# Patient Record
Sex: MALE | Race: WHITE | Employment: UNEMPLOYED | URBAN - METROPOLITAN AREA
[De-identification: names, ages, dates, MRNs, and addresses within clinical notes are randomized per-mention and may not be internally consistent; named-entity substitution may affect disease eponyms.]

---

## 2017-03-24 ENCOUNTER — APPOINTMENT (OUTPATIENT)
Dept: GENERAL RADIOLOGY | Age: 57
End: 2017-03-24
Payer: COMMERCIAL

## 2017-03-24 ENCOUNTER — HOSPITAL ENCOUNTER (EMERGENCY)
Age: 57
Discharge: OTHER INSTITUTION | End: 2017-03-25
Attending: EMERGENCY MEDICINE
Payer: COMMERCIAL

## 2017-03-24 VITALS
WEIGHT: 195 LBS | HEART RATE: 112 BPM | SYSTOLIC BLOOD PRESSURE: 159 MMHG | DIASTOLIC BLOOD PRESSURE: 104 MMHG | RESPIRATION RATE: 16 BRPM | TEMPERATURE: 98.2 F | OXYGEN SATURATION: 98 % | BODY MASS INDEX: 25.04 KG/M2

## 2017-03-24 DIAGNOSIS — M54.9 CHRONIC BACK PAIN, UNSPECIFIED BACK LOCATION, UNSPECIFIED BACK PAIN LATERALITY: ICD-10-CM

## 2017-03-24 DIAGNOSIS — F25.9 SCHIZOAFFECTIVE DISORDER, UNSPECIFIED TYPE (HCC): ICD-10-CM

## 2017-03-24 DIAGNOSIS — G89.29 CHRONIC SHOULDER PAIN, UNSPECIFIED LATERALITY: Primary | ICD-10-CM

## 2017-03-24 DIAGNOSIS — G89.29 CHRONIC BACK PAIN, UNSPECIFIED BACK LOCATION, UNSPECIFIED BACK PAIN LATERALITY: ICD-10-CM

## 2017-03-24 DIAGNOSIS — M25.519 CHRONIC SHOULDER PAIN, UNSPECIFIED LATERALITY: Primary | ICD-10-CM

## 2017-03-24 PROCEDURE — 99285 EMERGENCY DEPT VISIT HI MDM: CPT

## 2017-03-24 PROCEDURE — 73030 X-RAY EXAM OF SHOULDER: CPT

## 2017-03-24 PROCEDURE — 93005 ELECTROCARDIOGRAM TRACING: CPT

## 2017-03-24 PROCEDURE — 72100 X-RAY EXAM L-S SPINE 2/3 VWS: CPT

## 2017-03-24 RX ORDER — HYDROCODONE BITARTRATE AND ACETAMINOPHEN 5; 325 MG/1; MG/1
1 TABLET ORAL EVERY 6 HOURS PRN
Qty: 10 TABLET | Refills: 0 | Status: ON HOLD | OUTPATIENT
Start: 2017-03-24 | End: 2017-04-04 | Stop reason: HOSPADM

## 2017-03-24 ASSESSMENT — ENCOUNTER SYMPTOMS
COUGH: 0
ORTHOPNEA: 0
ABDOMINAL PAIN: 0
BACK PAIN: 1
SHORTNESS OF BREATH: 0
WHEEZING: 0
NAUSEA: 0
DIARRHEA: 0
VOMITING: 0

## 2017-03-24 ASSESSMENT — PAIN DESCRIPTION - ONSET: ONSET: ON-GOING

## 2017-03-24 ASSESSMENT — PAIN DESCRIPTION - PAIN TYPE: TYPE: ACUTE PAIN;CHRONIC PAIN

## 2017-03-24 ASSESSMENT — PAIN DESCRIPTION - FREQUENCY: FREQUENCY: CONTINUOUS

## 2017-03-24 ASSESSMENT — PAIN DESCRIPTION - PROGRESSION: CLINICAL_PROGRESSION: GRADUALLY WORSENING

## 2017-03-24 ASSESSMENT — PAIN DESCRIPTION - DESCRIPTORS: DESCRIPTORS: ACHING

## 2017-03-24 ASSESSMENT — PAIN DESCRIPTION - LOCATION: LOCATION: BACK;GENERALIZED;SHOULDER

## 2017-03-24 ASSESSMENT — PAIN SCALES - GENERAL: PAINLEVEL_OUTOF10: 10

## 2017-03-25 ENCOUNTER — HOSPITAL ENCOUNTER (INPATIENT)
Age: 57
LOS: 23 days | Discharge: HOME | DRG: 885 | End: 2017-04-17
Attending: PSYCHIATRY & NEUROLOGY | Admitting: PSYCHIATRY & NEUROLOGY
Payer: COMMERCIAL

## 2017-03-25 PROBLEM — F90.2 ADHD (ATTENTION DEFICIT HYPERACTIVITY DISORDER), COMBINED TYPE: Status: ACTIVE | Noted: 2017-03-25

## 2017-03-25 LAB
AMPHETAMINE SCREEN URINE: NEGATIVE
BARBITURATE SCREEN URINE: NEGATIVE
BENZODIAZEPINE SCREEN, URINE: NEGATIVE
BUPRENORPHINE URINE: NORMAL
CANNABINOID SCREEN URINE: NEGATIVE
COCAINE METABOLITE, URINE: NEGATIVE
MDMA URINE: NORMAL
METHADONE SCREEN, URINE: NEGATIVE
METHAMPHETAMINE, URINE: NORMAL
OPIATES, URINE: NEGATIVE
OXYCODONE SCREEN URINE: NEGATIVE
PHENCYCLIDINE, URINE: NEGATIVE
PROPOXYPHENE, URINE: NORMAL
TEST INFORMATION: NORMAL
TRICYCLIC ANTIDEPRESSANTS, UR: NORMAL

## 2017-03-25 PROCEDURE — 6370000000 HC RX 637 (ALT 250 FOR IP): Performed by: PSYCHIATRY & NEUROLOGY

## 2017-03-25 PROCEDURE — 80307 DRUG TEST PRSMV CHEM ANLYZR: CPT

## 2017-03-25 PROCEDURE — 1240000000 HC EMOTIONAL WELLNESS R&B

## 2017-03-25 PROCEDURE — 6370000000 HC RX 637 (ALT 250 FOR IP): Performed by: EMERGENCY MEDICINE

## 2017-03-25 RX ORDER — BUPROPION HYDROCHLORIDE 100 MG/1
100 TABLET, EXTENDED RELEASE ORAL DAILY
Status: DISCONTINUED | OUTPATIENT
Start: 2017-03-25 | End: 2017-03-31

## 2017-03-25 RX ORDER — LORAZEPAM 0.5 MG/1
1 TABLET ORAL ONCE
Status: COMPLETED | OUTPATIENT
Start: 2017-03-25 | End: 2017-03-25

## 2017-03-25 RX ORDER — LORAZEPAM 2 MG/ML
2 INJECTION INTRAMUSCULAR
Status: ACTIVE | OUTPATIENT
Start: 2017-03-25 | End: 2017-03-25

## 2017-03-25 RX ORDER — NICOTINE 21 MG/24HR
1 PATCH, TRANSDERMAL 24 HOURS TRANSDERMAL DAILY
Status: DISCONTINUED | OUTPATIENT
Start: 2017-03-25 | End: 2017-03-25 | Stop reason: CLARIF

## 2017-03-25 RX ORDER — BENZTROPINE MESYLATE 1 MG/ML
2 INJECTION INTRAMUSCULAR; INTRAVENOUS DAILY PRN
Status: DISCONTINUED | OUTPATIENT
Start: 2017-03-25 | End: 2017-04-17 | Stop reason: HOSPADM

## 2017-03-25 RX ORDER — ATOMOXETINE 10 MG/1
20 CAPSULE ORAL DAILY
Status: DISCONTINUED | OUTPATIENT
Start: 2017-03-25 | End: 2017-03-28

## 2017-03-25 RX ORDER — QUETIAPINE FUMARATE 300 MG/1
300 TABLET, FILM COATED ORAL NIGHTLY
Status: DISCONTINUED | OUTPATIENT
Start: 2017-03-25 | End: 2017-04-17 | Stop reason: HOSPADM

## 2017-03-25 RX ORDER — ATOMOXETINE 18 MG/1
18 CAPSULE ORAL DAILY
Status: DISCONTINUED | OUTPATIENT
Start: 2017-03-25 | End: 2017-03-25

## 2017-03-25 RX ORDER — CITALOPRAM 20 MG/1
20 TABLET ORAL DAILY
Status: DISCONTINUED | OUTPATIENT
Start: 2017-03-25 | End: 2017-03-25

## 2017-03-25 RX ORDER — MAGNESIUM HYDROXIDE/ALUMINUM HYDROXICE/SIMETHICONE 120; 1200; 1200 MG/30ML; MG/30ML; MG/30ML
30 SUSPENSION ORAL 2 TIMES DAILY PRN
Status: DISCONTINUED | OUTPATIENT
Start: 2017-03-25 | End: 2017-04-17 | Stop reason: HOSPADM

## 2017-03-25 RX ORDER — HYDROXYZINE HYDROCHLORIDE 25 MG/1
50 TABLET, FILM COATED ORAL 3 TIMES DAILY PRN
Status: DISPENSED | OUTPATIENT
Start: 2017-03-25 | End: 2017-03-28

## 2017-03-25 RX ORDER — TRAZODONE HYDROCHLORIDE 100 MG/1
100 TABLET ORAL NIGHTLY PRN
Status: DISCONTINUED | OUTPATIENT
Start: 2017-03-25 | End: 2017-04-17 | Stop reason: HOSPADM

## 2017-03-25 RX ORDER — ARIPIPRAZOLE 5 MG/1
5 TABLET ORAL DAILY
Status: DISCONTINUED | OUTPATIENT
Start: 2017-03-25 | End: 2017-03-25

## 2017-03-25 RX ORDER — HALOPERIDOL 5 MG/ML
10 INJECTION INTRAMUSCULAR
Status: ACTIVE | OUTPATIENT
Start: 2017-03-25 | End: 2017-03-25

## 2017-03-25 RX ORDER — ACETAMINOPHEN 325 MG/1
650 TABLET ORAL EVERY 6 HOURS PRN
Status: DISCONTINUED | OUTPATIENT
Start: 2017-03-25 | End: 2017-04-17 | Stop reason: HOSPADM

## 2017-03-25 RX ADMIN — TRAZODONE HYDROCHLORIDE 100 MG: 100 TABLET ORAL at 21:01

## 2017-03-25 RX ADMIN — QUETIAPINE FUMARATE 300 MG: 300 TABLET, FILM COATED ORAL at 21:01

## 2017-03-25 RX ADMIN — LORAZEPAM 1 MG: 0.5 TABLET ORAL at 00:33

## 2017-03-25 RX ADMIN — ATOMOXETINE HYDROCHLORIDE 20 MG: 10 CAPSULE ORAL at 13:27

## 2017-03-25 RX ADMIN — CITALOPRAM HYDROBROMIDE 20 MG: 20 TABLET ORAL at 08:03

## 2017-03-25 RX ADMIN — HYDROXYZINE HYDROCHLORIDE 50 MG: 25 TABLET, FILM COATED ORAL at 21:01

## 2017-03-25 RX ADMIN — ARIPIPRAZOLE 5 MG: 5 TABLET ORAL at 08:03

## 2017-03-25 RX ADMIN — ACETAMINOPHEN 650 MG: 325 TABLET ORAL at 01:52

## 2017-03-25 RX ADMIN — HYDROXYZINE HYDROCHLORIDE 50 MG: 25 TABLET, FILM COATED ORAL at 01:52

## 2017-03-25 RX ADMIN — ACETAMINOPHEN 650 MG: 325 TABLET ORAL at 10:36

## 2017-03-25 RX ADMIN — BUPROPION HYDROCHLORIDE 100 MG: 100 TABLET, FILM COATED, EXTENDED RELEASE ORAL at 12:00

## 2017-03-25 ASSESSMENT — PAIN SCALES - GENERAL
PAINLEVEL_OUTOF10: 4
PAINLEVEL_OUTOF10: 5
PAINLEVEL_OUTOF10: 0
PAINLEVEL_OUTOF10: 4
PAINLEVEL_OUTOF10: 7

## 2017-03-25 ASSESSMENT — LIFESTYLE VARIABLES: HISTORY_ALCOHOL_USE: YES

## 2017-03-25 ASSESSMENT — SLEEP AND FATIGUE QUESTIONNAIRES
DO YOU USE A SLEEP AID: NO
AVERAGE NUMBER OF SLEEP HOURS: 7
DO YOU HAVE DIFFICULTY SLEEPING: NO

## 2017-03-25 ASSESSMENT — PATIENT HEALTH QUESTIONNAIRE - PHQ9: SUM OF ALL RESPONSES TO PHQ QUESTIONS 1-9: 5

## 2017-03-25 ASSESSMENT — PAIN DESCRIPTION - PAIN TYPE: TYPE: ACUTE PAIN

## 2017-03-26 LAB
ABSOLUTE EOS #: 0.48 K/UL (ref 0–0.4)
ABSOLUTE LYMPH #: 1.84 K/UL (ref 1–4.8)
ABSOLUTE MONO #: 0.82 K/UL (ref 0.1–1.3)
ALBUMIN SERPL-MCNC: 3.9 G/DL (ref 3.5–5.2)
ALBUMIN/GLOBULIN RATIO: ABNORMAL (ref 1–2.5)
ALP BLD-CCNC: 64 U/L (ref 40–129)
ALT SERPL-CCNC: 18 U/L (ref 5–41)
ANION GAP SERPL CALCULATED.3IONS-SCNC: 14 MMOL/L (ref 9–17)
AST SERPL-CCNC: 15 U/L
BASOPHILS # BLD: 1 % (ref 0–2)
BASOPHILS ABSOLUTE: 0.07 K/UL (ref 0–0.2)
BILIRUB SERPL-MCNC: 0.24 MG/DL (ref 0.3–1.2)
BUN BLDV-MCNC: 13 MG/DL (ref 6–20)
BUN/CREAT BLD: ABNORMAL (ref 9–20)
CALCIUM SERPL-MCNC: 9 MG/DL (ref 8.6–10.4)
CHLORIDE BLD-SCNC: 102 MMOL/L (ref 98–107)
CO2: 22 MMOL/L (ref 20–31)
CREAT SERPL-MCNC: 0.67 MG/DL (ref 0.7–1.2)
DIFFERENTIAL TYPE: ABNORMAL
EOSINOPHILS RELATIVE PERCENT: 7 % (ref 0–4)
GFR AFRICAN AMERICAN: >60 ML/MIN
GFR NON-AFRICAN AMERICAN: >60 ML/MIN
GFR SERPL CREATININE-BSD FRML MDRD: ABNORMAL ML/MIN/{1.73_M2}
GFR SERPL CREATININE-BSD FRML MDRD: ABNORMAL ML/MIN/{1.73_M2}
GLUCOSE BLD-MCNC: 144 MG/DL (ref 70–99)
HCT VFR BLD CALC: 38 % (ref 41–53)
HEMOGLOBIN: 11.7 G/DL (ref 13.5–17.5)
LYMPHOCYTES # BLD: 27 % (ref 24–44)
MCH RBC QN AUTO: 24.3 PG (ref 26–34)
MCHC RBC AUTO-ENTMCNC: 30.7 G/DL (ref 31–37)
MCV RBC AUTO: 79.2 FL (ref 80–100)
MONOCYTES # BLD: 12 % (ref 1–7)
MORPHOLOGY: ABNORMAL
PDW BLD-RTO: 16.3 % (ref 11.5–14.9)
PLATELET # BLD: 248 K/UL (ref 150–450)
PLATELET ESTIMATE: ABNORMAL
PMV BLD AUTO: 7.7 FL (ref 6–12)
POTASSIUM SERPL-SCNC: 4.5 MMOL/L (ref 3.7–5.3)
RBC # BLD: 4.8 M/UL (ref 4.5–5.9)
RBC # BLD: ABNORMAL 10*6/UL
SEG NEUTROPHILS: 53 % (ref 36–66)
SEGMENTED NEUTROPHILS ABSOLUTE COUNT: 3.59 K/UL (ref 1.3–9.1)
SODIUM BLD-SCNC: 138 MMOL/L (ref 135–144)
TOTAL PROTEIN: 7.1 G/DL (ref 6.4–8.3)
WBC # BLD: 6.8 K/UL (ref 3.5–11)
WBC # BLD: ABNORMAL 10*3/UL

## 2017-03-26 PROCEDURE — 6370000000 HC RX 637 (ALT 250 FOR IP): Performed by: PSYCHIATRY & NEUROLOGY

## 2017-03-26 PROCEDURE — 85025 COMPLETE CBC W/AUTO DIFF WBC: CPT

## 2017-03-26 PROCEDURE — 36415 COLL VENOUS BLD VENIPUNCTURE: CPT

## 2017-03-26 PROCEDURE — 1240000000 HC EMOTIONAL WELLNESS R&B

## 2017-03-26 PROCEDURE — 6360000002 HC RX W HCPCS: Performed by: PSYCHIATRY & NEUROLOGY

## 2017-03-26 PROCEDURE — 80053 COMPREHEN METABOLIC PANEL: CPT

## 2017-03-26 RX ORDER — BENZTROPINE MESYLATE 1 MG/1
1 TABLET ORAL 2 TIMES DAILY
Status: DISCONTINUED | OUTPATIENT
Start: 2017-03-26 | End: 2017-04-17 | Stop reason: HOSPADM

## 2017-03-26 RX ADMIN — BUPROPION HYDROCHLORIDE 100 MG: 100 TABLET, FILM COATED, EXTENDED RELEASE ORAL at 09:06

## 2017-03-26 RX ADMIN — HYDROXYZINE HYDROCHLORIDE 50 MG: 25 TABLET, FILM COATED ORAL at 20:17

## 2017-03-26 RX ADMIN — ALUMINUM HYDROXIDE, MAGNESIUM HYDROXIDE, AND SIMETHICONE 30 ML: 200; 200; 20 SUSPENSION ORAL at 00:01

## 2017-03-26 RX ADMIN — BENZTROPINE MESYLATE 1 MG: 1 TABLET ORAL at 20:17

## 2017-03-26 RX ADMIN — ATOMOXETINE HYDROCHLORIDE 20 MG: 10 CAPSULE ORAL at 09:06

## 2017-03-26 RX ADMIN — ALUMINUM HYDROXIDE, MAGNESIUM HYDROXIDE, AND SIMETHICONE 30 ML: 200; 200; 20 SUSPENSION ORAL at 18:50

## 2017-03-26 RX ADMIN — BENZTROPINE MESYLATE 2 MG: 1 INJECTION INTRAMUSCULAR; INTRAVENOUS at 15:10

## 2017-03-26 RX ADMIN — QUETIAPINE FUMARATE 300 MG: 300 TABLET, FILM COATED ORAL at 20:17

## 2017-03-26 RX ADMIN — HYDROXYZINE HYDROCHLORIDE 50 MG: 25 TABLET, FILM COATED ORAL at 09:29

## 2017-03-26 RX ADMIN — TRAZODONE HYDROCHLORIDE 100 MG: 100 TABLET ORAL at 20:17

## 2017-03-27 PROCEDURE — 6370000000 HC RX 637 (ALT 250 FOR IP): Performed by: PSYCHIATRY & NEUROLOGY

## 2017-03-27 PROCEDURE — 1240000000 HC EMOTIONAL WELLNESS R&B

## 2017-03-27 RX ORDER — AMLODIPINE BESYLATE 10 MG/1
10 TABLET ORAL DAILY
Status: DISCONTINUED | OUTPATIENT
Start: 2017-03-27 | End: 2017-04-17 | Stop reason: HOSPADM

## 2017-03-27 RX ORDER — LORAZEPAM 2 MG/ML
2 INJECTION INTRAMUSCULAR
Status: ACTIVE | OUTPATIENT
Start: 2017-03-27 | End: 2017-03-27

## 2017-03-27 RX ORDER — LORAZEPAM 0.5 MG/1
0.5 TABLET ORAL 2 TIMES DAILY
Status: DISCONTINUED | OUTPATIENT
Start: 2017-03-27 | End: 2017-04-17 | Stop reason: HOSPADM

## 2017-03-27 RX ORDER — HALOPERIDOL 5 MG/ML
10 INJECTION INTRAMUSCULAR
Status: ACTIVE | OUTPATIENT
Start: 2017-03-27 | End: 2017-03-27

## 2017-03-27 RX ORDER — PANTOPRAZOLE SODIUM 20 MG/1
20 TABLET, DELAYED RELEASE ORAL
Status: DISCONTINUED | OUTPATIENT
Start: 2017-03-28 | End: 2017-04-17 | Stop reason: HOSPADM

## 2017-03-27 RX ADMIN — QUETIAPINE FUMARATE 300 MG: 300 TABLET, FILM COATED ORAL at 20:34

## 2017-03-27 RX ADMIN — TRAZODONE HYDROCHLORIDE 100 MG: 100 TABLET ORAL at 22:21

## 2017-03-27 RX ADMIN — ATOMOXETINE HYDROCHLORIDE 20 MG: 10 CAPSULE ORAL at 08:48

## 2017-03-27 RX ADMIN — BENZTROPINE MESYLATE 1 MG: 1 TABLET ORAL at 08:48

## 2017-03-27 RX ADMIN — AMLODIPINE BESYLATE 10 MG: 10 TABLET ORAL at 11:03

## 2017-03-27 RX ADMIN — BUPROPION HYDROCHLORIDE 100 MG: 100 TABLET, FILM COATED, EXTENDED RELEASE ORAL at 08:48

## 2017-03-27 RX ADMIN — BENZTROPINE MESYLATE 1 MG: 1 TABLET ORAL at 20:34

## 2017-03-27 RX ADMIN — LORAZEPAM 0.5 MG: 0.5 TABLET ORAL at 20:34

## 2017-03-27 RX ADMIN — ALUMINUM HYDROXIDE, MAGNESIUM HYDROXIDE, AND SIMETHICONE 30 ML: 200; 200; 20 SUSPENSION ORAL at 11:03

## 2017-03-28 PROCEDURE — 6370000000 HC RX 637 (ALT 250 FOR IP): Performed by: PSYCHIATRY & NEUROLOGY

## 2017-03-28 PROCEDURE — 1240000000 HC EMOTIONAL WELLNESS R&B

## 2017-03-28 RX ORDER — ATOMOXETINE 10 MG/1
30 CAPSULE ORAL DAILY
Status: DISCONTINUED | OUTPATIENT
Start: 2017-03-29 | End: 2017-03-30

## 2017-03-28 RX ADMIN — AMLODIPINE BESYLATE 10 MG: 10 TABLET ORAL at 08:32

## 2017-03-28 RX ADMIN — PANTOPRAZOLE SODIUM 20 MG: 20 TABLET, DELAYED RELEASE ORAL at 06:40

## 2017-03-28 RX ADMIN — LORAZEPAM 0.5 MG: 0.5 TABLET ORAL at 20:39

## 2017-03-28 RX ADMIN — BUPROPION HYDROCHLORIDE 100 MG: 100 TABLET, FILM COATED, EXTENDED RELEASE ORAL at 08:31

## 2017-03-28 RX ADMIN — ATOMOXETINE HYDROCHLORIDE 20 MG: 10 CAPSULE ORAL at 08:32

## 2017-03-28 RX ADMIN — LORAZEPAM 0.5 MG: 0.5 TABLET ORAL at 08:32

## 2017-03-28 RX ADMIN — QUETIAPINE FUMARATE 300 MG: 300 TABLET, FILM COATED ORAL at 20:39

## 2017-03-28 RX ADMIN — BENZTROPINE MESYLATE 1 MG: 1 TABLET ORAL at 08:32

## 2017-03-29 LAB
EKG ATRIAL RATE: 114 BPM
EKG P AXIS: 47 DEGREES
EKG P-R INTERVAL: 138 MS
EKG Q-T INTERVAL: 336 MS
EKG QRS DURATION: 94 MS
EKG QTC CALCULATION (BAZETT): 463 MS
EKG R AXIS: -14 DEGREES
EKG T AXIS: 52 DEGREES
EKG VENTRICULAR RATE: 114 BPM

## 2017-03-29 PROCEDURE — 1240000000 HC EMOTIONAL WELLNESS R&B

## 2017-03-29 PROCEDURE — 6370000000 HC RX 637 (ALT 250 FOR IP): Performed by: PSYCHIATRY & NEUROLOGY

## 2017-03-29 RX ADMIN — LORAZEPAM 0.5 MG: 0.5 TABLET ORAL at 08:59

## 2017-03-29 RX ADMIN — PANTOPRAZOLE SODIUM 20 MG: 20 TABLET, DELAYED RELEASE ORAL at 06:36

## 2017-03-29 RX ADMIN — LORAZEPAM 0.5 MG: 0.5 TABLET ORAL at 20:54

## 2017-03-29 RX ADMIN — QUETIAPINE FUMARATE 300 MG: 300 TABLET, FILM COATED ORAL at 20:54

## 2017-03-29 RX ADMIN — TRAZODONE HYDROCHLORIDE 100 MG: 100 TABLET ORAL at 20:54

## 2017-03-29 RX ADMIN — ACETAMINOPHEN 650 MG: 325 TABLET ORAL at 10:15

## 2017-03-29 RX ADMIN — AMLODIPINE BESYLATE 10 MG: 10 TABLET ORAL at 08:58

## 2017-03-29 RX ADMIN — ATOMOXETINE HYDROCHLORIDE 30 MG: 10 CAPSULE ORAL at 08:58

## 2017-03-29 RX ADMIN — BUPROPION HYDROCHLORIDE 100 MG: 100 TABLET, FILM COATED, EXTENDED RELEASE ORAL at 08:59

## 2017-03-29 RX ADMIN — BENZTROPINE MESYLATE 1 MG: 1 TABLET ORAL at 20:54

## 2017-03-29 ASSESSMENT — PAIN DESCRIPTION - LOCATION
LOCATION: SHOULDER
LOCATION: SHOULDER

## 2017-03-29 ASSESSMENT — PAIN SCALES - GENERAL
PAINLEVEL_OUTOF10: 0
PAINLEVEL_OUTOF10: 3
PAINLEVEL_OUTOF10: 10

## 2017-03-29 ASSESSMENT — PAIN DESCRIPTION - ORIENTATION
ORIENTATION: LEFT
ORIENTATION: LEFT

## 2017-03-29 ASSESSMENT — PAIN DESCRIPTION - PAIN TYPE
TYPE: CHRONIC PAIN
TYPE: CHRONIC PAIN

## 2017-03-30 PROCEDURE — 1240000000 HC EMOTIONAL WELLNESS R&B

## 2017-03-30 PROCEDURE — 6370000000 HC RX 637 (ALT 250 FOR IP): Performed by: PSYCHIATRY & NEUROLOGY

## 2017-03-30 RX ORDER — ATOMOXETINE 40 MG/1
40 CAPSULE ORAL DAILY
Status: DISCONTINUED | OUTPATIENT
Start: 2017-03-31 | End: 2017-03-31

## 2017-03-30 RX ADMIN — ATOMOXETINE HYDROCHLORIDE 30 MG: 10 CAPSULE ORAL at 08:57

## 2017-03-30 RX ADMIN — LORAZEPAM 0.5 MG: 0.5 TABLET ORAL at 08:58

## 2017-03-30 RX ADMIN — ACETAMINOPHEN 650 MG: 325 TABLET ORAL at 18:04

## 2017-03-30 RX ADMIN — PANTOPRAZOLE SODIUM 20 MG: 20 TABLET, DELAYED RELEASE ORAL at 06:29

## 2017-03-30 RX ADMIN — ALUMINUM HYDROXIDE, MAGNESIUM HYDROXIDE, AND SIMETHICONE 30 ML: 200; 200; 20 SUSPENSION ORAL at 21:40

## 2017-03-30 RX ADMIN — QUETIAPINE FUMARATE 300 MG: 300 TABLET, FILM COATED ORAL at 20:27

## 2017-03-30 RX ADMIN — AMLODIPINE BESYLATE 10 MG: 10 TABLET ORAL at 08:58

## 2017-03-30 RX ADMIN — BUPROPION HYDROCHLORIDE 100 MG: 100 TABLET, FILM COATED, EXTENDED RELEASE ORAL at 08:58

## 2017-03-30 RX ADMIN — LORAZEPAM 0.5 MG: 0.5 TABLET ORAL at 20:27

## 2017-03-30 ASSESSMENT — PAIN DESCRIPTION - LOCATION
LOCATION: HEAD
LOCATION: HEAD
LOCATION: BACK

## 2017-03-30 ASSESSMENT — PAIN SCALES - GENERAL
PAINLEVEL_OUTOF10: 0
PAINLEVEL_OUTOF10: 10
PAINLEVEL_OUTOF10: 3

## 2017-03-30 ASSESSMENT — PAIN DESCRIPTION - PAIN TYPE
TYPE: ACUTE PAIN
TYPE: CHRONIC PAIN
TYPE: ACUTE PAIN

## 2017-03-31 PROCEDURE — 6370000000 HC RX 637 (ALT 250 FOR IP): Performed by: PSYCHIATRY & NEUROLOGY

## 2017-03-31 PROCEDURE — 1240000000 HC EMOTIONAL WELLNESS R&B

## 2017-03-31 RX ORDER — BUPROPION HYDROCHLORIDE 150 MG/1
300 TABLET, EXTENDED RELEASE ORAL DAILY
Status: DISCONTINUED | OUTPATIENT
Start: 2017-04-01 | End: 2017-04-17 | Stop reason: HOSPADM

## 2017-03-31 RX ORDER — ATOMOXETINE 60 MG/1
60 CAPSULE ORAL DAILY
Status: DISCONTINUED | OUTPATIENT
Start: 2017-04-01 | End: 2017-04-17 | Stop reason: HOSPADM

## 2017-03-31 RX ORDER — BUPROPION HYDROCHLORIDE 100 MG/1
100 TABLET, EXTENDED RELEASE ORAL ONCE
Status: DISCONTINUED | OUTPATIENT
Start: 2017-03-31 | End: 2017-03-31

## 2017-03-31 RX ORDER — BUPROPION HYDROCHLORIDE 150 MG/1
300 TABLET, EXTENDED RELEASE ORAL ONCE
Status: DISCONTINUED | OUTPATIENT
Start: 2017-04-01 | End: 2017-03-31

## 2017-03-31 RX ORDER — BUPROPION HYDROCHLORIDE 100 MG/1
200 TABLET, EXTENDED RELEASE ORAL DAILY
Status: DISCONTINUED | OUTPATIENT
Start: 2017-04-01 | End: 2017-03-31

## 2017-03-31 RX ORDER — BUPROPION HYDROCHLORIDE 100 MG/1
100 TABLET, EXTENDED RELEASE ORAL ONCE
Status: COMPLETED | OUTPATIENT
Start: 2017-03-31 | End: 2017-03-31

## 2017-03-31 RX ADMIN — PANTOPRAZOLE SODIUM 20 MG: 20 TABLET, DELAYED RELEASE ORAL at 06:12

## 2017-03-31 RX ADMIN — BUPROPION HYDROCHLORIDE 100 MG: 100 TABLET, FILM COATED, EXTENDED RELEASE ORAL at 09:24

## 2017-03-31 RX ADMIN — AMLODIPINE BESYLATE 10 MG: 10 TABLET ORAL at 08:32

## 2017-03-31 RX ADMIN — QUETIAPINE FUMARATE 300 MG: 300 TABLET, FILM COATED ORAL at 20:50

## 2017-03-31 RX ADMIN — BUPROPION HYDROCHLORIDE 100 MG: 100 TABLET, FILM COATED, EXTENDED RELEASE ORAL at 08:33

## 2017-03-31 RX ADMIN — ATOMOXETINE HYDROCHLORIDE 40 MG: 40 CAPSULE ORAL at 08:33

## 2017-03-31 RX ADMIN — LORAZEPAM 0.5 MG: 0.5 TABLET ORAL at 08:33

## 2017-03-31 RX ADMIN — LORAZEPAM 0.5 MG: 0.5 TABLET ORAL at 20:50

## 2017-03-31 ASSESSMENT — PAIN SCALES - GENERAL: PAINLEVEL_OUTOF10: 10

## 2017-03-31 ASSESSMENT — PAIN DESCRIPTION - LOCATION: LOCATION: HEAD;BACK

## 2017-03-31 ASSESSMENT — PAIN DESCRIPTION - PAIN TYPE: TYPE: ACUTE PAIN

## 2017-04-01 PROCEDURE — 6370000000 HC RX 637 (ALT 250 FOR IP): Performed by: PSYCHIATRY & NEUROLOGY

## 2017-04-01 PROCEDURE — 1240000000 HC EMOTIONAL WELLNESS R&B

## 2017-04-01 RX ADMIN — AMLODIPINE BESYLATE 10 MG: 10 TABLET ORAL at 08:43

## 2017-04-01 RX ADMIN — LORAZEPAM 0.5 MG: 0.5 TABLET ORAL at 21:44

## 2017-04-01 RX ADMIN — BENZTROPINE MESYLATE 1 MG: 1 TABLET ORAL at 08:42

## 2017-04-01 RX ADMIN — PANTOPRAZOLE SODIUM 20 MG: 20 TABLET, DELAYED RELEASE ORAL at 06:16

## 2017-04-01 RX ADMIN — QUETIAPINE FUMARATE 300 MG: 300 TABLET, FILM COATED ORAL at 21:43

## 2017-04-01 RX ADMIN — ACETAMINOPHEN 650 MG: 325 TABLET ORAL at 21:42

## 2017-04-01 RX ADMIN — ALUMINUM HYDROXIDE, MAGNESIUM HYDROXIDE, AND SIMETHICONE 30 ML: 200; 200; 20 SUSPENSION ORAL at 10:45

## 2017-04-01 RX ADMIN — BUPROPION HYDROCHLORIDE 300 MG: 150 TABLET, FILM COATED, EXTENDED RELEASE ORAL at 08:43

## 2017-04-01 RX ADMIN — ATOMOXETINE HYDROCHLORIDE 60 MG: 60 CAPSULE ORAL at 08:43

## 2017-04-01 RX ADMIN — LORAZEPAM 0.5 MG: 0.5 TABLET ORAL at 08:43

## 2017-04-01 ASSESSMENT — PAIN SCALES - GENERAL
PAINLEVEL_OUTOF10: 5
PAINLEVEL_OUTOF10: 0

## 2017-04-02 PROCEDURE — 6370000000 HC RX 637 (ALT 250 FOR IP): Performed by: PSYCHIATRY & NEUROLOGY

## 2017-04-02 PROCEDURE — 1240000000 HC EMOTIONAL WELLNESS R&B

## 2017-04-02 RX ADMIN — ACETAMINOPHEN 650 MG: 325 TABLET ORAL at 12:08

## 2017-04-02 RX ADMIN — ATOMOXETINE HYDROCHLORIDE 60 MG: 60 CAPSULE ORAL at 08:29

## 2017-04-02 RX ADMIN — QUETIAPINE FUMARATE 300 MG: 300 TABLET, FILM COATED ORAL at 21:30

## 2017-04-02 RX ADMIN — AMLODIPINE BESYLATE 10 MG: 10 TABLET ORAL at 08:25

## 2017-04-02 RX ADMIN — LORAZEPAM 0.5 MG: 0.5 TABLET ORAL at 21:30

## 2017-04-02 RX ADMIN — LORAZEPAM 0.5 MG: 0.5 TABLET ORAL at 08:25

## 2017-04-02 RX ADMIN — BUPROPION HYDROCHLORIDE 300 MG: 150 TABLET, FILM COATED, EXTENDED RELEASE ORAL at 08:25

## 2017-04-02 ASSESSMENT — PAIN SCALES - GENERAL: PAINLEVEL_OUTOF10: 10

## 2017-04-03 PROCEDURE — 6370000000 HC RX 637 (ALT 250 FOR IP): Performed by: PSYCHIATRY & NEUROLOGY

## 2017-04-03 PROCEDURE — 1240000000 HC EMOTIONAL WELLNESS R&B

## 2017-04-03 RX ADMIN — BUPROPION HYDROCHLORIDE 300 MG: 150 TABLET, FILM COATED, EXTENDED RELEASE ORAL at 08:50

## 2017-04-03 RX ADMIN — LORAZEPAM 0.5 MG: 0.5 TABLET ORAL at 21:49

## 2017-04-03 RX ADMIN — ATOMOXETINE HYDROCHLORIDE 60 MG: 60 CAPSULE ORAL at 08:50

## 2017-04-03 RX ADMIN — AMLODIPINE BESYLATE 10 MG: 10 TABLET ORAL at 08:50

## 2017-04-03 RX ADMIN — QUETIAPINE FUMARATE 300 MG: 300 TABLET, FILM COATED ORAL at 21:49

## 2017-04-03 RX ADMIN — PANTOPRAZOLE SODIUM 20 MG: 20 TABLET, DELAYED RELEASE ORAL at 06:46

## 2017-04-03 RX ADMIN — LORAZEPAM 0.5 MG: 0.5 TABLET ORAL at 08:50

## 2017-04-03 RX ADMIN — ALUMINUM HYDROXIDE, MAGNESIUM HYDROXIDE, AND SIMETHICONE 30 ML: 200; 200; 20 SUSPENSION ORAL at 00:45

## 2017-04-04 PROCEDURE — 6370000000 HC RX 637 (ALT 250 FOR IP): Performed by: PSYCHIATRY & NEUROLOGY

## 2017-04-04 PROCEDURE — 1240000000 HC EMOTIONAL WELLNESS R&B

## 2017-04-04 RX ORDER — BENZTROPINE MESYLATE 1 MG/1
1 TABLET ORAL 2 TIMES DAILY
Qty: 60 TABLET | Refills: 0 | Status: SHIPPED | OUTPATIENT
Start: 2017-04-04

## 2017-04-04 RX ORDER — RANOLAZINE 500 MG/1
500 TABLET, EXTENDED RELEASE ORAL 2 TIMES DAILY
Qty: 60 TABLET | Refills: 0 | Status: SHIPPED | OUTPATIENT
Start: 2017-04-04

## 2017-04-04 RX ORDER — QUETIAPINE FUMARATE 300 MG/1
300 TABLET, FILM COATED ORAL NIGHTLY
Qty: 30 TABLET | Refills: 0 | Status: SHIPPED | OUTPATIENT
Start: 2017-04-04

## 2017-04-04 RX ORDER — TRAZODONE HYDROCHLORIDE 100 MG/1
100 TABLET ORAL NIGHTLY PRN
Qty: 30 TABLET | Refills: 0 | Status: SHIPPED | OUTPATIENT
Start: 2017-04-04

## 2017-04-04 RX ORDER — LORAZEPAM 0.5 MG/1
0.5 TABLET ORAL 2 TIMES DAILY
Qty: 60 TABLET | Refills: 0 | Status: SHIPPED | OUTPATIENT
Start: 2017-04-04 | End: 2017-05-04

## 2017-04-04 RX ORDER — PANTOPRAZOLE SODIUM 20 MG/1
20 TABLET, DELAYED RELEASE ORAL
Qty: 30 TABLET | Refills: 0 | Status: SHIPPED | OUTPATIENT
Start: 2017-04-04

## 2017-04-04 RX ORDER — BUPROPION HYDROCHLORIDE 150 MG/1
300 TABLET, EXTENDED RELEASE ORAL DAILY
Qty: 60 TABLET | Refills: 0 | Status: SHIPPED | OUTPATIENT
Start: 2017-04-04

## 2017-04-04 RX ORDER — AMLODIPINE BESYLATE 10 MG/1
10 TABLET ORAL DAILY
Qty: 30 TABLET | Refills: 0 | Status: SHIPPED | OUTPATIENT
Start: 2017-04-04

## 2017-04-04 RX ORDER — ATOMOXETINE 60 MG/1
60 CAPSULE ORAL DAILY
Qty: 30 CAPSULE | Refills: 0 | Status: SHIPPED | OUTPATIENT
Start: 2017-04-04

## 2017-04-04 RX ADMIN — LORAZEPAM 0.5 MG: 0.5 TABLET ORAL at 08:51

## 2017-04-04 RX ADMIN — ATOMOXETINE HYDROCHLORIDE 60 MG: 60 CAPSULE ORAL at 08:51

## 2017-04-04 RX ADMIN — ALUMINUM HYDROXIDE, MAGNESIUM HYDROXIDE, AND SIMETHICONE 30 ML: 200; 200; 20 SUSPENSION ORAL at 16:32

## 2017-04-04 RX ADMIN — BUPROPION HYDROCHLORIDE 300 MG: 150 TABLET, FILM COATED, EXTENDED RELEASE ORAL at 08:52

## 2017-04-04 RX ADMIN — BENZTROPINE MESYLATE 1 MG: 1 TABLET ORAL at 08:52

## 2017-04-04 RX ADMIN — TRAZODONE HYDROCHLORIDE 100 MG: 100 TABLET ORAL at 20:26

## 2017-04-04 RX ADMIN — ACETAMINOPHEN 650 MG: 325 TABLET ORAL at 20:26

## 2017-04-04 RX ADMIN — AMLODIPINE BESYLATE 10 MG: 10 TABLET ORAL at 08:52

## 2017-04-04 RX ADMIN — LORAZEPAM 0.5 MG: 0.5 TABLET ORAL at 20:25

## 2017-04-04 RX ADMIN — ALUMINUM HYDROXIDE, MAGNESIUM HYDROXIDE, AND SIMETHICONE 30 ML: 200; 200; 20 SUSPENSION ORAL at 21:54

## 2017-04-04 ASSESSMENT — PAIN SCALES - GENERAL
PAINLEVEL_OUTOF10: 7
PAINLEVEL_OUTOF10: 3

## 2017-04-05 PROCEDURE — 6370000000 HC RX 637 (ALT 250 FOR IP): Performed by: PSYCHIATRY & NEUROLOGY

## 2017-04-05 PROCEDURE — 1240000000 HC EMOTIONAL WELLNESS R&B

## 2017-04-05 RX ADMIN — LORAZEPAM 0.5 MG: 0.5 TABLET ORAL at 08:25

## 2017-04-05 RX ADMIN — ATOMOXETINE HYDROCHLORIDE 60 MG: 60 CAPSULE ORAL at 08:25

## 2017-04-05 RX ADMIN — ALUMINUM HYDROXIDE, MAGNESIUM HYDROXIDE, AND SIMETHICONE 30 ML: 200; 200; 20 SUSPENSION ORAL at 09:33

## 2017-04-05 RX ADMIN — BENZTROPINE MESYLATE 1 MG: 1 TABLET ORAL at 08:25

## 2017-04-05 RX ADMIN — AMLODIPINE BESYLATE 10 MG: 10 TABLET ORAL at 08:25

## 2017-04-05 RX ADMIN — BUPROPION HYDROCHLORIDE 300 MG: 150 TABLET, FILM COATED, EXTENDED RELEASE ORAL at 08:25

## 2017-04-05 RX ADMIN — PANTOPRAZOLE SODIUM 20 MG: 20 TABLET, DELAYED RELEASE ORAL at 06:32

## 2017-04-05 RX ADMIN — LORAZEPAM 0.5 MG: 0.5 TABLET ORAL at 20:43

## 2017-04-05 RX ADMIN — QUETIAPINE FUMARATE 300 MG: 300 TABLET, FILM COATED ORAL at 20:44

## 2017-04-05 ASSESSMENT — PAIN SCALES - GENERAL: PAINLEVEL_OUTOF10: 10

## 2017-04-06 PROCEDURE — 1240000000 HC EMOTIONAL WELLNESS R&B

## 2017-04-06 PROCEDURE — 6370000000 HC RX 637 (ALT 250 FOR IP): Performed by: PSYCHIATRY & NEUROLOGY

## 2017-04-06 RX ADMIN — BENZTROPINE MESYLATE 1 MG: 1 TABLET ORAL at 10:42

## 2017-04-06 RX ADMIN — LORAZEPAM 0.5 MG: 0.5 TABLET ORAL at 08:45

## 2017-04-06 RX ADMIN — BUPROPION HYDROCHLORIDE 300 MG: 150 TABLET, FILM COATED, EXTENDED RELEASE ORAL at 08:45

## 2017-04-06 RX ADMIN — LORAZEPAM 0.5 MG: 0.5 TABLET ORAL at 20:33

## 2017-04-06 RX ADMIN — QUETIAPINE FUMARATE 300 MG: 300 TABLET, FILM COATED ORAL at 20:31

## 2017-04-06 RX ADMIN — PANTOPRAZOLE SODIUM 20 MG: 20 TABLET, DELAYED RELEASE ORAL at 08:48

## 2017-04-06 RX ADMIN — ATOMOXETINE HYDROCHLORIDE 60 MG: 60 CAPSULE ORAL at 08:45

## 2017-04-06 RX ADMIN — AMLODIPINE BESYLATE 10 MG: 10 TABLET ORAL at 08:45

## 2017-04-06 ASSESSMENT — PAIN SCALES - GENERAL: PAINLEVEL_OUTOF10: 10

## 2017-04-07 PROCEDURE — 6370000000 HC RX 637 (ALT 250 FOR IP): Performed by: PSYCHIATRY & NEUROLOGY

## 2017-04-07 PROCEDURE — 1240000000 HC EMOTIONAL WELLNESS R&B

## 2017-04-07 RX ADMIN — AMLODIPINE BESYLATE 10 MG: 10 TABLET ORAL at 08:49

## 2017-04-07 RX ADMIN — QUETIAPINE FUMARATE 300 MG: 300 TABLET, FILM COATED ORAL at 20:39

## 2017-04-07 RX ADMIN — BUPROPION HYDROCHLORIDE 300 MG: 150 TABLET, FILM COATED, EXTENDED RELEASE ORAL at 08:49

## 2017-04-07 RX ADMIN — PANTOPRAZOLE SODIUM 20 MG: 20 TABLET, DELAYED RELEASE ORAL at 06:28

## 2017-04-07 RX ADMIN — ALUMINUM HYDROXIDE, MAGNESIUM HYDROXIDE, AND SIMETHICONE 30 ML: 200; 200; 20 SUSPENSION ORAL at 12:07

## 2017-04-07 RX ADMIN — ACETAMINOPHEN 650 MG: 325 TABLET ORAL at 20:39

## 2017-04-07 RX ADMIN — LORAZEPAM 0.5 MG: 0.5 TABLET ORAL at 20:39

## 2017-04-07 RX ADMIN — ATOMOXETINE HYDROCHLORIDE 60 MG: 60 CAPSULE ORAL at 08:49

## 2017-04-07 RX ADMIN — BENZTROPINE MESYLATE 1 MG: 1 TABLET ORAL at 20:39

## 2017-04-07 RX ADMIN — LORAZEPAM 0.5 MG: 0.5 TABLET ORAL at 08:49

## 2017-04-07 ASSESSMENT — PAIN SCALES - GENERAL
PAINLEVEL_OUTOF10: 6
PAINLEVEL_OUTOF10: 3

## 2017-04-08 PROCEDURE — 1240000000 HC EMOTIONAL WELLNESS R&B

## 2017-04-08 PROCEDURE — 6370000000 HC RX 637 (ALT 250 FOR IP): Performed by: PSYCHIATRY & NEUROLOGY

## 2017-04-08 RX ADMIN — AMLODIPINE BESYLATE 10 MG: 10 TABLET ORAL at 08:41

## 2017-04-08 RX ADMIN — TRAZODONE HYDROCHLORIDE 100 MG: 100 TABLET ORAL at 20:34

## 2017-04-08 RX ADMIN — BUPROPION HYDROCHLORIDE 300 MG: 150 TABLET, FILM COATED, EXTENDED RELEASE ORAL at 08:41

## 2017-04-08 RX ADMIN — QUETIAPINE FUMARATE 300 MG: 300 TABLET, FILM COATED ORAL at 20:33

## 2017-04-08 RX ADMIN — BENZTROPINE MESYLATE 1 MG: 1 TABLET ORAL at 08:41

## 2017-04-08 RX ADMIN — BENZTROPINE MESYLATE 1 MG: 1 TABLET ORAL at 20:33

## 2017-04-08 RX ADMIN — LORAZEPAM 0.5 MG: 0.5 TABLET ORAL at 08:41

## 2017-04-08 RX ADMIN — LORAZEPAM 0.5 MG: 0.5 TABLET ORAL at 20:33

## 2017-04-08 RX ADMIN — ATOMOXETINE HYDROCHLORIDE 60 MG: 60 CAPSULE ORAL at 08:41

## 2017-04-08 RX ADMIN — PANTOPRAZOLE SODIUM 20 MG: 20 TABLET, DELAYED RELEASE ORAL at 06:27

## 2017-04-09 PROCEDURE — 1240000000 HC EMOTIONAL WELLNESS R&B

## 2017-04-09 PROCEDURE — 6370000000 HC RX 637 (ALT 250 FOR IP): Performed by: PSYCHIATRY & NEUROLOGY

## 2017-04-09 RX ADMIN — LORAZEPAM 0.5 MG: 0.5 TABLET ORAL at 08:40

## 2017-04-09 RX ADMIN — BUPROPION HYDROCHLORIDE 300 MG: 150 TABLET, FILM COATED, EXTENDED RELEASE ORAL at 08:39

## 2017-04-09 RX ADMIN — QUETIAPINE FUMARATE 300 MG: 300 TABLET, FILM COATED ORAL at 20:30

## 2017-04-09 RX ADMIN — ATOMOXETINE HYDROCHLORIDE 60 MG: 60 CAPSULE ORAL at 08:39

## 2017-04-09 RX ADMIN — PANTOPRAZOLE SODIUM 20 MG: 20 TABLET, DELAYED RELEASE ORAL at 08:39

## 2017-04-09 RX ADMIN — AMLODIPINE BESYLATE 10 MG: 10 TABLET ORAL at 08:40

## 2017-04-09 RX ADMIN — ALUMINUM HYDROXIDE, MAGNESIUM HYDROXIDE, AND SIMETHICONE 30 ML: 200; 200; 20 SUSPENSION ORAL at 16:57

## 2017-04-09 RX ADMIN — BENZTROPINE MESYLATE 1 MG: 1 TABLET ORAL at 08:40

## 2017-04-09 RX ADMIN — LORAZEPAM 0.5 MG: 0.5 TABLET ORAL at 20:30

## 2017-04-09 ASSESSMENT — PAIN SCALES - GENERAL
PAINLEVEL_OUTOF10: 10
PAINLEVEL_OUTOF10: 10

## 2017-04-09 ASSESSMENT — PAIN DESCRIPTION - LOCATION
LOCATION: GENERALIZED
LOCATION: GENERALIZED

## 2017-04-09 ASSESSMENT — PAIN DESCRIPTION - PAIN TYPE: TYPE: CHRONIC PAIN

## 2017-04-10 PROCEDURE — 1240000000 HC EMOTIONAL WELLNESS R&B

## 2017-04-10 PROCEDURE — 6370000000 HC RX 637 (ALT 250 FOR IP): Performed by: PSYCHIATRY & NEUROLOGY

## 2017-04-10 RX ADMIN — LORAZEPAM 0.5 MG: 0.5 TABLET ORAL at 20:32

## 2017-04-10 RX ADMIN — BENZTROPINE MESYLATE 1 MG: 1 TABLET ORAL at 11:38

## 2017-04-10 RX ADMIN — PANTOPRAZOLE SODIUM 20 MG: 20 TABLET, DELAYED RELEASE ORAL at 06:32

## 2017-04-10 RX ADMIN — QUETIAPINE FUMARATE 300 MG: 300 TABLET, FILM COATED ORAL at 20:32

## 2017-04-10 RX ADMIN — ATOMOXETINE HYDROCHLORIDE 60 MG: 60 CAPSULE ORAL at 08:31

## 2017-04-10 RX ADMIN — LORAZEPAM 0.5 MG: 0.5 TABLET ORAL at 08:32

## 2017-04-10 RX ADMIN — ACETAMINOPHEN 650 MG: 325 TABLET ORAL at 13:34

## 2017-04-10 RX ADMIN — BUPROPION HYDROCHLORIDE 300 MG: 150 TABLET, FILM COATED, EXTENDED RELEASE ORAL at 08:31

## 2017-04-10 ASSESSMENT — PAIN DESCRIPTION - LOCATION: LOCATION: HEAD

## 2017-04-10 ASSESSMENT — PAIN DESCRIPTION - PAIN TYPE: TYPE: ACUTE PAIN

## 2017-04-10 ASSESSMENT — PAIN SCALES - GENERAL
PAINLEVEL_OUTOF10: 1
PAINLEVEL_OUTOF10: 3

## 2017-04-10 ASSESSMENT — PAIN DESCRIPTION - DESCRIPTORS: DESCRIPTORS: HEADACHE

## 2017-04-10 ASSESSMENT — PAIN DESCRIPTION - FREQUENCY: FREQUENCY: INTERMITTENT

## 2017-04-11 PROCEDURE — 6370000000 HC RX 637 (ALT 250 FOR IP): Performed by: PSYCHIATRY & NEUROLOGY

## 2017-04-11 PROCEDURE — 1240000000 HC EMOTIONAL WELLNESS R&B

## 2017-04-11 RX ADMIN — BUPROPION HYDROCHLORIDE 300 MG: 150 TABLET, FILM COATED, EXTENDED RELEASE ORAL at 08:35

## 2017-04-11 RX ADMIN — TRAZODONE HYDROCHLORIDE 100 MG: 100 TABLET ORAL at 20:53

## 2017-04-11 RX ADMIN — LORAZEPAM 0.5 MG: 0.5 TABLET ORAL at 20:53

## 2017-04-11 RX ADMIN — BENZTROPINE MESYLATE 1 MG: 1 TABLET ORAL at 20:53

## 2017-04-11 RX ADMIN — LORAZEPAM 0.5 MG: 0.5 TABLET ORAL at 08:35

## 2017-04-11 RX ADMIN — BENZTROPINE MESYLATE 1 MG: 1 TABLET ORAL at 08:35

## 2017-04-11 RX ADMIN — ATOMOXETINE HYDROCHLORIDE 60 MG: 60 CAPSULE ORAL at 08:35

## 2017-04-11 RX ADMIN — AMLODIPINE BESYLATE 10 MG: 10 TABLET ORAL at 08:35

## 2017-04-11 RX ADMIN — QUETIAPINE FUMARATE 300 MG: 300 TABLET, FILM COATED ORAL at 20:53

## 2017-04-11 RX ADMIN — PANTOPRAZOLE SODIUM 20 MG: 20 TABLET, DELAYED RELEASE ORAL at 06:48

## 2017-04-11 ASSESSMENT — PAIN DESCRIPTION - LOCATION: LOCATION: GENERALIZED

## 2017-04-12 PROCEDURE — 1240000000 HC EMOTIONAL WELLNESS R&B

## 2017-04-12 PROCEDURE — 6370000000 HC RX 637 (ALT 250 FOR IP): Performed by: PSYCHIATRY & NEUROLOGY

## 2017-04-12 RX ADMIN — LORAZEPAM 0.5 MG: 0.5 TABLET ORAL at 20:33

## 2017-04-12 RX ADMIN — QUETIAPINE FUMARATE 300 MG: 300 TABLET, FILM COATED ORAL at 20:33

## 2017-04-12 RX ADMIN — PANTOPRAZOLE SODIUM 20 MG: 20 TABLET, DELAYED RELEASE ORAL at 06:22

## 2017-04-12 RX ADMIN — LORAZEPAM 0.5 MG: 0.5 TABLET ORAL at 08:38

## 2017-04-12 RX ADMIN — AMLODIPINE BESYLATE 10 MG: 10 TABLET ORAL at 08:38

## 2017-04-12 RX ADMIN — BENZTROPINE MESYLATE 1 MG: 1 TABLET ORAL at 08:39

## 2017-04-12 RX ADMIN — BUPROPION HYDROCHLORIDE 300 MG: 150 TABLET, FILM COATED, EXTENDED RELEASE ORAL at 08:38

## 2017-04-12 RX ADMIN — ATOMOXETINE HYDROCHLORIDE 60 MG: 60 CAPSULE ORAL at 08:40

## 2017-04-12 ASSESSMENT — PAIN DESCRIPTION - PAIN TYPE: TYPE: ACUTE PAIN

## 2017-04-12 ASSESSMENT — PAIN DESCRIPTION - LOCATION: LOCATION: GENERALIZED

## 2017-04-12 ASSESSMENT — PAIN SCALES - GENERAL: PAINLEVEL_OUTOF10: 10

## 2017-04-13 PROCEDURE — 6370000000 HC RX 637 (ALT 250 FOR IP): Performed by: PSYCHIATRY & NEUROLOGY

## 2017-04-13 PROCEDURE — 1240000000 HC EMOTIONAL WELLNESS R&B

## 2017-04-13 PROCEDURE — 6370000000 HC RX 637 (ALT 250 FOR IP): Performed by: PODIATRIST

## 2017-04-13 RX ORDER — CLOTRIMAZOLE 1 %
CREAM (GRAM) TOPICAL 2 TIMES DAILY
Status: DISCONTINUED | OUTPATIENT
Start: 2017-04-13 | End: 2017-04-17 | Stop reason: HOSPADM

## 2017-04-13 RX ADMIN — LORAZEPAM 0.5 MG: 0.5 TABLET ORAL at 20:28

## 2017-04-13 RX ADMIN — PANTOPRAZOLE SODIUM 20 MG: 20 TABLET, DELAYED RELEASE ORAL at 06:27

## 2017-04-13 RX ADMIN — LORAZEPAM 0.5 MG: 0.5 TABLET ORAL at 09:02

## 2017-04-13 RX ADMIN — BENZTROPINE MESYLATE 1 MG: 1 TABLET ORAL at 09:03

## 2017-04-13 RX ADMIN — AMLODIPINE BESYLATE 10 MG: 10 TABLET ORAL at 09:01

## 2017-04-13 RX ADMIN — BUPROPION HYDROCHLORIDE 300 MG: 150 TABLET, FILM COATED, EXTENDED RELEASE ORAL at 09:02

## 2017-04-13 RX ADMIN — QUETIAPINE FUMARATE 300 MG: 300 TABLET, FILM COATED ORAL at 20:28

## 2017-04-13 RX ADMIN — ATOMOXETINE HYDROCHLORIDE 60 MG: 60 CAPSULE ORAL at 09:02

## 2017-04-13 RX ADMIN — CLOTRIMAZOLE: 10 CREAM TOPICAL at 15:02

## 2017-04-14 PROCEDURE — 1240000000 HC EMOTIONAL WELLNESS R&B

## 2017-04-14 PROCEDURE — 6370000000 HC RX 637 (ALT 250 FOR IP): Performed by: PSYCHIATRY & NEUROLOGY

## 2017-04-14 PROCEDURE — 6370000000 HC RX 637 (ALT 250 FOR IP): Performed by: PODIATRIST

## 2017-04-14 RX ADMIN — LORAZEPAM 0.5 MG: 0.5 TABLET ORAL at 08:53

## 2017-04-14 RX ADMIN — LORAZEPAM 0.5 MG: 0.5 TABLET ORAL at 20:26

## 2017-04-14 RX ADMIN — BUPROPION HYDROCHLORIDE 150 MG: 150 TABLET, FILM COATED, EXTENDED RELEASE ORAL at 08:53

## 2017-04-14 RX ADMIN — CLOTRIMAZOLE: 10 CREAM TOPICAL at 10:20

## 2017-04-14 RX ADMIN — ATOMOXETINE HYDROCHLORIDE 60 MG: 60 CAPSULE ORAL at 08:52

## 2017-04-14 RX ADMIN — QUETIAPINE FUMARATE 300 MG: 300 TABLET, FILM COATED ORAL at 20:25

## 2017-04-14 RX ADMIN — BENZTROPINE MESYLATE 1 MG: 1 TABLET ORAL at 11:42

## 2017-04-14 RX ADMIN — AMLODIPINE BESYLATE 10 MG: 10 TABLET ORAL at 08:53

## 2017-04-14 RX ADMIN — PANTOPRAZOLE SODIUM 20 MG: 20 TABLET, DELAYED RELEASE ORAL at 06:21

## 2017-04-15 PROCEDURE — 6370000000 HC RX 637 (ALT 250 FOR IP): Performed by: PSYCHIATRY & NEUROLOGY

## 2017-04-15 PROCEDURE — 1240000000 HC EMOTIONAL WELLNESS R&B

## 2017-04-15 RX ADMIN — AMLODIPINE BESYLATE 10 MG: 10 TABLET ORAL at 08:36

## 2017-04-15 RX ADMIN — ATOMOXETINE HYDROCHLORIDE 60 MG: 60 CAPSULE ORAL at 08:36

## 2017-04-15 RX ADMIN — BENZTROPINE MESYLATE 1 MG: 1 TABLET ORAL at 08:36

## 2017-04-15 RX ADMIN — LORAZEPAM 0.5 MG: 0.5 TABLET ORAL at 20:44

## 2017-04-15 RX ADMIN — ACETAMINOPHEN 650 MG: 325 TABLET ORAL at 20:43

## 2017-04-15 RX ADMIN — PANTOPRAZOLE SODIUM 20 MG: 20 TABLET, DELAYED RELEASE ORAL at 06:27

## 2017-04-15 RX ADMIN — LORAZEPAM 0.5 MG: 0.5 TABLET ORAL at 08:36

## 2017-04-15 RX ADMIN — QUETIAPINE FUMARATE 300 MG: 300 TABLET, FILM COATED ORAL at 20:44

## 2017-04-15 RX ADMIN — ALUMINUM HYDROXIDE, MAGNESIUM HYDROXIDE, AND SIMETHICONE 30 ML: 200; 200; 20 SUSPENSION ORAL at 10:43

## 2017-04-15 RX ADMIN — BUPROPION HYDROCHLORIDE 300 MG: 150 TABLET, FILM COATED, EXTENDED RELEASE ORAL at 08:36

## 2017-04-15 ASSESSMENT — PAIN SCALES - GENERAL
PAINLEVEL_OUTOF10: 3
PAINLEVEL_OUTOF10: 3

## 2017-04-16 PROCEDURE — 1240000000 HC EMOTIONAL WELLNESS R&B

## 2017-04-16 PROCEDURE — 6370000000 HC RX 637 (ALT 250 FOR IP): Performed by: PSYCHIATRY & NEUROLOGY

## 2017-04-16 RX ADMIN — BUPROPION HYDROCHLORIDE 300 MG: 150 TABLET, FILM COATED, EXTENDED RELEASE ORAL at 08:32

## 2017-04-16 RX ADMIN — PANTOPRAZOLE SODIUM 20 MG: 20 TABLET, DELAYED RELEASE ORAL at 06:23

## 2017-04-16 RX ADMIN — ALUMINUM HYDROXIDE, MAGNESIUM HYDROXIDE, AND SIMETHICONE 30 ML: 200; 200; 20 SUSPENSION ORAL at 19:50

## 2017-04-16 RX ADMIN — CLOTRIMAZOLE: 10 CREAM TOPICAL at 20:42

## 2017-04-16 RX ADMIN — ATOMOXETINE HYDROCHLORIDE 60 MG: 60 CAPSULE ORAL at 08:40

## 2017-04-16 RX ADMIN — QUETIAPINE FUMARATE 300 MG: 300 TABLET, FILM COATED ORAL at 20:32

## 2017-04-16 RX ADMIN — AMLODIPINE BESYLATE 10 MG: 10 TABLET ORAL at 08:32

## 2017-04-16 RX ADMIN — LORAZEPAM 0.5 MG: 0.5 TABLET ORAL at 20:31

## 2017-04-16 RX ADMIN — LORAZEPAM 0.5 MG: 0.5 TABLET ORAL at 08:32

## 2017-04-16 RX ADMIN — ALUMINUM HYDROXIDE, MAGNESIUM HYDROXIDE, AND SIMETHICONE 30 ML: 200; 200; 20 SUSPENSION ORAL at 10:38

## 2017-04-16 RX ADMIN — BENZTROPINE MESYLATE 1 MG: 1 TABLET ORAL at 08:32

## 2017-04-17 VITALS
WEIGHT: 195 LBS | DIASTOLIC BLOOD PRESSURE: 71 MMHG | OXYGEN SATURATION: 97 % | BODY MASS INDEX: 27.3 KG/M2 | HEART RATE: 84 BPM | SYSTOLIC BLOOD PRESSURE: 124 MMHG | HEIGHT: 71 IN | TEMPERATURE: 98.1 F | RESPIRATION RATE: 14 BRPM

## 2017-04-17 PROCEDURE — 6370000000 HC RX 637 (ALT 250 FOR IP): Performed by: PSYCHIATRY & NEUROLOGY

## 2017-04-17 PROCEDURE — 5130000000 HC BRIDGE APPOINTMENT

## 2017-04-17 RX ADMIN — PANTOPRAZOLE SODIUM 20 MG: 20 TABLET, DELAYED RELEASE ORAL at 06:10

## 2017-04-17 RX ADMIN — CLOTRIMAZOLE: 10 CREAM TOPICAL at 08:37

## 2017-04-17 RX ADMIN — BENZTROPINE MESYLATE 1 MG: 1 TABLET ORAL at 08:36

## 2017-04-17 RX ADMIN — AMLODIPINE BESYLATE 10 MG: 10 TABLET ORAL at 08:36

## 2017-04-17 RX ADMIN — ATOMOXETINE HYDROCHLORIDE 60 MG: 60 CAPSULE ORAL at 08:35

## 2017-04-17 RX ADMIN — BUPROPION HYDROCHLORIDE 300 MG: 150 TABLET, FILM COATED, EXTENDED RELEASE ORAL at 08:36

## 2017-04-17 RX ADMIN — LORAZEPAM 0.5 MG: 0.5 TABLET ORAL at 08:35

## 2023-03-04 PROCEDURE — 99308 SBSQ NF CARE LOW MDM 20: CPT | Performed by: INTERNAL MEDICINE

## 2023-04-01 PROCEDURE — 99308 SBSQ NF CARE LOW MDM 20: CPT | Performed by: INTERNAL MEDICINE

## 2023-04-30 ENCOUNTER — NURSING HOME VISIT (OUTPATIENT)
Dept: POST ACUTE CARE | Facility: EXTERNAL LOCATION | Age: 63
End: 2023-04-30
Payer: COMMERCIAL

## 2023-04-30 DIAGNOSIS — K21.9 GASTROESOPHAGEAL REFLUX DISEASE WITHOUT ESOPHAGITIS: ICD-10-CM

## 2023-04-30 DIAGNOSIS — R53.1 ASTHENIA: ICD-10-CM

## 2023-04-30 DIAGNOSIS — F32.89 OTHER DEPRESSION: ICD-10-CM

## 2023-04-30 DIAGNOSIS — J44.9 CHRONIC OBSTRUCTIVE PULMONARY DISEASE, UNSPECIFIED COPD TYPE (MULTI): ICD-10-CM

## 2023-04-30 DIAGNOSIS — G20.A1 PARKINSON'S DISEASE (MULTI): ICD-10-CM

## 2023-04-30 DIAGNOSIS — I25.10 ASHD (ARTERIOSCLEROTIC HEART DISEASE): ICD-10-CM

## 2023-04-30 DIAGNOSIS — F20.89 OTHER SCHIZOPHRENIA (MULTI): ICD-10-CM

## 2023-04-30 DIAGNOSIS — I10 BENIGN HYPERTENSION: Primary | ICD-10-CM

## 2023-04-30 DIAGNOSIS — F03.90 DEMENTIA, UNSPECIFIED DEMENTIA SEVERITY, UNSPECIFIED DEMENTIA TYPE, UNSPECIFIED WHETHER BEHAVIORAL, PSYCHOTIC, OR MOOD DISTURBANCE OR ANXIETY (MULTI): ICD-10-CM

## 2023-04-30 DIAGNOSIS — G40.89 OTHER SEIZURES (MULTI): ICD-10-CM

## 2023-04-30 PROCEDURE — 99308 SBSQ NF CARE LOW MDM 20: CPT | Performed by: INTERNAL MEDICINE

## 2023-04-30 NOTE — LETTER
Patient: Gerardo Albright  : 1960    Encounter Date: 2023    NAME OF THE PATIENT: Gerardo Albright     YOB: 1960    PLACE OF SERVICE: [facility]    This is a subsequent visit.    HPI: Mr. Gerardo Albright is a 62-year-old male with history of schizophrenia and depression.  He does have a history of Parkinson disease as well as dementia.  He is unable to care for himself and manage his affairs.  He requires supportive care.    PAST MEDICAL HISTORY: As per nursing home list. Schizoaffective disorder, schizophrenia, GERD, myocardial infarction, dementia, suicidal ideation, weakness, angina, seizure, depression, hypertension, ADHD.    CURRENT MEDICATIONS: As per nursing home list. Reviewed.    ALLERGIES: As per nursing home list. No allergies.    SOCIAL HISTORY: As per nursing home list. The patient is a nonsmoker and nondrinker.    FAMILY HISTORY: As per nursing home list. The patient does not recall his family.    REVIEW OF SYSTEMS: All 12 systems reviewed and pertaining covered in history and physical, rest are negative. The patient denies any fevers, chills, or chest pain at this time.    PHYSICAL EXAMINATION  GENERAL: This is a well-developed, well-nourished male, sitting in chair, in no acute distress.  VITAL SIGNS:  As recorded and reviewed from EMR.  Blood pressure 120/80.  Pulse 78.  Respirations 18.  Temperature 98.1.  EYES:  The patient's sclerae white.  The pupils were equal and round.  ENT:  The patient's external ears were normal and the otoscopic examination was negative.  NECK:  Supple.  There were no palpable masses.  Thyroid was not enlarged and there were no carotid bruits.  RESPIRATORY:  Lungs show rare scattered wheeze.  CARDIOVASCULAR:  The patient had S1 normal, split S2 without obvious rubs, clicks, or murmurs.  GASTROINTESTINAL:  There was no hepatosplenomegaly.  There were no palpable masses and no inguinal nodes.  LYMPHATIC:  The patient had no axillary,  groin, or lymphadenopathy.  MUSCULOSKELETAL:  The patient had normal gait.  The joints appeared to be normal without evidence of deformity.  Grossly the muscles had good range of motion and were without effusion.  EXTREMITIES:  There was no evidence of peripheral edema.  NEUROLOGIC:  The patient had normal cranial nerves.  The reflexes, sensory, and motor examination were grossly within normal limits.  PSYCHIATRIC: The patient had normal judgment and insight.  The patient was oriented to person, place, and time, and had no obvious mood defect including depression, anxiety, and/or agitation.    LAB WORK: Laboratory studies reviewed from prior visit.    ASSESSMENT AND PLAN:  Schizophrenia, on medication.  Parkinson's disease, on carbidopa/levodopa.  Dementia, unchanged.  Depression, on medication.  ASHD, on aspirin.  Weakness, on PT/OT.  Fall risk, on fall precautions.  COPD, on bronchodilator therapy.  Reflux disease, on PPI.  Hypertension, med controlled.  Seizure disorder, on anti-epileptic.  Medication list reviewed and discussed with the family.  Hospital chart reviewed.      Electronically Signed By: Amparo Galarza MD   8/2/23 10:27 AM

## 2023-05-06 ENCOUNTER — NURSING HOME VISIT (OUTPATIENT)
Dept: POST ACUTE CARE | Facility: EXTERNAL LOCATION | Age: 63
End: 2023-05-06
Payer: COMMERCIAL

## 2023-05-06 DIAGNOSIS — G40.89 OTHER SEIZURES (MULTI): ICD-10-CM

## 2023-05-06 DIAGNOSIS — F03.90 DEMENTIA, UNSPECIFIED DEMENTIA SEVERITY, UNSPECIFIED DEMENTIA TYPE, UNSPECIFIED WHETHER BEHAVIORAL, PSYCHOTIC, OR MOOD DISTURBANCE OR ANXIETY (MULTI): ICD-10-CM

## 2023-05-06 DIAGNOSIS — K21.9 GASTROESOPHAGEAL REFLUX DISEASE WITHOUT ESOPHAGITIS: ICD-10-CM

## 2023-05-06 DIAGNOSIS — J44.9 CHRONIC OBSTRUCTIVE PULMONARY DISEASE, UNSPECIFIED COPD TYPE (MULTI): Primary | ICD-10-CM

## 2023-05-06 DIAGNOSIS — G20.A1 PARKINSON'S DISEASE (MULTI): ICD-10-CM

## 2023-05-06 DIAGNOSIS — R53.1 ASTHENIA: ICD-10-CM

## 2023-05-06 DIAGNOSIS — I25.10 ASHD (ARTERIOSCLEROTIC HEART DISEASE): ICD-10-CM

## 2023-05-06 DIAGNOSIS — F20.89 OTHER SCHIZOPHRENIA (MULTI): ICD-10-CM

## 2023-05-06 DIAGNOSIS — F32.89 OTHER DEPRESSION: ICD-10-CM

## 2023-05-06 DIAGNOSIS — G47.09 OTHER INSOMNIA: ICD-10-CM

## 2023-05-06 DIAGNOSIS — I10 BENIGN HYPERTENSION: ICD-10-CM

## 2023-05-06 PROCEDURE — 99308 SBSQ NF CARE LOW MDM 20: CPT | Performed by: INTERNAL MEDICINE

## 2023-05-06 NOTE — LETTER
Patient: Gerardo Albright  : 1960    Encounter Date: 2023    NAME OF THE PATIENT: Gerardo Albright    YOB: 1960    PLACE OF SERVICE:  [facility]    This is a subsequent visit.    CHIEF COMPLAINT:  Mr. Gerardo Albright is a 62-year-old male with history of dementia with Parkinson disease.  He suffers from seizures and has a history of COPD.  He is unable to care for himself and requires supportive care.    PAST MEDICAL HISTORY: As per nursing home list. Schizoaffective disorder, schizophrenia, GERD, myocardial infarction, dementia, suicidal ideation, weakness, angina, seizure, depression, hypertension, ADHD.    CURRENT MEDICATIONS: As per nursing home list. Reviewed.    ALLERGIES: As per nursing home list. No allergies.    SOCIAL HISTORY: As per nursing home list. The patient is a nonsmoker and nondrinker.    FAMILY HISTORY: As per nursing home list. The patient does not recall his family.    REVIEW OF SYSTEMS: All 12 systems reviewed and pertaining covered in history and physical, rest are negative. The patient denies any fevers, chills, or chest pain at this time.    PHYSICAL EXAMINATION  GENERAL: This is a well-developed, well-nourished male, sitting in a chair, in no acute distress.  VITAL SIGNS:  As recorded and reviewed from EMR.  Blood pressure 126/78.  Pulse 84.  Respirations 18.  Temperature 97.9.  EYES:  The patient's sclerae white.  The pupils were equal and round.  ENT:  The patient's external ears were normal and the otoscopic examination was negative.  NECK:  Supple.  There were no palpable masses.  Thyroid was not enlarged and there were no carotid bruits.  RESPIRATORY:  Lungs show decreased breath sounds throughout with rare scattered wheeze.  CARDIOVASCULAR:  The patient had S1 normal, split S2 without obvious rubs, clicks, or murmurs.  GASTROINTESTINAL:  There was no hepatosplenomegaly.  There were no palpable masses and no inguinal nodes.  LYMPHATIC:  The patient  had no axillary, groin, or lymphadenopathy.  MUSCULOSKELETAL:  The patient had normal gait.  The joints appeared to be normal without evidence of deformity.  Grossly the muscles had good range of motion and were without effusion.  EXTREMITIES:  There was no evidence of peripheral edema.  NEUROLOGIC:  The patient had normal cranial nerves.  The reflexes, sensory, and motor examination were grossly within normal limits.  PSYCHIATRIC: The patient had normal judgment and insight.  The patient had no obvious mood defect including anxiety and/or agitation.  MENTAL STATUS EXAMINATION: The patient is alert and oriented x2.    LAB WORK: Laboratory studies were reviewed from prior visit.    ASSESSMENT AND PLAN:  COPD, on bronchodilator therapy.  Parkinson disease, on carbidopa and levodopa.  Seizure disorder, on antiepileptic.  Dementia, unchanged.  Schizophrenia, on medication.  Depression, on medication.  ASHD, on aspirin.  Weakness, on PT and OT.  Fall risk, on fall precaution.  Insomnia, on melatonin.  Reflux disease, on PPI.  Hypertension, med controlled.  Medication list reviewed and discussed with the family.  Hospital chart reviewed.      Electronically Signed By: Amparo Galarza MD   8/2/23 10:26 AM

## 2023-05-08 PROCEDURE — 99222 1ST HOSP IP/OBS MODERATE 55: CPT | Performed by: INTERNAL MEDICINE

## 2023-05-16 ENCOUNTER — NURSING HOME VISIT (OUTPATIENT)
Dept: POST ACUTE CARE | Facility: EXTERNAL LOCATION | Age: 63
End: 2023-05-16
Payer: COMMERCIAL

## 2023-05-16 DIAGNOSIS — E87.8 ELECTROLYTE DISORDER: ICD-10-CM

## 2023-05-16 DIAGNOSIS — S41.112A LACERATION OF MULTIPLE SITES OF LEFT UPPER EXTREMITY, INITIAL ENCOUNTER: Primary | ICD-10-CM

## 2023-05-16 DIAGNOSIS — I10 ESSENTIAL HYPERTENSION: ICD-10-CM

## 2023-05-16 DIAGNOSIS — K21.9 GASTROESOPHAGEAL REFLUX DISEASE, UNSPECIFIED WHETHER ESOPHAGITIS PRESENT: ICD-10-CM

## 2023-05-16 DIAGNOSIS — G62.9 NEUROPATHY: ICD-10-CM

## 2023-05-16 DIAGNOSIS — M13.0 POLYARTHRITIS: ICD-10-CM

## 2023-05-16 DIAGNOSIS — K59.00 CONSTIPATION, UNSPECIFIED CONSTIPATION TYPE: ICD-10-CM

## 2023-05-16 DIAGNOSIS — I25.10 CORONARY ARTERY DISEASE, UNSPECIFIED VESSEL OR LESION TYPE, UNSPECIFIED WHETHER ANGINA PRESENT, UNSPECIFIED WHETHER NATIVE OR TRANSPLANTED HEART: ICD-10-CM

## 2023-05-16 DIAGNOSIS — G20.A1 PARKINSON DISEASE (MULTI): ICD-10-CM

## 2023-05-16 DIAGNOSIS — G89.29 OTHER CHRONIC PAIN: ICD-10-CM

## 2023-05-16 PROCEDURE — 99310 SBSQ NF CARE HIGH MDM 45: CPT | Performed by: NURSE PRACTITIONER

## 2023-05-16 NOTE — LETTER
Patient: Gerardo Albright  : 1960    Encounter Date: 2023    *Provider Impression*  Patient is a 62 year y/o male who is seen today for management of multiple medical problems  #LFA Lacerations - PT/OT, wound care, keflex 500mg TID until   #Parkinson's / Neuropathy / Chronic pain / OA - APAP 650mg q4h PRN, schedule f/u w/ neurology - movement disorder clinic, neurontin 600mg TID  #HTN / CAD - amlodipine 10mg daily, ASA 81mg daily, Ranexa 500mg BID  #Electrolyte disorder - NaCl 1gram daily  #GERD / Constipation - pantoprazole 40mg daily  #Schizoaffective / Depression - per psych - mirtazapine, bupropion, cogentin, quetiapine, depakote, zoloft  Follow up as needed      *Chief Complaint*  laceration      *History of Present Illness*  Pt is a 61 y/o male LT resident of [facility] w/ [facility] as below who is seen today for follow up and management of multiple medical problems.     He was sent to the ED after an apparent suicide attempt during which he stole and disassembled shaving razors and slices his left wrist multiple times. He was treated and medically stabilized in the ED. His lacerations were sutured, he was evaluated by EPAT and admitted to [facility], where he was psychiatrically stabilized then d/c to back to [facility] for rehab.    He is seen today shortly after his arrival back to the facility and reports some tenderness, but denies any f/c, sweats, CP, SOB, cough, n/v, or any other new c/o presently.    Allergies - NKDA  PMH - ADHD, Schizoaffective, HTN, CAD, MDD  PSH - cardiac cath, PCI, CABG  FH - unreliable  SocHx - Current everyday smoker      *Review of Systems*  All other systems reviewed are negative except as noted in the HPI      *Vitals*  Date: 23 - T: 98.4 P: 69 R: 18 BP: 130/84 SpO2: 97% on RA ; 11/15/22 - Wt 185      *Results/Data*  CBC - Date: 23 WBC: 6.2 HGB: 14.7 HCT: 43.4 PLT: 272 ;   BMP - Date: 23 Na: 134 K: 4.4 Cl: 102 CO2: 24 BUN: 11 Cr: 0.74 Glu:  74 Ca: 9.5 Alb: 4.2 ;   LFT - Date: 23 AST: 12 ALT: 16 ALP: 46 Tbili: 0.3 ;   Coags - Date: INR: PT:  Other - Date: 9/3/20 - TC: 139 T HDL: 25 LDL: 93; 11/10/20 - CRP: 15.1 D-dimer: 0.46; 21 - TC: 199 T HDL: 36 LDL: 138 VPA: 86 TSH: 2.715 25-OH-D: 19.07 ; 21 - VPA: 49; 21 - 25-OH-D: 26.67; 22 - TSH: 2.218     *Physical Exam*  Gen: (+) NAD, (+) well-appearing  HEENT: (+) normocephalic, (+) MMM  Neck: (+) supple  Lungs: (-) cough  Abd: (+) ND  Ext: (-) edema, (-) deformity  MSK: (-) joint swelling  Skin: (-) rash, (+) LFA w/ dsg c/d/i  Neuro: (+) follows commands, (+) tremor, (+) alert      Electronically Signed By: VIVIANE Peña-CNP   23  9:33 PM

## 2023-05-20 ENCOUNTER — NURSING HOME VISIT (OUTPATIENT)
Dept: POST ACUTE CARE | Facility: EXTERNAL LOCATION | Age: 63
End: 2023-05-20
Payer: COMMERCIAL

## 2023-05-20 DIAGNOSIS — G40.89 OTHER SEIZURES (MULTI): ICD-10-CM

## 2023-05-20 DIAGNOSIS — F03.90 DEMENTIA, UNSPECIFIED DEMENTIA SEVERITY, UNSPECIFIED DEMENTIA TYPE, UNSPECIFIED WHETHER BEHAVIORAL, PSYCHOTIC, OR MOOD DISTURBANCE OR ANXIETY (MULTI): ICD-10-CM

## 2023-05-20 DIAGNOSIS — G47.09 OTHER INSOMNIA: ICD-10-CM

## 2023-05-20 DIAGNOSIS — I10 BENIGN HYPERTENSION: ICD-10-CM

## 2023-05-20 DIAGNOSIS — F20.89 OTHER SCHIZOPHRENIA (MULTI): Primary | ICD-10-CM

## 2023-05-20 DIAGNOSIS — F41.9 ANXIETY: ICD-10-CM

## 2023-05-20 DIAGNOSIS — R53.1 ASTHENIA: ICD-10-CM

## 2023-05-20 DIAGNOSIS — I21.A9 OTHER TYPE OF ACUTE MYOCARDIAL INFARCTION (MULTI): ICD-10-CM

## 2023-05-20 DIAGNOSIS — G20.A1 PARKINSON'S DISEASE (MULTI): ICD-10-CM

## 2023-05-20 DIAGNOSIS — K21.9 GASTROESOPHAGEAL REFLUX DISEASE WITHOUT ESOPHAGITIS: ICD-10-CM

## 2023-05-20 DIAGNOSIS — J44.9 CHRONIC OBSTRUCTIVE PULMONARY DISEASE, UNSPECIFIED COPD TYPE (MULTI): ICD-10-CM

## 2023-05-20 PROCEDURE — 99306 1ST NF CARE HIGH MDM 50: CPT | Performed by: INTERNAL MEDICINE

## 2023-05-20 NOTE — PROGRESS NOTES
*Provider Impression*  Patient is a 62 year y/o male who is seen today for management of multiple medical problems  #LFA Lacerations - PT/OT, wound care, keflex 500mg TID until 5/24  #Parkinson's / Neuropathy / Chronic pain / OA - APAP 650mg q4h PRN, schedule f/u w/ neurology - movement disorder clinic, neurontin 600mg TID  #HTN / CAD - amlodipine 10mg daily, ASA 81mg daily, Ranexa 500mg BID  #Electrolyte disorder - NaCl 1gram daily  #GERD / Constipation - pantoprazole 40mg daily  #Schizoaffective / Depression - per psych - mirtazapine, bupropion, cogentin, quetiapine, depakote, zoloft  Follow up as needed      *Chief Complaint*  laceration      *History of Present Illness*  Pt is a 61 y/o male LTC resident of [facility] w/ [facility] as below who is seen today for follow up and management of multiple medical problems.     He was sent to the ED after an apparent suicide attempt during which he stole and disassembled shaving razors and slices his left wrist multiple times. He was treated and medically stabilized in the ED. His lacerations were sutured, he was evaluated by EPAT and admitted to [facility], where he was psychiatrically stabilized then d/c to back to [facility] for rehab.    He is seen today shortly after his arrival back to the facility and reports some tenderness, but denies any f/c, sweats, CP, SOB, cough, n/v, or any other new c/o presently.    Allergies - NKDA  PMH - ADHD, Schizoaffective, HTN, CAD, MDD  PSH - cardiac cath, PCI, CABG  FH - unreliable  SocHx - Current everyday smoker      *Review of Systems*  All other systems reviewed are negative except as noted in the HPI      *Vitals*  Date: 5/16/23 - T: 98.4 P: 69 R: 18 BP: 130/84 SpO2: 97% on RA ; 11/15/22 - Wt 185      *Results/Data*  CBC - Date: 5/7/23 WBC: 6.2 HGB: 14.7 HCT: 43.4 PLT: 272 ;   BMP - Date: 5/7/23 Na: 134 K: 4.4 Cl: 102 CO2: 24 BUN: 11 Cr: 0.74 Glu: 74 Ca: 9.5 Alb: 4.2 ;   LFT - Date: 5/7/23 AST: 12 ALT: 16 ALP: 46 Tbili:  0.3 ;   Coags - Date: INR: PT:  Other - Date: 9/3/20 - TC: 139 T HDL: 25 LDL: 93; 11/10/20 - CRP: 15.1 D-dimer: 0.46; 21 - TC: 199 T HDL: 36 LDL: 138 VPA: 86 TSH: 2.715 25-OH-D: 19.07 ; 21 - VPA: 49; 21 - 25-OH-D: 26.67; 22 - TSH: 2.218     *Physical Exam*  Gen: (+) NAD, (+) well-appearing  HEENT: (+) normocephalic, (+) MMM  Neck: (+) supple  Lungs: (-) cough  Abd: (+) ND  Ext: (-) edema, (-) deformity  MSK: (-) joint swelling  Skin: (-) rash, (+) LFA w/ dsg c/d/i  Neuro: (+) follows commands, (+) tremor, (+) alert

## 2023-05-20 NOTE — LETTER
Patient: Gerardo Albright  : 1960    Encounter Date: 2023    NAME OF THE PATIENT: Gerardo Albright     YOB: 1960    PLACE OF SERVICE:  [facility].    This is a re-admission.    HPI:  Mr. Gerardo Albright is a 62-year-old male with history of schizoaffective disorder with seizures.  He has a history of dementia as well as Parkinson's disease.  He is unable to care for himself and manage his affairs.  He requires supportive care.    PAST MEDICAL HISTORY: As per nursing home list. Schizoaffective disorder, schizophrenia, GERD, myocardial infarction, dementia, suicidal ideation, weakness, angina, seizure, depression, hypertension, ADHD.    CURRENT MEDICATIONS: As per nursing home list. Reviewed.    ALLERGIES: As per nursing home list. No allergies.    SOCIAL HISTORY: As per nursing home list. The patient is a nonsmoker and nondrinker.    FAMILY HISTORY: As per nursing home list. The patient does not recall his family.    REVIEW OF SYSTEMS: All 12 systems reviewed and pertaining covered in history and physical, rest are negative. The patient denies any fevers, chills, or chest pain at this time.    PHYSICAL EXAMINATION  GENERAL: This is a well-developed, well-nourished male, sitting in a chair, in no acute distress.  VITAL SIGNS:  As recorded and reviewed from EMR.  Blood pressure 116/70.  Pulse 76.  Respirations 16.  Temperature 97.7.  EYES:  The patient's sclerae white.  The pupils were equal and round.  ENT:  The patient's external ears were normal and the otoscopic examination was negative.  NECK:  Supple.  There were no palpable masses.  Thyroid was not enlarged and there were no carotid bruits.  RESPIRATORY:  The patient had normal inspirations and expirations.  The breath sounds were equal bilaterally and clear to auscultation.  CARDIOVASCULAR:  The patient had S1 normal, split S2 without obvious rubs, clicks, or murmurs.  GASTROINTESTINAL:  There was no hepatosplenomegaly.   There were no palpable masses and no inguinal nodes.  LYMPHATIC:  The patient had no axillary, groin, or lymphadenopathy.  MUSCULOSKELETAL:  The patient had normal gait.  The joints appeared to be normal without evidence of deformity.  Grossly the muscles had good range of motion and were without effusion.  EXTREMITIES:  There was no evidence of peripheral edema.  NEUROLOGIC:  The patient had normal cranial nerves.  The reflexes, sensory, and motor examination were grossly within normal limits.  PSYCHIATRIC: The patient had normal judgment and insight. The patient had no obvious mood defect including depression,  and agitation.  MENTAL STATUS EXAMINATION:  The patient is alert and oriented x1.    LAB WORK: Laboratory studies reviewed from prior visit.    ASSESSMENT AND PLAN:  Schizoaffective disorder, on medication.  Seizure disorder, on antiepileptic.  Anxiety, on medication.  Parkinson's disease, on carbidopa and levodopa.  Dementia, unchanged.  Weakness, on PT and OT.  Fall risk, on fall precautions.  History of AMI, on aspirin.  Insomnia, on melatonin.  Reflux disease, on PPI.  COPD, on bronchodilator therapy.  Hypertension, med controlled.  Medication list reviewed and discussed with the family.  Hospital chart reviewed.      Electronically Signed By: Amparo Galarza MD   8/2/23 10:27 AM

## 2023-06-10 ENCOUNTER — NURSING HOME VISIT (OUTPATIENT)
Dept: POST ACUTE CARE | Facility: EXTERNAL LOCATION | Age: 63
End: 2023-06-10
Payer: COMMERCIAL

## 2023-06-10 DIAGNOSIS — J44.9 CHRONIC OBSTRUCTIVE PULMONARY DISEASE, UNSPECIFIED COPD TYPE (MULTI): Primary | ICD-10-CM

## 2023-06-10 DIAGNOSIS — K21.9 GASTROESOPHAGEAL REFLUX DISEASE WITHOUT ESOPHAGITIS: ICD-10-CM

## 2023-06-10 DIAGNOSIS — F20.89 OTHER SCHIZOPHRENIA (MULTI): ICD-10-CM

## 2023-06-10 DIAGNOSIS — F32.89 OTHER DEPRESSION: ICD-10-CM

## 2023-06-10 DIAGNOSIS — G47.09 OTHER INSOMNIA: ICD-10-CM

## 2023-06-10 DIAGNOSIS — I10 BENIGN HYPERTENSION: ICD-10-CM

## 2023-06-10 DIAGNOSIS — F03.90 DEMENTIA, UNSPECIFIED DEMENTIA SEVERITY, UNSPECIFIED DEMENTIA TYPE, UNSPECIFIED WHETHER BEHAVIORAL, PSYCHOTIC, OR MOOD DISTURBANCE OR ANXIETY (MULTI): ICD-10-CM

## 2023-06-10 DIAGNOSIS — G40.89 OTHER SEIZURES (MULTI): ICD-10-CM

## 2023-06-10 DIAGNOSIS — R53.1 ASTHENIA: ICD-10-CM

## 2023-06-10 PROCEDURE — 99307 SBSQ NF CARE SF MDM 10: CPT | Performed by: INTERNAL MEDICINE

## 2023-06-10 NOTE — LETTER
Patient: Gerardo Albright  : 1960    Encounter Date: 06/10/2023    NAME OF THE PATIENT: Gerardo Albright    YOB: 1960    PLACE OF SERVICE:  [facility].    This is a subsequent visit.    HPI:  Mr. Gerardo Albright is a 63-year-old male with history of dementia.  He suffers from COPD as well as schizophrenia.  He is unable to care for himself and requires supportive care.    PAST MEDICAL HISTORY: As per nursing home list. Schizoaffective disorder, schizophrenia, GERD, myocardial infarction, dementia, suicidal ideation, weakness, angina, seizure, depression, hypertension, ADHD.    CURRENT MEDICATIONS: As per nursing home list. Reviewed.    ALLERGIES: As per nursing home list. No allergies.    SOCIAL HISTORY: As per nursing home list. The patient is a nonsmoker and nondrinker.    FAMILY HISTORY: As per nursing home list. The patient does not recall his family.    REVIEW OF SYSTEMS: All 12 systems reviewed and pertaining covered in history and physical, rest are negative. The patient denies any fevers, chills, or chest pain at this time.    PHYSICAL EXAMINATION  GENERAL: This is a well-developed, well-nourished male, sitting in a chair, in no acute distress.  VITAL SIGNS:  As recorded and reviewed from EMR.  Blood pressure 130/82.  Pulse 80.  Respirations 18.  Temperature 97.6.  EYES:  The patient's sclerae white.  The pupils were equal and round.  ENT:  The patient's external ears were normal and the otoscopic examination was negative.  NECK:  Supple.  There were no palpable masses.  Thyroid was not enlarged and there were no carotid bruits.  RESPIRATORY:  Lungs show decreased breath sounds throughout.  CARDIOVASCULAR:  The patient had S1 normal, split S2 without obvious rubs, clicks, or murmurs.  GASTROINTESTINAL:  There was no hepatosplenomegaly.  There were no palpable masses and no inguinal nodes.  LYMPHATIC:  The patient had no axillary, groin, or lymphadenopathy.  MUSCULOSKELETAL:  The  patient had normal gait.  The joints appeared to be normal without evidence of deformity.  Grossly the muscles had good range of motion and were without effusion.  EXTREMITIES:  There was no evidence of peripheral edema.  NEUROLOGIC:  The patient had normal cranial nerves.  The reflexes, sensory, and motor examination were grossly within normal limits.  PSYCHIATRIC: The patient had normal judgment and insight.  The patient had no obvious mood defect including anxiety, and/or agitation.  MENTAL STATUS EXAMINATION: The patient is alert and oriented x2.    LAB WORK: Laboratory studies reviewed from prior visit.    ASSESSMENT AND PLAN:  COPD, on bronchodilator therapy.  Dementia, unchanged.  Schizophrenia, on medication.  Weakness, on PT/OT.  Fall risk, on fall precaution.  Reflux disease, on PPI.  Insomnia, on melatonin.  Seizure disorder, on anti-epileptic.  Hypertension, med controlled.  Depression, on medication.  Medication list reviewed and discussed with the family.  Hospital chart reviewed.      Electronically Signed By: Amparo Galarza MD   8/2/23 10:26 AM

## 2023-06-24 ENCOUNTER — NURSING HOME VISIT (OUTPATIENT)
Dept: POST ACUTE CARE | Facility: EXTERNAL LOCATION | Age: 63
End: 2023-06-24
Payer: COMMERCIAL

## 2023-06-24 DIAGNOSIS — J44.9 CHRONIC OBSTRUCTIVE PULMONARY DISEASE, UNSPECIFIED COPD TYPE (MULTI): ICD-10-CM

## 2023-06-24 DIAGNOSIS — I10 BENIGN HYPERTENSION: ICD-10-CM

## 2023-06-24 DIAGNOSIS — Z91.81 AT RISK FOR FALLS: ICD-10-CM

## 2023-06-24 DIAGNOSIS — F20.89 OTHER SCHIZOPHRENIA (MULTI): ICD-10-CM

## 2023-06-24 DIAGNOSIS — G20.A1 PARKINSON'S DISEASE (MULTI): ICD-10-CM

## 2023-06-24 DIAGNOSIS — F32.89 OTHER DEPRESSION: ICD-10-CM

## 2023-06-24 DIAGNOSIS — G47.09 OTHER INSOMNIA: ICD-10-CM

## 2023-06-24 DIAGNOSIS — G40.89 OTHER SEIZURES (MULTI): ICD-10-CM

## 2023-06-24 DIAGNOSIS — K21.9 GASTROESOPHAGEAL REFLUX DISEASE WITHOUT ESOPHAGITIS: ICD-10-CM

## 2023-06-24 DIAGNOSIS — F03.90 DEMENTIA, UNSPECIFIED DEMENTIA SEVERITY, UNSPECIFIED DEMENTIA TYPE, UNSPECIFIED WHETHER BEHAVIORAL, PSYCHOTIC, OR MOOD DISTURBANCE OR ANXIETY (MULTI): Primary | ICD-10-CM

## 2023-06-24 DIAGNOSIS — R53.1 ASTHENIA: ICD-10-CM

## 2023-06-24 PROCEDURE — 99308 SBSQ NF CARE LOW MDM 20: CPT | Performed by: INTERNAL MEDICINE

## 2023-06-24 NOTE — LETTER
Patient: Gerardo Albright  : 1960    Encounter Date: 2023    NAME OF THE PATIENT: Gerardo Albright    YOB: 1960    PLACE OF SERVICE:  [facility]    This is a subsequent visit.    HPI: Mr. Gerardo Albright is a 63-year-old male with history of dementia with Parkinson's disease and schizophrenia.  He is unable to care for himself and requires supportive care.    PAST MEDICAL HISTORY: As per nursing home list. Schizoaffective disorder, schizophrenia, GERD, myocardial infarction, dementia, suicidal ideation, weakness, angina, seizure, depression, hypertension, ADHD.    CURRENT MEDICATIONS: As per nursing home list. Reviewed.    ALLERGIES: As per nursing home list. No allergies.    SOCIAL HISTORY: As per nursing home list. The patient is a nonsmoker and nondrinker.    FAMILY HISTORY: As per nursing home list. The patient does not recall his family.    REVIEW OF SYSTEMS: All 12 systems reviewed and pertaining covered in history and physical, rest are negative. The patient denies any fevers, chills, or chest pain at this time.    PHYSICAL EXAMINATION  GENERAL: This is a well-developed, well-nourished male, sitting in a chair, in no acute distress.  VITAL SIGNS:  As recorded and reviewed from EMR.  Blood pressure 126/80.  Pulse 76.  Respirations 18.  Temperature 97.7.  EYES:  The patient's sclerae white.  The pupils were equal and round.  ENT:  The patient's external ears were normal and the otoscopic examination was negative.  NECK:  Supple.  There were no palpable masses.  Thyroid was not enlarged and there were no carotid bruits.  RESPIRATORY:  Lungs show rare scattered wheeze.  CARDIOVASCULAR:  The patient had S1 normal, split S2 without obvious rubs, clicks, or murmurs.  GASTROINTESTINAL:  There was no hepatosplenomegaly.  There were no palpable masses and no inguinal nodes.  LYMPHATIC:  The patient had no axillary, groin, or lymphadenopathy.  MUSCULOSKELETAL:  The patient had normal  gait.  The joints appeared to be normal without evidence of deformity.  Grossly the muscles had good range of motion and were without effusion.  EXTREMITIES:  There was no evidence of peripheral edema.  NEUROLOGIC:  The patient had normal cranial nerves.  The reflexes, sensory, and motor examination were grossly within normal limits.  PSYCHIATRIC: The patient had normal judgment and insight.  The patient had no obvious mood defect including anxiety and/or agitation.  MENTAL STATUS EXAMINATION: The patient is alert and oriented x2.    LAB WORK: Laboratory studies were reviewed from prior visit.    ASSESSMENT AND PLAN:  Dementia, unchanged.  Parkinson's disease, on carbidopa and levodopa.  COPD, on bronchodilator therapy.  Schizophrenia, on medication.  Hypertension, med controlled  Seizure disorder, on antiepileptic.  Weakness, on PT/OT.  Fall risk, on fall precaution.  Reflux disease, on PPI.  Depression, on medication.  Insomnia, on melatonin.  Medication list reviewed and discussed with the family.  Hospital chart reviewed.      Electronically Signed By: Amparo Galarza MD   8/2/23 10:26 AM

## 2023-06-27 ENCOUNTER — NURSING HOME VISIT (OUTPATIENT)
Dept: POST ACUTE CARE | Facility: EXTERNAL LOCATION | Age: 63
End: 2023-06-27
Payer: COMMERCIAL

## 2023-06-27 DIAGNOSIS — I25.10 CORONARY ARTERY DISEASE, UNSPECIFIED VESSEL OR LESION TYPE, UNSPECIFIED WHETHER ANGINA PRESENT, UNSPECIFIED WHETHER NATIVE OR TRANSPLANTED HEART: ICD-10-CM

## 2023-06-27 DIAGNOSIS — G20.A1 PARKINSON DISEASE (MULTI): Primary | ICD-10-CM

## 2023-06-27 DIAGNOSIS — E87.8 ELECTROLYTE DISORDER: ICD-10-CM

## 2023-06-27 DIAGNOSIS — G89.29 OTHER CHRONIC PAIN: ICD-10-CM

## 2023-06-27 DIAGNOSIS — K59.00 CONSTIPATION, UNSPECIFIED CONSTIPATION TYPE: ICD-10-CM

## 2023-06-27 DIAGNOSIS — K21.9 GASTROESOPHAGEAL REFLUX DISEASE, UNSPECIFIED WHETHER ESOPHAGITIS PRESENT: ICD-10-CM

## 2023-06-27 DIAGNOSIS — I10 ESSENTIAL HYPERTENSION: ICD-10-CM

## 2023-06-27 DIAGNOSIS — G62.9 NEUROPATHY: ICD-10-CM

## 2023-06-27 DIAGNOSIS — M13.0 POLYARTHRITIS: ICD-10-CM

## 2023-06-27 PROCEDURE — 99309 SBSQ NF CARE MODERATE MDM 30: CPT | Performed by: NURSE PRACTITIONER

## 2023-07-01 NOTE — PROGRESS NOTES
*Provider Impression*  Patient is a 63 year y/o male who is seen today for management of multiple medical problems  #Parkinson's / Neuropathy / Chronic pain / OA - APAP 650mg q4h PRN, schedule f/u w/ neurology - movement disorder clinic, neurontin 600mg TID  #HTN / CAD - amlodipine 10mg daily, ASA 81mg daily, Ranexa 500mg BID  #Electrolyte disorder - NaCl 1gram daily  #GERD / Constipation - pantoprazole 40mg daily  #Schizoaffective / Depression - per psych - mirtazapine, bupropion, cogentin, quetiapine, depakote, zoloft, risperidone  Follow up as needed      *Chief Complaint*  multiple medical problems      *History of Present Illness*  Pt is a 64 y/o male LTC resident of [facility] w/ [facility] as below who is seen today for follow up and management of multiple medical problems.     He was seen by psych. Started on risperidone.    He is seen sitting up at a table near the nurse's station and reports the new medicine is working, he is feeling better, no f/c, sweats, HA, dizziness, CP, SOB, cough, n/v, constipation, diarrhea, LUTS, or any other new c/o presently.    Allergies - NKDA  PMH - ADHD, Schizoaffective, HTN, CAD, MDD  PSH - cardiac cath, PCI, CABG  FH - unreliable  SocHx - Current everyday smoker      *Review of Systems*  All other systems reviewed are negative except as noted in the HPI      *Vitals*  Date: 23 - T: 98.4 P: 69 R: 18 BP: 130/84 SpO2: 97% on RA ; 11/15/22 - Wt 185      *Results/Data*  CBC - Date: 23 WBC: 6.2 HGB: 14.7 HCT: 43.4 PLT: 272 ;   BMP - Date: 23 Na: 134 K: 4.4 Cl: 102 CO2: 24 BUN: 11 Cr: 0.74 Glu: 74 Ca: 9.5 Alb: 4.2 ;   LFT - Date: 23 AST: 12 ALT: 16 ALP: 46 Tbili: 0.3 ;   Coags - Date: INR: PT:  Other - Date: 9/3/20 - TC: 139 T HDL: 25 LDL: 93; 11/10/20 - CRP: 15.1 D-dimer: 0.46; 21 - TC: 199 T HDL: 36 LDL: 138 VPA: 86 TSH: 2.715 25-OH-D: 19.07 ; 21 - VPA: 49; 21 - 25-OH-D: 26.67; 22 - TSH: 2.218     *Physical Exam*  Gen: (+) NAD,  (+) well-appearing  HEENT: (+) normocephalic, (+) MMM  Neck: (+) supple  Lungs: (-) cough  Abd: (+) ND  Ext: (-) edema, (-) deformity  MSK: (-) joint swelling  Skin: (-) rash  Neuro: (+) follows commands, (+) tremor, (+) alert

## 2023-07-08 ENCOUNTER — NURSING HOME VISIT (OUTPATIENT)
Dept: POST ACUTE CARE | Facility: EXTERNAL LOCATION | Age: 63
End: 2023-07-08
Payer: COMMERCIAL

## 2023-07-08 DIAGNOSIS — I10 BENIGN HYPERTENSION: ICD-10-CM

## 2023-07-08 DIAGNOSIS — R53.1 ASTHENIA: ICD-10-CM

## 2023-07-08 DIAGNOSIS — F03.90 DEMENTIA, UNSPECIFIED DEMENTIA SEVERITY, UNSPECIFIED DEMENTIA TYPE, UNSPECIFIED WHETHER BEHAVIORAL, PSYCHOTIC, OR MOOD DISTURBANCE OR ANXIETY (MULTI): ICD-10-CM

## 2023-07-08 DIAGNOSIS — Z91.81 AT RISK FOR FALLS: ICD-10-CM

## 2023-07-08 DIAGNOSIS — G20.A1 PARKINSON'S DISEASE, UNSPECIFIED WHETHER DYSKINESIA PRESENT, UNSPECIFIED WHETHER MANIFESTATIONS FLUCTUATE (MULTI): ICD-10-CM

## 2023-07-08 DIAGNOSIS — F32.89 OTHER DEPRESSION: ICD-10-CM

## 2023-07-08 DIAGNOSIS — J44.9 CHRONIC OBSTRUCTIVE PULMONARY DISEASE, UNSPECIFIED COPD TYPE (MULTI): Primary | ICD-10-CM

## 2023-07-08 DIAGNOSIS — K21.9 GASTROESOPHAGEAL REFLUX DISEASE WITHOUT ESOPHAGITIS: ICD-10-CM

## 2023-07-08 DIAGNOSIS — G47.09 OTHER INSOMNIA: ICD-10-CM

## 2023-07-08 DIAGNOSIS — F20.89 OTHER SCHIZOPHRENIA (MULTI): ICD-10-CM

## 2023-07-08 DIAGNOSIS — G40.89 OTHER SEIZURES (MULTI): ICD-10-CM

## 2023-07-08 PROCEDURE — 99308 SBSQ NF CARE LOW MDM 20: CPT | Performed by: INTERNAL MEDICINE

## 2023-07-08 NOTE — LETTER
Patient: Gerardo Albright  : 1960    Encounter Date: 2023    NAME OF THE PATIENT: Gerardo Albright     YOB: 1960    PLACE OF SERVICE:  [facility]    This is a subsequent visit.    HPI: Mr. Gerardo Albright is a 63-year-old male with history of dementia with Parkinson's disease.  He does suffer from schizophrenia and COPD.  He is unable to care for himself.  He requires supportive care.    PAST MEDICAL HISTORY:  As per nursing home list.  Schizoaffective disorder, schizophrenia, GERD, myocardial infarction, dementia, suicidal ideation, weakness, angina, seizure, depression, hypertension, ADHD.    CURRENT MEDICATIONS:  As per nursing home list.  Reviewed.    ALLERGIES:  As per nursing home list.  No allergies.    SOCIAL HISTORY:  As per nursing home list.  The patient is a nonsmoker and nondrinker.    FAMILY HISTORY:  As per nursing home list.  The patient does not recall his family.    REVIEW OF SYSTEMS:  All 12 systems reviewed and pertaining covered in history and physical, rest are negative.  The patient denies any fevers, chills, or chest pain at this time.    PHYSICAL EXAMINATION  GENERAL:  This is a well-developed, well-nourished male, sitting in a chair, in no acute distress.  VITAL SIGNS:  As recorded and reviewed from EMR.  Blood pressure 126/82.  Pulse 78.  Respirations 18.  Temperature 98.0.  EYES:  The patient's sclerae white.  The pupils were equal and round.  ENT:  The patient's external ears were normal and the otoscopic examination was negative.  NECK:  Supple.  There were no palpable masses.  Thyroid was not enlarged and there were no carotid bruits.  RESPIRATORY:  Lungs show decreased breath sound throughout.  CARDIOVASCULAR:  The patient had S1 normal, split S2 without obvious rubs, clicks, or murmurs.  GASTROINTESTINAL:  There was no hepatosplenomegaly.  There were no palpable masses and no inguinal nodes.  LYMPHATIC:  The patient had no axillary, groin, or  lymphadenopathy.  MUSCULOSKELETAL:  The patient had normal gait.  The joints appeared to be normal without evidence of deformity.  Grossly the muscles had good range of motion and were without effusion.  EXTREMITIES:  There was no evidence of peripheral edema.  NEUROLOGIC:  The patient had normal cranial nerves.  The reflexes, sensory, and motor examination were grossly within normal limits.  MENTAL STATUS EXAMINATION: The patient is alert and oriented x2.    LAB WORK: Laboratory studies were reviewed from prior visit.    ASSESSMENT AND PLAN:  COPD, on bronchodilator therapy.  Schizophrenia, on medication.  Dementia, unchanged.  Parkinson's disease, on carbidopa and levodopa.  Seizure disorder, on antiepileptic.  Hypertension, med controlled.  Weakness, on PT/OT.  Fall risk, on fall precaution.  Depression, on medication.  Reflux disease, on PPI.  Insomnia, on melatonin.  Medication list reviewed and discussed with the family.  Hospital chart reviewed.      Electronically Signed By: Amparo Galarza MD   10/9/23  4:09 PM

## 2023-07-16 ENCOUNTER — NURSING HOME VISIT (OUTPATIENT)
Dept: POST ACUTE CARE | Facility: EXTERNAL LOCATION | Age: 63
End: 2023-07-16
Payer: COMMERCIAL

## 2023-07-16 DIAGNOSIS — K21.9 GASTROESOPHAGEAL REFLUX DISEASE WITHOUT ESOPHAGITIS: ICD-10-CM

## 2023-07-16 DIAGNOSIS — I25.10 ASHD (ARTERIOSCLEROTIC HEART DISEASE): ICD-10-CM

## 2023-07-16 DIAGNOSIS — F20.89 OTHER SCHIZOPHRENIA (MULTI): ICD-10-CM

## 2023-07-16 DIAGNOSIS — F03.90 DEMENTIA, UNSPECIFIED DEMENTIA SEVERITY, UNSPECIFIED DEMENTIA TYPE, UNSPECIFIED WHETHER BEHAVIORAL, PSYCHOTIC, OR MOOD DISTURBANCE OR ANXIETY (MULTI): ICD-10-CM

## 2023-07-16 DIAGNOSIS — R53.1 ASTHENIA: ICD-10-CM

## 2023-07-16 DIAGNOSIS — I10 BENIGN HYPERTENSION: ICD-10-CM

## 2023-07-16 DIAGNOSIS — G47.09 OTHER INSOMNIA: ICD-10-CM

## 2023-07-16 DIAGNOSIS — G20.A1 PARKINSON'S DISEASE, UNSPECIFIED WHETHER DYSKINESIA PRESENT, UNSPECIFIED WHETHER MANIFESTATIONS FLUCTUATE (MULTI): ICD-10-CM

## 2023-07-16 DIAGNOSIS — Z91.81 AT RISK FOR FALLS: ICD-10-CM

## 2023-07-16 DIAGNOSIS — J44.9 CHRONIC OBSTRUCTIVE PULMONARY DISEASE, UNSPECIFIED COPD TYPE (MULTI): Primary | ICD-10-CM

## 2023-07-16 DIAGNOSIS — G40.89 OTHER SEIZURES (MULTI): ICD-10-CM

## 2023-07-16 PROCEDURE — 99308 SBSQ NF CARE LOW MDM 20: CPT | Performed by: INTERNAL MEDICINE

## 2023-07-16 NOTE — LETTER
Patient: Gerardo Albright  : 1960    Encounter Date: 2023    NAME OF THE PATIENT: Gerardo Albright    YOB: 1960    PLACE OF SERVICE:  [facility]    This is a subsequent visit.    HPI:  Mr. Gerardo Albright is a 63-year-old male with history of Parkinson disease.  He does have a history of COPD.  He suffers from dementia as well as seizure disorder.  He is unable to care for himself and requires supportive care.    PAST MEDICAL HISTORY:  As per nursing home list.  Schizoaffective disorder, schizophrenia, GERD, myocardial infarction, dementia, suicidal ideation, weakness, angina, seizure, depression, hypertension, ADHD.    CURRENT MEDICATIONS:  As per nursing home list.  Reviewed.    ALLERGIES:  As per nursing home list.  No allergies.    SOCIAL HISTORY:  As per nursing home list.  The patient is a nonsmoker and nondrinker.    FAMILY HISTORY:  As per nursing home list.  The patient does not recall his family.    REVIEW OF SYSTEMS:  All 12 systems reviewed and pertaining covered in history and physical, rest are negative.  The patient denies any fevers, chills, or chest pain at this time.    PHYSICAL EXAMINATION  GENERAL:  This is a well-developed, well-nourished male, sitting in a chair, in no acute distress.  VITAL SIGNS:  As recorded and reviewed from EMR.  Blood pressure 130/84.  Pulse 78.  Respirations 18.  Temperature 98.0.  EYES:  The patient's sclerae white.  The pupils were equal and round.  ENT:  The patient's external ears were normal and the otoscopic examination was negative.  NECK:  Supple.  There were no palpable masses.  Thyroid was not enlarged and there were no carotid bruits.  RESPIRATORY:  Lungs showed rare scattered wheeze.  CARDIOVASCULAR:  The patient had S1 normal, split S2 without obvious rubs, clicks, or murmurs.  GASTROINTESTINAL:  There was no hepatosplenomegaly.  There were no palpable masses and no inguinal nodes.  LYMPHATIC:  The patient had no axillary,  groin, or lymphadenopathy.  MUSCULOSKELETAL:  The patient had normal gait.  The joints appeared to be normal without evidence of deformity.  Grossly the muscles had good range of motion and were without effusion.  EXTREMITIES:  There was no evidence of peripheral edema.  NEUROLOGIC:  The patient had normal cranial nerves.  The reflexes, sensory, and motor examination were grossly within normal limits.  PSYCHIATRIC: The patient had normal judgment and insight.  The patient had no obvious mood defect including depression, anxiety, and/or agitation.  MENTAL STATUS:  The patient is alert and oriented x2.    LAB WORK:  Laboratory studies were reviewed from prior visit.    ASSESSMENT AND PLAN:  COPD, on bronchodilator therapy.  Parkinson disease, on carbidopa and levodopa.  Dementia, unchanged.  Seizure disorder, on antiepileptic.  Hypertension, medically controlled.  Weakness, on PT/OT.  Fall risk, on fall precautions.  Reflux disease, on PPI.  Schizophrenia, on medication.  ASHD, on aspirin.  Insomnia, on melatonin.  Medication list reviewed and discussed with the family.  Hospital chart reviewed.      Electronically Signed By: Amparo Galarza MD   10/9/23  4:09 PM

## 2023-07-27 PROBLEM — J44.9 CHRONIC OBSTRUCTIVE PULMONARY DISEASE (MULTI): Status: ACTIVE | Noted: 2023-07-27

## 2023-07-27 PROBLEM — I21.9 ACUTE MYOCARDIAL INFARCTION (MULTI): Status: ACTIVE | Noted: 2023-07-27

## 2023-07-27 PROBLEM — F03.90 DEMENTIA (MULTI): Status: ACTIVE | Noted: 2023-07-27

## 2023-07-27 PROBLEM — F41.9 ANXIETY: Status: ACTIVE | Noted: 2023-07-27

## 2023-07-27 PROBLEM — F20.89 OTHER SCHIZOPHRENIA (MULTI): Status: ACTIVE | Noted: 2023-07-27

## 2023-07-27 PROBLEM — G47.09 OTHER INSOMNIA: Status: ACTIVE | Noted: 2023-07-27

## 2023-07-27 PROBLEM — G40.89 OTHER SEIZURES (MULTI): Status: ACTIVE | Noted: 2023-07-27

## 2023-07-27 PROBLEM — K21.9 GASTROESOPHAGEAL REFLUX DISEASE WITHOUT ESOPHAGITIS: Status: ACTIVE | Noted: 2023-07-27

## 2023-07-27 PROBLEM — I25.10 ASHD (ARTERIOSCLEROTIC HEART DISEASE): Status: ACTIVE | Noted: 2023-07-27

## 2023-07-27 PROBLEM — F32.A DEPRESSION: Status: ACTIVE | Noted: 2023-07-27

## 2023-07-27 PROBLEM — I10 BENIGN HYPERTENSION: Status: ACTIVE | Noted: 2023-07-27

## 2023-07-27 PROBLEM — G20.A1 PARKINSON'S DISEASE (MULTI): Status: ACTIVE | Noted: 2023-07-27

## 2023-07-27 PROBLEM — R53.1 ASTHENIA: Status: ACTIVE | Noted: 2023-07-27

## 2023-07-27 NOTE — PROGRESS NOTES
NAME OF THE PATIENT: Gerardo Albright     YOB: 1960    PLACE OF SERVICE: [facility]    This is a subsequent visit.    HPI: Mr. Gerardo Albright is a 62-year-old male with history of schizophrenia and depression.  He does have a history of Parkinson disease as well as dementia.  He is unable to care for himself and manage his affairs.  He requires supportive care.    PAST MEDICAL HISTORY: As per nursing home list. Schizoaffective disorder, schizophrenia, GERD, myocardial infarction, dementia, suicidal ideation, weakness, angina, seizure, depression, hypertension, ADHD.    CURRENT MEDICATIONS: As per nursing home list. Reviewed.    ALLERGIES: As per nursing home list. No allergies.    SOCIAL HISTORY: As per nursing home list. The patient is a nonsmoker and nondrinker.    FAMILY HISTORY: As per nursing home list. The patient does not recall his family.    REVIEW OF SYSTEMS: All 12 systems reviewed and pertaining covered in history and physical, rest are negative. The patient denies any fevers, chills, or chest pain at this time.    PHYSICAL EXAMINATION  GENERAL: This is a well-developed, well-nourished male, sitting in chair, in no acute distress.  VITAL SIGNS:  As recorded and reviewed from EMR.  Blood pressure 120/80.  Pulse 78.  Respirations 18.  Temperature 98.1.  EYES:  The patient's sclerae white.  The pupils were equal and round.  ENT:  The patient's external ears were normal and the otoscopic examination was negative.  NECK:  Supple.  There were no palpable masses.  Thyroid was not enlarged and there were no carotid bruits.  RESPIRATORY:  Lungs show rare scattered wheeze.  CARDIOVASCULAR:  The patient had S1 normal, split S2 without obvious rubs, clicks, or murmurs.  GASTROINTESTINAL:  There was no hepatosplenomegaly.  There were no palpable masses and no inguinal nodes.  LYMPHATIC:  The patient had no axillary, groin, or lymphadenopathy.  MUSCULOSKELETAL:  The patient had normal gait.   The joints appeared to be normal without evidence of deformity.  Grossly the muscles had good range of motion and were without effusion.  EXTREMITIES:  There was no evidence of peripheral edema.  NEUROLOGIC:  The patient had normal cranial nerves.  The reflexes, sensory, and motor examination were grossly within normal limits.  PSYCHIATRIC: The patient had normal judgment and insight.  The patient was oriented to person, place, and time, and had no obvious mood defect including depression, anxiety, and/or agitation.    LAB WORK: Laboratory studies reviewed from prior visit.    ASSESSMENT AND PLAN:  Schizophrenia, on medication.  Parkinson's disease, on carbidopa/levodopa.  Dementia, unchanged.  Depression, on medication.  ASHD, on aspirin.  Weakness, on PT/OT.  Fall risk, on fall precautions.  COPD, on bronchodilator therapy.  Reflux disease, on PPI.  Hypertension, med controlled.  Seizure disorder, on anti-epileptic.  Medication list reviewed and discussed with the family.  Hospital chart reviewed.

## 2023-07-27 NOTE — PROGRESS NOTES
NAME OF THE PATIENT: Gerardo Albright     YOB: 1960    PLACE OF SERVICE:  [facility].    This is a re-admission.    HPI:  Mr. Gerardo Albright is a 62-year-old male with history of schizoaffective disorder with seizures.  He has a history of dementia as well as Parkinson's disease.  He is unable to care for himself and manage his affairs.  He requires supportive care.    PAST MEDICAL HISTORY: As per nursing home list. Schizoaffective disorder, schizophrenia, GERD, myocardial infarction, dementia, suicidal ideation, weakness, angina, seizure, depression, hypertension, ADHD.    CURRENT MEDICATIONS: As per nursing home list. Reviewed.    ALLERGIES: As per nursing home list. No allergies.    SOCIAL HISTORY: As per nursing home list. The patient is a nonsmoker and nondrinker.    FAMILY HISTORY: As per nursing home list. The patient does not recall his family.    REVIEW OF SYSTEMS: All 12 systems reviewed and pertaining covered in history and physical, rest are negative. The patient denies any fevers, chills, or chest pain at this time.    PHYSICAL EXAMINATION  GENERAL: This is a well-developed, well-nourished male, sitting in a chair, in no acute distress.  VITAL SIGNS:  As recorded and reviewed from EMR.  Blood pressure 116/70.  Pulse 76.  Respirations 16.  Temperature 97.7.  EYES:  The patient's sclerae white.  The pupils were equal and round.  ENT:  The patient's external ears were normal and the otoscopic examination was negative.  NECK:  Supple.  There were no palpable masses.  Thyroid was not enlarged and there were no carotid bruits.  RESPIRATORY:  The patient had normal inspirations and expirations.  The breath sounds were equal bilaterally and clear to auscultation.  CARDIOVASCULAR:  The patient had S1 normal, split S2 without obvious rubs, clicks, or murmurs.  GASTROINTESTINAL:  There was no hepatosplenomegaly.  There were no palpable masses and no inguinal nodes.  LYMPHATIC:  The patient  had no axillary, groin, or lymphadenopathy.  MUSCULOSKELETAL:  The patient had normal gait.  The joints appeared to be normal without evidence of deformity.  Grossly the muscles had good range of motion and were without effusion.  EXTREMITIES:  There was no evidence of peripheral edema.  NEUROLOGIC:  The patient had normal cranial nerves.  The reflexes, sensory, and motor examination were grossly within normal limits.  PSYCHIATRIC: The patient had normal judgment and insight. The patient had no obvious mood defect including depression,  and agitation.  MENTAL STATUS EXAMINATION:  The patient is alert and oriented x1.    LAB WORK: Laboratory studies reviewed from prior visit.    ASSESSMENT AND PLAN:  Schizoaffective disorder, on medication.  Seizure disorder, on antiepileptic.  Anxiety, on medication.  Parkinson's disease, on carbidopa and levodopa.  Dementia, unchanged.  Weakness, on PT and OT.  Fall risk, on fall precautions.  History of AMI, on aspirin.  Insomnia, on melatonin.  Reflux disease, on PPI.  COPD, on bronchodilator therapy.  Hypertension, med controlled.  Medication list reviewed and discussed with the family.  Hospital chart reviewed.

## 2023-07-27 NOTE — PROGRESS NOTES
NAME OF THE PATIENT: Gerardo Albright    YOB: 1960    PLACE OF SERVICE:  [facility]    This is a subsequent visit.    CHIEF COMPLAINT:  Mr. Gerardo Albright is a 62-year-old male with history of dementia with Parkinson disease.  He suffers from seizures and has a history of COPD.  He is unable to care for himself and requires supportive care.    PAST MEDICAL HISTORY: As per nursing home list. Schizoaffective disorder, schizophrenia, GERD, myocardial infarction, dementia, suicidal ideation, weakness, angina, seizure, depression, hypertension, ADHD.    CURRENT MEDICATIONS: As per nursing home list. Reviewed.    ALLERGIES: As per nursing home list. No allergies.    SOCIAL HISTORY: As per nursing home list. The patient is a nonsmoker and nondrinker.    FAMILY HISTORY: As per nursing home list. The patient does not recall his family.    REVIEW OF SYSTEMS: All 12 systems reviewed and pertaining covered in history and physical, rest are negative. The patient denies any fevers, chills, or chest pain at this time.    PHYSICAL EXAMINATION  GENERAL: This is a well-developed, well-nourished male, sitting in a chair, in no acute distress.  VITAL SIGNS:  As recorded and reviewed from EMR.  Blood pressure 126/78.  Pulse 84.  Respirations 18.  Temperature 97.9.  EYES:  The patient's sclerae white.  The pupils were equal and round.  ENT:  The patient's external ears were normal and the otoscopic examination was negative.  NECK:  Supple.  There were no palpable masses.  Thyroid was not enlarged and there were no carotid bruits.  RESPIRATORY:  Lungs show decreased breath sounds throughout with rare scattered wheeze.  CARDIOVASCULAR:  The patient had S1 normal, split S2 without obvious rubs, clicks, or murmurs.  GASTROINTESTINAL:  There was no hepatosplenomegaly.  There were no palpable masses and no inguinal nodes.  LYMPHATIC:  The patient had no axillary, groin, or lymphadenopathy.  MUSCULOSKELETAL:  The  patient had normal gait.  The joints appeared to be normal without evidence of deformity.  Grossly the muscles had good range of motion and were without effusion.  EXTREMITIES:  There was no evidence of peripheral edema.  NEUROLOGIC:  The patient had normal cranial nerves.  The reflexes, sensory, and motor examination were grossly within normal limits.  PSYCHIATRIC: The patient had normal judgment and insight.  The patient had no obvious mood defect including anxiety and/or agitation.  MENTAL STATUS EXAMINATION: The patient is alert and oriented x2.    LAB WORK: Laboratory studies were reviewed from prior visit.    ASSESSMENT AND PLAN:  COPD, on bronchodilator therapy.  Parkinson disease, on carbidopa and levodopa.  Seizure disorder, on antiepileptic.  Dementia, unchanged.  Schizophrenia, on medication.  Depression, on medication.  ASHD, on aspirin.  Weakness, on PT and OT.  Fall risk, on fall precaution.  Insomnia, on melatonin.  Reflux disease, on PPI.  Hypertension, med controlled.  Medication list reviewed and discussed with the family.  Hospital chart reviewed.

## 2023-07-28 NOTE — PROGRESS NOTES
NAME OF THE PATIENT: Gerardo Albright    YOB: 1960    PLACE OF SERVICE:  [facility].    This is a subsequent visit.    HPI:  Mr. Gerardo Albright is a 63-year-old male with history of dementia.  He suffers from COPD as well as schizophrenia.  He is unable to care for himself and requires supportive care.    PAST MEDICAL HISTORY: As per nursing home list. Schizoaffective disorder, schizophrenia, GERD, myocardial infarction, dementia, suicidal ideation, weakness, angina, seizure, depression, hypertension, ADHD.    CURRENT MEDICATIONS: As per nursing home list. Reviewed.    ALLERGIES: As per nursing home list. No allergies.    SOCIAL HISTORY: As per nursing home list. The patient is a nonsmoker and nondrinker.    FAMILY HISTORY: As per nursing home list. The patient does not recall his family.    REVIEW OF SYSTEMS: All 12 systems reviewed and pertaining covered in history and physical, rest are negative. The patient denies any fevers, chills, or chest pain at this time.    PHYSICAL EXAMINATION  GENERAL: This is a well-developed, well-nourished male, sitting in a chair, in no acute distress.  VITAL SIGNS:  As recorded and reviewed from EMR.  Blood pressure 130/82.  Pulse 80.  Respirations 18.  Temperature 97.6.  EYES:  The patient's sclerae white.  The pupils were equal and round.  ENT:  The patient's external ears were normal and the otoscopic examination was negative.  NECK:  Supple.  There were no palpable masses.  Thyroid was not enlarged and there were no carotid bruits.  RESPIRATORY:  Lungs show decreased breath sounds throughout.  CARDIOVASCULAR:  The patient had S1 normal, split S2 without obvious rubs, clicks, or murmurs.  GASTROINTESTINAL:  There was no hepatosplenomegaly.  There were no palpable masses and no inguinal nodes.  LYMPHATIC:  The patient had no axillary, groin, or lymphadenopathy.  MUSCULOSKELETAL:  The patient had normal gait.  The joints appeared to be normal without  evidence of deformity.  Grossly the muscles had good range of motion and were without effusion.  EXTREMITIES:  There was no evidence of peripheral edema.  NEUROLOGIC:  The patient had normal cranial nerves.  The reflexes, sensory, and motor examination were grossly within normal limits.  PSYCHIATRIC: The patient had normal judgment and insight.  The patient had no obvious mood defect including anxiety, and/or agitation.  MENTAL STATUS EXAMINATION: The patient is alert and oriented x2.    LAB WORK: Laboratory studies reviewed from prior visit.    ASSESSMENT AND PLAN:  COPD, on bronchodilator therapy.  Dementia, unchanged.  Schizophrenia, on medication.  Weakness, on PT/OT.  Fall risk, on fall precaution.  Reflux disease, on PPI.  Insomnia, on melatonin.  Seizure disorder, on anti-epileptic.  Hypertension, med controlled.  Depression, on medication.  Medication list reviewed and discussed with the family.  Hospital chart reviewed.

## 2023-07-30 PROBLEM — Z91.81 AT RISK FOR FALLS: Status: ACTIVE | Noted: 2023-07-30

## 2023-07-30 NOTE — PROGRESS NOTES
NAME OF THE PATIENT: Gerardo Albright    YOB: 1960    PLACE OF SERVICE:  [facility]    This is a subsequent visit.    HPI: Mr. Gerardo Albright is a 63-year-old male with history of dementia with Parkinson's disease and schizophrenia.  He is unable to care for himself and requires supportive care.    PAST MEDICAL HISTORY: As per nursing home list. Schizoaffective disorder, schizophrenia, GERD, myocardial infarction, dementia, suicidal ideation, weakness, angina, seizure, depression, hypertension, ADHD.    CURRENT MEDICATIONS: As per nursing home list. Reviewed.    ALLERGIES: As per nursing home list. No allergies.    SOCIAL HISTORY: As per nursing home list. The patient is a nonsmoker and nondrinker.    FAMILY HISTORY: As per nursing home list. The patient does not recall his family.    REVIEW OF SYSTEMS: All 12 systems reviewed and pertaining covered in history and physical, rest are negative. The patient denies any fevers, chills, or chest pain at this time.    PHYSICAL EXAMINATION  GENERAL: This is a well-developed, well-nourished male, sitting in a chair, in no acute distress.  VITAL SIGNS:  As recorded and reviewed from EMR.  Blood pressure 126/80.  Pulse 76.  Respirations 18.  Temperature 97.7.  EYES:  The patient's sclerae white.  The pupils were equal and round.  ENT:  The patient's external ears were normal and the otoscopic examination was negative.  NECK:  Supple.  There were no palpable masses.  Thyroid was not enlarged and there were no carotid bruits.  RESPIRATORY:  Lungs show rare scattered wheeze.  CARDIOVASCULAR:  The patient had S1 normal, split S2 without obvious rubs, clicks, or murmurs.  GASTROINTESTINAL:  There was no hepatosplenomegaly.  There were no palpable masses and no inguinal nodes.  LYMPHATIC:  The patient had no axillary, groin, or lymphadenopathy.  MUSCULOSKELETAL:  The patient had normal gait.  The joints appeared to be normal without evidence of deformity.   Grossly the muscles had good range of motion and were without effusion.  EXTREMITIES:  There was no evidence of peripheral edema.  NEUROLOGIC:  The patient had normal cranial nerves.  The reflexes, sensory, and motor examination were grossly within normal limits.  PSYCHIATRIC: The patient had normal judgment and insight.  The patient had no obvious mood defect including anxiety and/or agitation.  MENTAL STATUS EXAMINATION: The patient is alert and oriented x2.    LAB WORK: Laboratory studies were reviewed from prior visit.    ASSESSMENT AND PLAN:  Dementia, unchanged.  Parkinson's disease, on carbidopa and levodopa.  COPD, on bronchodilator therapy.  Schizophrenia, on medication.  Hypertension, med controlled  Seizure disorder, on antiepileptic.  Weakness, on PT/OT.  Fall risk, on fall precaution.  Reflux disease, on PPI.  Depression, on medication.  Insomnia, on melatonin.  Medication list reviewed and discussed with the family.  Hospital chart reviewed.

## 2023-08-15 ENCOUNTER — NURSING HOME VISIT (OUTPATIENT)
Dept: POST ACUTE CARE | Facility: EXTERNAL LOCATION | Age: 63
End: 2023-08-15
Payer: COMMERCIAL

## 2023-08-15 DIAGNOSIS — G62.9 NEUROPATHY: ICD-10-CM

## 2023-08-15 DIAGNOSIS — M13.0 POLYARTHRITIS: ICD-10-CM

## 2023-08-15 DIAGNOSIS — I10 ESSENTIAL HYPERTENSION: ICD-10-CM

## 2023-08-15 DIAGNOSIS — K59.00 CONSTIPATION, UNSPECIFIED CONSTIPATION TYPE: ICD-10-CM

## 2023-08-15 DIAGNOSIS — I25.10 CORONARY ARTERY DISEASE, UNSPECIFIED VESSEL OR LESION TYPE, UNSPECIFIED WHETHER ANGINA PRESENT, UNSPECIFIED WHETHER NATIVE OR TRANSPLANTED HEART: ICD-10-CM

## 2023-08-15 DIAGNOSIS — R22.9 SUBCUTANEOUS NODULE: ICD-10-CM

## 2023-08-15 DIAGNOSIS — E87.8 ELECTROLYTE DISORDER: ICD-10-CM

## 2023-08-15 DIAGNOSIS — G89.29 OTHER CHRONIC PAIN: ICD-10-CM

## 2023-08-15 DIAGNOSIS — K21.9 GASTROESOPHAGEAL REFLUX DISEASE, UNSPECIFIED WHETHER ESOPHAGITIS PRESENT: ICD-10-CM

## 2023-08-15 DIAGNOSIS — G20.A1 PARKINSON DISEASE (MULTI): Primary | ICD-10-CM

## 2023-08-15 PROCEDURE — 99309 SBSQ NF CARE MODERATE MDM 30: CPT | Performed by: NURSE PRACTITIONER

## 2023-08-19 NOTE — PROGRESS NOTES
*Provider Impression*  Patient is a 62 year y/o male who is seen today for management of multiple medical problems  #Parkinson's / Neuropathy / Chronic pain / OA - APAP 650mg q4h PRN, schedule f/u w/ neurology - movement disorder clinic, neurontin 600mg TID  #Subcutaneous nodule - check US subcutaneous nodule, add lidocaine 4% q6h PRN  #HTN / CAD - amlodipine 10mg daily, ASA 81mg daily, Ranexa 500mg BID  #Electrolyte disorder - NaCl 1gram daily  #GERD / Constipation - pantoprazole 40mg daily  #Schizoaffective / Depression - per psych - mirtazapine, bupropion, cogentin, quetiapine, depakote, zoloft, risperidone  Follow up as needed      *Chief Complaint*  multiple medical problems      *History of Present Illness*  Pt is a 61 y/o male LTC resident of [facility] w/ PMH as below who is seen today for follow up and management of multiple medical problems.     His labs were ok, including TSH and D level.    He is seen in the [facility], reports he has a nodule on abdomen, somewhat tender for about a week, having f/c, sweats, n/v, CP, SOB, cough, diarrhea, constipation, or any other new c/o presently.    Allergies - NKDA  PMH - ADHD, Schizoaffective, HTN, CAD, MDD  PSH - cardiac cath, PCI, CABG  FH - unreliable  SocHx - Current everyday smoker      *Review of Systems*  All other systems reviewed are negative except as noted in the HPI      *Vitals*  Date: 23 - T: 97.8 P: 78 R: 18 BP: 146/78 SpO2: 97% on RA ; 23 - Wt 197      *Results/Data*  CBC - Date: 23 WBC: 5.3 HGB: 13.3 HCT: 39.8 PLT: 274 ;   BMP - Date: 23 Na: 130 K: 3.9 Cl: 95 CO2: 24 BUN: 9 Cr: 0.8 Glu: 135 Ca: 8.9 Alb: 4.1 ;   LFT - Date: 23 AST: 13 ALT: 10 ALP: 44 Tbili: 0.5 ;   Coags - Date: INR: PT:  Other - Date: 9/3/20 - TC: 139 T HDL: 25 LDL: 93; 11/10/20 - CRP: 15.1 D-dimer: 0.46; 21 - TC: 199 T HDL: 36 LDL: 138 VPA: 86 TSH: 2.715 25-OH-D: 19.07 ; 21 - VPA: 49; 21 - 25-OH-D: 26.67; 22 - TSH: 2.218 ;  8/8/23  TSH: 2.888  25-OH-D: 31.67    *Physical Exam*  Gen: (+) NAD, (+) well-appearing  HEENT: (+) normocephalic, (+) MMM  Neck: (+) supple  Lungs: (-) cough  Abd: (+) ND  Ext: (-) edema, (-) deformity  MSK: (-) joint swelling  Skin: (-) rash,  Neuro: (+) follows commands, (+) tremor, (+) alert

## 2023-09-03 ENCOUNTER — NURSING HOME VISIT (OUTPATIENT)
Dept: POST ACUTE CARE | Facility: EXTERNAL LOCATION | Age: 63
End: 2023-09-03
Payer: COMMERCIAL

## 2023-09-03 DIAGNOSIS — K21.9 GASTROESOPHAGEAL REFLUX DISEASE WITHOUT ESOPHAGITIS: ICD-10-CM

## 2023-09-03 DIAGNOSIS — F32.89 OTHER DEPRESSION: ICD-10-CM

## 2023-09-03 DIAGNOSIS — Z91.81 AT RISK FOR FALLS: ICD-10-CM

## 2023-09-03 DIAGNOSIS — I10 BENIGN HYPERTENSION: ICD-10-CM

## 2023-09-03 DIAGNOSIS — G40.89 OTHER SEIZURES (MULTI): ICD-10-CM

## 2023-09-03 DIAGNOSIS — G47.09 OTHER INSOMNIA: ICD-10-CM

## 2023-09-03 DIAGNOSIS — J44.9 CHRONIC OBSTRUCTIVE PULMONARY DISEASE, UNSPECIFIED COPD TYPE (MULTI): Primary | ICD-10-CM

## 2023-09-03 DIAGNOSIS — R53.1 ASTHENIA: ICD-10-CM

## 2023-09-03 DIAGNOSIS — F03.90 DEMENTIA, UNSPECIFIED DEMENTIA SEVERITY, UNSPECIFIED DEMENTIA TYPE, UNSPECIFIED WHETHER BEHAVIORAL, PSYCHOTIC, OR MOOD DISTURBANCE OR ANXIETY (MULTI): ICD-10-CM

## 2023-09-03 DIAGNOSIS — F20.89 OTHER SCHIZOPHRENIA (MULTI): ICD-10-CM

## 2023-09-03 DIAGNOSIS — G20.A1 PARKINSON'S DISEASE, UNSPECIFIED WHETHER DYSKINESIA PRESENT, UNSPECIFIED WHETHER MANIFESTATIONS FLUCTUATE (MULTI): ICD-10-CM

## 2023-09-03 PROCEDURE — 99308 SBSQ NF CARE LOW MDM 20: CPT | Performed by: INTERNAL MEDICINE

## 2023-09-03 NOTE — LETTER
Patient: Gerardo Albright  : 1960    Encounter Date: 2023    NAME OF THE PATIENT: Gerardo Albright    YOB: 1960    PLACE OF SERVICE:  [facility]    This is a subsequent visit.    HPI: Mr. Gerardo Albright is a 63-year-old male with history of dementia with COPD and Parkinson disease.  He is unable to care for himself and requires supportive care.    PAST MEDICAL HISTORY: As per nursing home list. Schizoaffective disorder, schizophrenia, GERD, myocardial infarction, dementia, suicidal ideation, weakness, angina, seizure, depression, hypertension, ADHD.    CURRENT MEDICATIONS: As per nursing home list. Reviewed.    ALLERGIES: As per nursing home list. No allergies.    SOCIAL HISTORY: As per nursing home list. The patient is a nonsmoker and nondrinker.    FAMILY HISTORY: As per nursing home list. The patient does not recall his family.    REVIEW OF SYSTEMS: All 12 systems reviewed and pertaining covered in history and physical, rest are negative. The patient denies any fevers, chills, or chest pain at this time.    PHYSICAL EXAMINATION  GENERAL: This is a well-developed, well-nourished male, sitting in a chair, in no acute distress.  VITAL SIGNS:  As recorded and reviewed from EMR.  Blood pressure 128/82.  Pulse 84.  Respirations 18.  Temperature 98.1.  EYES:  The patient's sclerae white.  The pupils were equal and round.  ENT:  The patient's external ears were normal and the otoscopic examination was negative.  NECK:  Supple.  There were no palpable masses.  Thyroid was not enlarged and there were no carotid bruits.  RESPIRATORY:  Lungs show decreased breath sounds throughout.  CARDIOVASCULAR:  The patient had S1 normal, split S2 without obvious rubs, clicks, or murmurs.  GASTROINTESTINAL:  There was no hepatosplenomegaly.  There were no palpable masses and no inguinal nodes.  LYMPHATIC:  The patient had no axillary, groin, or lymphadenopathy.  MUSCULOSKELETAL:  The patient had  normal gait.  The joints appeared to be normal without evidence of deformity.  Grossly the muscles had good range of motion and were without effusion.  EXTREMITIES:  There was no evidence of peripheral edema.  NEUROLOGIC:  The patient had normal cranial nerves.  The reflexes, sensory, and motor examination were grossly within normal limits.  PSYCHIATRIC: The patient had normal judgment and insight. The patient was oriented to person, place, and time, and had no obvious mood defect including anxiety and/or agitation.    LAB WORK: Laboratory studies were reviewed from prior visit.    ASSESSMENT AND PLAN:  COPD, on bronchodilator therapy.  Seizure disorder, on antiepileptic.  Parkinson disease, on carbidopa and levodopa.  Dementia, unchanged.  Schizoaffective disorder, on medication.  Insomnia, on melatonin.  Weakness, on PT and OT.  Fall risk, on fall precaution.  Hypertension, med controlled.  Depression, on medication.  Reflux disease, on PPI.  Medication list reviewed and discussed with the family.  Hospital chart reviewed.      Electronically Signed By: Amparo Galarza MD   10/9/23  4:10 PM

## 2023-09-21 ENCOUNTER — HOSPITAL ENCOUNTER (INPATIENT)
Dept: DATA CONVERSION | Facility: HOSPITAL | Age: 63
Discharge: ANOTHER HOSPITAL | End: 2023-09-26
Attending: EMERGENCY MEDICINE | Admitting: INTERNAL MEDICINE
Payer: COMMERCIAL

## 2023-09-21 DIAGNOSIS — F29 UNSPECIFIED PSYCHOSIS NOT DUE TO A SUBSTANCE OR KNOWN PHYSIOLOGICAL CONDITION (MULTI): ICD-10-CM

## 2023-09-21 DIAGNOSIS — F25.1 SCHIZOAFFECTIVE DISORDER, DEPRESSIVE TYPE (MULTI): ICD-10-CM

## 2023-09-21 DIAGNOSIS — R45.851 SUICIDAL IDEATIONS: ICD-10-CM

## 2023-09-21 DIAGNOSIS — I10 ESSENTIAL (PRIMARY) HYPERTENSION: ICD-10-CM

## 2023-09-21 DIAGNOSIS — J69.0 PNEUMONITIS DUE TO INHALATION OF FOOD AND VOMIT (MULTI): ICD-10-CM

## 2023-09-21 DIAGNOSIS — F41.9 ANXIETY DISORDER, UNSPECIFIED: ICD-10-CM

## 2023-09-21 DIAGNOSIS — A41.9 SEPSIS, UNSPECIFIED ORGANISM (MULTI): ICD-10-CM

## 2023-09-21 DIAGNOSIS — F17.200 NICOTINE DEPENDENCE, UNSPECIFIED, UNCOMPLICATED: ICD-10-CM

## 2023-09-21 DIAGNOSIS — J18.9 PNEUMONIA, UNSPECIFIED ORGANISM: ICD-10-CM

## 2023-09-21 DIAGNOSIS — J96.01 ACUTE RESPIRATORY FAILURE WITH HYPOXIA (MULTI): ICD-10-CM

## 2023-09-21 DIAGNOSIS — F10.10 ALCOHOL ABUSE, UNCOMPLICATED: ICD-10-CM

## 2023-09-21 DIAGNOSIS — R41.9 UNSPECIFIED SYMPTOMS AND SIGNS INVOLVING COGNITIVE FUNCTIONS AND AWARENESS: ICD-10-CM

## 2023-09-21 DIAGNOSIS — I25.10 ATHEROSCLEROTIC HEART DISEASE OF NATIVE CORONARY ARTERY WITHOUT ANGINA PECTORIS: ICD-10-CM

## 2023-09-21 DIAGNOSIS — E87.1 HYPO-OSMOLALITY AND HYPONATREMIA: ICD-10-CM

## 2023-09-21 LAB
ALANINE AMINOTRANSFERASE (SGPT) (U/L) IN SER/PLAS: 11 U/L (ref 10–52)
ALBUMIN (G/DL) IN SER/PLAS: 4 G/DL (ref 3.4–5)
ALKALINE PHOSPHATASE (U/L) IN SER/PLAS: 55 U/L (ref 33–136)
ANION GAP IN SER/PLAS: 14 MMOL/L (ref 10–20)
APPARATUS: ABNORMAL
APPEARANCE, URINE: CLEAR
ASPARTATE AMINOTRANSFERASE (SGOT) (U/L) IN SER/PLAS: 13 U/L (ref 9–39)
BASOPHILS (10*3/UL) IN BLOOD BY AUTOMATED COUNT: 0.04 X10E9/L (ref 0–0.1)
BASOPHILS/100 LEUKOCYTES IN BLOOD BY AUTOMATED COUNT: 0.2 % (ref 0–2)
BILIRUBIN TOTAL (MG/DL) IN SER/PLAS: 0.7 MG/DL (ref 0–1.2)
BILIRUBIN, URINE: NEGATIVE
BLOOD, URINE: NEGATIVE
CALCIUM (MG/DL) IN SER/PLAS: 9.2 MG/DL (ref 8.6–10.3)
CARBON DIOXIDE, TOTAL (MMOL/L) IN SER/PLAS: 23 MMOL/L (ref 21–32)
CHLORIDE (MMOL/L) IN SER/PLAS: 98 MMOL/L (ref 98–107)
COLOR, URINE: YELLOW
CREATININE (MG/DL) IN SER/PLAS: 1.26 MG/DL (ref 0.5–1.3)
EOSINOPHILS (10*3/UL) IN BLOOD BY AUTOMATED COUNT: 0.01 X10E9/L (ref 0–0.7)
EOSINOPHILS/100 LEUKOCYTES IN BLOOD BY AUTOMATED COUNT: 0.1 % (ref 0–6)
ERYTHROCYTE DISTRIBUTION WIDTH (RATIO) BY AUTOMATED COUNT: 13.1 % (ref 11.5–14.5)
ERYTHROCYTE MEAN CORPUSCULAR HEMOGLOBIN CONCENTRATION (G/DL) BY AUTOMATED: 32.7 G/DL (ref 32–36)
ERYTHROCYTE MEAN CORPUSCULAR VOLUME (FL) BY AUTOMATED COUNT: 95 FL (ref 80–100)
ERYTHROCYTES (10*6/UL) IN BLOOD BY AUTOMATED COUNT: 4.36 X10E12/L (ref 4.5–5.9)
FIO2: 36 %
FLU A RESULT: NOT DETECTED
FLU B RESULT: NOT DETECTED
GFR MALE: 64 ML/MIN/1.73M2
GLUCOSE (MG/DL) IN SER/PLAS: 138 MG/DL (ref 74–99)
GLUCOSE, URINE: ABNORMAL MG/DL
HEMATOCRIT (%) IN BLOOD BY AUTOMATED COUNT: 41.6 % (ref 41–52)
HEMOGLOBIN (G/DL) IN BLOOD: 13.6 G/DL (ref 13.5–17.5)
IMMATURE GRANULOCYTES/100 LEUKOCYTES IN BLOOD BY AUTOMATED COUNT: 1 % (ref 0–0.9)
KETONES, URINE: NEGATIVE MG/DL
LACTATE (MMOL/L) IN SER/PLAS: 1.6 MMOL/L (ref 0.4–2)
LEUKOCYTE ESTERASE, URINE: NEGATIVE
LEUKOCYTES (10*3/UL) IN BLOOD BY AUTOMATED COUNT: 19.1 X10E9/L (ref 4.4–11.3)
LYMPHOCYTES (10*3/UL) IN BLOOD BY AUTOMATED COUNT: 1.11 X10E9/L (ref 1.2–4.8)
LYMPHOCYTES/100 LEUKOCYTES IN BLOOD BY AUTOMATED COUNT: 5.8 % (ref 13–44)
MONOCYTES (10*3/UL) IN BLOOD BY AUTOMATED COUNT: 1.54 X10E9/L (ref 0.1–1)
MONOCYTES/100 LEUKOCYTES IN BLOOD BY AUTOMATED COUNT: 8.1 % (ref 2–10)
NEUTROPHILS (10*3/UL) IN BLOOD BY AUTOMATED COUNT: 16.21 X10E9/L (ref 1.2–7.7)
NEUTROPHILS/100 LEUKOCYTES IN BLOOD BY AUTOMATED COUNT: 84.8 % (ref 40–80)
NITRITE, URINE: NEGATIVE
PATIENT TEMPERATURE: 37 DEGREES C
PH, URINE: 6 (ref 5–8)
PLATELETS (10*3/UL) IN BLOOD AUTOMATED COUNT: 269 X10E9/L (ref 150–450)
POCT ANION GAP, ARTERIAL: 9 MMOL/L (ref 10–25)
POCT BASE EXCESS, ARTERIAL: 1.3 MMOL/L (ref -2–3)
POCT CHLORIDE, ARTERIAL: 99 MMOL/L (ref 98–107)
POCT GLUCOSE, ARTERIAL: 127 MG/DL (ref 74–99)
POCT HCO3, ARTERIAL: 25 MMOL/L (ref 22–26)
POCT HEMATOCRIT, ARTERIAL: 41 % (ref 41–52)
POCT HEMOGLOBIN, ARTERIAL: 13.5 G/DL (ref 13.5–17.5)
POCT IONIZED CALCIUM, ARTERIAL: 1.2 MMOL/L (ref 1.1–1.33)
POCT LACTATE, ARTERIAL: 0.8 MMOL/L (ref 0.4–2)
POCT OXY HEMOGLOBIN, ARTERIAL: 93.1 % (ref 94–98)
POCT PCO2, ARTERIAL: 36 MMHG (ref 38–42)
POCT PH, ARTERIAL: 7.45 (ref 7.38–7.42)
POCT PO2, ARTERIAL: 77 MMHG (ref 85–95)
POCT POTASSIUM, ARTERIAL: 4.3 MMOL/L (ref 3.5–5.3)
POCT SO2, ARTERIAL: 97 % (ref 94–100)
POCT SODIUM, ARTERIAL: 129 MMOL/L (ref 136–145)
POTASSIUM (MMOL/L) IN SER/PLAS: 4.2 MMOL/L (ref 3.5–5.3)
PROTEIN TOTAL: 7.5 G/DL (ref 6.4–8.2)
PROTEIN, URINE: NEGATIVE MG/DL
SARS-COV-2 RESULT: NOT DETECTED
SODIUM (MMOL/L) IN SER/PLAS: 131 MMOL/L (ref 136–145)
SPECIFIC GRAVITY, URINE: 1.01 (ref 1–1.03)
TROPONIN I, HIGH SENSITIVITY: 6 NG/L (ref 0–20)
UREA NITROGEN (MG/DL) IN SER/PLAS: 21 MG/DL (ref 6–23)
UROBILINOGEN, URINE: <2 MG/DL (ref 0–1.9)

## 2023-09-22 LAB
ANION GAP IN SER/PLAS: 14 MMOL/L (ref 10–20)
CALCIUM (MG/DL) IN SER/PLAS: 8.8 MG/DL (ref 8.6–10.3)
CARBON DIOXIDE, TOTAL (MMOL/L) IN SER/PLAS: 22 MMOL/L (ref 21–32)
CHLORIDE (MMOL/L) IN SER/PLAS: 104 MMOL/L (ref 98–107)
CREATININE (MG/DL) IN SER/PLAS: 0.7 MG/DL (ref 0.5–1.3)
ERYTHROCYTE DISTRIBUTION WIDTH (RATIO) BY AUTOMATED COUNT: 13.2 % (ref 11.5–14.5)
ERYTHROCYTE MEAN CORPUSCULAR HEMOGLOBIN CONCENTRATION (G/DL) BY AUTOMATED: 33.1 G/DL (ref 32–36)
ERYTHROCYTE MEAN CORPUSCULAR VOLUME (FL) BY AUTOMATED COUNT: 95 FL (ref 80–100)
ERYTHROCYTES (10*6/UL) IN BLOOD BY AUTOMATED COUNT: 3.9 X10E12/L (ref 4.5–5.9)
GFR MALE: >90 ML/MIN/1.73M2
GLUCOSE (MG/DL) IN SER/PLAS: 189 MG/DL (ref 74–99)
HEMATOCRIT (%) IN BLOOD BY AUTOMATED COUNT: 36.9 % (ref 41–52)
HEMOGLOBIN (G/DL) IN BLOOD: 12.2 G/DL (ref 13.5–17.5)
LEUKOCYTES (10*3/UL) IN BLOOD BY AUTOMATED COUNT: 14.4 X10E9/L (ref 4.4–11.3)
PLATELETS (10*3/UL) IN BLOOD AUTOMATED COUNT: 265 X10E9/L (ref 150–450)
POTASSIUM (MMOL/L) IN SER/PLAS: 4 MMOL/L (ref 3.5–5.3)
PROCALCITONIN: 0.13 NG/ML
SODIUM (MMOL/L) IN SER/PLAS: 136 MMOL/L (ref 136–145)
UREA NITROGEN (MG/DL) IN SER/PLAS: 18 MG/DL (ref 6–23)

## 2023-09-23 LAB
ANION GAP IN SER/PLAS: 14 MMOL/L (ref 10–20)
CALCIUM (MG/DL) IN SER/PLAS: 9.1 MG/DL (ref 8.6–10.3)
CARBON DIOXIDE, TOTAL (MMOL/L) IN SER/PLAS: 22 MMOL/L (ref 21–32)
CHLORIDE (MMOL/L) IN SER/PLAS: 103 MMOL/L (ref 98–107)
CREATININE (MG/DL) IN SER/PLAS: 0.65 MG/DL (ref 0.5–1.3)
ERYTHROCYTE DISTRIBUTION WIDTH (RATIO) BY AUTOMATED COUNT: 13.4 % (ref 11.5–14.5)
ERYTHROCYTE MEAN CORPUSCULAR HEMOGLOBIN CONCENTRATION (G/DL) BY AUTOMATED: 32.3 G/DL (ref 32–36)
ERYTHROCYTE MEAN CORPUSCULAR VOLUME (FL) BY AUTOMATED COUNT: 99 FL (ref 80–100)
ERYTHROCYTES (10*6/UL) IN BLOOD BY AUTOMATED COUNT: 3.91 X10E12/L (ref 4.5–5.9)
GFR MALE: >90 ML/MIN/1.73M2
GLUCOSE (MG/DL) IN SER/PLAS: 207 MG/DL (ref 74–99)
HEMATOCRIT (%) IN BLOOD BY AUTOMATED COUNT: 38.7 % (ref 41–52)
HEMOGLOBIN (G/DL) IN BLOOD: 12.5 G/DL (ref 13.5–17.5)
LEUKOCYTES (10*3/UL) IN BLOOD BY AUTOMATED COUNT: 17.6 X10E9/L (ref 4.4–11.3)
PLATELETS (10*3/UL) IN BLOOD AUTOMATED COUNT: 295 X10E9/L (ref 150–450)
POTASSIUM (MMOL/L) IN SER/PLAS: 4.4 MMOL/L (ref 3.5–5.3)
SODIUM (MMOL/L) IN SER/PLAS: 135 MMOL/L (ref 136–145)
UREA NITROGEN (MG/DL) IN SER/PLAS: 12 MG/DL (ref 6–23)
VANCOMYCIN (UG/ML) IN SER/PLAS: 7.3 UG/ML

## 2023-09-24 LAB
ANION GAP IN SER/PLAS: 12 MMOL/L (ref 10–20)
CALCIUM (MG/DL) IN SER/PLAS: 9 MG/DL (ref 8.6–10.3)
CARBON DIOXIDE, TOTAL (MMOL/L) IN SER/PLAS: 27 MMOL/L (ref 21–32)
CHLORIDE (MMOL/L) IN SER/PLAS: 99 MMOL/L (ref 98–107)
CREATININE (MG/DL) IN SER/PLAS: 0.66 MG/DL (ref 0.5–1.3)
ERYTHROCYTE DISTRIBUTION WIDTH (RATIO) BY AUTOMATED COUNT: 13.4 % (ref 11.5–14.5)
ERYTHROCYTE MEAN CORPUSCULAR HEMOGLOBIN CONCENTRATION (G/DL) BY AUTOMATED: 33.1 G/DL (ref 32–36)
ERYTHROCYTE MEAN CORPUSCULAR VOLUME (FL) BY AUTOMATED COUNT: 96 FL (ref 80–100)
ERYTHROCYTES (10*6/UL) IN BLOOD BY AUTOMATED COUNT: 3.95 X10E12/L (ref 4.5–5.9)
GFR MALE: >90 ML/MIN/1.73M2
GLUCOSE (MG/DL) IN SER/PLAS: 235 MG/DL (ref 74–99)
HEMATOCRIT (%) IN BLOOD BY AUTOMATED COUNT: 37.8 % (ref 41–52)
HEMOGLOBIN (G/DL) IN BLOOD: 12.5 G/DL (ref 13.5–17.5)
LEUKOCYTES (10*3/UL) IN BLOOD BY AUTOMATED COUNT: 12.2 X10E9/L (ref 4.4–11.3)
PLATELETS (10*3/UL) IN BLOOD AUTOMATED COUNT: 330 X10E9/L (ref 150–450)
POTASSIUM (MMOL/L) IN SER/PLAS: 4.6 MMOL/L (ref 3.5–5.3)
SODIUM (MMOL/L) IN SER/PLAS: 133 MMOL/L (ref 136–145)
STAPH/MRSA SCREEN, CULTURE: ABNORMAL
UREA NITROGEN (MG/DL) IN SER/PLAS: 14 MG/DL (ref 6–23)
VANCOMYCIN (UG/ML) IN SER/PLAS: 8.8 UG/ML

## 2023-09-25 LAB
AMPHETAMINE (PRESENCE) IN URINE BY SCREEN METHOD: NORMAL
ANION GAP IN SER/PLAS: 14 MMOL/L (ref 10–20)
BARBITURATES PRESENCE IN URINE BY SCREEN METHOD: NORMAL
BENZODIAZEPINE (PRESENCE) IN URINE BY SCREEN METHOD: NORMAL
CALCIUM (MG/DL) IN SER/PLAS: 9.1 MG/DL (ref 8.6–10.3)
CANNABINOIDS IN URINE BY SCREEN METHOD: NORMAL
CARBON DIOXIDE, TOTAL (MMOL/L) IN SER/PLAS: 22 MMOL/L (ref 21–32)
CHLORIDE (MMOL/L) IN SER/PLAS: 98 MMOL/L (ref 98–107)
COCAINE (PRESENCE) IN URINE BY SCREEN METHOD: NORMAL
CREATININE (MG/DL) IN SER/PLAS: 0.68 MG/DL (ref 0.5–1.3)
DRUG SCREEN COMMENT URINE: NORMAL
ERYTHROCYTE DISTRIBUTION WIDTH (RATIO) BY AUTOMATED COUNT: 13.3 % (ref 11.5–14.5)
ERYTHROCYTE MEAN CORPUSCULAR HEMOGLOBIN CONCENTRATION (G/DL) BY AUTOMATED: 31.1 G/DL (ref 32–36)
ERYTHROCYTE MEAN CORPUSCULAR VOLUME (FL) BY AUTOMATED COUNT: 101 FL (ref 80–100)
ERYTHROCYTES (10*6/UL) IN BLOOD BY AUTOMATED COUNT: 4.19 X10E12/L (ref 4.5–5.9)
FENTANYL URINE: NORMAL
GFR MALE: >90 ML/MIN/1.73M2
GLUCOSE (MG/DL) IN SER/PLAS: 282 MG/DL (ref 74–99)
HEMATOCRIT (%) IN BLOOD BY AUTOMATED COUNT: 42.4 % (ref 41–52)
HEMOGLOBIN (G/DL) IN BLOOD: 13.2 G/DL (ref 13.5–17.5)
LEUKOCYTES (10*3/UL) IN BLOOD BY AUTOMATED COUNT: 12.6 X10E9/L (ref 4.4–11.3)
OPIATES (PRESENCE) IN URINE BY SCREEN METHOD: NORMAL
OXYCODONE (PRESENCE) IN URINE BY SCREEN METHOD: NORMAL
PHENCYCLIDINE (PRESENCE) IN URINE BY SCREEN METHOD: NORMAL
PLATELETS (10*3/UL) IN BLOOD AUTOMATED COUNT: 334 X10E9/L (ref 150–450)
POTASSIUM (MMOL/L) IN SER/PLAS: 4.4 MMOL/L (ref 3.5–5.3)
SARS-COV-2 RESULT: NOT DETECTED
SODIUM (MMOL/L) IN SER/PLAS: 130 MMOL/L (ref 136–145)
UREA NITROGEN (MG/DL) IN SER/PLAS: 17 MG/DL (ref 6–23)

## 2023-09-26 ENCOUNTER — HOSPITAL ENCOUNTER (INPATIENT)
Dept: DATA CONVERSION | Facility: HOSPITAL | Age: 63
LOS: 5 days | Discharge: NURSING, REHAB OR LONG-TERM CARE | DRG: 177 | End: 2023-10-01
Attending: STUDENT IN AN ORGANIZED HEALTH CARE EDUCATION/TRAINING PROGRAM | Admitting: INTERNAL MEDICINE
Payer: COMMERCIAL

## 2023-09-26 DIAGNOSIS — R45.851 SUICIDAL IDEATIONS: ICD-10-CM

## 2023-09-26 DIAGNOSIS — J12.9 VIRAL PNEUMONIA, UNSPECIFIED: Primary | ICD-10-CM

## 2023-09-26 DIAGNOSIS — E11.9 TYPE 2 DIABETES MELLITUS WITHOUT COMPLICATION, UNSPECIFIED WHETHER LONG TERM INSULIN USE (MULTI): ICD-10-CM

## 2023-09-26 LAB
ANION GAP IN SER/PLAS: 12 MMOL/L (ref 10–20)
BLOOD CULTURE: NORMAL
BLOOD CULTURE: NORMAL
CALCIUM (MG/DL) IN SER/PLAS: 9.2 MG/DL (ref 8.6–10.3)
CARBON DIOXIDE, TOTAL (MMOL/L) IN SER/PLAS: 30 MMOL/L (ref 21–32)
CHLORIDE (MMOL/L) IN SER/PLAS: 96 MMOL/L (ref 98–107)
CREATININE (MG/DL) IN SER/PLAS: 0.66 MG/DL (ref 0.5–1.3)
ERYTHROCYTE DISTRIBUTION WIDTH (RATIO) BY AUTOMATED COUNT: 13.4 % (ref 11.5–14.5)
ERYTHROCYTE MEAN CORPUSCULAR HEMOGLOBIN CONCENTRATION (G/DL) BY AUTOMATED: 32.7 G/DL (ref 32–36)
ERYTHROCYTE MEAN CORPUSCULAR VOLUME (FL) BY AUTOMATED COUNT: 96 FL (ref 80–100)
ERYTHROCYTES (10*6/UL) IN BLOOD BY AUTOMATED COUNT: 4.26 X10E12/L (ref 4.5–5.9)
GFR MALE: >90 ML/MIN/1.73M2
GLUCOSE (MG/DL) IN SER/PLAS: 170 MG/DL (ref 74–99)
HEMATOCRIT (%) IN BLOOD BY AUTOMATED COUNT: 41 % (ref 41–52)
HEMOGLOBIN (G/DL) IN BLOOD: 13.4 G/DL (ref 13.5–17.5)
LEUKOCYTES (10*3/UL) IN BLOOD BY AUTOMATED COUNT: 14.6 X10E9/L (ref 4.4–11.3)
NRBC (PER 100 WBCS) BY AUTOMATED COUNT: 0.1 /100 WBC (ref 0–0)
PLATELETS (10*3/UL) IN BLOOD AUTOMATED COUNT: 372 X10E9/L (ref 150–450)
POCT GLUCOSE: 142 MG/DL (ref 74–99)
POTASSIUM (MMOL/L) IN SER/PLAS: 4.2 MMOL/L (ref 3.5–5.3)
SODIUM (MMOL/L) IN SER/PLAS: 134 MMOL/L (ref 136–145)
UREA NITROGEN (MG/DL) IN SER/PLAS: 20 MG/DL (ref 6–23)

## 2023-09-26 PROCEDURE — 9990 CHARGE CONVERSION

## 2023-09-26 PROCEDURE — 82947 ASSAY GLUCOSE BLOOD QUANT: CPT

## 2023-09-27 LAB
ANION GAP IN SER/PLAS: NORMAL
CALCIUM (MG/DL) IN SER/PLAS: NORMAL
CARBON DIOXIDE, TOTAL (MMOL/L) IN SER/PLAS: NORMAL
CHLORIDE (MMOL/L) IN SER/PLAS: NORMAL
CREATININE (MG/DL) IN SER/PLAS: NORMAL
ERYTHROCYTE DISTRIBUTION WIDTH (RATIO) BY AUTOMATED COUNT: NORMAL
ERYTHROCYTE MEAN CORPUSCULAR HEMOGLOBIN CONCENTRATION (G/DL) BY AUTOMATED: NORMAL
ERYTHROCYTE MEAN CORPUSCULAR VOLUME (FL) BY AUTOMATED COUNT: NORMAL
ERYTHROCYTES (10*6/UL) IN BLOOD BY AUTOMATED COUNT: NORMAL
GFR FEMALE: NORMAL
GFR MALE: NORMAL
GLUCOSE (MG/DL) IN SER/PLAS: NORMAL
HEMATOCRIT (%) IN BLOOD BY AUTOMATED COUNT: NORMAL
HEMOGLOBIN (G/DL) IN BLOOD: NORMAL
LEUKOCYTES (10*3/UL) IN BLOOD BY AUTOMATED COUNT: NORMAL
NRBC (PER 100 WBCS) BY AUTOMATED COUNT: NORMAL
PLATELETS (10*3/UL) IN BLOOD AUTOMATED COUNT: NORMAL
POTASSIUM (MMOL/L) IN SER/PLAS: NORMAL
SODIUM (MMOL/L) IN SER/PLAS: NORMAL
UREA NITROGEN (MG/DL) IN SER/PLAS: NORMAL

## 2023-09-27 PROCEDURE — 92611 MOTION FLUOROSCOPY/SWALLOW: CPT | Mod: GN

## 2023-09-27 PROCEDURE — 94640 AIRWAY INHALATION TREATMENT: CPT

## 2023-09-27 PROCEDURE — 74230 X-RAY XM SWLNG FUNCJ C+: CPT

## 2023-09-27 PROCEDURE — 92610 EVALUATE SWALLOWING FUNCTION: CPT | Mod: GN

## 2023-09-27 PROCEDURE — 82947 ASSAY GLUCOSE BLOOD QUANT: CPT

## 2023-09-27 PROCEDURE — 9990 CHARGE CONVERSION

## 2023-09-28 LAB
ALBUMIN (G/DL) IN SER/PLAS: 3.2 G/DL (ref 3.4–5)
ANION GAP IN SER/PLAS: 11 MMOL/L (ref 10–20)
CALCIUM (MG/DL) IN SER/PLAS: 8.6 MG/DL (ref 8.6–10.3)
CARBON DIOXIDE, TOTAL (MMOL/L) IN SER/PLAS: 28 MMOL/L (ref 21–32)
CHLORIDE (MMOL/L) IN SER/PLAS: 100 MMOL/L (ref 98–107)
CREATININE (MG/DL) IN SER/PLAS: 0.63 MG/DL (ref 0.5–1.3)
ERYTHROCYTE DISTRIBUTION WIDTH (RATIO) BY AUTOMATED COUNT: 13.3 % (ref 11.5–14.5)
ERYTHROCYTE MEAN CORPUSCULAR HEMOGLOBIN CONCENTRATION (G/DL) BY AUTOMATED: 32.9 G/DL (ref 32–36)
ERYTHROCYTE MEAN CORPUSCULAR VOLUME (FL) BY AUTOMATED COUNT: 94 FL (ref 80–100)
ERYTHROCYTES (10*6/UL) IN BLOOD BY AUTOMATED COUNT: 4.5 X10E12/L (ref 4.5–5.9)
GFR MALE: >90 ML/MIN/1.73M2
GLUCOSE (MG/DL) IN SER/PLAS: 193 MG/DL (ref 74–99)
HEMATOCRIT (%) IN BLOOD BY AUTOMATED COUNT: 42.2 % (ref 41–52)
HEMOGLOBIN (G/DL) IN BLOOD: 13.9 G/DL (ref 13.5–17.5)
LEUKOCYTES (10*3/UL) IN BLOOD BY AUTOMATED COUNT: 13.3 X10E9/L (ref 4.4–11.3)
PHOSPHATE (MG/DL) IN SER/PLAS: 3.4 MG/DL (ref 2.5–4.9)
PLATELETS (10*3/UL) IN BLOOD AUTOMATED COUNT: 358 X10E9/L (ref 150–450)
POTASSIUM (MMOL/L) IN SER/PLAS: 4.3 MMOL/L (ref 3.5–5.3)
SODIUM (MMOL/L) IN SER/PLAS: 135 MMOL/L (ref 136–145)
TROPONIN I, HIGH SENSITIVITY: 4 NG/L (ref 0–20)
UREA NITROGEN (MG/DL) IN SER/PLAS: 27 MG/DL (ref 6–23)

## 2023-09-28 PROCEDURE — 93005 ELECTROCARDIOGRAM TRACING: CPT

## 2023-09-28 PROCEDURE — 94640 AIRWAY INHALATION TREATMENT: CPT

## 2023-09-28 PROCEDURE — 85027 COMPLETE CBC AUTOMATED: CPT

## 2023-09-28 PROCEDURE — 9990 CHARGE CONVERSION

## 2023-09-28 PROCEDURE — 84484 ASSAY OF TROPONIN QUANT: CPT

## 2023-09-28 PROCEDURE — 80069 RENAL FUNCTION PANEL: CPT

## 2023-09-28 PROCEDURE — 36415 COLL VENOUS BLD VENIPUNCTURE: CPT

## 2023-09-28 RX ORDER — MIRTAZAPINE 15 MG/1
30 TABLET, FILM COATED ORAL NIGHTLY
Status: DISCONTINUED | OUTPATIENT
Start: 2023-09-30 | End: 2023-10-01 | Stop reason: HOSPADM

## 2023-09-28 RX ORDER — NALTREXONE HYDROCHLORIDE 50 MG/1
50 TABLET, FILM COATED ORAL DAILY
Status: DISCONTINUED | OUTPATIENT
Start: 2023-09-30 | End: 2023-10-01 | Stop reason: HOSPADM

## 2023-09-28 RX ORDER — MULTIVIT-MIN/IRON FUM/FOLIC AC 7.5 MG-4
1 TABLET ORAL DAILY
Status: DISCONTINUED | OUTPATIENT
Start: 2023-09-30 | End: 2023-10-01 | Stop reason: HOSPADM

## 2023-09-28 RX ORDER — QUETIAPINE FUMARATE 100 MG/1
200 TABLET, FILM COATED ORAL 2 TIMES DAILY
Status: DISCONTINUED | OUTPATIENT
Start: 2023-09-30 | End: 2023-10-01 | Stop reason: HOSPADM

## 2023-09-28 RX ORDER — ENOXAPARIN SODIUM 100 MG/ML
40 INJECTION SUBCUTANEOUS EVERY 24 HOURS
Status: DISCONTINUED | OUTPATIENT
Start: 2023-09-30 | End: 2023-10-01 | Stop reason: HOSPADM

## 2023-09-28 RX ORDER — PANTOPRAZOLE SODIUM 40 MG/1
40 TABLET, DELAYED RELEASE ORAL 2 TIMES DAILY
Status: DISCONTINUED | OUTPATIENT
Start: 2023-09-30 | End: 2023-10-01 | Stop reason: HOSPADM

## 2023-09-28 RX ORDER — BENZTROPINE MESYLATE 1 MG/1
1 TABLET ORAL EVERY 12 HOURS
Status: DISCONTINUED | OUTPATIENT
Start: 2023-09-30 | End: 2023-10-01 | Stop reason: HOSPADM

## 2023-09-28 RX ORDER — ALBUTEROL SULFATE 0.83 MG/ML
2.5 SOLUTION RESPIRATORY (INHALATION) EVERY 2 HOUR PRN
Status: DISCONTINUED | OUTPATIENT
Start: 2023-09-30 | End: 2023-10-01 | Stop reason: HOSPADM

## 2023-09-28 RX ORDER — QUETIAPINE FUMARATE 25 MG/1
50 TABLET, FILM COATED ORAL NIGHTLY
Status: DISCONTINUED | OUTPATIENT
Start: 2023-09-30 | End: 2023-10-01 | Stop reason: HOSPADM

## 2023-09-28 RX ORDER — BUPROPION HYDROCHLORIDE 150 MG/1
300 TABLET ORAL EVERY 24 HOURS
Status: DISCONTINUED | OUTPATIENT
Start: 2023-09-30 | End: 2023-10-01 | Stop reason: HOSPADM

## 2023-09-28 RX ORDER — SODIUM CHLORIDE 1000 MG
1 TABLET, SOLUBLE MISCELLANEOUS DAILY
Status: DISCONTINUED | OUTPATIENT
Start: 2023-09-30 | End: 2023-10-01 | Stop reason: HOSPADM

## 2023-09-28 RX ORDER — RANOLAZINE 500 MG/1
500 TABLET, EXTENDED RELEASE ORAL 2 TIMES DAILY
Status: DISCONTINUED | OUTPATIENT
Start: 2023-09-30 | End: 2023-10-01 | Stop reason: HOSPADM

## 2023-09-28 RX ORDER — AMLODIPINE BESYLATE 10 MG/1
10 TABLET ORAL DAILY
Status: DISCONTINUED | OUTPATIENT
Start: 2023-09-30 | End: 2023-10-01 | Stop reason: HOSPADM

## 2023-09-28 RX ORDER — NAPROXEN SODIUM 220 MG/1
81 TABLET, FILM COATED ORAL DAILY
Status: DISCONTINUED | OUTPATIENT
Start: 2023-09-30 | End: 2023-10-01 | Stop reason: HOSPADM

## 2023-09-28 RX ORDER — LORAZEPAM 0.5 MG/1
0.5 TABLET ORAL EVERY 8 HOURS PRN
Status: DISCONTINUED | OUTPATIENT
Start: 2023-09-30 | End: 2023-10-01 | Stop reason: HOSPADM

## 2023-09-28 RX ORDER — GABAPENTIN 300 MG/1
600 CAPSULE ORAL 3 TIMES DAILY
Status: DISCONTINUED | OUTPATIENT
Start: 2023-09-30 | End: 2023-10-01 | Stop reason: HOSPADM

## 2023-09-28 RX ORDER — ALBUTEROL SULFATE 0.83 MG/ML
3 SOLUTION RESPIRATORY (INHALATION) 3 TIMES DAILY
Status: DISCONTINUED | OUTPATIENT
Start: 2023-09-30 | End: 2023-10-01 | Stop reason: HOSPADM

## 2023-09-28 RX ORDER — SERTRALINE HYDROCHLORIDE 50 MG/1
100 TABLET, FILM COATED ORAL DAILY
Status: DISCONTINUED | OUTPATIENT
Start: 2023-09-30 | End: 2023-10-01 | Stop reason: HOSPADM

## 2023-09-29 PROCEDURE — 93005 ELECTROCARDIOGRAM TRACING: CPT

## 2023-09-29 PROCEDURE — 80069 RENAL FUNCTION PANEL: CPT

## 2023-09-29 PROCEDURE — 92526 ORAL FUNCTION THERAPY: CPT | Mod: GN

## 2023-09-29 PROCEDURE — 9990 CHARGE CONVERSION

## 2023-09-29 PROCEDURE — 85027 COMPLETE CBC AUTOMATED: CPT

## 2023-09-29 PROCEDURE — 36415 COLL VENOUS BLD VENIPUNCTURE: CPT

## 2023-09-29 PROCEDURE — 84484 ASSAY OF TROPONIN QUANT: CPT

## 2023-09-29 PROCEDURE — 94640 AIRWAY INHALATION TREATMENT: CPT

## 2023-09-29 RX ORDER — SODIUM CHLORIDE 1000 MG
1 TABLET, SOLUBLE MISCELLANEOUS DAILY
COMMUNITY
End: 2023-10-01 | Stop reason: HOSPADM

## 2023-09-29 RX ORDER — MULTIVITAMIN
1 TABLET ORAL DAILY
COMMUNITY
End: 2024-04-24 | Stop reason: HOSPADM

## 2023-09-29 RX ORDER — MIRTAZAPINE 30 MG/1
30 TABLET, FILM COATED ORAL NIGHTLY
COMMUNITY
End: 2023-10-01 | Stop reason: HOSPADM

## 2023-09-29 RX ORDER — GABAPENTIN 600 MG/1
600 TABLET ORAL 3 TIMES DAILY
Status: ON HOLD | COMMUNITY
End: 2024-01-10 | Stop reason: SDUPTHER

## 2023-09-29 RX ORDER — QUETIAPINE FUMARATE 200 MG/1
200 TABLET, FILM COATED ORAL 2 TIMES DAILY
Status: ON HOLD | COMMUNITY
End: 2024-01-07 | Stop reason: SDUPTHER

## 2023-09-29 RX ORDER — AMLODIPINE BESYLATE 10 MG/1
10 TABLET ORAL DAILY
COMMUNITY
End: 2023-10-01 | Stop reason: HOSPADM

## 2023-09-29 RX ORDER — BUPROPION HYDROCHLORIDE 300 MG/1
300 TABLET ORAL EVERY MORNING
COMMUNITY
End: 2024-04-24 | Stop reason: HOSPADM

## 2023-09-29 RX ORDER — QUETIAPINE FUMARATE 50 MG/1
50 TABLET, FILM COATED ORAL NIGHTLY
COMMUNITY
End: 2024-04-24 | Stop reason: HOSPADM

## 2023-09-29 RX ORDER — NALTREXONE HYDROCHLORIDE 50 MG/1
50 TABLET, FILM COATED ORAL DAILY
COMMUNITY
End: 2023-10-01 | Stop reason: HOSPADM

## 2023-09-29 RX ORDER — PANTOPRAZOLE SODIUM 40 MG/1
40 TABLET, DELAYED RELEASE ORAL
COMMUNITY
End: 2024-04-24 | Stop reason: HOSPADM

## 2023-09-29 RX ORDER — RANOLAZINE 500 MG/1
500 TABLET, EXTENDED RELEASE ORAL 2 TIMES DAILY
Status: ON HOLD | COMMUNITY
End: 2024-01-07 | Stop reason: ALTCHOICE

## 2023-09-29 RX ORDER — NAPROXEN SODIUM 220 MG/1
81 TABLET, FILM COATED ORAL DAILY
COMMUNITY
End: 2024-04-24 | Stop reason: HOSPADM

## 2023-09-29 RX ORDER — RISPERIDONE 90 MG
90 KIT SUBCUTANEOUS
Status: ON HOLD | COMMUNITY
End: 2024-04-17 | Stop reason: DRUGHIGH

## 2023-09-29 RX ORDER — SERTRALINE HYDROCHLORIDE 100 MG/1
100 TABLET, FILM COATED ORAL DAILY
COMMUNITY
End: 2023-10-01 | Stop reason: HOSPADM

## 2023-09-29 RX ORDER — BENZTROPINE MESYLATE 1 MG/1
1 TABLET ORAL 2 TIMES DAILY
COMMUNITY
End: 2024-04-24 | Stop reason: HOSPADM

## 2023-09-30 ENCOUNTER — DOCUMENTATION (OUTPATIENT)
Dept: BEHAVIORAL HEALTH | Facility: HOSPITAL | Age: 63
End: 2023-09-30

## 2023-09-30 PROBLEM — J12.9 VIRAL PNEUMONIA, UNSPECIFIED: Status: ACTIVE | Noted: 2023-09-30

## 2023-09-30 LAB
ALBUMIN SERPL BCP-MCNC: 3.5 G/DL (ref 3.4–5)
ANION GAP SERPL CALC-SCNC: 13 MMOL/L (ref 10–20)
BUN SERPL-MCNC: 18 MG/DL (ref 6–23)
CALCIUM SERPL-MCNC: 8.8 MG/DL (ref 8.6–10.3)
CHLORIDE SERPL-SCNC: 97 MMOL/L (ref 98–107)
CO2 SERPL-SCNC: 24 MMOL/L (ref 21–32)
CREAT SERPL-MCNC: 0.75 MG/DL (ref 0.5–1.3)
ERYTHROCYTE [DISTWIDTH] IN BLOOD BY AUTOMATED COUNT: 13.2 % (ref 11.5–14.5)
GFR SERPL CREATININE-BSD FRML MDRD: >90 ML/MIN/1.73M*2
GLUCOSE SERPL-MCNC: 201 MG/DL (ref 74–99)
HCT VFR BLD AUTO: 40.3 % (ref 41–52)
HGB BLD-MCNC: 13 G/DL (ref 13.5–17.5)
MCH RBC QN AUTO: 30.4 PG (ref 26–34)
MCHC RBC AUTO-ENTMCNC: 32.3 G/DL (ref 32–36)
MCV RBC AUTO: 94 FL (ref 80–100)
NRBC BLD-RTO: 0 /100 WBCS (ref 0–0)
PHOSPHATE SERPL-MCNC: 2.9 MG/DL (ref 2.5–4.9)
PLATELET # BLD AUTO: 351 X10*3/UL (ref 150–450)
PMV BLD AUTO: 9 FL (ref 7.5–11.5)
POTASSIUM SERPL-SCNC: 4.1 MMOL/L (ref 3.5–5.3)
RBC # BLD AUTO: 4.28 X10*6/UL (ref 4.5–5.9)
SODIUM SERPL-SCNC: 130 MMOL/L (ref 136–145)
WBC # BLD AUTO: 9.6 X10*3/UL (ref 4.4–11.3)

## 2023-09-30 PROCEDURE — 2500000002 HC RX 250 W HCPCS SELF ADMINISTERED DRUGS (ALT 637 FOR MEDICARE OP, ALT 636 FOR OP/ED): Performed by: FAMILY MEDICINE

## 2023-09-30 PROCEDURE — 83036 HEMOGLOBIN GLYCOSYLATED A1C: CPT | Mod: CMCLAB,GEALAB | Performed by: FAMILY MEDICINE

## 2023-09-30 PROCEDURE — 36415 COLL VENOUS BLD VENIPUNCTURE: CPT | Performed by: FAMILY MEDICINE

## 2023-09-30 PROCEDURE — 94760 N-INVAS EAR/PLS OXIMETRY 1: CPT

## 2023-09-30 PROCEDURE — 2500000004 HC RX 250 GENERAL PHARMACY W/ HCPCS (ALT 636 FOR OP/ED): Performed by: FAMILY MEDICINE

## 2023-09-30 PROCEDURE — 85027 COMPLETE CBC AUTOMATED: CPT | Performed by: FAMILY MEDICINE

## 2023-09-30 PROCEDURE — 2500000001 HC RX 250 WO HCPCS SELF ADMINISTERED DRUGS (ALT 637 FOR MEDICARE OP): Performed by: FAMILY MEDICINE

## 2023-09-30 PROCEDURE — 1100000001 HC PRIVATE ROOM DAILY

## 2023-09-30 PROCEDURE — 94640 AIRWAY INHALATION TREATMENT: CPT

## 2023-09-30 PROCEDURE — 80069 RENAL FUNCTION PANEL: CPT | Performed by: FAMILY MEDICINE

## 2023-09-30 PROCEDURE — 94664 DEMO&/EVAL PT USE INHALER: CPT

## 2023-09-30 PROCEDURE — 99254 IP/OBS CNSLTJ NEW/EST MOD 60: CPT | Performed by: PSYCHIATRY & NEUROLOGY

## 2023-09-30 RX ADMIN — PANTOPRAZOLE SODIUM 40 MG: 40 TABLET, DELAYED RELEASE ORAL at 21:22

## 2023-09-30 RX ADMIN — GABAPENTIN 600 MG: 300 CAPSULE ORAL at 14:10

## 2023-09-30 RX ADMIN — PANTOPRAZOLE SODIUM 40 MG: 40 TABLET, DELAYED RELEASE ORAL at 08:42

## 2023-09-30 RX ADMIN — LORAZEPAM 0.5 MG: 0.5 TABLET ORAL at 05:35

## 2023-09-30 RX ADMIN — AMLODIPINE BESYLATE 10 MG: 10 TABLET ORAL at 08:39

## 2023-09-30 RX ADMIN — SERTRALINE HYDROCHLORIDE 100 MG: 50 TABLET ORAL at 08:43

## 2023-09-30 RX ADMIN — CEFEPIME 1 G: 1 INJECTION, POWDER, FOR SOLUTION INTRAMUSCULAR; INTRAVENOUS at 21:40

## 2023-09-30 RX ADMIN — LORAZEPAM 0.5 MG: 0.5 TABLET ORAL at 14:11

## 2023-09-30 RX ADMIN — SODIUM CHLORIDE 1 G: 1 TABLET ORAL at 08:44

## 2023-09-30 RX ADMIN — ASPIRIN 81 MG CHEWABLE TABLET 81 MG: 81 TABLET CHEWABLE at 08:40

## 2023-09-30 RX ADMIN — BUPROPION HYDROCHLORIDE 300 MG: 150 TABLET, FILM COATED, EXTENDED RELEASE ORAL at 08:38

## 2023-09-30 RX ADMIN — RANOLAZINE 500 MG: 500 TABLET, FILM COATED, EXTENDED RELEASE ORAL at 08:43

## 2023-09-30 RX ADMIN — NALTREXONE HYDROCHLORIDE 50 MG: 50 TABLET, FILM COATED ORAL at 08:42

## 2023-09-30 RX ADMIN — GABAPENTIN 600 MG: 300 CAPSULE ORAL at 08:41

## 2023-09-30 RX ADMIN — ALBUTEROL SULFATE 3 ML: 2.5 SOLUTION RESPIRATORY (INHALATION) at 14:59

## 2023-09-30 RX ADMIN — BENZTROPINE MESYLATE 1 MG: 1 TABLET ORAL at 21:37

## 2023-09-30 RX ADMIN — ALBUTEROL SULFATE 3 ML: 2.5 SOLUTION RESPIRATORY (INHALATION) at 09:17

## 2023-09-30 RX ADMIN — GABAPENTIN 600 MG: 300 CAPSULE ORAL at 21:24

## 2023-09-30 RX ADMIN — RANOLAZINE 500 MG: 500 TABLET, FILM COATED, EXTENDED RELEASE ORAL at 21:36

## 2023-09-30 RX ADMIN — QUETIAPINE FUMARATE 200 MG: 100 TABLET ORAL at 21:39

## 2023-09-30 RX ADMIN — QUETIAPINE FUMARATE 50 MG: 25 TABLET ORAL at 21:38

## 2023-09-30 RX ADMIN — ALBUTEROL SULFATE 3 ML: 2.5 SOLUTION RESPIRATORY (INHALATION) at 20:54

## 2023-09-30 RX ADMIN — CEFEPIME 1 G: 1 INJECTION, POWDER, FOR SOLUTION INTRAMUSCULAR; INTRAVENOUS at 14:13

## 2023-09-30 RX ADMIN — MIRTAZAPINE 30 MG: 15 TABLET, FILM COATED ORAL at 21:25

## 2023-09-30 RX ADMIN — QUETIAPINE FUMARATE 200 MG: 100 TABLET ORAL at 08:43

## 2023-09-30 RX ADMIN — BENZTROPINE MESYLATE 1 MG: 1 TABLET ORAL at 08:41

## 2023-09-30 RX ADMIN — CEFEPIME 1 G: 1 INJECTION, POWDER, FOR SOLUTION INTRAMUSCULAR; INTRAVENOUS at 05:06

## 2023-09-30 RX ADMIN — LORAZEPAM 0.5 MG: 0.5 TABLET ORAL at 22:30

## 2023-09-30 RX ADMIN — MULTIPLE VITAMINS W/ MINERALS TAB 1 TABLET: TAB at 08:42

## 2023-09-30 RX ADMIN — ENOXAPARIN SODIUM 40 MG: 40 INJECTION SUBCUTANEOUS at 08:41

## 2023-09-30 ASSESSMENT — COGNITIVE AND FUNCTIONAL STATUS - GENERAL
DRESSING REGULAR LOWER BODY CLOTHING: A LITTLE
WALKING IN HOSPITAL ROOM: A LOT
MOVING TO AND FROM BED TO CHAIR: A LITTLE
DAILY ACTIVITIY SCORE: 19
DRESSING REGULAR UPPER BODY CLOTHING: A LITTLE
DRESSING REGULAR LOWER BODY CLOTHING: A LITTLE
HELP NEEDED FOR BATHING: A LITTLE
TOILETING: A LITTLE
TURNING FROM BACK TO SIDE WHILE IN FLAT BAD: A LITTLE
PERSONAL GROOMING: A LITTLE
CLIMB 3 TO 5 STEPS WITH RAILING: A LOT
MOVING FROM LYING ON BACK TO SITTING ON SIDE OF FLAT BED WITH BEDRAILS: A LITTLE
STANDING UP FROM CHAIR USING ARMS: A LOT
MOBILITY SCORE: 15
MOBILITY SCORE: 15
DRESSING REGULAR UPPER BODY CLOTHING: A LITTLE
TOILETING: A LITTLE
MOVING FROM LYING ON BACK TO SITTING ON SIDE OF FLAT BED WITH BEDRAILS: A LITTLE
TURNING FROM BACK TO SIDE WHILE IN FLAT BAD: A LITTLE
MOVING TO AND FROM BED TO CHAIR: A LITTLE
PERSONAL GROOMING: A LITTLE
HELP NEEDED FOR BATHING: A LITTLE
STANDING UP FROM CHAIR USING ARMS: A LOT
CLIMB 3 TO 5 STEPS WITH RAILING: A LOT
WALKING IN HOSPITAL ROOM: A LOT
DAILY ACTIVITIY SCORE: 19

## 2023-09-30 ASSESSMENT — COLUMBIA-SUICIDE SEVERITY RATING SCALE - C-SSRS
6. HAVE YOU EVER DONE ANYTHING, STARTED TO DO ANYTHING, OR PREPARED TO DO ANYTHING TO END YOUR LIFE?: NO
2. HAVE YOU ACTUALLY HAD ANY THOUGHTS OF KILLING YOURSELF?: NO
1. IN THE PAST MONTH, HAVE YOU WISHED YOU WERE DEAD OR WISHED YOU COULD GO TO SLEEP AND NOT WAKE UP?: NO

## 2023-09-30 ASSESSMENT — PAIN - FUNCTIONAL ASSESSMENT
PAIN_FUNCTIONAL_ASSESSMENT: 0-10
PAIN_FUNCTIONAL_ASSESSMENT: 0-10

## 2023-09-30 ASSESSMENT — PAIN SCALES - GENERAL
PAINLEVEL_OUTOF10: 0 - NO PAIN

## 2023-09-30 NOTE — PROGRESS NOTES
Service: Medicine     Subjective Data:   ELIER CAMPOS is a 63 year old Male who is Hospital Day # 2.     No overnight events reported. Patient had 1:1 sitter overnight for reported suicidal ideation.     This morning, patient is on 2L O2, does not typically wear O2. He reports that his breathing feels labored and he feels like he needs to cough something up. He is very fixated on his home medications and making sure that all of his meds are continued  here; he is difficult to redirect at times. Home naltrexone not stocked by pharmacy and unable to be brought in from patient's facility. Plan of care discussed with patient, all questions answered at that time.    Objective Data:     Objective Information:      T   P  R  BP   MAP  SpO2   Value  36.5  84  16  131/71   88  95%  Date/Time 9/22 7:00 9/22 7:00 9/22 0:00 9/22 7:00  9/22 0:00 9/22 7:00  Range  (36.3C - 36.8C )  (60 - 84 )  (11 - 19 )  (103 - 135 )/ (63 - 97 )  (88 - 106 )  (89% - 100% )   As of 22-Sep-2023 00:00:00, patient is on 3 L/min of oxygen via nasal cannula.      Pain reported at 9/22 6:39: 3 = Mild    ---- Intake and Output  -----  Mn/Dy/Year Time  Intake   Output  Net  Sep 22, 2023 6:00 am  240   500  -260  Sep 21, 2023 10:00 pm  0   2550  -2550    The Intake and Output Totals for the last 24 hours are:      Intake   Output  Net      240   6540  -2810    Physical Exam Narrative:  ·  Physical Exam:    Constitutional: WN/WD male sitting up in bed, in no acute distress, appears much older than stated age   Eyes: No scleral icterus   ENMT: Mucous membranes moist  Head/Neck: NC/AT  Respiratory/Thorax: Diminished breath sounds with crackles. No accessory muscle use or conversational dyspnea. SpO2 > 92% on 2L O2 via NC   Cardiovascular: Regular rate and rhythm, no murmurs, normal S1 and S2.  Gastrointestinal: Soft, non-tender, non-distended abdomen. No rebound tenderness or guarding, no masses palpable, no organomegaly, + BS  Extremities: Grossly  normal extremities, no edema  Neurological: Alert and oriented x 3  Skin: Warm and dry, no lesions or rashes   Psychological: Anxious     Medication:    Medications:      1. Vancomycin - RPh to Dose - IV Piggy Back:  1  each  As Specified  Variable      ALTERNATIVE MEDICINES:    1. Melatonin:  9  mg  Oral  At Bedtime   PRN         ANTI-INFECTIVES:    1. Vancomycin 1.5 gram/ 300 mL Premix IVPB (Xellia):  1500  mg  IntraVenous Piggyback  Every 24 Hours    2. Piperacillin - Tazobactam 4.5 gram/Iso-osmotic 100 mL Premix IVPB:  100  mL  IntraVenous Piggyback  Every 6 Hours      CARDIOVASCULAR AGENTS:    1. Ranolazine:  500  mg  Oral  2 Times a Day    2. amLODIPine (NORVASC):  10  mg  Oral  Daily      CENTRAL NERVOUS SYSTEM AGENTS:    1. Acetaminophen:  650  mg  Oral  Every 4 Hours   PRN       2. Acetaminophen:  650  mg  Oral  Every 4 Hours   PRN       3. Aspirin Chewable:  81  mg  Oral  Daily    4. Divalproex Sodium Del Rel (Depakote):  500  mg  Oral  Every 12 Hours    5. Gabapentin:  600  mg  Oral  3 Times a Day    6. Ondansetron Injectable:  4  mg  IntraVenous Push  Every 4 Hours   PRN       7. Benztropine:  1  mg  Oral  Every 12 Hours    8. LORazepam:  0.5  mg  Oral  Every 8 Hours   PRN         COAGULATION MODIFIERS:    1. Enoxaparin SubCutaneous:  40  mg  SubCutaneous  Every 24 Hours      GASTROINTESTINAL AGENTS:    1. Magnesium Hydroxide -Al Hydrox -Simethicone Oral Liquid:  30  mL  Oral  Every 6 Hours   PRN       2. Magnesium Hydroxide Oral Liquid:  10  mL  Oral  Every 24 Hours   PRN       3. Pantoprazole:  40  mg  Oral  Daily      HORMONES/HORMONE MODIFIERS:    1. methylPREDNISolone Sodium Succinate Injectable:  40  mg  IntraVenous Push  Every 8 Hours      IMMUNOLOGIC AGENTS:    1. Influenza Virus QUADRIVALENT (Inactive) ADULT Vaccine:  0.5  mL  IntraMuscular  Once    2. Pneumococcal 23-Valent (PNEUMOVAX) Vaccine:  0.5  mL  IntraMuscular  Once      MISCELLANEOUS AGENTS:    1. Naltrexone:  50  mg  Oral  Daily       NUTRITIONAL PRODUCTS:    1. Sodium Chloride:  1  gram(s)  Oral  Daily    2. Sodium Chloride 0.9% Injectable Flush:  10  mL  IntraVenous Flush  Every 8 Hours and as Needed   PRN       3. Multivitamin with Minerals:  1  tablet(s)  Oral  Daily      PSYCHOTHERAPEUTIC AGENTS:    1. buPROPion (WELLBUTRIN XL) Extended Release (24 hour):  300  mg  Oral  Every 24 Hours    2. Mirtazapine:  30  mg  Oral  At Bedtime    3. Sertraline:  100  mg  Oral  Daily    4. QUEtiapine:  200  mg  Oral  Daily Before First Meal    5. QUEtiapine:  250  mg  Oral  At Bedtime      RESPIRATORY AGENTS:    1. Albuterol 2.5 mg - Ipratropium 0.5 mg/ 3 mL Neb Soln:  3  mL  Inhalation  Every 6 Hours    2. Albuterol 2.5 mg/ 3 mL Nebulizer Soln:  3  mL  Inhalation  Every 2 Hours   PRN       3. guaiFENesin Extended Release:  600  mg  Oral  Every 12 Hours    4. Dextromethorphan - guaiFENesin Oral Liquid:  5  mL  Oral  Every 4 Hours   PRN         TOPICAL AGENTS:    1. Sore Throat Lozenge:  1  lozenge(s)  Oral  Every 2 Hours   PRN         Recent Lab Results:    Results:    CBC: 9/22/2023 08:21              \     Hgb     /                              \     12.2 L    /  WBC  ----------------  Plt               14.4 H    ----------------    265              /     Hct     \                              /     36.9 L    \            RBC: 3.90 L    MCV: 95     Neutrophil %: 84.8      BMP: 9/22/2023 08:21  NA+        Cl-     BUN  /                         136    104    18  /  --------------------------------  Glucose                ---------------------------  189 H    K+     HCO3-   Creat \                         4.0  22    0.70  \  Calcium : 8.8     Anion Gap : 14      CMP: 9/21/2023 13:56  NA+        Cl-     BUN  /                         131 L   98    21  /  --------------------------------  Glucose                ---------------------------  138 H    K+     HCO3-   Creat \                         4.2    23    1.26  \           \  T Bili  /                     \  0.7  /  AST  x ---- x ALT        13 x ---- x 11         /  Alk P   \               /  55  \  Calcium : 9.2     Anion Gap : 14     Albumin : 4.0     T Protein : 7.5             ---------- Recent Arterial Blood Gas Results----------     9/21/2023 14:47  pO2 77  pH 7.45  pCO2 36  SO2 97  Base Excess 1.3null    Radiology Results:    Results:        Impression:    Prior cardiac surgery with nonspecific mild bibasilar  pleural/parenchymal airspace opacity suggested. Similar cardiomegaly.     Xray Chest 1 View [Sep 21 2023  2:45PM]      Assessment and Plan:   Code Status:  ·  Code Status Full Code     Assessment:    #Acute hypoxic respiratory failure 2/2 pneumonia, gram negative vs. MRSA  - WBC 19.1 > 14.4   - Lactate 1.6     - COVID, flu A/B negative  - ABG: pH 7.45, pCO2 36, pO2 77   - CXR: Prior cardiac surgery with nonspecific mild bibasilar pleural/parenchymal airspace opacity suggested. Similar cardiomegaly.  - Procal, BCx, sputum culture, MRSA nares pending   - Continue Vancomycin and Zosyn (day 2)   - Solumedrol 40 mg IVP q8h   - Mucinex twice a day, Tylenol as needed for pain/fever   - RT eval and treat protocol, additional inhalers/breathing treatments per RT   - DuoNebs q6h   - Bronchial hygiene   - Baseline O2:  RA   - Wean O2 as tolerated; patient currently on 2L O2 via NC  - SLP eval pending to r/o aspiration; currently on honey thick liquids     #HTN  #CAD  - Continue amlodipine, aspirin, and ranolazine   - Monitor BP     #Hyponatremia, chronic  - Na 131 > 136  - Continue sodium chloride tablet every day (home med)  - Daily BMP to monitor Na level    #Schizoaffective disorder   #Suicidal ideation with h/o multiple suicide attempts    #H/o EtOH abuse   - Continue home meds: benztropine, bupropion, Depakote, gabapentin, mirtazapine, quetiapine, and sertraline   - Naltrexone not stocked by pharmacy; [facility] not able to bring to hospital   - Ativan 0.5 mg PO q8h as needed for anxiety   -  Delirium precautions: Frequent reorientation, day/night normalization, shades open for sunlight, provide glasses/hearing aids as needed   - 1:1 sitter for reported SI   - Psych consulted, appreciate recs   - Patient resides at [facility] on lockdown unit, has a guardian     DVT PPx: Lovenox   GI PPx: Protonix   Diet: Regular with bite size food, honey thick liquids   Code Status: Full code     Disposition: Patient requires inpatient management at this time.     Total accumulated time spent face to face and not face to face preparing to see the patient, obtaining and reviewing separately obtained history; performing a medically appropriate examination and/or evaluation; counseling and educating the patient, family;  ordering medications, tests, or procedures; referring and communicating with other health care professionals; documenting clinical information in the patient's medical record; independently interpreting results and communicating the results to the patient,  family; and care coordination was 45 minutes.       Plan of Care Reviewed With:  Plan of Care Reviewed With: patient       Electronic Signatures:  Tahira Scott (PAC)  (Signed 22-Sep-2023 11:59)   Authored: Service, Subjective Data, Objective Data, Assessment  and Plan, Note Completion      Last Updated: 22-Sep-2023 11:59 by Tahira Scott (PAC)

## 2023-09-30 NOTE — H&P
History of Present Illness:   Admission Reason: Aspiration pneumonia, suicidal  ideation   HPI:    ELIER ALBRIGHT is a 63 year old Male with a past medical history of Alzheimer's dementia, hypertension, GERD, depression with suicidal ideations, major depression,  schizophrenia, history of polysubstance use disorder and recurrent tinea pedis who was admitted to the hospital for aspiration pneumonia and suicidal ideation.  Patient states that he has been coughing.  He was admitted to the [facility] on 2023 for hypoxic respiratory failure.  He was treated with vancomycin and Zosyn.  Both of those antibiotics have been discontinued in the last 2 days.  However CT scan done yesterday showed extensive infiltrate in the right lung.  Patient was started  on cefepime.  Patient denies chest pain, fever or chills.  No shortness of breath.  He has no abdominal, vomiting, leg pain or leg.      Past medical/surgical history: As stated above in the HPI    Past surgical history: Unknown    Social history: Denies smoking, EtOH abuse or illicit drug use.    Family history: Noncontributory    Review of systems: 10 point review of systems is negative.        Comorbidities:   Comorbidites:  ·  Comorbid Conditions hypertension     Social History:   Social History:  Smoking Status moderate user (uses 11-30 cig/day, OR 0.5-1.5 ppd, OR 2-3 cans/pouches loose leaf tobacco per week, OR 0.5-1.5 vape pods per day)  (1)   Alcohol Use history of abuse(2)   Drug Use denies (2)   Occupation Currently unemployed(2)   Social History    Pt is   Wife Briana Albright lives at [facility] somewhere around [place], [place]  Identify wife as the main support  No children as per chart review  Mother is   and father abandoned him when was young        (2)             Allergies:  ·  No Known Allergies :     Medications Prior to Admission:     Multiple Vitamins with Minerals oral tablet: 1 tab(s) orally once a day for vitamin  supplimentation.   aspirin 81 mg oral tablet, chewable: 1 tab(s) orally once a day for atherosclerosis.  Ranexa 500 mg oral tablet, extended release: 1 tab(s) orally 2 times a day for chronic angina.  benztropine 1 mg oral tablet: 1 tab(s) orally every 12 hours for EPS prophylaxis (muscle stiffness).   buPROPion 300 mg/24 hours (XL) oral tablet, extended release: 1 tab(s) orally every 24 hours  gabapentin 600 mg oral tablet: 1 tab(s) orally 3 times a day  Remeron 30 mg oral tablet: 1 tab(s) orally once a day (at bedtime)  Norvasc 10 mg oral tablet: 1 tab(s) orally once a day  naltrexone 50 mg oral tablet: 1 tab(s) orally once a day  Perseris 90 mg subcutaneous injection, extended release: 1 milligram(s) subcutaneous once a month  Sodium Chloride 1 g oral tablet: 1 tab(s) orally once a day  Zoloft 100 mg oral tablet: 1 tab(s) orally once a day  pantoprazole 40 mg oral delayed release tablet: 1 tab(s) orally 2 times a day  QUEtiapine 50 mg oral tablet: 1 tab(s) orally once a day (at bedtime)  QUEtiapine 200 mg oral tablet: 1 tab(s) orally 2 times a day.    Objective:     Objective Information:      T   P  R  BP   MAP  SpO2   Value  36.2  63  19  123/76      92%  Date/Time 9/26 21:12 9/26 21:12 9/26 21:12 9/26 21:12    9/26 21:12  Range  (36.2C - 38.6C )  (63 - 71 )  (19 - 20 )  (113 - 123 )/ (70 - 76 )    (91% - 92% )  Highest temp of 38.6 C was recorded at 9/26 18:54    Physical Exam by System:    Constitutional: Well developed, awake/alert/oriented  x3, no distress, alert and cooperative   Eyes: EOMI grossly, clear sclera   ENMT: mucous membranes moist   Head/Neck: Neck supple   Respiratory/Thorax: Patent airways, diminished in  the right lung base   Cardiovascular: Regular, rate and rhythm, no murmurs,  2+ equal pulses of the extremities, normal S 1and S 2   Gastrointestinal: Nondistended, soft, non-tender,  no rebound tenderness or guarding,  +BS, no bruits   Musculoskeletal: ROM intact   Extremities: no cyanosis,  no edema   Neurological: alert and oriented x3, intact senses,  moves all limbs against resistance   Psychological: Appropriate mood and behavior   Skin: Warm and dry, intact     Medications:    Medications:          Continuous Medications       --------------------------------  No continuous medications are active       Scheduled Medications       --------------------------------    1. Albuterol 2.5 mg/ 3 mL Nebulizer Soln:  3  mL  Inhalation  3 Times a Day    2. amLODIPine (NORVASC):  10  mg  Oral  Daily    3. Aspirin Chewable:  81  mg  Oral  Daily    4. Azithromycin 500 mg/ D5W 250 mL:  500  mg  IntraVenous Piggyback  Every 24 Hours    5. Benztropine:  1  mg  Oral  Every 12 Hours    6. buPROPion (WELLBUTRIN XL) Extended Release (24 hour):  300  mg  Oral  Every 24 Hours    7. cefTRIAXone 2 gram/Dextrose 5% IVPB Premixed Soln 50 mL:  50  mL  IntraVenous Piggyback  Every 24 Hours    8. Enoxaparin SubCutaneous:  40  mg  SubCutaneous  Every 24 Hours    9. Gabapentin:  600  mg  Oral  3 Times a Day    10. Mirtazapine  - PEDS:  30  mg  Oral  Every Night    11. Multivitamin with Minerals:  1  tablet(s)  Oral  Daily    12. Naltrexone:  50  mg  Oral  Daily    13. Pantoprazole:  40  mg  Oral  2 Times a Day    14. QUEtiapine:  200  mg  Oral  2 Times a Day    15. QUEtiapine  - PEDS:  50  mg  Oral  Every Night    16. Ranolazine:  500  mg  Oral  2 Times a Day    17. Sertraline:  100  mg  Oral  Daily    18. Sodium Chloride:  1  gram(s)  Oral  Daily         PRN Medications       --------------------------------    1. Albuterol 2.5 mg/ 3 mL Nebulizer Soln:  3  mL  Inhalation  Every 2 Hours        Recent Lab Results:    Results:    CBC: 9/26/2023 05:47              \     Hgb     /                              \     13.4 L    /  WBC  ----------------  Plt               14.6 H    ----------------    372              /     Hct     \                              /     41.0       \            RBC: 4.26 L    MCV: 96     Neutrophil %:  84.8      BMP: 9/26/2023 05:47  NA+        Cl-     BUN  /                         134 L   96 L    20  /  --------------------------------  Glucose                ---------------------------  170 H    K+     HCO3-   Creat \                         4.2  30    0.66  \  Calcium : 9.2     Anion Gap : 12      CMP: 9/21/2023 13:56  NA+        Cl-     BUN  /                         131 L   98    21  /  --------------------------------  Glucose                ---------------------------  138 H    K+     HCO3-   Creat \                         4.2    23    1.26  \           \  T Bili  /                    \  0.7  /  AST  x ---- x ALT        13 x ---- x 11         /  Alk P   \               /  55  \  Calcium : 9.2     Anion Gap : 14     Albumin : 4.0     T Protein : 7.5             ---------- Recent Arterial Blood Gas Results----------     9/21/2023 14:47  pO2 77  pH 7.45  pCO2 36  SO2 97  Base Excess 1.3null    Radiology Results:    Results:        Impression:    1.  Extensive infiltrate/pneumonia of the right lung. No  parapneumonic effusion.     CT Chest without Contrast [Sep 26 2023 10:15AM]      Assessment and Plan:   Assessment:    ELIER CAMPOS is a 63 year old Male with a past medical history of Alzheimer's dementia, hypertension, GERD, depression with suicidal ideations, major depression,  schizophrenia, history of polysubstance use disorder and recurrent tinea pedis who was admitted to the hospital for aspiration pneumonia and suicidal ideation.      Aspiration pneumonia/hypoxic respiratory failure  -Got 6 days course of vancomycin and Zosyn.  -Repeat CT scan is showing extensive infiltrate in the right lung.  -Cefepime was started yesterday.   -Follow-up cultures  -Wean oxygen  -We will monitor  -Speech eval    Schizophrenia/major depression/Alzheimer's dementia/suicidal ideation  -Patient was transferred here in part to see psychiatry  -Continue one-to-one sitter  -Resume home  "meds    GERD  -Resume PPI    Hypertension  -Resume Norvasc    Resume home meds for all comorbidities    DVT prophylaxis  -Lovenox        Electronic Signatures:  Emre Miles)  (Signed 27-Sep-2023 10:04)   Authored: History of Present Illness, Comorbidities,  Social History, Allergies, Medications Prior to Admission, Objective, Assessment and Plan, Note Completion      Last Updated: 27-Sep-2023 10:04 by Emre Miles)    References:  1.  Data Referenced From \"Patient Profile - Adult v2\" 26-Sep-2023 18:54  2.  Data Referenced From \"Consult - Psychiatry\" 25-Sep-2023 08:14   "

## 2023-09-30 NOTE — PROGRESS NOTES
Service: Medicine     Subjective Data:   ELIER CAMPOS is a 63 year old Male who is Hospital Day # 3.    Overnight Events: Patient had an uneventful night.       Objective Data:     Objective Information:      T   P  R  BP   MAP  SpO2   Value  36.3  86  18  133/84      93%  Date/Time 9/28 6:53 9/28 6:53 9/28 6:53 9/28 6:53    9/28 6:53  Range  (36.2C - 36.3C )  (62 - 86 )  (18 - 19 )  (92 - 133 )/ (63 - 84 )    (93% - 94% )   As of 27-Sep-2023 23:31:00, patient is on 2 L/min of oxygen via nasal cannula.      Pain reported at 9/27 21:02: 0 = None    Physical Exam by System:    Constitutional: Well developed, awake/alert/oriented  x3, no distress, alert and cooperative   Eyes: EOMI grossly, clear sclera   ENMT: mucous membranes moist   Head/Neck: Neck supple   Respiratory/Thorax: Patent airways, diminished in  the right lung base   Cardiovascular: Regular, rate and rhythm, no murmurs,  2+ equal pulses of the extremities, normal S 1and S 2   Gastrointestinal: Nondistended, soft, non-tender,  no rebound tenderness or guarding,  +BS, no bruits   Musculoskeletal: ROM intact   Extremities: no cyanosis, no edema   Neurological: alert and oriented x3, intact senses,  moves all limbs against resistance   Psychological: Appropriate mood and behavior   Skin: Warm and dry, intact     Recent Lab Results:    Results:    CBC: 9/28/2023 07:11              \     Hgb     /                              \     13.9       /  WBC  ----------------  Plt               13.3 H    ----------------    358              /     Hct     \                              /     42.2       \            RBC: 4.50     MCV: 94         Radiology Results:    Results:        Impression:    1.  Extensive infiltrate/pneumonia of the right lung. No  parapneumonic effusion.     CT Chest without Contrast [Sep 26 2023 10:15AM]      Assessment and Plan:   Code Status:  ·  Code Status Full Code     Assessment:    ELIER CAMPOS is a 63 year old Male with a  past medical history of Alzheimer's dementia, hypertension, GERD, depression with suicidal ideations, major depression,  schizophrenia, history of polysubstance use disorder and recurrent tinea pedis who was admitted to the hospital for aspiration pneumonia and suicidal ideation.      Acute hypoxic respiratory failure due to Aspiration pneumonia  -O2 weaned off  -Repeat CT scan is showing extensive infiltrate in the right lung.  -Got 6 days course of vancomycin and Zosyn.  -Continue supplemental O2 at 2L/min via NC, wean as tolerated   -Cefepime Day 4  - Flu and covid negative  -MRSA screen showed MSSA  -Blood culture 09/21 show no growth (final)  -Speech recommends soft and bites sized diet with thin liquids and continued swallowing therapy    Schizophrenia/major depression/Alzheimer's dementia/suicidal ideation  -Psychiatry advised to contact once patient is medically stabilized   -Continue one-to-one sitter  -Resume home meds    GERD  -Resume PPI    Hypertension  -Resume Norvasc    DVT prophylaxis  -Lovenox    Disposition   O2 has been weaned off patient is 4 days from likely aspiration, he is medically cleared for evaluation by psychiatry, once discharged we will continue oral antibiotics to complete a 7-day course from date of aspiration        Electronic Signatures:  Monster Portillo)  (Signed 28-Sep-2023 10:40)   Authored: Service, Subjective Data, Objective Data, Assessment  and Plan, Note Completion      Last Updated: 28-Sep-2023 10:40 by Monster Portillo ()

## 2023-09-30 NOTE — CARE PLAN
The patient's goals for the shift include  Patient will remain free from injury by using the call light and asking for help. Patient will be involved in bathing and personal hygiene.     The clinical goals for the shift include  will help themselves with bathing and use the call light through out the shift.     Over the shift, the patient did not make progress toward the following goals. Barriers to progression include ***. Recommendations to address these barriers include ***.

## 2023-09-30 NOTE — DISCHARGE SUMMARY
Send Summary:   Discharge Summary Providers:  Provider Role Provider Name   · Referring Vanessa Paredes   · Attending Kurt Sheth   · Consulting Marisol Hurt   · Primary Amparo Galarza       Note Recipients: Amparo Galarza MD - [phone]  [Preferred]       Discharge:    Summary:   Admission Date: .21-Sep-2023 13:43:00   Discharge Date: 26-Sep-2023   Attending Physician at Discharge: Kurt Sheth   Admission Reason: J18.9m A41.9, J44.1 (1)   Final Discharge Diagnoses: Suicidal ideation   Procedures: none   Condition at Discharge: Fair   Disposition at Discharge: Short Term Acute Hospital   Vital Signs:        T   P  R  BP   MAP  SpO2   Value  37.2  78  18  120/74      91%  Date/Time 9/26 12:37 9/26 12:04 9/26 12:04 9/26 12:04    9/26 12:04  Range  (36.7C - 37.8C )  (70 - 79 )  (18 - 19 )  (120 - 153 )/ (74 - 91 )    (85% - 92% )   As of 26-Sep-2023 12:04:00, patient is on 2 L/min of oxygen via nasal cannula.  Highest temp of 37.8 C was recorded at 9/26 12:04    Date:            Weight/Scale Type:  Height:   21-Sep-2023 18:42  90.1  kg / bed  182.8  cm  Physical Exam:    Constitutional: WN/WD male lying in bed in no acute distress, appears much older than stated age   Eyes: No scleral icterus   ENMT: Mucous membranes moist  Head/Neck: NC/AT  Respiratory/Thorax: Diminished breath sounds with crackles and rhonchi throughout, significantly worsened compared to yesterday. Moist-sounding cough noted. No accessory muscle use or conversational dyspnea. SpO2 > 88% on 2L O2 via NC   Cardiovascular: Regular rate and rhythm, no murmurs, normal S1 and S2.  Gastrointestinal: Soft, non-tender, non-distended abdomen. No rebound tenderness or guarding, no masses palpable, no organomegaly, + BS  Extremities: Grossly normal extremities, no edema  Neurological: Alert and oriented x 3  Skin: Warm and dry, no lesions or rashes   Psychological: Anxious   Hospital Course:    HPI:    ELIER CAMPOS is a 63 year old Male  who  presented to [facility] ED from [facility].  Patient  resides on a secure behavioral  health  unit.  Nursing home  staff reported patient has  increased  oxygenation needs  worsening  cough and congestion for past several days.   CXR Findings showed Prior cardiac surgery with nonspecific mild bibasilar pleural/parenchymal airspace opacity suggested. Similar cardiomegaly.  In ED labs showed WBC 19.1  Sodium  131 . In ED patient  received Sodium 1 gm,  Mucinex Zosyn Vanco Solumedrol  Sodium Bolus.  On exam patient resting in bed.  Alert with agitation noted. Patient endorses Suicidal  Ideations.  He  states he is  Paranoid and has Command  Audio Hallucinations  to  hurt himself. Patient refused to discuss plan. Patient states he  has had 7 suicide attempts  . Review of previous  hospitalization confirm multiple suicide attempts.  Patient reports multiple in-patient psychiatric admissions.  PMH of Schizoaffective  Disorder, Depression,  Anxiety Neurocognitive Disorder Psychosis Polysubstance Abuse Alcohol Abuse . Reviewed medication record.  Patient   takes Seroquel 700 mg daily Perseris 90 mg SQ Monthly Cogentin 1 mg Q 12 hours Wellbutrin XL  300 mg by mouth   daily   Depakote 500 mg by mouth BID  and Zoloft 100 mg PO Daily Neurontin 600  mg by mouth TID  Naltrexone 50 mg by mouth  daily   - and states he has  not  had any  meds since coming to the ED this  morning.   Ordered 1: 1 Staffing Q 15 minute checks  Ativan 0.5m IVP x 1.     Hospital Course:     #Acute hypoxic respiratory failure 2/2 pneumonia, concern for aspiration   - WBC 19.1 > 14.4 > 17.6 > 12.2 > 12.6 > 14.6   - Lactate 1.6     - COVID, flu A/B negative  - ABG: pH 7.45, pCO2 36, pO2 77   - CXR: Prior cardiac surgery with nonspecific mild bibasilar pleural/parenchymal airspace opacity suggested. Similar cardiomegaly.  - Procal 0.13  - BCx negative- final report   - MRSA nares grew MSSA, Vancomycin discontinued 9/25   - Completed 6 days of  Zosyn, now discontinued  - Solumedrol 40 mg IVP q8h transitioned to Medrol dose pack 9/25 in preparation for discharge   - Mucinex twice a day, Tylenol as needed for pain/fever   - RT eval and treat protocol, additional inhalers/breathing treatments per RT   - DuoNebs q6h   - Bronchial hygiene   - Baseline O2:  RA   - Wean O2 as tolerated; patient now requiring 2L O2 via NC   - Diet upgraded by SLP on 9/25 to bite size food with nectar thick liquids, patient now requiring 2L O2 with worsening crackles and wet cough on exam- concern for aspiration  - CT chest 9/26:  Extensive infiltrate/pneumonia of the right lung. No parapneumonic effusion.  - COVID negative 9/25   - Start Cefepime (day 1)   - Diet downgraded to pureed/honey thick liquids until SLP can re-evaluate patient     #HTN  #CAD  - Continue amlodipine, aspirin, and ranolazine   - Monitor BP; last recorded 130/80    #Hyponatremia, chronic  - Na 131 > 136 > 135 > 133 > 130 > 134  - Encourage PO intake as tolerated  - Continue sodium chloride tablet every day (home med)  - Daily BMP to monitor Na level    #Schizoaffective disorder   #Suicidal ideation with h/o multiple suicide attempts    #H/o EtOH abuse   - Patient resides at [facility] on lockdown unit, has a guardian   - Continue home meds: benztropine, bupropion, Depakote, gabapentin, mirtazapine, quetiapine, and sertraline   - Naltrexone not stocked by pharmacy; brought in from [facility]   - Ativan 0.5 mg PO q6h as needed for anxiety   - Delirium precautions: Frequent reorientation, day/night normalization, shades open for sunlight, provide glasses/hearing aids as needed   - 1:1 sitter for reported SI   - Psych consulted, recommending inpatient stabilization for SI   - EPAT was working on inpatient psych placement; patient now requiring supplemental O2 with concern for aspiration- EPAT notified that patient is no longer medically cleared for transfer    DVT PPx: Lovenox   GI PPx: Protonix   Diet:  "Downgraded to pureed/honey thick liquids  Code Status: Full code     Disposition: Patient accepted for transfer to [facility] under medicine service with psychiatry consulted. Bed assigned; awaiting transportation.     Total cumulative time spent in preparation of this discharge including documentation review, coordination of care with the medical team including PT/SW/care coordinators and treating consultants, discussion with patient and pertinent family members and  finalization of prescriptions, follow-up appointments, and this discharge summary was approximately 45 minutes.         Immunizations:    Immunizations:  23-Sep-2023   Pneumonia- Pneumococcal polysaccharide vaccine: Immunizations,  23-Sep-2023  23-Sep-2023   .Influenza- Influenza Virus: Immunizations, 23-Sep-2023  20-Aug-2017   Tdap: Immunizations, 20-Aug-2017      Discharge Information:    and Continuing Care:   Lab Results - Pending:    None  Radiology Results - Pending: None   Discharge Instructions:    Transfer to [facility]  Follow Up Appointments:    Follow-Up Appointment 01:           Physician/Dept/Service:   provider at facility          Reason for Referral:   post hospitalization follow up/medication management          Call to Schedule in:   1 week    Discharge Medications: Transfer to [facility]     DNR Status:   ·  Code Status Code Status order at time of discharge: Full Code       Electronic Signatures:  Tahira Scott (PAC)  (Signed 26-Sep-2023 13:47)   Authored: Send Summary, Summary Content, Immunizations,  Ongoing Care, DNR Status, Note Completion      Last Updated: 26-Sep-2023 13:47 by Tahira Scott (PAC)    References:  1.  Data Referenced From \"Consult - Psychiatry\" 25-Sep-2023 08:14   "

## 2023-09-30 NOTE — H&P
History of Present Illness:   HPI:    BHAVIK MOTTA is a 55 year old Male with a past medical history significant for HTN who presents to the ED due to shortness of breath that has been present for 1 week.    He states that about 2 week ago is when he started to feel a little off. He played 27 holes of golf and when he got home he started to have a pain in his left lat. He didn't think much of it initially, however the pain lasted for about a week. The pain eventually transitioned to his left sided rib cage area about a week in and at this point he started to feel short of breath. He went to his primary care doctor who gave him a prescription for Meloxicam which provided minimal relief for him. The shortness of breath continued to progress. The shortness of breath was at its worst yesterday when his daughter and wife noticed that he was getting significantly short of breath just walking from the couch to the bathroom. He states that any activity makes him significantly short of breath for which he has to stop what he's doing and rest. At this point his wife and daughter finally convinced him to come to the ED. He has also been experiencing dry cough, fever/chills, and night sweats for about a week. Since the onset of the pain he has also completely lost his appetite which has forced him to stop eating. He mentions that in the past 3 weeks he thinks he may have lost 10-15 pounds.     Denies chest pain/pressure, abdominal pain, nausea, vomiting, headaches, vision changes.     12 Point ROS negative unless otherwise specified above.     ED COURSE:   VS: T 98.7, , /68, RR 22, SpO2 94% on RA    Labs  - CBC: WBC 14.6 (Neutrophilic predominance), Hgb 10.9, HCT 34.9, Platelet Count 702  - CMP: Na 131, Cl 96, Calcium 8.5, Albumin 3.0, Alk Phos 253, ,   - PT/INR: 18.8/1.7  - D-Dimer: 2352  - BNP 90  - Troponin 8/9  - Blood Culture x2: collected     Imaging:  - EKG: NSR, QTc 418  - Chest X-Ray:  cardiomegaly, mild vascular congestion. Large left pleural effusion with diffuse airspace opacity at the left upper lung, may be secondary to pulmonary edema, atelectasis, or pneumonia.   - CTA PE: no evidence of PE, large mildly loculated left pleural effusion with left lower lobe and lingula compressive atelectasis, mild pericardial fatty infiltrative change of indeterminate etiology, question underlying fat inflammation or surgical intervention.     Interventions: In the ED, the patient was started on Azithromycin and Ceftriaxone and admitted onto the floor for further management.     Past Medical History: HTN  Past Surgical History: None  Allergies: NKDA  Family History:  - Mother: Cervical Cancer  - Dad: Frontotemporal Dementia    - 3 Siblings: Brother with Frontotemporal Dementia, other brother and sister relatively healthy.   Home Medications: Lisinopril 20 mg QD    Social History:  - Coming from Home, lives with Wife and Daughter.  - Currently works at [employer], has worked here for 30+ years and has exposures to fiberglass, metal scrapings, paint fumes.   - Tobacco: denies  - Alcohol: 2-3 [employer] Lights/night  - Illicit Drug: denies     Code Status: Full Code       Comorbidities:   Comorbidites:  · Comorbid Conditions hypertension     Social History:   Social History:  Smoking Status former smoker (1)              Allergies:  ·  No Known Allergies:     Medications Prior to Admission:   Admission Medication Reconciliation has not been completed for this patient.    Objective:     Objective Information:      T   P  R  BP   MAP  SpO2   Value  37.1  89  26  160/68      97%  Date/Time 9/27 20:20 9/27 21:50 9/27 21:50 9/27 20:20    9/27 21:50  Range  (37.1C - 37.1C )  (89 - 101 )  (22 - 26 )  (160 - 160 )/ (68 - 68 )    (94% - 97% )  Highest temp of 37.1 C was recorded at 9/27 20:20      Pain reported at 9/27 20:32: 0 = None    Physical Exam Narrative:  · Physical Exam:    Constitutional: patient was seen  and examined at the bedside and appeared calm  Eyes: PERRL bilaterally   Head: atraumatic, normocephalic  Heart: RRR, no M/R/G  Lungs: diminished lung sounds on the left, dullness to percussion   Abdomen: soft, ND, NT, + BS in all 4 quadrants   Extremities: trace pitting edema bilaterally    Neuro: AAOx3 to person/place/time  Psych: appropriate mood and behavior      Medications:    Medications:          Continuous Medications       --------------------------------  No continuous medications are active       Scheduled Medications       --------------------------------    1. Ampicillin - Sulbactam IV Piggy Back:  1.5  gram(s)  IntraVenous Piggyback  Every 6 Hours    2. Azithromycin 500 mg/ D5W 250 mL:  500  mg  IntraVenous Piggyback  Once    3. Enoxaparin SubCutaneous:  40  mg  SubCutaneous  Every 24 Hours    4. Lisinopril:  20  mg  Oral  Daily         PRN Medications       --------------------------------    1. Acetaminophen:  650  mg  Oral  Every 4 Hours    2. Acetaminophen:  650  mg  Oral  Every 4 Hours    3. Ondansetron Injectable:  4  mg  IntraVenous Push  Every 4 Hours    4. Sodium Chloride 0.9% Injectable Flush:  10  mL  IntraVenous Flush  Every 8 Hours and as Needed        Recent Lab Results:    Results:        I have reviewed these laboratory results:    Coronavirus 2019 by PCR  27-Sep-2023 21:24:00      Result Value    Fluid Source  Nasal, Nasopharyngeal    Coronavirus 2019,PCR  NOT DETECTED  Reference Range: Not Detected .This test has received FDA Emergency Use Authorization (EUA) and has been verified by [facility]. This test is only authorized for the duration of time that circumsta      Comprehensive Metabolic Panel  27-Sep-2023 21:17:00      Result Value    Glucose, Serum  139   H   NA  131   L   K  4.1    CL  96   L   Bicarbonate, Serum  26    Anion Gap, Serum  13    BUN  21    CREAT  1.21    GFR Male  70    Calcium, Serum  8.5   L   ALB  3.0   L   ALKP  253   H   T  Pro  7.7    T Bili  0.7    Alanine Aminotransferase, Serum  149   H   Aspartate Transaminase, Serum  104   H     Troponin I, High Sensitivity  27-Sep-2023 21:17:00      Result Value    Troponin I, High Sensitivity  8      Magnesium, Serum  27-Sep-2023 21:17:00      Result Value    Magnesium, Serum  2.16      Complete Blood Count + Differential  27-Sep-2023 21:16:00      Result Value    White Blood Cell Count  14.6   H   Red Blood Cell Count  3.65   L   HGB  10.9   L   HCT  34.9   L   MCV  96    MCHC  31.2   L   PLT  702   H   RDW-CV  12.0    Neutrophil %  78.1    Immature Granulocytes %  1.2   H   Lymphocyte %  12.7    Monocyte %  7.1    Eosinophil %  0.6    Basophil %  0.3    Neutrophil Count  11.37   H   Lymphocyte Count  1.85    Monocyte Count  1.04   H   Eosinophil Count  0.09    Basophil Count  0.05      PT + INR, Plasma  27-Sep-2023 21:16:00      Result Value    Prothrombin Time, Plasma  18.8   H   International Normalized Ratio, Plasma  1.7   H     Brain Natriuretic Peptide  27-Sep-2023 21:16:00      Result Value    Brain Natriuretic Peptide  90      D-Dimer, VTE Exclusion  27-Sep-2023 21:16:00      Result Value    D-Dimer, VTE Exclusion  2352   A       Radiology Results:    Results:        Impression:    1.  Mild motion artifact without evidence pulmonary embolism.      2.  Largemildly loculated left pleural effusion with left lower lobe  and lingula compressive atelectasis.      3.  Mild pericardial fatty infiltrative change of indeterminate  etiology question underlying fat inflammation or recent surgical  intervention.  Clinical correlation suggested,           Signed by Inderjit Sheridan MD      CT Angio Chest for PE [Sep 27 2023 10:57PM]      Impression:    1.  Cardiomegaly. Mild vascular congestion. Large left pleural  effusion with diffuse airspace opacity at the left upper lung, may be  secondary to pulmonary edema, atelectasis or pneumonia.           Xray Chest 1 View [Sep 27 2023  10:31PM]      Assessment and Plan:   Assessment:    BHAVIK MOTTA is a 55 year old Male with a past medical history significant for HTN who presents to the ED due to shortness of breath that has been present for 1 week. Admitted onto the floor for further management of large left sided pleural effusion.    Acute Medical Issues     # Large Left Sided Pleural Effusion, Weight Loss, Dry Cough   2/2 Parapneumonic Effusion/Empyema vs Pneumonia vs TB vs Malignancy (Primary Lung Cancer vs Mesothelioma)   - Chest X-Ray: cardiomegaly, mild vascular congestion. Large left pleural effusion with diffuse airspace opacity at the left upper lung, may be secondary to pulmonary edema, atelectasis, or pneumonia.   - CTA PE: no evidence of PE, large mildly loculated left pleural effusion with left lower lobe and lingula compressive atelectasis, mild pericardial fatty infiltrative change of indeterminate etiology, question underlying fat inflammation or surgical intervention  - SIRS 3/4: WBC + HR + RR  - Microdata:  -- Blood Culture x2: collected and pending   - 30+ years exposure to fiberglass, metal scrapings, paint fumes at occupation  [ ] Diagnostic Thoracentesis with Cytology + Labs  [ ] Pulmonary consulted for Thoracentesis   [ ] Unasyn 1.5g q6h (9/28  - )  [ ] Procal ordered and pending     # Mixed Cholestatic + Hepatocellular Injury  - Alk Phos 253, ,    [ ] RUQ U/S ordered and pending     # Hypotonic Hypovolemic Hyponatremia   2/2 Poor PO Intake/Dehydration  - Na 131, Cl 96  - Continue to monitor     Chronic Medical Issues:    # HTN: Lisinopril 20 mg QD    Fluids: None  Electrolytes: replete as needed  Nutrition: NPO for possible Thora  GI PPx: None  DVT PPx: Lovenox     Oxygenation: Room Air  Antibiotics: Unasyn 1.5g q6h (9/28  - )    Dispo: Admitted onto the floor for further management of left sided pleural effusion. Will need a thoracentesis, pulmonary consulted. Expected LOS > 48 hours.     Monster BEST  Bandar, DO

## 2023-09-30 NOTE — CARE PLAN
The patient's goals for the shift include      The clinical goals for the shift include      Over the shift, the patient did not make progress toward the following goals. Barriers to progression include Cognitive limitations.   Recommendations to address these barriers include different ways to explain the information with a teach back method.

## 2023-09-30 NOTE — PROGRESS NOTES
"Elier Albright is a 63 y.o. male on day 4 of admission presenting with Viral pneumonia, unspecified.    Subjective   No complaints.       Objective     Physical Exam  Constitutional: Well developed, awake/alert/oriented x3, no distress, alert and cooperative   Eyes: EOMI grossly, clear sclera   ENMT: mucous membranes moist   Head/Neck: Neck supple   Respiratory/Thorax: Patent airways, diminished in the right lung base   Cardiovascular: Regular, rate and rhythm, no murmurs, 2+ equal pulses of the extremities, normal S 1and S 2   Gastrointestinal: Nondistended, soft, non-tender, no rebound tenderness or guarding,  +BS, no bruits   Musculoskeletal: ROM intact   Extremities: no cyanosis, no edema   Neurological: alert and oriented x3, intact senses, moves all limbs against resistance   Psychological: Appropriate mood and behavior   Skin: Warm and dry, intact       Last Recorded Vitals  Blood pressure 148/89, pulse 66, temperature 36 °C (96.8 °F), resp. rate 18, height 1.828 m (5' 11.97\"), weight 87.1 kg (192 lb 0.3 oz), SpO2 92 %.  Intake/Output last 3 Shifts:  I/O last 3 completed shifts:  In: - (0 mL/kg)   Out: 900 (10.3 mL/kg) [Urine:900 (0.3 mL/kg/hr)]  Weight: 87.1 kg         Assessment/Plan   ELIER ALBRIGHT is a 63 year old Male with a past medical history of Alzheimer's dementia, hypertension, GERD, depression with suicidal ideations, major depression, schizophrenia, history of polysubstance use disorder and recurrent tinea pedis who was admitted to the hospital for aspiration pneumonia and suicidal ideation.      Acute hypoxic respiratory failure due to Aspiration pneumonia  -O2 weaned off  -Repeat CT scan is showing extensive infiltrate in the right lung.  -Got 6 days course of vancomycin and Zosyn.  -Continue supplemental O2 at 2L/min via NC, wean as tolerated   -Cefepime Day 6  - Flu and covid negative  -MRSA screen showed MSSA  -Blood culture 09/21 show no growth (final)  -Speech recommends soft and bites " sized diet with thin liquids and continued swallowing therapy    Schizophrenia/major depression/Alzheimer's dementia/suicidal ideation  -Psychiatry advised to contact once patient is medically stabilized   -Continue one-to-one sitter  -Resume home meds    GERD  -Resume PPI    Hypertension  -Resume Norvasc    DVT prophylaxis  -Lovenox    Disposition   O2 has been weaned off patient and is 5 days from likely aspiration, he is medically cleared for evaluation by psychiatry, once discharged we will continue oral antibiotics to complete a 7-day course from date of aspiration      Monster Portillo DO

## 2023-09-30 NOTE — CONSULTS
"Reason For Consult  Patient making suicidal statements during his hospitalization.    History Of Present Illness  Gerardo Albright is a 63 y.o. male presenting with pneumonia and making suicidal statements during his hospitalization. The patient denies all symptoms of MDD except for feeling \"a little depressed\" and \"a little helpless.\" He denies having any current suicidal ideation or plans. No significant anxiety, or manic symptoms were elicited. He did report having ongoing chronic auditory and visual hallucinations, but no other hallucinations. No paranoia was elicited either.     Past Medical History  1) pneumonia - being treated  2) Past Dx: SAD, depressed type  3) several past psych hospitalizations  4) No prior suicide attempts     Social History  He denies any use of tobacco, alcohol, or street drugs.  The patient reports being in  ed classes in the past in school. He is currently on disability. He reports being  for 10 years. No children. The patient is a nursing home resident.    Family History  1) mother -\"emotional\" things (reported history of \"[facility]\"  2) No suicides in the family     Allergies  Patient has no known allergies.    Review of Systems  Psych: no delusions, no anxiety, no suicidal thoughts or plans. He did report 2 symptoms of depression, and AH and VH.      Physical Exam  MSE:   A&Ox 2 (not year nor month)  Appearance: mildly disheveled and dressed in hospital attire, laying in bed.  Speech: fluent, coherent, non-pressured, normal volume/tone/rhythm/rate.  Mood: \"a little depressed\"  Affect: Pleasant, and smiling occasionally.  Thought Process: Not disorganized and positively goal directed.   Thought content: Denies any current suicidal ideation or suicide plans. No homicidal ideations.                                No delusions or paranoia. +AH of chronic \"music and some talking.\"                                +VH of chronic \"triangle, crescent moon\"                       " "          No other hallucinations elicited.  Insight: Poor-to-fair  Judgement: Intact.       Last Recorded Vitals  Blood pressure 148/89, pulse 66, temperature 36 °C (96.8 °F), resp. rate 18, height 1.828 m (5' 11.97\"), weight 87.1 kg (192 lb 0.3 oz), SpO2 92 %.         Assessment:  1) Schizoaffective Disorder, depressive type, currently with mild depression and psychosis (chronic AH and VH)  2) Pneumonia - treated    Plan:  1) Gerardo is judged a minimal suicide risk due to: 1) No access to guns at the SNF, 2) Denies any prior suicide attempts, 3) Denies any current suicidal ideation or suicide plans, 4) +plans for the future: \"... See about going to [place] to get a new life, and be in a nice climate...,\" 5) No current symptoms of Major Depressive Disorder except for feeling \"a little depressed\" and \"a little helpless,\"  6) No suicides in the family, 7) No command auditory hallucinations, and 8) \"I wouldn't kill myself (because) I would go to Hell.\" The patient is judged safe to be discharged from a psychiatric viewpoint.    2) Discontinue 1-to-1 sitter    3) continue current psychotropic medications.        I spent 60 minutes in the professional and overall care of this patient.      Acosta Taylor MD    "

## 2023-09-30 NOTE — PROGRESS NOTES
Service: Medicine     Subjective Data:   ELIER CAMPOS is a 63 year old Male who is Hospital Day # 4.    Overnight Events: Patient had an uneventful night.     Objective Data:     Objective Information:      T   P  R  BP   MAP  SpO2   Value  36.2  68  17  113/71      92%  Date/Time 9/29 6:21 9/29 6:21 9/29 6:21 9/29 6:21    9/29 6:21  Range  (36.2C - 36.4C )  (68 - 86 )  (17 - 18 )  (112 - 133 )/ (71 - 84 )    (91% - 96% )   As of 28-Sep-2023 20:09:00, patient is on 2 L/min of oxygen via room air.      Pain reported at 9/29 8:00: 0 = None    Physical Exam by System:    Constitutional: Well developed, awake/alert/oriented  x3, no distress, alert and cooperative   Eyes: EOMI grossly, clear sclera   ENMT: mucous membranes moist   Head/Neck: Neck supple   Respiratory/Thorax: Patent airways, diminished in  the right lung base   Cardiovascular: Regular, rate and rhythm, no murmurs,  2+ equal pulses of the extremities, normal S 1and S 2   Gastrointestinal: Nondistended, soft, non-tender,  no rebound tenderness or guarding,  +BS, no bruits   Musculoskeletal: ROM intact   Extremities: no cyanosis, no edema   Neurological: alert and oriented x3, intact senses,  moves all limbs against resistance   Psychological: Appropriate mood and behavior   Skin: Warm and dry, intact     Recent Lab Results:    Results:    CBC: 9/28/2023 07:11              \     Hgb     /                              \     13.9       /  WBC  ----------------  Plt               13.3 H    ----------------    358              /     Hct     \                              /     42.2       \            RBC: 4.50     MCV: 94         Radiology Results:    Results:        Impression:    1.  Extensive infiltrate/pneumonia of the right lung. No  parapneumonic effusion.     CT Chest without Contrast [Sep 26 2023 10:15AM]      Assessment and Plan:   Code Status:  ·  Code Status Full Code     Assessment:    ELIER CAMPOS is a 63 year old Male with a past  medical history of Alzheimer's dementia, hypertension, GERD, depression with suicidal ideations, major depression,  schizophrenia, history of polysubstance use disorder and recurrent tinea pedis who was admitted to the hospital for aspiration pneumonia and suicidal ideation.      Acute hypoxic respiratory failure due to Aspiration pneumonia  -O2 weaned off  -Repeat CT scan is showing extensive infiltrate in the right lung.  -Got 6 days course of vancomycin and Zosyn.  -Continue supplemental O2 at 2L/min via NC, wean as tolerated   -Cefepime Day 5  - Flu and covid negative  -MRSA screen showed MSSA  -Blood culture 09/21 show no growth (final)  -Speech recommends soft and bites sized diet with thin liquids and continued swallowing therapy    Schizophrenia/major depression/Alzheimer's dementia/suicidal ideation  -Psychiatry advised to contact once patient is medically stabilized   -Continue one-to-one sitter  -Resume home meds    GERD  -Resume PPI    Hypertension  -Resume Norvasc    DVT prophylaxis  -Lovenox    Disposition   O2 has been weaned off patient and is 5 days from likely aspiration, he is medically cleared for evaluation by psychiatry, once discharged we will continue oral antibiotics to complete a 7-day course from date of aspiration        Electronic Signatures:  Monster Portillo)  (Signed 29-Sep-2023 13:51)   Authored: Service, Subjective Data, Objective Data, Assessment  and Plan, Note Completion      Last Updated: 29-Sep-2023 13:51 by Monster Poritllo ()

## 2023-09-30 NOTE — H&P
History of Present Illness:   Admission Reason: Acute hypoxic respiratory failure  2/2 Community Acquired  Pneumonia   HPI:    ELIER CAMPOS is a 63 year old Male who  presented to [facility] ED  from [facility].  Patient  resides on a secure behavioral  health  unit.  Nursing home  staff reported patient has  increased oxygenation needs  worsening  cough and congestion for  past several days.   CXR Findings showed Prior cardiac surgery with nonspecific mild  bibasilar pleural/parenchymal airspace opacity suggested. Similar cardiomegaly.  In ED labs showed WBC 19.1  Sodium 131 . In ED patient   received Sodium 1 gm,  Mucinex Zosyn Vanco Solumedrol  Sodium Bolus.  On exam patient resting in bed.  Alert with agitation noted. Patient endorses Suicidal  Ideations.  He  states he is  Paranoid and has Command  Audio Hallucinations to  hurt himself.  Patient refused to discuss plan. Patient states he  has had 7 suicide attempts  . Review of previous  hospitalization confirm multiple suicide attempts.   Patient reports multiple in-patient psychiatric admissions.  PMH of Schizoaffective Disorder, Depression,  Anxiety Neurocognitive Disorder Psychosis Polysubstance Abuse Alcohol Abuse . Reviewed medication record.  Patient   takes Seroquel 700 mg daily  Perseris 90 mg SQ Monthly Cogentin 1 mg Q 12 hours Wellbutrin XL  300 mg by mouth  daily   Depakote 500 mg by mouth BID  and Zoloft 100 mg PO Daily Neurontin 600  mg by mouth TID  Naltrexone 50 mg by mouth  daily   - and states he has  not  had any  meds  since coming to the ED this  morning.   Ordered 1: 1 Staffing Q 15 minute checks Ativan 0.5m IVP x 1.     ED  Meds  Vanco Zosyn Solumedrol Albuterol   VS  36.6  75  18  123/76  93% 3 L NC   Labs   WBC 19.1  H/H 13.6/41.6  Sodium 131 Potassium 4.2 BUN 21 Creatinine 1.26     UA Negative   Covid Flu  Negative   Lactate 1.6   Troponin 6     Radiology      Xray Chest 1 View  Prior cardiac surgery with  nonspecific mild bibasilar pleural/parenchymal airspace opacity suggested. Similar cardiomegaly.      PMH of  Suicide Attempts (7) Schizoaffective Disorder, Depression,  Anxiety Neurocognitive Disorder Psychosis Polysubstance Abuse Alcohol Abuse   HTN   SH Resides on a Secure Behavioral Health Unit  Current Smoker   FH Unable to  obtain       Comorbidities:   Comorbidites:  ·  Comorbid Conditions hypertension     Family History:   Family History: reviewed and not pertinent to presenting  problem     Social History:   Social History:  Smoking Status unable to assess (1)   Alcohol Use history of abuse   Drug Use denies   Occupation Currently unemployed   Social History    Pt is   Wife Briana Albright lives at [facility] somewhere around [place], [place]  Identify wife as the main support  No children as per chart review  Mother is   and father abandoned him when was young                     Allergies:  ·  No Known Allergies :     Medications Prior to Admission:     Multiple Vitamins with Minerals oral tablet: 1 tab(s) orally once a day for vitamin supplimentation.   acetaminophen 325 mg oral tablet: 2 tab(s) orally every 4 hours, As needed, Pain - Mild (1-7) or fever  aspirin 81 mg oral tablet, chewable: 1 tab(s) orally once a day for atherosclerosis.  Ranexa 500 mg oral tablet, extended release: 1 tab(s) orally 2 times a day for chronic angina.  benztropine 1 mg oral tablet: 1 tab(s) orally every 12 hours for EPS prophylaxis (muscle stiffness).   buPROPion 300 mg/24 hours (XL) oral tablet, extended release: 1 tab(s) orally every 24 hours  SEROquel  mg oral tablet, extended release: 1 tab(s) orally once a day (in the evening)  Depakote Sprinkles: 500 milligram(s) orally 2 times a day  gabapentin 600 mg oral tablet: 1 tab(s) orally 3 times a day  omeprazole 40 mg oral delayed release capsule: 1 cap(s) orally once a day  QUEtiapine 200 mg oral tablet: 1 tab(s) orally 2 times a day once at bed  time  QUEtiapine 50 mg oral tablet: 1 tab(s) orally 2 times a day one at bed time  Remeron 30 mg oral tablet: 1 tab(s) orally once a day (at bedtime)  Norvasc 10 mg oral tablet: 1 tab(s) orally once a day  naltrexone 50 mg oral tablet: 1 tab(s) orally once a day  Perseris 90 mg subcutaneous injection, extended release: 1 milligram(s) subcutaneous once a month  Sodium Chloride 1 g oral tablet: 1 tab(s) orally once a day  Zoloft 100 mg oral tablet: 1 tab(s) orally once a day.    Review of Systems:   Constitutional: NEGATIVE: Fever, Chills, Anorexia,  Weight Loss, Malaise     Eyes: NEGATIVE: Blurry Vision, Drainage, Diploplia,  Redness, Vision Loss/ Change     Cardiac: NEGATIVE: Chest Pain, Dyspnea on Exertion,  Orthopnea, Palpitations, Syncope     Gastrointestinal: NEGATIVE: Nausea, Vomiting, Diarrhea,  Constipation, Abdominal Pain     Genitourinary: NEGATIVE: Discharge, Dysuria, Flank  Pain, Frequency, Hematuria     Musculoskeletal: NEGATIVE: Decreased ROM, Pain, Swelling,  Stiffness, Weakness; COMMENTS: Neck     Psychiatric: POSITIVE: Mood Changes, Anxiety, Hallucinations, Sleep Changes, Suicidal Ideas     Skin: NEGATIVE: Mass, Pain, Pruritus, Rash, Ulcer       Objective:     Objective Information:      T   P  R  BP   MAP  SpO2   Value  36.6  75  18  123/76   100  93%  Date/Time 9/21 18:42 9/21 18:42 9/21 18:42 9/21 18:42  9/21 17:22 9/21 18:42  Range  (36.6C - 36.8C )  (60 - 75 )  (11 - 19 )  (103 - 135 )/ (68 - 97 )  (88 - 106 )  (89% - 100% )   As of 21-Sep-2023 18:42:00, patient is on 3 L/min of oxygen via nasal cannula.      Pain reported at 9/21 17:31: 0 = None         Weights   9/21 18:42: Weight in kg (Weight (kg))  90.1  9/21 18:42: Weight in lbs ((lbs))  198.6  9/21 18:42: BMI (kg/m2) (BMI (kg/m2))  26.963    Physical Exam by System:    Constitutional: Alert with confusion  noted.  Appears  internally stimulated Non Linear Thought Cooperative   Eyes: EOM intact   ENMT: mucous membranes moist, no apparent  injury,  no lesions seen   Head/Neck: Neck supple, no apparent injury, thyroid  without mass or tenderness, No JVD, trachea midline, no bruits   Respiratory/Thorax: Patent airways, CTAB, normal  breath sounds with good chest expansion, thorax symmetric   Cardiovascular: Regular, rate and rhythm, no murmurs,  2+ equal pulses of the extremities, normal S 1and S 2   Gastrointestinal: Nondistended, soft, non-tender,  no rebound tenderness or guarding, no masses palpable, no organomegaly, +BS, no bruits   Genitourinary: genital exam not performed since complaints  are not related.   Musculoskeletal: ROM intact, no joint swelling, normal  strength   Extremities: normal extremities, no cyanosis edema,  contusions or wounds, no clubbing   Neurological: AOx2 Intermittent confusion   Psychological: Delusional   Skin: no rashes, skin warm and dry, no erythematous  area     Medications:    Medications:          Continuous Medications       --------------------------------  No continuous medications are active       Scheduled Medications       --------------------------------    1. Albuterol 2.5 mg - Ipratropium 0.5 mg/ 3 mL Neb Soln:  3  mL  Inhalation  Every 6 Hours    2. amLODIPine (NORVASC):  10  mg  Oral  Daily    3. Aspirin Chewable:  81  mg  Oral  Daily    4. Benztropine:  1  mg  Oral  Every 12 Hours    5. buPROPion (WELLBUTRIN XL) Extended Release (24 hour):  300  mg  Oral  Every 24 Hours    6. Divalproex Sodium Del Rel (Depakote):  500  mg  Oral  Every 12 Hours    7. Enoxaparin SubCutaneous:  40  mg  SubCutaneous  Every 24 Hours    8. Gabapentin:  600  mg  Oral  3 Times a Day    9. guaiFENesin Extended Release:  600  mg  Oral  Every 12 Hours    10. methylPREDNISolone Sodium Succinate Injectable:  40  mg  IntraVenous Push  Every 8 Hours    11. Mirtazapine:  30  mg  Oral  At Bedtime    12. Multivitamin with Minerals:  1  tablet(s)  Oral  Daily    13. Naltrexone:  50  mg  Oral  Daily    14. Pantoprazole:  40  mg  Oral   Daily    15. Piperacillin - Tazobactam 4.5 gram/Iso-osmotic 100 mL Premix IVPB:  100  mL  IntraVenous Piggyback  Every 6 Hours    16. QUEtiapine:  200  mg  Oral  2 Times a Day    17. QUEtiapine:  50  mg  Oral  2 Times a Day    18. QUEtiapine:  200  mg  Oral  Daily 1800    19. QUEtiapine:  200  mg  Oral  Once    20. QUEtiapine:  50  mg  Oral  Once    21. Ranolazine:  500  mg  Oral  2 Times a Day    22. Sertraline:  100  mg  Oral  Daily    23. Sodium Chloride:  1  gram(s)  Oral  Daily    24. Vancomycin - h to Dose - IV Piggy Back:  1  each  As Specified  Variable         PRN Medications       --------------------------------    1. Acetaminophen:  650  mg  Oral  Every 4 Hours    2. Acetaminophen:  650  mg  Oral  Every 4 Hours    3. Albuterol 2.5 mg/ 3 mL Nebulizer Soln:  3  mL  Inhalation  Every 2 Hours    4. Dextromethorphan - guaiFENesin Oral Liquid:  5  mL  Oral  Every 4 Hours    5. LORazepam:  0.5  mg  Oral  Every 8 Hours    6. Magnesium Hydroxide -Al Hydrox -Simethicone Oral Liquid:  30  mL  Oral  Every 6 Hours    7. Magnesium Hydroxide Oral Liquid:  10  mL  Oral  Every 24 Hours    8. Melatonin:  9  mg  Oral  At Bedtime    9. Ondansetron Injectable:  4  mg  IntraVenous Push  Every 4 Hours    10. Sodium Chloride 0.9% Injectable Flush:  10  mL  IntraVenous Flush  Every 8 Hours and as Needed    11. Sore Throat Lozenge:  1  lozenge(s)  Oral  Every 2 Hours        Recent Lab Results:    Results:    CBC: 9/21/2023 13:56              \     Hgb     /                              \     13.6       /  WBC  ----------------  Plt               19.1 H    ----------------    269              /     Hct     \                              /     41.6       \            RBC: 4.36 L    MCV: 95     Neutrophil %: 84.8      CMP: 9/21/2023 13:56  NA+        Cl-     BUN  /                         131 L   98    21  /  --------------------------------  Glucose                ---------------------------  138 H    K+     HCO3-   Creat \                          4.2    23    1.26  \           \  T Bili  /                    \  0.7  /  AST  x ---- x ALT        13 x ---- x 11         /  Alk P   \               /  55  \  Calcium : 9.2     Anion Gap : 14     Albumin : 4.0     T Protein : 7.5             ---------- Recent Arterial Blood Gas Results----------     9/21/2023 14:47  pO2 77  pH 7.45  pCO2 36  SO2 97  Base Excess 1.3null      I have reviewed these laboratory results:    Urinalysis with Culture if Indicated  21-Sep-2023 17:41:00      Result Value    Lab Comment:  CALLED TO [place] [id number] 9-21-23    Color, Urine  YELLOW    Reference Range: STRAW,YELLOW    Appearance, Urine  CLEAR    Specific Gravity, Urine  1.008    pH, Urine  6.0    Protein, Urine  NEGATIVE    Glucose, Urine  50 (1+)   A   Blood, Urine  NEGATIVE    Ketones, Urine  NEGATIVE    Bilirubin, Urine  NEGATIVE    Urobilinogen, Urine  <2.0    Nitrite, Urine  NEGATIVE    Leukocyte Esterase, Urine  NEGATIVE      Influenza A + B, PCR  21-Sep-2023 17:29:00      Result Value    Influenza A PCR  NOT DETECTED    Reference Range: Not Detected Respiratory virus testing is performed routinely by PCR    for Influenza A/B and RSV.    Not Detected results do not preclude Influenza A/B or RSV   infections since the adequacy of sample collection or lo    Influenza B PCR  NOT DETECTED    Reference Range: Not Detected Respiratory virus testing is performed routinely by PCR    for Influenza A/B and RSV.    Not Detected results do not preclude Influenza A/B or RSV   infections since the adequacy of sample collection or lo    Fluid Source  Nasal, Nasopharyngeal      Arterial Full Panel  21-Sep-2023 14:47:00      Result Value    pH, Arterial  7.45   H   pCO2, Arterial  36   L   pO2, Arterial  77   L   PATIENT TEMPERATURE, Arterial  37.0    FIO2, Arterial  36    SO2, Arterial  97    Oxy Hgb, Arterial  93.1   L   HCT CALCULATED, Arterial  41.0    SODIUM, Arterial  129   L   Potassium- Arterial  4.3    CL  99     CALCIUM, IONIZED, Arterial  1.20    GLUCOSE, Arterial  127   H   LACTATE, Arterial  0.8    BASE EXCESS-BLOOD, Arterial  1.3    BiCarb-Calculated, Arterial  25.0    HGB, Arterial  13.5    ANION GAP, Arterial  9   L   Apparatus  Cannula      Coronavirus 2019 by PCR  21-Sep-2023 14:29:00      Result Value    Fluid Source  Nasal, Nasopharyngeal    Coronavirus 2019,PCR  NOT DETECTED    Reference Range: Not Detected .  This test has received FDA Emergency Use Authorization (EUA) and has been   verified by [facility]. This test is only   authorized for the duration of time that circumsta      Complete Blood Count + Differential  21-Sep-2023 13:56:00      Result Value    White Blood Cell Count  19.1   H   Red Blood Cell Count  4.36   L   HGB  13.6    HCT  41.6    MCV  95    MCHC  32.7    PLT  269    RDW-CV  13.1    Neutrophil %  84.8    Immature Granulocytes %  1.0   H   Lymphocyte %  5.8    Monocyte %  8.1    Eosinophil %  0.1    Basophil %  0.2    Neutrophil Count  16.21   H   Lymphocyte Count  1.11   L   Monocyte Count  1.54   H   Eosinophil Count  0.01    Basophil Count  0.04      Comprehensive Metabolic Panel  21-Sep-2023 13:56:00      Result Value    Glucose, Serum  138   H   NA  131   L   K  4.2    CL  98    Bicarbonate, Serum  23    Anion Gap, Serum  14    BUN  21    CREAT  1.26    GFR Male  64    Calcium, Serum  9.2    ALB  4.0    ALKP  55    T Pro  7.5    T Bili  0.7    Alanine Aminotransferase, Serum  11    Aspartate Transaminase, Serum  13      Lactate, Level  21-Sep-2023 13:56:00      Result Value    Lactate, Level  1.6      Troponin I, High Sensitivity  21-Sep-2023 13:56:00      Result Value    Troponin I, High Sensitivity  6        Radiology Results:    Results:        Impression:    Prior cardiac surgery with nonspecific mild bibasilar  pleural/parenchymal airspace opacity suggested. Similar cardiomegaly.     Xray Chest 1 View [Sep 21 2023  2:45PM]      Assessment and Plan:    Problem List:       Additional Dx:   Laceration of wrist:    Possible major neurocognitive disorder due to Alzheimer's disease :    Medical clearance for psychiatric admission:    Medical clearance for psychiatric admission:    Cognitive disorder:    Generalized anxiety disorder:    Tobacco use disorder:    Schizoaffective disorder, bipolar type:    Suicidal ideation:    Schizoaffective disorder, depressive type:    Medical clearance for psychiatric admission:    CAD (coronary artery disease):    GERD (gastroesophageal reflux disease):    Hypertension:        Medical History:   Depression:    Anxiety:     Assessment:    ## Acute hypoxic respiratory failure 2/2 Community Acquired   Pneumonia   -Maintain SpO2 >94%, Wean O2 as tolerated  -Currently on 3L PRN  -Home Oxygen RA   -Breathing treatments via MDI  -RT to eval and treat;   -Monitor for s&s of worsening infection  -Tylenol PRN for fever  -CXR: Prior cardiac surgery with nonspecific mild bibasilar pleural/parenchymal airspace opacity suggested.  Similar cardiomegaly.  - VS  36.6  75  18  123/76  93% 3 L NC   -Labs WBC 19.1  H/H 13.6/41.6  Sodium 131 Potassium 4.2 BUN 21 Creatinine 1.26  BG 13  -Repeat Labs pending   -Continue Vanco   -Continue Zosyn   -Continue Solumedrol     ##Suicide Ideations   ##Psychosis   ##Unspecified depressive disorder  ##Unspecified anxiety disorder  ##Schizoaffective disorder   ##Neurocognitive disorder   ##Polysubstance Abuse   ##Alcohol Abuse   Patient endorses Suicidal  Ideations.  He  states he is  Paranoid and has Command  Audio Hallucinations to  hurt himself. Patient refused to discuss plan. Patient states he  has had 7 suicide attempts  .  Review of previous  hospitalization confirm multiple suicide attempts.  Patient reports multiple in-patient psychiatric admissions.  -Continue following home meds  -Divalproex Sodium Del Rel (Depakote):  500  mg  Oral  2 Times a Day    -Presseris 90 mg SQ Monthly   -Benztropine:  1   "mg  Oral  Every 12 Hours    -Remeron 30 kmg by mouth Q HS   -Bupropion Extended Release (24 hour):  300  mg  Oral @9.00AM  Neurontin 600 mg PO TID   - Seroquel:  200 mg by mouth BID   -Seroquel 50 mg by mouth BID   -Seroquel 450 mg by mouth Q 1800   -Seroquel   mg by mouth Q HS     - Naltrexone 50 mg by mouth daily    - 1: 1 Staffing   - 15 minute  Suicide Checks     #Chronic hyponatremia  -Continue NaCl 1 gm OD  -Be cautious using medications that can worsens hyponatremia    # HTN   Well  controlled   Continue home meds Norvasc     F: PRN  E: Replete per protocol  N: Minced Food Thickened  Liquid   A: PIV  Disposition: Pt requires inpatient mgmt at this time.   Code Status: Full Code     Total accumulated time spent face to face and not face to face preparing to see the patient, obtaining and reviewing separately obtained history; performing a medically appropriate examination and/or evaluation; counseling and educating the patient, family;  ordering medications, tests, or procedures; referring and communicating with other health care professionals; documenting clinical information in the patient's medical record; independently interpreting results and communicating the results to the patient,  family; and care coordination was 45 minutes.                    Electronic Signatures:  Trey Sanchez (APRN-CNP)  (Signed 21-Sep-2023 21:45)   Authored: History of Present Illness, Comorbidities,  Family History, Social History, Allergies, Medications Prior to Admission, Review of Systems, Objective, Assessment and Plan, Note Completion      Last Updated: 21-Sep-2023 21:45 by Trey Sanchez (APRN-CNP)    References:  1.  Data Referenced From \"Patient Profile - Adult v2\" 21-Sep-2023 18:42   "

## 2023-09-30 NOTE — PROGRESS NOTES
Service: Medicine     Subjective Data:   ELIER CAMPOS is a 63 year old Male who is Hospital Day # 3.     Overnight: No acute issues noted.   CC: Complains of back pain, hyperfixated on medication timing/dosages.   1:1 for SI.    Objective Data:     Objective Information:      T   P  R  BP   MAP  SpO2   Value  36.6  77  18  134/83      95%  Date/Time 9/23 7:21 9/23 7:21 9/23 7:21 9/23 7:21    9/23 7:21  Range  (35.6C - 36.9C )  (77 - 92 )  (18 - 22 )  (106 - 142 )/ (58 - 83 )    (93% - 95% )   As of 23-Sep-2023 07:21:00, patient is on 2 L/min of oxygen via nasal cannula.  Highest temp of 36.9 C was recorded at 9/22 17:20      Pain reported at 9/23 3:00: sleeping    ---- Intake and Output  -----  Mn/Dy/Year Time  Intake   Output  Net  Sep 23, 2023 6:00 am  260   2510  -2250  Sep 22, 2023 10:00 pm  1260   1110  150  Sep 22, 2023 2:00 pm  600   700  -100    The Intake and Output Totals for the last 24 hours are:      Intake   Output  Net      1817   4320  -2200    Physical Exam by System:    Constitutional: Well developed, awake/alert/oriented  x3, no distress, alert and cooperative   Eyes: Clear slcera   ENMT: MMM   Respiratory/Thorax: scattered crackles b/l on supplemental  o2   Cardiovascular: Regular, rate and rhythm, no murmurs   Gastrointestinal: Nondistended, soft, non-tender,  no rebound tenderness or guarding, no masses palpable, no organomegaly, +BS, no bruits   Musculoskeletal: Gross ROM intact, no joint swelling   Extremities: normal extremities, no cyanosis, edema,  contusions, wounds, no clubbing   Neurological: no focal deficit   Lymphatic: No significant lymphadenopathy   Psychological: Appropriate mood and behavior   Skin: Warm, dry     Medication:    Medications:          Continuous Medications       --------------------------------  No continuous medications are active       Scheduled Medications       --------------------------------    1. Albuterol 2.5 mg - Ipratropium 0.5 mg/ 3 mL Neb  Soln:  3  mL  Inhalation  Every 6 Hours    2. amLODIPine (NORVASC):  10  mg  Oral  Daily    3. Aspirin Chewable:  81  mg  Oral  Daily    4. Benztropine:  1  mg  Oral  Every 12 Hours    5. buPROPion (WELLBUTRIN XL) Extended Release (24 hour):  300  mg  Oral  Every 24 Hours    6. Divalproex Sodium Del Rel (Depakote):  500  mg  Oral  Every 12 Hours    7. Enoxaparin SubCutaneous:  40  mg  SubCutaneous  Every 24 Hours    8. Gabapentin:  600  mg  Oral  3 Times a Day    9. guaiFENesin Extended Release:  600  mg  Oral  Every 12 Hours    10. methylPREDNISolone Sodium Succinate Injectable:  40  mg  IntraVenous Push  Every 8 Hours    11. Mirtazapine:  30  mg  Oral  At Bedtime    12. Multivitamin with Minerals:  1  tablet(s)  Oral  Daily    13. Naltrexone:  50  mg  Oral  Daily    14. Pantoprazole:  40  mg  Oral  Daily    15. Piperacillin - Tazobactam 4.5 gram/Iso-osmotic 100 mL Premix IVPB:  100  mL  IntraVenous Piggyback  Every 6 Hours    16. QUEtiapine:  200  mg  Oral  Daily Before First Meal    17. QUEtiapine:  250  mg  Oral  At Bedtime    18. Ranolazine:  500  mg  Oral  2 Times a Day    19. Sertraline:  100  mg  Oral  Daily    20. Sodium Chloride:  1  gram(s)  Oral  Daily    21. Vancomycin - RPh to Dose - IV Piggy Back:  1  each  As Specified  Variable    22. Vancomycin 1.5 gram/ 300 mL Premix IVPB (Xellia):  1500  mg  IntraVenous Piggyback  Every 12 Hours         PRN Medications       --------------------------------    1. Acetaminophen:  650  mg  Oral  Every 4 Hours    2. Acetaminophen:  650  mg  Oral  Every 4 Hours    3. Albuterol 2.5 mg/ 3 mL Nebulizer Soln:  3  mL  Inhalation  Every 2 Hours    4. Dextromethorphan - guaiFENesin Oral Liquid:  5  mL  Oral  Every 4 Hours    5. LORazepam:  0.5  mg  Oral  Every 6 Hours    6. Magnesium Hydroxide -Al Hydrox -Simethicone Oral Liquid:  30  mL  Oral  Every 6 Hours    7. Magnesium Hydroxide Oral Liquid:  10  mL  Oral  Every 24 Hours    8. Melatonin:  9  mg  Oral  At Bedtime    9.  Ondansetron Injectable:  4  mg  IntraVenous Push  Every 4 Hours    10. Sodium Chloride 0.9% Injectable Flush:  10  mL  IntraVenous Flush  Every 8 Hours and as Needed    11. Sore Throat Lozenge:  1  lozenge(s)  Oral  Every 2 Hours        Recent Lab Results:    Results:    CBC: 9/23/2023 08:43              \     Hgb     /                              \     12.5 L    /  WBC  ----------------  Plt               17.6 H    ----------------    295              /     Hct     \                              /     38.7 L    \            RBC: 3.91 L    MCV: 99           BMP: 9/23/2023 06:18  NA+        Cl-     BUN  /                         135 L   103    12  /  --------------------------------  Glucose                ---------------------------  207 H    K+     HCO3-   Creat \                         4.4  22    0.65  \  Calcium : 9.1     Anion Gap : 14      Radiology Results:    Results:        Impression:    Prior cardiac surgery with nonspecific mild bibasilar  pleural/parenchymal airspace opacity suggested. Similar cardiomegaly.     Xray Chest 1 View [Sep 21 2023  2:45PM]      Impression:    No definite acute fracture or radiopaque foreign body however there  is artifact from bandage material in the proximal forearm     Xray Forearm 2 View [May  7 2023  1:55PM]      Assessment and Plan:   Code Status:  ·  Code Status Full Code     Assessment:    #Acute hypoxic respiratory failure 2/2 pneumonia, gram negative vs. MRSA  #Tobacco abuse   - WBC 19.1 > 14.4   - Lactate 1.6     - COVID, flu A/B negative  - ABG: pH 7.45, pCO2 36, pO2 77   - CXR: Prior cardiac surgery with nonspecific mild bibasilar pleural/parenchymal airspace opacity suggested. Similar cardiomegaly.  - Procal, BCx, sputum culture, MRSA nares pending   - Continue Vancomycin and Zosyn (day 3)   - Solumedrol 40 mg IVP q8h   - Mucinex twice a day, Tylenol as needed for pain/fever   - RT eval and treat protocol, additional inhalers/breathing treatments per RT    - DuoNebs q6h   - Bronchial hygiene   - Baseline O2:  RA   - Wean O2 as tolerated; patient currently on 2L O2 via NC  - SLP eval pending to r/o aspiration; currently on honey thick liquids   - recommend complete smoking cessation     #HTN  #CAD  - Continue amlodipine, aspirin, and ranolazine   - Monitor BP     #Hyponatremia, chronic  - Na 131 > 136  - Continue sodium chloride tablet every day (home med)  - Daily BMP to monitor Na level    #Schizoaffective disorder   #Suicidal ideation with h/o multiple suicide attempts    #H/o EtOH abuse   - Continue home meds: benztropine, bupropion, Depakote, gabapentin, mirtazapine, quetiapine, and sertraline   - Naltrexone not stocked by pharmacy; [facility] not able to bring to hospital   - Ativan 0.5 mg PO q8h as needed for anxiety   - Delirium precautions: Frequent reorientation, day/night normalization, shades open for sunlight, provide glasses/hearing aids as needed   - 1:1 sitter for reported SI   - Psych consulted, appreciate recs   - Patient resides at [facility] on lockdown unit, has a guardian   - rec EPAT; pending medical clearance.     DVT PPx: Lovenox   GI PPx: Protonix   Diet: Regular with bite size food, honey thick liquids   Code Status: Full code     Disposition: Patient requires inpatient management at this time.     Total accumulated time spent face to face and not face to face preparing to see the patient, obtaining and reviewing separately obtained history; performing a medically appropriate examination and/or evaluation; counseling and educating the patient, family;  ordering medications, tests, or procedures; referring and communicating with other health care professionals; documenting clinical information in the patient's medical record; independently interpreting results and communicating the results to the patient,  family; and care coordination was 45 minutes.       Plan of Care Reviewed With:  Plan of Care Reviewed With: patient     Attestation:    Note Completion:  I am a:  Scribe   Scribing on behalf of (Provider name) Kurt Sheth   Scribe Only - Scribe Attestation I am scribing for, and in the presence of the provider.    Scribed Provider Only - Attestation to Scribe The documentation recorded by the individual acting as Scribe, in my presence, accurately and completely reflects the service(s) I personally performed and  the decisions made by me.          Electronic Signatures:  Maliha Israel (PAC)  (Entered 23-Sep-2023 11:08)   Entered: Service, Subjective Data, Objective Data, Assessment  and Plan, Note Completion  Kurt Sheth)  (Signed 23-Sep-2023 20:43)   Entered: Note Completion   Authored: Service, Subjective Data, Objective Data, Assessment and Plan, Note Completion      Last Updated: 23-Sep-2023 20:43 by Kurt Sheth)

## 2023-09-30 NOTE — PROGRESS NOTES
Service: Medicine     Subjective Data:   ELIER CAMPOS is a 63 year old Male who is Hospital Day # 2.    Overnight Events: Patient had an uneventful night.       Objective Data:     Objective Information:      T   P  R  BP   MAP  SpO2   Value  36.3  62  19  92/63      93%  Date/Time 9/27 6:50 9/27 6:50 9/27 6:50 9/27 6:50    9/27 6:50  Range  (36.2C - 38.6C )  (62 - 71 )  (19 - 20 )  (92 - 123 )/ (63 - 76 )    (91% - 93% )   As of 27-Sep-2023 09:31:00, patient is on 2 L/min of oxygen via nasal cannula.  Highest temp of 38.6 C was recorded at 9/26 18:54      Pain reported at 9/27 9:31: 0 = None    Physical Exam by System:    Constitutional: Well developed, awake/alert/oriented  x3, no distress, alert and cooperative   Eyes: EOMI grossly, clear sclera   ENMT: mucous membranes moist   Head/Neck: Neck supple   Respiratory/Thorax: Patent airways, diminished in  the right lung base   Cardiovascular: Regular, rate and rhythm, no murmurs,  2+ equal pulses of the extremities, normal S 1and S 2   Gastrointestinal: Nondistended, soft, non-tender,  no rebound tenderness or guarding,  +BS, no bruits   Musculoskeletal: ROM intact   Extremities: no cyanosis, no edema   Neurological: alert and oriented x3, intact senses,  moves all limbs against resistance   Psychological: Appropriate mood and behavior   Skin: Warm and dry, intact     Medication:    Medications:          Continuous Medications       --------------------------------  No continuous medications are active       Scheduled Medications       --------------------------------    1. Albuterol 2.5 mg/ 3 mL Nebulizer Soln:  3  mL  Inhalation  3 Times a Day    2. amLODIPine (NORVASC):  10  mg  Oral  Daily    3. Aspirin Chewable:  81  mg  Oral  Daily    4. Benztropine:  1  mg  Oral  Every 12 Hours    5. buPROPion (WELLBUTRIN XL) Extended Release (24 hour):  300  mg  Oral  Every 24 Hours    6. Cefepime 1 gram IVPB/ Premixed Soln 50 mL:  50  mL  IntraVenous Piggyback   Every 8 Hours    7. Enoxaparin SubCutaneous:  40  mg  SubCutaneous  Every 24 Hours    8. Gabapentin:  600  mg  Oral  3 Times a Day    9. Mirtazapine  - PEDS:  30  mg  Oral  Every Night    10. Multivitamin with Minerals:  1  tablet(s)  Oral  Daily    11. Naltrexone:  50  mg  Oral  Daily    12. Pantoprazole:  40  mg  Oral  2 Times a Day    13. QUEtiapine:  200  mg  Oral  2 Times a Day    14. QUEtiapine  - PEDS:  50  mg  Oral  Every Night    15. Ranolazine:  500  mg  Oral  2 Times a Day    16. Sertraline:  100  mg  Oral  Daily    17. Sodium Chloride:  1  gram(s)  Oral  Daily         PRN Medications       --------------------------------    1. Albuterol 2.5 mg/ 3 mL Nebulizer Soln:  3  mL  Inhalation  Every 2 Hours        Recent Lab Results:    Results:    CBC: 9/26/2023 05:47              \     Hgb     /                              \     13.4 L    /  WBC  ----------------  Plt               14.6 H    ----------------    372              /     Hct     \                              /     41.0       \            RBC: 4.26 L    MCV: 96     Neutrophil %: 84.8      BMP: 9/26/2023 05:47  NA+        Cl-     BUN  /                         134 L   96 L    20  /  --------------------------------  Glucose                ---------------------------  170 H    K+     HCO3-   Creat \                         4.2  30    0.66  \  Calcium : 9.2     Anion Gap : 12      CMP: 9/21/2023 13:56  NA+        Cl-     BUN  /                         131 L   98    21  /  --------------------------------  Glucose                ---------------------------  138 H    K+     HCO3-   Creat \                         4.2    23    1.26  \           \  T Bili  /                    \  0.7  /  AST  x ---- x ALT        13 x ---- x 11         /  Alk P   \               /  55  \  Calcium : 9.2     Anion Gap : 14     Albumin : 4.0     T Protein : 7.5             ---------- Recent Arterial Blood Gas Results----------     9/21/2023  14:47  pO2 77  pH 7.45  pCO2 36  SO2 97  Base Excess 1.3null    Radiology Results:    Results:        Impression:    1.  Extensive infiltrate/pneumonia of the right lung. No  parapneumonic effusion.     CT Chest without Contrast [Sep 26 2023 10:15AM]      Assessment and Plan:   Code Status:  ·  Code Status Full Code     Assessment:    ELIER CAMPOS is a 63 year old Male with a past medical history of Alzheimer's dementia, hypertension, GERD, depression with suicidal ideations, major depression,  schizophrenia, history of polysubstance use disorder and recurrent tinea pedis who was admitted to the hospital for aspiration pneumonia and suicidal ideation.      Acute hypoxic respiratory failure due to Aspiration pneumonia  -Repeat CT scan is showing extensive infiltrate in the right lung.  -Got 6 days course of vancomycin and Zosyn.  -Continue supplemental O2 at 2L/min via NC, wean as tolerated   -Cefepime started 09/25  - Flu and covid negative  -MRSA screen showed MSSA  -Blood culture 09/21 show no growth (final)  -Speech consulted    Schizophrenia/major depression/Alzheimer's dementia/suicidal ideation  -Patient was transferred here in part to see psychiatry  -Continue one-to-one sitter  -Resume home meds    GERD  -Resume PPI    Hypertension  -Resume Norvasc    DVT prophylaxis  -Lovenox        Electronic Signatures:  Monster Portillo ()  (Signed 27-Sep-2023 13:04)   Authored: Service, Subjective Data, Objective Data, Assessment  and Plan, Note Completion      Last Updated: 27-Sep-2023 13:04 by Monster Portillo ()

## 2023-09-30 NOTE — PROGRESS NOTES
Speech-Language Pathology    Inpatient  Speech-Language Pathology Treatment     Patient Name: Gerardo Albright  MRN: [id number]  Today's Date: 9/30/2023  Time in: 10:23  Time out: 10:43  Time in therapy: 20 mnutes      Clinical swallowing evaluation was completed on 9/27/23 in [facility]. Please view EMR for full report. SLP recommendations from that report include: Minced and moist diet/ IDDSI level 5 and thin liquids.       Current Problem:   Patient Active Problem List   Diagnosis    Other schizophrenia (CMS/HCC)    Other seizures (CMS/HCC)    Anxiety    Parkinson's disease (CMS/HCC)    Dementia (CMS/HCC)    Other insomnia    Gastroesophageal reflux disease without esophagitis    Asthenia    Chronic obstructive pulmonary disease (CMS/HCC)    Benign hypertension    Acute myocardial infarction (CMS/HCC)    Depression    ASHD (arteriosclerotic heart disease)    At risk for falls         SLP Assessment:  SLP TX Intervention Outcome: Making Progress Towards Goals  Prognosis: Good       Plan:  SLP Frequency: 3x daily until discharge  Duration: 2 weeks    GOALS-  Pt will tolerate a Minced and moist diet/ IDDSI level 5 with thin liquids without dysphagia on 95% of observed trials.   Pt will implement use of upright posture, slow intake, complete oral clearance before next bite  with min assist of clinician.   Patient will participate in ongoing evaluation of swallowing functioning to ensure he is on the safest and least restrictive diet level.      Subjective   Current Problem:  Dysphagia with risk for aspiration when eating too quickly.    Reason for Referral: dysphagia  Patient Seen During This Visit: Yes      Pain Assessment:   Pain Assessment: 0-10  Pain Score: 0 - No pain      Objective     Therapeutic Swallow:  Therapeutic Swallow Intervention : Compensatory Strategies, PO Trials  Solid Diet Recommendations: Minced & moist/ground (IDDSI Level 5)  Liquid Diet Recommendations: Thin (IDDSI Level 0)    Instruction  "and review provided for the patient for rehearsal of safest swallowing recommendations. He required total assist/ max cues to maintain slow rate of self feeding.     After review, he reported 2/3 safe swallowing recommendations independently.       Education:  Learner patient;    Barriers to Learning cognitive limitations barrier   Method demonstration; verbal; written- (Written \"Swallowing Guidelines\" posted at patient's bedside)   Education - Topic ST provided patient education regarding role of ST, purpose of assessment, clinical impressions, goals of treatment, and plan of care. Patient verbalized questionable comprehension, consistent with cognitive status. Education will be reinforced. ST further coordinated with RN regarding recommendations and precautions per this assessment, with RN verbalizing understanding.     Outcome    needs review/reinforcement; teach back/return demonstration        "

## 2023-09-30 NOTE — PROGRESS NOTES
Service: Medicine     Subjective Data:   ELIER CAMPOS is a 63 year old Male who is Hospital Day # 5.     No overnight events reported.    Patient successfully weaned to room air. He was seen by psychiatry this morning and they continue to recommend inpatient stabilization. Discussed plan of care with patient and answered all questions. EPAT was contacted today to initiate inpatient psych  placement. Patient is agreeable to inpatient admission.    Objective Data:     Objective Information:      T   P  R  BP   MAP  SpO2   Value  36.7  70  19  148/89      91%  Date/Time 9/25 6:27 9/25 6:27 9/25 6:27 9/25 6:27 9/25 6:27  Range  (36.7C - 37.3C )  (70 - 82 )  (18 - 19 )  (137 - 157 )/ (84 - 91 )    (91% - 93% )  Highest temp of 37.3 C was recorded at 9/24 6:45      Pain reported at 9/25 12:00: 0 = None    ---- Intake and Output  -----  Mn/Dy/Year Time  Intake   Output  Net  Sep 25, 2023 6:00 am  240   1250  -1010  Sep 24, 2023 10:00 pm  1510   1650  -140  Sep 24, 2023 2:00 pm  2380   2375  5    The Intake and Output Totals for the last 24 hours are:      Intake   Output  Net      9036 1330 -9314    Physical Exam Narrative:  ·  Physical Exam:    Constitutional: WN/WD male sitting up on the edge of the bedbed, in no acute distress, appears much older than stated age   Eyes: No scleral icterus   ENMT: Mucous membranes moist  Head/Neck: NC/AT  Respiratory/Thorax: Diminished breath sounds with a few crackles. No accessory muscle use or conversational dyspnea. SpO2 > 92% on RA   Cardiovascular: Regular rate and rhythm, no murmurs, normal S1 and S2.  Gastrointestinal: Soft, non-tender, non-distended abdomen. No rebound tenderness or guarding, no masses palpable, no organomegaly, + BS  Extremities: Grossly normal extremities, no edema  Neurological: Alert and oriented x 3  Skin: Warm and dry, no lesions or rashes   Psychological: Anxious       Medication:    Medications:      ALTERNATIVE MEDICINES:    1.  Melatonin:  9  mg  Oral  At Bedtime   PRN         ANTI-INFECTIVES:    1. Piperacillin - Tazobactam 4.5 gram/Iso-osmotic 100 mL Premix IVPB:  100  mL  IntraVenous Piggyback  Every 6 Hours      CARDIOVASCULAR AGENTS:    1. Ranolazine:  500  mg  Oral  2 Times a Day    2. amLODIPine (NORVASC):  10  mg  Oral  Daily      CENTRAL NERVOUS SYSTEM AGENTS:    1. Acetaminophen:  650  mg  Oral  Every 4 Hours   PRN       2. Acetaminophen:  650  mg  Oral  Every 4 Hours   PRN       3. Aspirin Chewable:  81  mg  Oral  Daily    4. Divalproex Sodium Del Rel (Depakote):  500  mg  Oral  Every 12 Hours    5. Gabapentin:  600  mg  Oral  3 Times a Day    6. Ondansetron Injectable:  4  mg  IntraVenous Push  Every 4 Hours   PRN       7. Benztropine:  1  mg  Oral  Every 12 Hours    8. LORazepam:  0.5  mg  Oral  Every 6 Hours   PRN         COAGULATION MODIFIERS:    1. Enoxaparin SubCutaneous:  40  mg  SubCutaneous  Every 24 Hours      GASTROINTESTINAL AGENTS:    1. Magnesium Hydroxide -Al Hydrox -Simethicone Oral Liquid:  30  mL  Oral  Every 6 Hours   PRN       2. Magnesium Hydroxide Oral Liquid:  10  mL  Oral  Every 24 Hours   PRN       3. Pantoprazole:  40  mg  Oral  Daily      HORMONES/HORMONE MODIFIERS:    1. methylPREDNISolone Sodium Succinate Injectable:  40  mg  IntraVenous Push  Every 8 Hours      MISCELLANEOUS AGENTS:    1. Naltrexone:  50  mg  Oral  Daily      NUTRITIONAL PRODUCTS:    1. Sodium Chloride:  1  gram(s)  Oral  Daily    2. Sodium Chloride 0.9% Injectable Flush:  10  mL  IntraVenous Flush  Every 8 Hours and as Needed   PRN       3. Multivitamin with Minerals:  1  tablet(s)  Oral  Daily      PSYCHOTHERAPEUTIC AGENTS:    1. buPROPion (WELLBUTRIN XL) Extended Release (24 hour):  300  mg  Oral  Every 24 Hours    2. Mirtazapine:  30  mg  Oral  At Bedtime    3. Sertraline:  100  mg  Oral  Daily    4. QUEtiapine:  200  mg  Oral  Daily Before First Meal    5. QUEtiapine:  250  mg  Oral  At Bedtime      RESPIRATORY AGENTS:    1.  Albuterol 2.5 mg - Ipratropium 0.5 mg/ 3 mL Neb Soln:  3  mL  Inhalation  Every 6 Hours    2. Albuterol 2.5 mg/ 3 mL Nebulizer Soln:  3  mL  Inhalation  Every 2 Hours   PRN       3. guaiFENesin Extended Release:  600  mg  Oral  Every 12 Hours    4. Dextromethorphan - guaiFENesin Oral Liquid:  5  mL  Oral  Every 4 Hours   PRN         TOPICAL AGENTS:    1. Sore Throat Lozenge:  1  lozenge(s)  Oral  Every 2 Hours   PRN           Recent Lab Results:    Results:    CBC: 9/25/2023 05:30              \     Hgb     /                              \     13.2 L    /  WBC  ----------------  Plt               12.6 H    ----------------    334              /     Hct     \                              /     42.4       \            RBC: 4.19 L    MCV: 101 H          BMP: 9/25/2023 05:30  NA+        Cl-     BUN  /                         130 L   98    17  /  --------------------------------  Glucose                ---------------------------  282 H    K+     HCO3-   Creat \                         4.4  22    0.68  \  Calcium : 9.1     Anion Gap : 14      Radiology Results:    Results:        Impression:    Prior cardiac surgery with nonspecific mild bibasilar  pleural/parenchymal airspace opacity suggested. Similar cardiomegaly.     Xray Chest 1 View [Sep 21 2023  2:45PM]        Assessment and Plan:   Code Status:  ·  Code Status Full Code     Assessment:    #Acute hypoxic respiratory failure 2/2 pneumonia, likely gram negative (resolved)   - WBC 19.1 > 14.4 > 17.6 > 12.2 > 12.6   - Lactate 1.6     - COVID, flu A/B negative  - ABG: pH 7.45, pCO2 36, pO2 77   - CXR: Prior cardiac surgery with nonspecific mild bibasilar pleural/parenchymal airspace opacity suggested. Similar cardiomegaly.  - Procal 0.13  - BCx NGTD  - MRSA nares grew MSSA, Vancomycin discontinued 9/25   - Continue Zosyn (day 6)   - Solumedrol 40 mg IVP q8h   - Mucinex twice a day, Tylenol as needed for pain/fever   - RT eval and treat protocol, additional  inhalers/breathing treatments per RT   - DuoNebs q6h   - Bronchial hygiene   - Baseline O2:  RA   - Wean O2 as tolerated; patient currently on RA  - SLP evaluated patient, recommending minced/moist diet with nectar thick liquids   - Will plan to discharge on cefuroxime to complete total of 7 days     #HTN  #CAD  - Continue amlodipine, aspirin, and ranolazine   - Monitor BP     #Hyponatremia, chronic  - Na 131 > 136 > 135 > 133 > 130   - Continue sodium chloride tablet every day (home med)  - Daily BMP to monitor Na level    #Schizoaffective disorder   #Suicidal ideation with h/o multiple suicide attempts    #H/o EtOH abuse   - Patient resides at [facility] on lockdown unit, has a guardian   - Continue home meds: benztropine, bupropion, Depakote, gabapentin, mirtazapine, quetiapine, and sertraline   - Naltrexone not stocked by pharmacy; brought in from [facility]   - Ativan 0.5 mg PO q6h as needed for anxiety   - Delirium precautions: Frequent reorientation, day/night normalization, shades open for sunlight, provide glasses/hearing aids as needed   - 1:1 sitter for reported SI   - Psych consulted, recommending inpatient stabilization for SI   - EPAT contacted 9/25, awaiting inpatient psych placement     DVT PPx: Lovenox   GI PPx: Protonix   Diet: Regular with bite size food, nectar thick liquids   Code Status: Full code     Disposition: Patient requires inpatient management at this time. Pending placement by EPAT.     Total accumulated time spent face to face and not face to face preparing to see the patient, obtaining and reviewing separately obtained history; performing a medically appropriate examination and/or evaluation; counseling and educating the patient, family;  ordering medications, tests, or procedures; referring and communicating with other health care professionals; documenting clinical information in the patient's medical record; independently interpreting results and communicating the results to the  patient,  family; and care coordination was 45 minutes.        Plan of Care Reviewed With:  Plan of Care Reviewed With: patient       Electronic Signatures:  Tahira Scott (PAC)  (Signed 25-Sep-2023 13:13)   Authored: Service, Subjective Data, Objective Data, Assessment  and Plan, Note Completion      Last Updated: 25-Sep-2023 13:13 by Tahira Scott (PAC)

## 2023-09-30 NOTE — PROGRESS NOTES
Service: Medicine     Subjective Data:   ELIER CAMPOS is a 63 year old Male who is Hospital Day # 4.     Overnight: No acute issues noted.   CC: Weaned to RA.   Denies fever, chills, h/a, vision or hearing changes, runny nose, sore throat, c/p, palpitations, cough, SOB, abd pain, n/v/d/c, dysuria, hematuria, numbness or tingling.    Objective Data:     Objective Information:      T   P  R  BP   MAP  SpO2   Value  37  73  18  144/84      93%  Date/Time 9/24 12:16 9/24 12:16 9/24 12:16 9/24 12:16    9/24 12:16  Range  (36.5C - 37.3C )  (70 - 87 )  (18 - 19 )  (129 - 152 )/ (74 - 92 )    (92% - 96% )   As of 23-Sep-2023 14:30:00, patient is on 2 L/min of oxygen via room air.  Highest temp of 37.3 C was recorded at 9/24 6:45      Pain reported at 9/24 8:04: 0 = None    ---- Intake and Output  -----  Mn/Dy/Year Time  Intake   Output  Net  Sep 24, 2023 6:00 am  370   1550  -1180  Sep 23, 2023 10:00 pm  1990   1900  90  Sep 23, 2023 2:00 pm  1690   1375  315    The Intake and Output Totals for the last 24 hours are:      Intake   Output  Net      3021 2797 -154    Physical Exam by System:    Constitutional: Well developed, awake/alert/oriented  x3, no distress, alert and cooperative   Eyes: Clear slcera   ENMT: MMM   Respiratory/Thorax: Scattered crackles. No accessory  muscle use. POx >92% on RA.   Cardiovascular: Regular, rate and rhythm, no murmurs   Gastrointestinal: Nondistended, soft, non-tender,  no rebound tenderness or guarding, no masses palpable, no organomegaly, +BS, no bruits   Musculoskeletal: Gross ROM intact, no joint swelling   Extremities: normal extremities, no cyanosis, edema,  contusions, wounds, no clubbing   Neurological: no focal deficit   Lymphatic: No significant lymphadenopathy   Psychological: Appropriate mood and behavior   Skin: Warm, dry     Medication:    Medications:          Continuous Medications       --------------------------------  No continuous medications are active        Scheduled Medications       --------------------------------    1. Albuterol 2.5 mg - Ipratropium 0.5 mg/ 3 mL Neb Soln:  3  mL  Inhalation  Every 6 Hours    2. amLODIPine (NORVASC):  10  mg  Oral  Daily    3. Aspirin Chewable:  81  mg  Oral  Daily    4. Benztropine:  1  mg  Oral  Every 12 Hours    5. buPROPion (WELLBUTRIN XL) Extended Release (24 hour):  300  mg  Oral  Every 24 Hours    6. Divalproex Sodium Del Rel (Depakote):  500  mg  Oral  Every 12 Hours    7. Enoxaparin SubCutaneous:  40  mg  SubCutaneous  Every 24 Hours    8. Gabapentin:  600  mg  Oral  3 Times a Day    9. guaiFENesin Extended Release:  600  mg  Oral  Every 12 Hours    10. methylPREDNISolone Sodium Succinate Injectable:  40  mg  IntraVenous Push  Every 8 Hours    11. Mirtazapine:  30  mg  Oral  At Bedtime    12. Multivitamin with Minerals:  1  tablet(s)  Oral  Daily    13. Naltrexone:  50  mg  Oral  Daily    14. Pantoprazole:  40  mg  Oral  Daily    15. Piperacillin - Tazobactam 4.5 gram/Iso-osmotic 100 mL Premix IVPB:  100  mL  IntraVenous Piggyback  Every 6 Hours    16. QUEtiapine:  200  mg  Oral  Daily Before First Meal    17. QUEtiapine:  250  mg  Oral  At Bedtime    18. Ranolazine:  500  mg  Oral  2 Times a Day    19. Sertraline:  100  mg  Oral  Daily    20. Sodium Chloride:  1  gram(s)  Oral  Daily    21. Vancomycin - RPh to Dose - IV Piggy Back:  1  each  As Specified  Variable    22. Vancomycin 1.5 gram/ 300 mL Premix IVPB (Xellia):  1500  mg  IntraVenous Piggyback  Every 12 Hours         PRN Medications       --------------------------------    1. Acetaminophen:  650  mg  Oral  Every 4 Hours    2. Acetaminophen:  650  mg  Oral  Every 4 Hours    3. Albuterol 2.5 mg/ 3 mL Nebulizer Soln:  3  mL  Inhalation  Every 2 Hours    4. Dextromethorphan - guaiFENesin Oral Liquid:  5  mL  Oral  Every 4 Hours    5. LORazepam:  0.5  mg  Oral  Every 6 Hours    6. Magnesium Hydroxide -Al Hydrox -Simethicone Oral Liquid:  30  mL  Oral  Every 6 Hours     7. Magnesium Hydroxide Oral Liquid:  10  mL  Oral  Every 24 Hours    8. Melatonin:  9  mg  Oral  At Bedtime    9. Ondansetron Injectable:  4  mg  IntraVenous Push  Every 4 Hours    10. Sodium Chloride 0.9% Injectable Flush:  10  mL  IntraVenous Flush  Every 8 Hours and as Needed    11. Sore Throat Lozenge:  1  lozenge(s)  Oral  Every 2 Hours        Recent Lab Results:    Results:    CBC: 9/24/2023 06:24              \     Hgb     /                              \     12.5 L    /  WBC  ----------------  Plt               12.2 H    ----------------    330              /     Hct     \                              /     37.8 L    \            RBC: 3.95 L    MCV: 96           BMP: 9/24/2023 06:24  NA+        Cl-     BUN  /                         133 L   99    14  /  --------------------------------  Glucose                ---------------------------  235 H    K+     HCO3-   Creat \                         4.6  27    0.66  \  Calcium : 9.0     Anion Gap : 12      Assessment and Plan:   Code Status:  ·  Code Status Full Code     Assessment:    #Acute hypoxic respiratory failure 2/2 pneumonia, gram negative vs. MRSA, resolved   #Tobacco abuse   - WBC 19.1 > 14.4 >17.6 >12.2   - Lactate 1.6     - COVID, flu A/B negative  - ABG: pH 7.45, pCO2 36, pO2 77   - CXR: Prior cardiac surgery with nonspecific mild bibasilar pleural/parenchymal airspace opacity suggested. Similar cardiomegaly.  - Procal, BCx, sputum culture, MRSA nares pending   - Continue Vancomycin and Zosyn (day 4)  - Solumedrol 40 mg IVP q8h   - Mucinex twice a day, Tylenol as needed for pain/fever   - RT eval and treat protocol, additional inhalers/breathing treatments per RT   - DuoNebs q6h   - Bronchial hygiene   - Baseline O2:  RA   - Wean O2 as tolerated; patient currently on 2L O2 via NC  - SLP eval pending to r/o aspiration; currently on honey thick liquids   - recommend complete smoking cessation     #HTN  #CAD  - Continue amlodipine, aspirin, and  ranolazine   - Monitor BP     #Hyponatremia, chronic  - Na 131 > 136 > 133   - Continue sodium chloride tablet every day (home med)  - Daily BMP to monitor Na level    #Schizoaffective disorder   #Suicidal ideation with h/o multiple suicide attempts    #H/o EtOH abuse   - Continue home meds: benztropine, bupropion, Depakote, gabapentin, mirtazapine, quetiapine, and sertraline   - Naltrexone not stocked by pharmacy; [facility] not able to bring to hospital   - Ativan 0.5 mg PO q8h as needed for anxiety   - Delirium precautions: Frequent reorientation, day/night normalization, shades open for sunlight, provide glasses/hearing aids as needed   - 1:1 sitter for reported SI   - Psych consulted, appreciate recs   - Patient resides at [facility] on lockdown unit, has a guardian   - rec EPAT; pending medical clearance.   - Anticipate medical clearance tomorrow, psych to see patient in AM    DVT PPx: Lovenox   GI PPx: Protonix   Diet: Regular with bite size food, honey thick liquids   Code Status: Full code     Disposition: Patient requires inpatient management at this time.     Total accumulated time spent face to face and not face to face preparing to see the patient, obtaining and reviewing separately obtained history; performing a medically appropriate examination and/or evaluation; counseling and educating the patient, family;  ordering medications, tests, or procedures; referring and communicating with other health care professionals; documenting clinical information in the patient's medical record; independently interpreting results and communicating the results to the patient,  family; and care coordination was 45 minutes.       Plan of Care Reviewed With:  Plan of Care Reviewed With: patient     Attestation:   Note Completion:  I am a:  Scribe   Scribing on behalf of (Provider name) Kurt Sheth   Scribe Only - Scribe Attestation I am scribing for, and in the presence of the provider.    Scribed Provider Only -  Attestation to Scribe The documentation recorded by the individual acting as Scribe, in my presence, accurately and completely reflects the service(s) I personally performed and  the decisions made by me.          Electronic Signatures:  Maliha Israel (PAC)  (Entered 24-Sep-2023 13:23)   Entered: Service, Subjective Data, Objective Data, Assessment  and Plan, Note Completion  Kurt Sheth)  (Signed 24-Sep-2023 18:26)   Entered: Note Completion   Authored: Service, Subjective Data, Objective Data, Assessment and Plan, Note Completion      Last Updated: 24-Sep-2023 18:26 by Kurt Sheth)

## 2023-10-01 VITALS
TEMPERATURE: 96.8 F | WEIGHT: 192.02 LBS | HEART RATE: 73 BPM | HEIGHT: 72 IN | OXYGEN SATURATION: 96 % | DIASTOLIC BLOOD PRESSURE: 90 MMHG | SYSTOLIC BLOOD PRESSURE: 141 MMHG | RESPIRATION RATE: 16 BRPM | BODY MASS INDEX: 26.01 KG/M2

## 2023-10-01 PROBLEM — J12.9 VIRAL PNEUMONIA, UNSPECIFIED: Status: RESOLVED | Noted: 2023-09-30 | Resolved: 2023-10-01

## 2023-10-01 PROBLEM — I21.9 ACUTE MYOCARDIAL INFARCTION (MULTI): Status: RESOLVED | Noted: 2023-07-27 | Resolved: 2023-10-01

## 2023-10-01 LAB
EST. AVERAGE GLUCOSE BLD GHB EST-MCNC: 140 MG/DL
GLUCOSE BLD MANUAL STRIP-MCNC: 290 MG/DL (ref 74–99)
HBA1C MFR BLD: 6.5 %

## 2023-10-01 PROCEDURE — 94760 N-INVAS EAR/PLS OXIMETRY 1: CPT

## 2023-10-01 PROCEDURE — 99238 HOSP IP/OBS DSCHRG MGMT 30/<: CPT | Performed by: FAMILY MEDICINE

## 2023-10-01 PROCEDURE — 2500000002 HC RX 250 W HCPCS SELF ADMINISTERED DRUGS (ALT 637 FOR MEDICARE OP, ALT 636 FOR OP/ED): Performed by: FAMILY MEDICINE

## 2023-10-01 PROCEDURE — 2500000001 HC RX 250 WO HCPCS SELF ADMINISTERED DRUGS (ALT 637 FOR MEDICARE OP): Performed by: FAMILY MEDICINE

## 2023-10-01 PROCEDURE — 82947 ASSAY GLUCOSE BLOOD QUANT: CPT

## 2023-10-01 PROCEDURE — 2500000004 HC RX 250 GENERAL PHARMACY W/ HCPCS (ALT 636 FOR OP/ED): Performed by: FAMILY MEDICINE

## 2023-10-01 PROCEDURE — 94640 AIRWAY INHALATION TREATMENT: CPT

## 2023-10-01 RX ORDER — QUETIAPINE FUMARATE 200 MG/1
200 TABLET, FILM COATED ORAL 2 TIMES DAILY
Qty: 60 TABLET | Refills: 0 | Status: SHIPPED | OUTPATIENT
Start: 2023-10-01 | End: 2024-04-24 | Stop reason: HOSPADM

## 2023-10-01 RX ORDER — METFORMIN HYDROCHLORIDE 500 MG/1
500 TABLET ORAL
Qty: 60 TABLET | Refills: 0 | Status: SHIPPED | OUTPATIENT
Start: 2023-10-01 | End: 2024-04-24 | Stop reason: HOSPADM

## 2023-10-01 RX ORDER — DEXTROSE MONOHYDRATE 100 MG/ML
0.3 INJECTION, SOLUTION INTRAVENOUS ONCE AS NEEDED
Status: DISCONTINUED | OUTPATIENT
Start: 2023-10-01 | End: 2023-10-01 | Stop reason: HOSPADM

## 2023-10-01 RX ORDER — DEXTROSE 50 % IN WATER (D50W) INTRAVENOUS SYRINGE
25
Status: DISCONTINUED | OUTPATIENT
Start: 2023-10-01 | End: 2023-10-01 | Stop reason: HOSPADM

## 2023-10-01 RX ADMIN — BUPROPION HYDROCHLORIDE 300 MG: 150 TABLET, FILM COATED, EXTENDED RELEASE ORAL at 09:48

## 2023-10-01 RX ADMIN — LORAZEPAM 0.5 MG: 0.5 TABLET ORAL at 06:45

## 2023-10-01 RX ADMIN — LORAZEPAM 0.5 MG: 0.5 TABLET ORAL at 14:21

## 2023-10-01 RX ADMIN — SODIUM CHLORIDE 1 G: 1 TABLET ORAL at 09:48

## 2023-10-01 RX ADMIN — CEFEPIME 1 G: 1 INJECTION, POWDER, FOR SOLUTION INTRAMUSCULAR; INTRAVENOUS at 06:00

## 2023-10-01 RX ADMIN — AMLODIPINE BESYLATE 10 MG: 10 TABLET ORAL at 09:48

## 2023-10-01 RX ADMIN — QUETIAPINE FUMARATE 200 MG: 100 TABLET ORAL at 09:49

## 2023-10-01 RX ADMIN — PANTOPRAZOLE SODIUM 40 MG: 40 TABLET, DELAYED RELEASE ORAL at 09:49

## 2023-10-01 RX ADMIN — ASPIRIN 81 MG CHEWABLE TABLET 81 MG: 81 TABLET CHEWABLE at 09:49

## 2023-10-01 RX ADMIN — NALTREXONE HYDROCHLORIDE 50 MG: 50 TABLET, FILM COATED ORAL at 09:47

## 2023-10-01 RX ADMIN — ALBUTEROL SULFATE 3 ML: 2.5 SOLUTION RESPIRATORY (INHALATION) at 08:08

## 2023-10-01 RX ADMIN — INSULIN HUMAN 3 UNITS: 100 INJECTION, SOLUTION PARENTERAL at 12:13

## 2023-10-01 RX ADMIN — ENOXAPARIN SODIUM 40 MG: 40 INJECTION SUBCUTANEOUS at 09:46

## 2023-10-01 RX ADMIN — GABAPENTIN 600 MG: 300 CAPSULE ORAL at 09:48

## 2023-10-01 RX ADMIN — SERTRALINE HYDROCHLORIDE 100 MG: 50 TABLET ORAL at 09:47

## 2023-10-01 RX ADMIN — BENZTROPINE MESYLATE 1 MG: 1 TABLET ORAL at 09:48

## 2023-10-01 RX ADMIN — CEFEPIME 1 G: 1 INJECTION, POWDER, FOR SOLUTION INTRAMUSCULAR; INTRAVENOUS at 14:21

## 2023-10-01 RX ADMIN — GABAPENTIN 600 MG: 300 CAPSULE ORAL at 14:21

## 2023-10-01 RX ADMIN — RANOLAZINE 500 MG: 500 TABLET, FILM COATED, EXTENDED RELEASE ORAL at 09:48

## 2023-10-01 RX ADMIN — MULTIPLE VITAMINS W/ MINERALS TAB 1 TABLET: TAB at 09:47

## 2023-10-01 SDOH — SOCIAL STABILITY: SOCIAL INSECURITY

## 2023-10-01 SDOH — SOCIAL STABILITY: SOCIAL INSECURITY: DOES ANYONE TRY TO KEEP YOU FROM HAVING/CONTACTING OTHER FRIENDS OR DOING THINGS OUTSIDE YOUR HOME?: NO

## 2023-10-01 SDOH — SOCIAL STABILITY: SOCIAL INSECURITY: HAS ANYONE EVER THREATENED TO HURT YOUR FAMILY OR YOUR PETS?: YES

## 2023-10-01 SDOH — SOCIAL STABILITY: SOCIAL INSECURITY: ARE THERE ANY APPARENT SIGNS OF INJURIES/BEHAVIORS THAT COULD BE RELATED TO ABUSE/NEGLECT?: NO

## 2023-10-01 SDOH — SOCIAL STABILITY: SOCIAL INSECURITY: ARE YOU OR HAVE YOU BEEN THREATENED OR ABUSED PHYSICALLY, EMOTIONALLY, OR SEXUALLY BY ANYONE?: YES

## 2023-10-01 SDOH — HEALTH STABILITY: MENTAL HEALTH: EXPERIENCED ANY OF THE FOLLOWING LIFE EVENTS: PHYSICAL ASSAULT;SEXUAL ASSAULT;OTHER (COMMENT)

## 2023-10-01 SDOH — SOCIAL STABILITY: SOCIAL INSECURITY: DO YOU FEEL UNSAFE GOING BACK TO THE PLACE WHERE YOU ARE LIVING?: YES

## 2023-10-01 SDOH — SOCIAL STABILITY: SOCIAL INSECURITY: ABUSE: ADULT

## 2023-10-01 SDOH — SOCIAL STABILITY: SOCIAL INSECURITY: DO YOU FEEL ANYONE HAS EXPLOITED OR TAKEN ADVANTAGE OF YOU FINANCIALLY OR OF YOUR PERSONAL PROPERTY?: NO

## 2023-10-01 SDOH — SOCIAL STABILITY: SOCIAL INSECURITY: HAVE YOU HAD THOUGHTS OF HARMING ANYONE ELSE?: NO

## 2023-10-01 SDOH — SOCIAL STABILITY: SOCIAL INSECURITY: WERE YOU ABLE TO COMPLETE ALL THE BEHAVIORAL HEALTH SCREENINGS?: YES

## 2023-10-01 ASSESSMENT — PAIN SCALES - GENERAL: PAINLEVEL_OUTOF10: 0 - NO PAIN

## 2023-10-01 ASSESSMENT — COGNITIVE AND FUNCTIONAL STATUS - GENERAL
EATING MEALS: A LITTLE
MOBILITY SCORE: 16
CLIMB 3 TO 5 STEPS WITH RAILING: A LOT
MOVING FROM LYING ON BACK TO SITTING ON SIDE OF FLAT BED WITH BEDRAILS: A LITTLE
TURNING FROM BACK TO SIDE WHILE IN FLAT BAD: A LITTLE
DRESSING REGULAR LOWER BODY CLOTHING: A LOT
STANDING UP FROM CHAIR USING ARMS: A LITTLE
MOBILITY SCORE: 15
HELP NEEDED FOR BATHING: A LITTLE
DRESSING REGULAR LOWER BODY CLOTHING: A LITTLE
STANDING UP FROM CHAIR USING ARMS: A LOT
WALKING IN HOSPITAL ROOM: A LOT
PERSONAL GROOMING: A LITTLE
DRESSING REGULAR UPPER BODY CLOTHING: A LITTLE
MOVING TO AND FROM BED TO CHAIR: A LITTLE
TOILETING: A LOT
DRESSING REGULAR UPPER BODY CLOTHING: A LOT
MOVING FROM LYING ON BACK TO SITTING ON SIDE OF FLAT BED WITH BEDRAILS: A LITTLE
MOVING TO AND FROM BED TO CHAIR: A LITTLE
DAILY ACTIVITIY SCORE: 14
WALKING IN HOSPITAL ROOM: A LOT
CLIMB 3 TO 5 STEPS WITH RAILING: A LOT
PERSONAL GROOMING: A LITTLE
HELP NEEDED FOR BATHING: A LOT
TOILETING: A LITTLE
TURNING FROM BACK TO SIDE WHILE IN FLAT BAD: A LITTLE
PATIENT BASELINE BEDBOUND: NO
DAILY ACTIVITIY SCORE: 19

## 2023-10-01 ASSESSMENT — ACTIVITIES OF DAILY LIVING (ADL)
PATIENT'S MEMORY ADEQUATE TO SAFELY COMPLETE DAILY ACTIVITIES?: YES
ADEQUATE_TO_COMPLETE_ADL: YES
ADEQUATE_TO_COMPLETE_ADL: YES
PATIENT'S MEMORY ADEQUATE TO SAFELY COMPLETE DAILY ACTIVITIES?: YES
ASSISTIVE_DEVICE: WALKER
HEARING - RIGHT EAR: FUNCTIONAL
BATHING: NEEDS ASSISTANCE
DRESSING YOURSELF: NEEDS ASSISTANCE
WALKS IN HOME: NEEDS ASSISTANCE
BATHING: NEEDS ASSISTANCE
HEARING - LEFT EAR: FUNCTIONAL
JUDGMENT_ADEQUATE_SAFELY_COMPLETE_DAILY_ACTIVITIES: NO
HEARING - LEFT EAR: FUNCTIONAL
FEEDING YOURSELF: INDEPENDENT
TOILETING: NEEDS ASSISTANCE
GROOMING: NEEDS ASSISTANCE
ASSISTIVE_DEVICE: WALKER
TOILETING: NEEDS ASSISTANCE
DRESSING YOURSELF: NEEDS ASSISTANCE
WALKS IN HOME: NEEDS ASSISTANCE
FEEDING YOURSELF: INDEPENDENT
GROOMING: NEEDS ASSISTANCE
JUDGMENT_ADEQUATE_SAFELY_COMPLETE_DAILY_ACTIVITIES: NO

## 2023-10-01 ASSESSMENT — LIFESTYLE VARIABLES
PRESCIPTION_ABUSE_PAST_12_MONTHS: NO
HOW MANY STANDARD DRINKS CONTAINING ALCOHOL DO YOU HAVE ON A TYPICAL DAY: PATIENT DECLINED
SUBSTANCE_ABUSE_PAST_12_MONTHS: NO
HOW OFTEN DO YOU HAVE 6 OR MORE DRINKS ON ONE OCCASION: PATIENT DECLINED
AUDIT-C TOTAL SCORE: -1
HOW OFTEN DO YOU HAVE A DRINK CONTAINING ALCOHOL: PATIENT DECLINED
SKIP TO QUESTIONS 9-10: 0
AUDIT-C TOTAL SCORE: -1

## 2023-10-01 ASSESSMENT — PATIENT HEALTH QUESTIONNAIRE - PHQ9
2. FEELING DOWN, DEPRESSED OR HOPELESS: NEARLY EVERY DAY
SUM OF ALL RESPONSES TO PHQ9 QUESTIONS 1 & 2: 6
1. LITTLE INTEREST OR PLEASURE IN DOING THINGS: NEARLY EVERY DAY

## 2023-10-01 NOTE — CARE PLAN
Patient ready for dc today to [facility]. Nurse provided report number. Transport confirmed for 1500. Left message on answering machine for spouse Briana and Angel listed on chart.

## 2023-10-01 NOTE — PROGRESS NOTES
"Elier Albright is a 63 y.o. male on day 4 of admission presenting with Viral pneumonia, unspecified.     Subjective   No complaints.     Objective   Physical Exam  Constitutional: Well developed, awake/alert/oriented x3, no distress, alert and cooperative   Eyes: EOMI grossly, clear sclera   ENMT: mucous membranes moist   Head/Neck: Neck supple   Respiratory/Thorax: Patent airways, diminished in the right lung base   Cardiovascular: Regular, rate and rhythm, no murmurs, 2+ equal pulses of the extremities, normal S 1and S 2   Gastrointestinal: Nondistended, soft, non-tender, no rebound tenderness or guarding,  +BS, no bruits   Musculoskeletal: ROM intact   Extremities: no cyanosis, no edema   Neurological: alert and oriented x3, intact senses, moves all limbs against resistance   Psychological: Appropriate mood and behavior   Skin: Warm and dry, intact         Last Recorded Vitals  Blood pressure 148/89, pulse 66, temperature 36 °C (96.8 °F), resp. rate 18, height 1.828 m (5' 11.97\"), weight 87.1 kg (192 lb 0.3 oz), SpO2 92 %.  Intake/Output last 3 Shifts:  I/O last 3 completed shifts:  In: - (0 mL/kg)   Out: 900 (10.3 mL/kg) [Urine:900 (0.3 mL/kg/hr)]  Weight: 87.1 kg                  Assessment/Plan   ELIER ALBRIGHT is a 63 year old Male with a past medical history of Alzheimer's dementia, hypertension, GERD, depression with suicidal ideations, major depression, schizophrenia, history of polysubstance use disorder and recurrent tinea pedis who was admitted to the hospital for aspiration pneumonia and suicidal ideation.       Acute hypoxic respiratory failure due to Aspiration pneumonia  -O2 weaned off  -Repeat CT scan is showing extensive infiltrate in the right lung.  -Got 6 days course of vancomycin and Zosyn.  -Continue supplemental O2 at 2L/min via NC, wean as tolerated   -Cefepime Day 7  - Flu and covid negative  -MRSA screen showed MSSA  -Blood culture 09/21 show no growth (final)  -Speech recommends soft " and bites sized diet with thin liquids and continued swallowing therapy     Hyperglycemia  Over the course of this admission and previous 1 at [facility] patient's blood sugar was consistently over 170  Follow-up with hemoglobin A1c  Start insulin sliding scale  Start Accu-Cheks  Carb controlled diet    Schizophrenia/major depression/Alzheimer's dementia/suicidal ideation  -Patient cleared for discharge by psychiatry  Has discontinued 1 on 1 sitter  Psychiatry advised to continue psychotropic medications     GERD  -Resume PPI     Hypertension  -Resume Norvasc     DVT prophylaxis  -Lovenox     Disposition   O2 has been weaned off patient, to receive the last dose of antibiotics today, difficult for discharge  Patient is interested in returning to another SNF besides the one he came from        Monster Portillo DO

## 2023-10-01 NOTE — PROGRESS NOTES
"Gerardo Albright is a 63 y.o. male on day 5 of admission presenting with Viral pneumonia, unspecified.    Subjective   ***       Objective     Physical Exam    Last Recorded Vitals  Blood pressure 148/84, pulse 66, temperature 35.9 °C (96.6 °F), temperature source Temporal, resp. rate 20, height 1.828 m (5' 11.97\"), weight 87.1 kg (192 lb 0.3 oz), SpO2 (!) 9 %.  Intake/Output last 3 Shifts:  I/O last 3 completed shifts:  In: 1620 (18.6 mL/kg) [P.O.:1520; IV Piggyback:100]  Out: 2100 (24.1 mL/kg) [Urine:2100 (0.7 mL/kg/hr)]  Weight: 87.1 kg     Relevant Results  {If you would like to pull in Medications, type .meds     If you would like to pull in Lab results for the last 24 hours, type .[id number]    If you would like to pull in Imaging results, type .[id number] :99}    {Link to Stroke Scoring tools - Link :99}                       Assessment/Plan   Principal Problem:    Viral pneumonia, unspecified    ***     {This patient does not have an ACP note on file for this encounter, please fill one out - Advance Care Planning Activity :99}      Yandel Arteaga      "

## 2023-10-03 ENCOUNTER — NURSING HOME VISIT (OUTPATIENT)
Dept: POST ACUTE CARE | Facility: EXTERNAL LOCATION | Age: 63
End: 2023-10-03
Payer: COMMERCIAL

## 2023-10-03 DIAGNOSIS — E11.9 TYPE 2 DIABETES MELLITUS WITHOUT COMPLICATION, WITHOUT LONG-TERM CURRENT USE OF INSULIN (MULTI): ICD-10-CM

## 2023-10-03 DIAGNOSIS — I25.10 CORONARY ARTERY DISEASE, UNSPECIFIED VESSEL OR LESION TYPE, UNSPECIFIED WHETHER ANGINA PRESENT, UNSPECIFIED WHETHER NATIVE OR TRANSPLANTED HEART: ICD-10-CM

## 2023-10-03 DIAGNOSIS — G62.9 NEUROPATHY: ICD-10-CM

## 2023-10-03 DIAGNOSIS — M13.0 POLYARTHRITIS: ICD-10-CM

## 2023-10-03 DIAGNOSIS — J69.0 ASPIRATION PNEUMONIA, UNSPECIFIED ASPIRATION PNEUMONIA TYPE, UNSPECIFIED LATERALITY, UNSPECIFIED PART OF LUNG (MULTI): ICD-10-CM

## 2023-10-03 DIAGNOSIS — G20.A1 PARKINSON'S DISEASE WITHOUT DYSKINESIA, UNSPECIFIED WHETHER MANIFESTATIONS FLUCTUATE (MULTI): Primary | ICD-10-CM

## 2023-10-03 DIAGNOSIS — K59.00 CONSTIPATION, UNSPECIFIED CONSTIPATION TYPE: ICD-10-CM

## 2023-10-03 DIAGNOSIS — G89.29 OTHER CHRONIC PAIN: ICD-10-CM

## 2023-10-03 DIAGNOSIS — I10 ESSENTIAL HYPERTENSION: ICD-10-CM

## 2023-10-03 DIAGNOSIS — K21.9 GASTROESOPHAGEAL REFLUX DISEASE, UNSPECIFIED WHETHER ESOPHAGITIS PRESENT: ICD-10-CM

## 2023-10-03 DIAGNOSIS — E87.8 ELECTROLYTE DISORDER: ICD-10-CM

## 2023-10-03 PROCEDURE — 99310 SBSQ NF CARE HIGH MDM 45: CPT | Performed by: NURSE PRACTITIONER

## 2023-10-08 NOTE — PROGRESS NOTES
*Provider Impression*  Patient is a 63 year y/o male who is seen today for management of multiple medical problems  #Parkinson's / Neuropathy / Chronic pain / OA - PT/OT, APAP 650mg q4h PRN, schedule f/u w/ neurology - movement disorder clinic, neurontin 600mg TID  #Aspiration PNA - completed antibiotics, f/u w/ SLP  #HTN / CAD - ASA 81mg daily  #T2DM - metformin 500mg BID  #Electrolyte disorder - NaCl 1gram daily  #GERD / Constipation - pantoprazole 40mg daily  #Schizoaffective / Depression - per psych - mirtazapine, bupropion, cogentin, quetiapine, depakote, zoloft, risperidone, naltrexone  Follow up as needed      *Chief Complaint*  multiple medical problems      *History of Present Illness*  Pt is a 62 y/o male LTC resident of [facility] w/ PMH as below who is seen today for follow up and management of multiple medical problems.     He was sent to [facility] ED on 9/21 for increased oxygenation needs worsening cough and congestion for several days.  CXR Findings showed prior cardiac surgery with nonspecific mild bibasilar pleural/parenchymal airspace opacity suggested. Similar cardiomegaly.  In ED labs showed WBC 19.1 Sodium 131. In ED patient received Sodium 1 gm,  Mucinex Zosyn Vanco Solumedrol  NS Bolus.  He reported some suicidal ideation, was placed on suicide precautions. He was admitted for Acute hypoxic respiratory failure 2/2 pneumonia, with concern for aspiration. He was treated w/ IV antibiotics including vancomycin and completed 6 days of Zosyn. Treated w/ solumedrol 40 mg IVP q8h transitioned to Medrol dose pack. Seen by RT, treated w/ aerosols and bronchial hygiene. He gradually weaned off O2. Diet upgraded by SLP on 9/25 to bite size food with nectar thick liquids. Repeat CT chest 9/26 showed extensive infiltrate/pneumonia of the right lung. Diet subsequently downgraded to pureed/honey thick liquids. He was transferred to [facility] on 9/26 for psych eval and started on cefepime after an  aspiration event and completed a 7-day course of cefepime.  Flu, COVID were negative MRSA screen showed MSSA.  Blood cultures showed no growth to date.  Speech therapy recommended a soft bite-size diet with thin liquids. He was evaluated by psychiatry who deemed him as a minimal risk advised the patient was stable to return to a skilled nursing facility, so he was d/c back to [facility] on 10/1.    He is seen sitting up in his room today and reports breathing better, no f/c, sweats, CP, SOB, cough, n/v, constipation, diarrhea, LUTS, or any other new c/o presently.    Allergies - NKDA  PMH - ADHD, Schizoaffective, HTN, CAD, MDD  PSH - cardiac cath, PCI, CABG  FH - unreliable  SocHx - Current everyday smoker      *Review of Systems*  All other systems reviewed are negative except as noted in the HPI      *Vitals*  Date: 23 - T: 98.4 P: 76 R:  BP: 98/62 SpO2: 89% on O2 ; 10/3/23 - Wt 191      *Results/Data*  CBC - Date: 23 WBC: 9.6 HGB: 13.0 HCT: 40.3 PLT: 351 ;   BMP - Date: 23 Na: 130 K: 4.1 Cl: 97 CO2: 24 BUN: 18 Cr: 0.75 Glu: 201 Ca: 8.8 Alb: 3.5 ;   LFT - Date: 23 AST: 13 ALT: 10 ALP: 44 Tbili: 0.5 ;   Coags - Date: INR: PT:  Other - Date: 9/3/20 - TC: 139 T HDL: 25 LDL: 93; 11/10/20 - CRP: 15.1 D-dimer: 0.46; 21 - TC: 199 T HDL: 36 LDL: 138 VPA: 86 TSH: 2.715 25-OH-D: 19.07 ; 21 - VPA: 49; 21 - 25-OH-D: 26.67; 22 - TSH: 2.218 ; 23  TSH: 2.888  25-OH-D: 31.67; 23  A1c: 6.5%    *Physical Exam*  Gen: (+) NAD, (+) well-appearing  HEENT: (+) normocephalic, (+) MMM  Neck: (+) supple  Lungs: (-) cough  Abd: (+) ND  Ext: (-) edema, (-) deformity  MSK: (-) joint swelling  Skin: (-) rash,  Neuro: (+) follows commands, (+) tremor, (+) alert

## 2023-10-09 NOTE — PROGRESS NOTES
NAME OF THE PATIENT: Gerardo Albright    YOB: 1960    PLACE OF SERVICE:  [place]    This is a subsequent visit.    HPI:  Mr. Gerardo Albright is a 63-year-old male with history of Parkinson disease.  He does have a history of COPD.  He suffers from dementia as well as seizure disorder.  He is unable to care for himself and requires supportive care.    PAST MEDICAL HISTORY:  As per nursing home list.  Schizoaffective disorder, schizophrenia, GERD, myocardial infarction, dementia, suicidal ideation, weakness, angina, seizure, depression, hypertension, ADHD.    CURRENT MEDICATIONS:  As per nursing home list.  Reviewed.    ALLERGIES:  As per nursing home list.  No allergies.    SOCIAL HISTORY:  As per nursing home list.  The patient is a nonsmoker and nondrinker.    FAMILY HISTORY:  As per nursing home list.  The patient does not recall his family.    REVIEW OF SYSTEMS:  All 12 systems reviewed and pertaining covered in history and physical, rest are negative.  The patient denies any fevers, chills, or chest pain at this time.    PHYSICAL EXAMINATION  GENERAL:  This is a well-developed, well-nourished male, sitting in a chair, in no acute distress.  VITAL SIGNS:  As recorded and reviewed from EMR.  Blood pressure 130/84.  Pulse 78.  Respirations 18.  Temperature 98.0.  EYES:  The patient's sclerae white.  The pupils were equal and round.  ENT:  The patient's external ears were normal and the otoscopic examination was negative.  NECK:  Supple.  There were no palpable masses.  Thyroid was not enlarged and there were no carotid bruits.  RESPIRATORY:  Lungs showed rare scattered wheeze.  CARDIOVASCULAR:  The patient had S1 normal, split S2 without obvious rubs, clicks, or murmurs.  GASTROINTESTINAL:  There was no hepatosplenomegaly.  There were no palpable masses and no inguinal nodes.  LYMPHATIC:  The patient had no axillary, groin, or lymphadenopathy.  MUSCULOSKELETAL:  The patient had normal  gait.  The joints appeared to be normal without evidence of deformity.  Grossly the muscles had good range of motion and were without effusion.  EXTREMITIES:  There was no evidence of peripheral edema.  NEUROLOGIC:  The patient had normal cranial nerves.  The reflexes, sensory, and motor examination were grossly within normal limits.  PSYCHIATRIC: The patient had normal judgment and insight.  The patient had no obvious mood defect including depression, anxiety, and/or agitation.  MENTAL STATUS:  The patient is alert and oriented x2.    LAB WORK:  Laboratory studies were reviewed from prior visit.    ASSESSMENT AND PLAN:  COPD, on bronchodilator therapy.  Parkinson disease, on carbidopa and levodopa.  Dementia, unchanged.  Seizure disorder, on antiepileptic.  Hypertension, medically controlled.  Weakness, on PT/OT.  Fall risk, on fall precautions.  Reflux disease, on PPI.  Schizophrenia, on medication.  ASHD, on aspirin.  Insomnia, on melatonin.  Medication list reviewed and discussed with the family.  Hospital chart reviewed.

## 2023-10-09 NOTE — PROGRESS NOTES
NAME OF THE PATIENT: Gerardo Albright    YOB: 1960    PLACE OF SERVICE:  [facility]    This is a subsequent visit.    HPI: Mr. Gerardo Albright is a 63-year-old male with history of dementia with COPD and Parkinson disease.  He is unable to care for himself and requires supportive care.    PAST MEDICAL HISTORY: As per nursing home list. Schizoaffective disorder, schizophrenia, GERD, myocardial infarction, dementia, suicidal ideation, weakness, angina, seizure, depression, hypertension, ADHD.    CURRENT MEDICATIONS: As per nursing home list. Reviewed.    ALLERGIES: As per nursing home list. No allergies.    SOCIAL HISTORY: As per nursing home list. The patient is a nonsmoker and nondrinker.    FAMILY HISTORY: As per nursing home list. The patient does not recall his family.    REVIEW OF SYSTEMS: All 12 systems reviewed and pertaining covered in history and physical, rest are negative. The patient denies any fevers, chills, or chest pain at this time.    PHYSICAL EXAMINATION  GENERAL: This is a well-developed, well-nourished male, sitting in a chair, in no acute distress.  VITAL SIGNS:  As recorded and reviewed from EMR.  Blood pressure 128/82.  Pulse 84.  Respirations 18.  Temperature 98.1.  EYES:  The patient's sclerae white.  The pupils were equal and round.  ENT:  The patient's external ears were normal and the otoscopic examination was negative.  NECK:  Supple.  There were no palpable masses.  Thyroid was not enlarged and there were no carotid bruits.  RESPIRATORY:  Lungs show decreased breath sounds throughout.  CARDIOVASCULAR:  The patient had S1 normal, split S2 without obvious rubs, clicks, or murmurs.  GASTROINTESTINAL:  There was no hepatosplenomegaly.  There were no palpable masses and no inguinal nodes.  LYMPHATIC:  The patient had no axillary, groin, or lymphadenopathy.  MUSCULOSKELETAL:  The patient had normal gait.  The joints appeared to be normal without evidence of deformity.   Grossly the muscles had good range of motion and were without effusion.  EXTREMITIES:  There was no evidence of peripheral edema.  NEUROLOGIC:  The patient had normal cranial nerves.  The reflexes, sensory, and motor examination were grossly within normal limits.  PSYCHIATRIC: The patient had normal judgment and insight. The patient was oriented to person, place, and time, and had no obvious mood defect including anxiety and/or agitation.    LAB WORK: Laboratory studies were reviewed from prior visit.    ASSESSMENT AND PLAN:  COPD, on bronchodilator therapy.  Seizure disorder, on antiepileptic.  Parkinson disease, on carbidopa and levodopa.  Dementia, unchanged.  Schizoaffective disorder, on medication.  Insomnia, on melatonin.  Weakness, on PT and OT.  Fall risk, on fall precaution.  Hypertension, med controlled.  Depression, on medication.  Reflux disease, on PPI.  Medication list reviewed and discussed with the family.  Hospital chart reviewed.

## 2023-10-09 NOTE — PROGRESS NOTES
NAME OF THE PATIENT: Gerardo Albright     YOB: 1960    PLACE OF SERVICE:  [facility]    This is a subsequent visit.    HPI: Mr. Gerardo Albright is a 63-year-old male with history of dementia with Parkinson's disease.  He does suffer from schizophrenia and COPD.  He is unable to care for himself.  He requires supportive care.    PAST MEDICAL HISTORY:  As per nursing home list.  Schizoaffective disorder, schizophrenia, GERD, myocardial infarction, dementia, suicidal ideation, weakness, angina, seizure, depression, hypertension, ADHD.    CURRENT MEDICATIONS:  As per nursing home list.  Reviewed.    ALLERGIES:  As per nursing home list.  No allergies.    SOCIAL HISTORY:  As per nursing home list.  The patient is a nonsmoker and nondrinker.    FAMILY HISTORY:  As per nursing home list.  The patient does not recall his family.    REVIEW OF SYSTEMS:  All 12 systems reviewed and pertaining covered in history and physical, rest are negative.  The patient denies any fevers, chills, or chest pain at this time.    PHYSICAL EXAMINATION  GENERAL:  This is a well-developed, well-nourished male, sitting in a chair, in no acute distress.  VITAL SIGNS:  As recorded and reviewed from EMR.  Blood pressure 126/82.  Pulse 78.  Respirations 18.  Temperature 98.0.  EYES:  The patient's sclerae white.  The pupils were equal and round.  ENT:  The patient's external ears were normal and the otoscopic examination was negative.  NECK:  Supple.  There were no palpable masses.  Thyroid was not enlarged and there were no carotid bruits.  RESPIRATORY:  Lungs show decreased breath sound throughout.  CARDIOVASCULAR:  The patient had S1 normal, split S2 without obvious rubs, clicks, or murmurs.  GASTROINTESTINAL:  There was no hepatosplenomegaly.  There were no palpable masses and no inguinal nodes.  LYMPHATIC:  The patient had no axillary, groin, or lymphadenopathy.  MUSCULOSKELETAL:  The patient had normal gait.  The joints  appeared to be normal without evidence of deformity.  Grossly the muscles had good range of motion and were without effusion.  EXTREMITIES:  There was no evidence of peripheral edema.  NEUROLOGIC:  The patient had normal cranial nerves.  The reflexes, sensory, and motor examination were grossly within normal limits.  MENTAL STATUS EXAMINATION: The patient is alert and oriented x2.    LAB WORK: Laboratory studies were reviewed from prior visit.    ASSESSMENT AND PLAN:  COPD, on bronchodilator therapy.  Schizophrenia, on medication.  Dementia, unchanged.  Parkinson's disease, on carbidopa and levodopa.  Seizure disorder, on antiepileptic.  Hypertension, med controlled.  Weakness, on PT/OT.  Fall risk, on fall precaution.  Depression, on medication.  Reflux disease, on PPI.  Insomnia, on melatonin.  Medication list reviewed and discussed with the family.  Hospital chart reviewed.

## 2023-11-03 ENCOUNTER — NURSING HOME VISIT (OUTPATIENT)
Dept: POST ACUTE CARE | Facility: EXTERNAL LOCATION | Age: 63
End: 2023-11-03
Payer: COMMERCIAL

## 2023-11-03 DIAGNOSIS — F32.89 OTHER DEPRESSION: ICD-10-CM

## 2023-11-03 DIAGNOSIS — J44.9 CHRONIC OBSTRUCTIVE PULMONARY DISEASE, UNSPECIFIED COPD TYPE (MULTI): Primary | ICD-10-CM

## 2023-11-03 DIAGNOSIS — G47.09 OTHER INSOMNIA: ICD-10-CM

## 2023-11-03 DIAGNOSIS — F03.90 DEMENTIA, UNSPECIFIED DEMENTIA SEVERITY, UNSPECIFIED DEMENTIA TYPE, UNSPECIFIED WHETHER BEHAVIORAL, PSYCHOTIC, OR MOOD DISTURBANCE OR ANXIETY (MULTI): ICD-10-CM

## 2023-11-03 DIAGNOSIS — G40.89 OTHER SEIZURES (MULTI): ICD-10-CM

## 2023-11-03 DIAGNOSIS — F20.89 OTHER SCHIZOPHRENIA (MULTI): ICD-10-CM

## 2023-11-03 DIAGNOSIS — E78.5 HYPERLIPIDEMIA, UNSPECIFIED HYPERLIPIDEMIA TYPE: ICD-10-CM

## 2023-11-03 DIAGNOSIS — Z91.81 AT RISK FOR FALLS: ICD-10-CM

## 2023-11-03 DIAGNOSIS — G20.A1 PARKINSON'S DISEASE WITHOUT DYSKINESIA, UNSPECIFIED WHETHER MANIFESTATIONS FLUCTUATE (MULTI): ICD-10-CM

## 2023-11-03 DIAGNOSIS — R53.1 WEAKNESS: ICD-10-CM

## 2023-11-03 DIAGNOSIS — I10 ESSENTIAL HYPERTENSION: ICD-10-CM

## 2023-11-03 PROCEDURE — 99309 SBSQ NF CARE MODERATE MDM 30: CPT | Performed by: INTERNAL MEDICINE

## 2023-11-03 NOTE — LETTER
Patient: Gerardo Albright  : 1960    Encounter Date: 2023    PLACE OF SERVICE:  [facility].    This is a subsequent visit.    Subjective  Patient ID: Gerardo Albright is a 63 y.o. male who presents for Follow-up.    Mr. Gerardo Albright is a 63-year-old male with history of COPD.  He suffers from Parkinson's disease as well as schizophrenia.  He is unable to care for himself and requires supportive care.    Review of Systems   Constitutional:  Negative for chills and fever.   Cardiovascular:  Negative for chest pain.   All other systems reviewed and are negative.    Objective  /74   Pulse 82   Temp 36.6 °C (97.8 °F)   Resp 18     Physical Exam  Vitals reviewed.   Constitutional:       General: He is not in acute distress.     Comments: This is a well-developed, well-nourished male, sitting in a chair.   HENT:      Right Ear: Tympanic membrane, ear canal and external ear normal.      Left Ear: Tympanic membrane, ear canal and external ear normal.   Eyes:      General: No scleral icterus.     Pupils: Pupils are equal, round, and reactive to light.   Neck:      Vascular: No carotid bruit.   Cardiovascular:      Heart sounds: Normal heart sounds, S1 normal and S2 normal. No murmur heard.     No friction rub.   Pulmonary:      Effort: Pulmonary effort is normal.      Breath sounds: Wheezing (rare scattered) present.   Abdominal:      Palpations: There is no hepatomegaly, splenomegaly or mass.   Musculoskeletal:         General: No swelling or deformity. Normal range of motion.      Cervical back: Neck supple.      Right lower leg: No edema.      Left lower leg: No edema.   Lymphadenopathy:      Cervical: No cervical adenopathy.      Upper Body:      Right upper body: No axillary adenopathy.      Left upper body: No axillary adenopathy.      Lower Body: No right inguinal adenopathy. No left inguinal adenopathy.   Neurological:      Mental Status: He is alert.      Cranial Nerves: Cranial nerves  2-12 are intact. No cranial nerve deficit.      Sensory: No sensory deficit.      Motor: Motor function is intact. No weakness.      Gait: Gait is intact.      Deep Tendon Reflexes: Reflexes normal.      Comments: The patient is oriented x2.   Psychiatric:         Mood and Affect: Mood normal. Mood is not anxious or depressed. Affect is not angry.         Behavior: Behavior is not agitated.         Thought Content: Thought content normal.         Judgment: Judgment normal.     LAB WORK:  Laboratory studies reviewed.    Assessment/Plan  Problem List Items Addressed This Visit             ICD-10-CM       Advance Directives and General Issues    At risk for falls Z91.81       Mental Health    Other schizophrenia (CMS/HCC) F20.89    Depression F32.A       Neuro    Other seizures (CMS/HCC) G40.89    Parkinson's disease G20.A1    Dementia (CMS/HCC) F03.90       Pulmonary and Pneumonias    Chronic obstructive pulmonary disease (CMS/HCC) - Primary J44.9       Sleep    Other insomnia G47.09     Other Visit Diagnoses         Codes    Weakness     R53.1    Essential hypertension     I10    Hyperlipidemia, unspecified hyperlipidemia type     E78.5        1. COPD, on bronchodilator therapy.  2. Dementia, unchanged.  3. Parkinson’s disease, on carbidopa and levodopa.  4. Seizure disorder, on anti-epileptic.  5. Schizophrenia, on medication.  6. Depression, on medication.  7. Weakness, on PT and OT.  8. Fall risk, fall precaution.  9. Hypertension, med controlled.  10. Hyperlipidemia, on statin.  11. Insomnia, on melatonin.    Scribe Attestation  By signing my name below, I, Mrava Bret, Scribe attest that this documentation has been prepared under the direction and in the presence of Amparo Galarza MD.       Electronically Signed By: Amparo Galarza MD   11/20/23  4:57 PM

## 2023-11-16 VITALS
TEMPERATURE: 97.8 F | DIASTOLIC BLOOD PRESSURE: 74 MMHG | SYSTOLIC BLOOD PRESSURE: 124 MMHG | HEART RATE: 82 BPM | RESPIRATION RATE: 18 BRPM

## 2023-11-16 ASSESSMENT — ENCOUNTER SYMPTOMS
FEVER: 0
CHILLS: 0

## 2023-11-16 NOTE — PROGRESS NOTES
PLACE OF SERVICE:  [facility].    This is a subsequent visit.    Subjective   Patient ID: Gerardo Albright is a 63 y.o. male who presents for Follow-up.    Mr. Gerardo Albright is a 63-year-old male with history of COPD.  He suffers from Parkinson's disease as well as schizophrenia.  He is unable to care for himself and requires supportive care.    Review of Systems   Constitutional:  Negative for chills and fever.   Cardiovascular:  Negative for chest pain.   All other systems reviewed and are negative.    Objective   /74   Pulse 82   Temp 36.6 °C (97.8 °F)   Resp 18     Physical Exam  Vitals reviewed.   Constitutional:       General: He is not in acute distress.     Comments: This is a well-developed, well-nourished male, sitting in a chair.   HENT:      Right Ear: Tympanic membrane, ear canal and external ear normal.      Left Ear: Tympanic membrane, ear canal and external ear normal.   Eyes:      General: No scleral icterus.     Pupils: Pupils are equal, round, and reactive to light.   Neck:      Vascular: No carotid bruit.   Cardiovascular:      Heart sounds: Normal heart sounds, S1 normal and S2 normal. No murmur heard.     No friction rub.   Pulmonary:      Effort: Pulmonary effort is normal.      Breath sounds: Wheezing (rare scattered) present.   Abdominal:      Palpations: There is no hepatomegaly, splenomegaly or mass.   Musculoskeletal:         General: No swelling or deformity. Normal range of motion.      Cervical back: Neck supple.      Right lower leg: No edema.      Left lower leg: No edema.   Lymphadenopathy:      Cervical: No cervical adenopathy.      Upper Body:      Right upper body: No axillary adenopathy.      Left upper body: No axillary adenopathy.      Lower Body: No right inguinal adenopathy. No left inguinal adenopathy.   Neurological:      Mental Status: He is alert.      Cranial Nerves: Cranial nerves 2-12 are intact. No cranial nerve deficit.      Sensory: No sensory  deficit.      Motor: Motor function is intact. No weakness.      Gait: Gait is intact.      Deep Tendon Reflexes: Reflexes normal.      Comments: The patient is oriented x2.   Psychiatric:         Mood and Affect: Mood normal. Mood is not anxious or depressed. Affect is not angry.         Behavior: Behavior is not agitated.         Thought Content: Thought content normal.         Judgment: Judgment normal.     LAB WORK:  Laboratory studies reviewed.    Assessment/Plan   Problem List Items Addressed This Visit             ICD-10-CM       Advance Directives and General Issues    At risk for falls Z91.81       Mental Health    Other schizophrenia (CMS/HCC) F20.89    Depression F32.A       Neuro    Other seizures (CMS/HCC) G40.89    Parkinson's disease G20.A1    Dementia (CMS/HCC) F03.90       Pulmonary and Pneumonias    Chronic obstructive pulmonary disease (CMS/HCC) - Primary J44.9       Sleep    Other insomnia G47.09     Other Visit Diagnoses         Codes    Weakness     R53.1    Essential hypertension     I10    Hyperlipidemia, unspecified hyperlipidemia type     E78.5        1. COPD, on bronchodilator therapy.  2. Dementia, unchanged.  3. Parkinson’s disease, on carbidopa and levodopa.  4. Seizure disorder, on anti-epileptic.  5. Schizophrenia, on medication.  6. Depression, on medication.  7. Weakness, on PT and OT.  8. Fall risk, fall precaution.  9. Hypertension, med controlled.  10. Hyperlipidemia, on statin.  11. Insomnia, on melatonin.    Scribe Attestation  By signing my name below, Marva SHANNON, Scribbran attest that this documentation has been prepared under the direction and in the presence of Amparo Galarza MD.

## 2024-01-03 ENCOUNTER — HOSPITAL ENCOUNTER (INPATIENT)
Facility: HOSPITAL | Age: 64
End: 2024-01-03
Attending: INTERNAL MEDICINE | Admitting: INTERNAL MEDICINE
Payer: COMMERCIAL

## 2024-01-03 ENCOUNTER — APPOINTMENT (OUTPATIENT)
Dept: RADIOLOGY | Facility: HOSPITAL | Age: 64
End: 2024-01-03
Payer: COMMERCIAL

## 2024-01-03 ENCOUNTER — APPOINTMENT (OUTPATIENT)
Dept: CARDIOLOGY | Facility: HOSPITAL | Age: 64
End: 2024-01-03
Payer: COMMERCIAL

## 2024-01-03 ENCOUNTER — HOSPITAL ENCOUNTER (EMERGENCY)
Facility: HOSPITAL | Age: 64
Discharge: OTHER INSTITUTION | End: 2024-01-06
Attending: FAMILY MEDICINE
Payer: COMMERCIAL

## 2024-01-03 DIAGNOSIS — J18.9 PNEUMONIA OF BOTH LUNGS DUE TO INFECTIOUS ORGANISM, UNSPECIFIED PART OF LUNG: Primary | ICD-10-CM

## 2024-01-03 DIAGNOSIS — T14.91XA SUICIDAL BEHAVIOR WITH ATTEMPTED SELF-INJURY (MULTI): ICD-10-CM

## 2024-01-03 LAB
ALBUMIN SERPL BCP-MCNC: 4.1 G/DL (ref 3.4–5)
ALP SERPL-CCNC: 58 U/L (ref 33–136)
ALT SERPL W P-5'-P-CCNC: 11 U/L (ref 10–52)
AMPHETAMINES UR QL SCN: NORMAL
ANION GAP SERPL CALC-SCNC: 13 MMOL/L (ref 10–20)
APAP SERPL-MCNC: <10 UG/ML
APPEARANCE UR: CLEAR
AST SERPL W P-5'-P-CCNC: 10 U/L (ref 9–39)
BARBITURATES UR QL SCN: NORMAL
BASOPHILS # BLD AUTO: 0.03 X10*3/UL (ref 0–0.1)
BASOPHILS NFR BLD AUTO: 0.1 %
BENZODIAZ UR QL SCN: NORMAL
BILIRUB SERPL-MCNC: 0.4 MG/DL (ref 0–1.2)
BILIRUB UR STRIP.AUTO-MCNC: NEGATIVE MG/DL
BUN SERPL-MCNC: 9 MG/DL (ref 6–23)
BZE UR QL SCN: NORMAL
CALCIUM SERPL-MCNC: 9.3 MG/DL (ref 8.6–10.3)
CANNABINOIDS UR QL SCN: NORMAL
CHLORIDE SERPL-SCNC: 97 MMOL/L (ref 98–107)
CO2 SERPL-SCNC: 25 MMOL/L (ref 21–32)
COLOR UR: ABNORMAL
CREAT SERPL-MCNC: 0.63 MG/DL (ref 0.5–1.3)
EOSINOPHIL # BLD AUTO: 0.07 X10*3/UL (ref 0–0.7)
EOSINOPHIL NFR BLD AUTO: 0.3 %
ERYTHROCYTE [DISTWIDTH] IN BLOOD BY AUTOMATED COUNT: 13 % (ref 11.5–14.5)
ETHANOL SERPL-MCNC: <10 MG/DL
FENTANYL+NORFENTANYL UR QL SCN: NORMAL
FLUAV RNA RESP QL NAA+PROBE: NOT DETECTED
FLUBV RNA RESP QL NAA+PROBE: NOT DETECTED
GFR SERPL CREATININE-BSD FRML MDRD: >90 ML/MIN/1.73M*2
GLUCOSE SERPL-MCNC: 128 MG/DL (ref 74–99)
GLUCOSE UR STRIP.AUTO-MCNC: NEGATIVE MG/DL
HCT VFR BLD AUTO: 39.7 % (ref 41–52)
HGB BLD-MCNC: 13.2 G/DL (ref 13.5–17.5)
IMM GRANULOCYTES # BLD AUTO: 0.08 X10*3/UL (ref 0–0.7)
IMM GRANULOCYTES NFR BLD AUTO: 0.4 % (ref 0–0.9)
KETONES UR STRIP.AUTO-MCNC: NEGATIVE MG/DL
LEUKOCYTE ESTERASE UR QL STRIP.AUTO: NEGATIVE
LYMPHOCYTES # BLD AUTO: 1.39 X10*3/UL (ref 1.2–4.8)
LYMPHOCYTES NFR BLD AUTO: 6.6 %
MCH RBC QN AUTO: 29.6 PG (ref 26–34)
MCHC RBC AUTO-ENTMCNC: 33.2 G/DL (ref 32–36)
MCV RBC AUTO: 89 FL (ref 80–100)
MONOCYTES # BLD AUTO: 0.78 X10*3/UL (ref 0.1–1)
MONOCYTES NFR BLD AUTO: 3.7 %
NEUTROPHILS # BLD AUTO: 18.78 X10*3/UL (ref 1.2–7.7)
NEUTROPHILS NFR BLD AUTO: 88.9 %
NITRITE UR QL STRIP.AUTO: NEGATIVE
NRBC BLD-RTO: 0 /100 WBCS (ref 0–0)
OPIATES UR QL SCN: NORMAL
OXYCODONE+OXYMORPHONE UR QL SCN: NORMAL
PCP UR QL SCN: NORMAL
PH UR STRIP.AUTO: 7 [PH]
PLATELET # BLD AUTO: 412 X10*3/UL (ref 150–450)
POTASSIUM SERPL-SCNC: 3.9 MMOL/L (ref 3.5–5.3)
PROT SERPL-MCNC: 7.1 G/DL (ref 6.4–8.2)
PROT UR STRIP.AUTO-MCNC: NEGATIVE MG/DL
RBC # BLD AUTO: 4.46 X10*6/UL (ref 4.5–5.9)
RBC # UR STRIP.AUTO: ABNORMAL /UL
RBC #/AREA URNS AUTO: NORMAL /HPF
SALICYLATES SERPL-MCNC: <3 MG/DL
SARS-COV-2 RNA RESP QL NAA+PROBE: NOT DETECTED
SODIUM SERPL-SCNC: 131 MMOL/L (ref 136–145)
SP GR UR STRIP.AUTO: 1
UROBILINOGEN UR STRIP.AUTO-MCNC: <2 MG/DL
WBC # BLD AUTO: 21.1 X10*3/UL (ref 4.4–11.3)
WBC #/AREA URNS AUTO: NORMAL /HPF

## 2024-01-03 PROCEDURE — 96365 THER/PROPH/DIAG IV INF INIT: CPT | Mod: 59

## 2024-01-03 PROCEDURE — 2500000004 HC RX 250 GENERAL PHARMACY W/ HCPCS (ALT 636 FOR OP/ED): Performed by: PHYSICIAN ASSISTANT

## 2024-01-03 PROCEDURE — 87636 SARSCOV2 & INF A&B AMP PRB: CPT | Performed by: PHYSICIAN ASSISTANT

## 2024-01-03 PROCEDURE — 96375 TX/PRO/DX INJ NEW DRUG ADDON: CPT | Mod: 59

## 2024-01-03 PROCEDURE — 70450 CT HEAD/BRAIN W/O DYE: CPT | Performed by: RADIOLOGY

## 2024-01-03 PROCEDURE — 2500000001 HC RX 250 WO HCPCS SELF ADMINISTERED DRUGS (ALT 637 FOR MEDICARE OP): Performed by: PHYSICIAN ASSISTANT

## 2024-01-03 PROCEDURE — 70450 CT HEAD/BRAIN W/O DYE: CPT

## 2024-01-03 PROCEDURE — 93005 ELECTROCARDIOGRAM TRACING: CPT | Mod: 59

## 2024-01-03 PROCEDURE — 81001 URINALYSIS AUTO W/SCOPE: CPT | Mod: 91 | Performed by: PHYSICIAN ASSISTANT

## 2024-01-03 PROCEDURE — 96376 TX/PRO/DX INJ SAME DRUG ADON: CPT | Mod: 59

## 2024-01-03 PROCEDURE — 80307 DRUG TEST PRSMV CHEM ANLYZR: CPT | Performed by: PHYSICIAN ASSISTANT

## 2024-01-03 PROCEDURE — 71045 X-RAY EXAM CHEST 1 VIEW: CPT | Mod: FOREIGN READ | Performed by: RADIOLOGY

## 2024-01-03 PROCEDURE — 99285 EMERGENCY DEPT VISIT HI MDM: CPT | Performed by: FAMILY MEDICINE

## 2024-01-03 PROCEDURE — 85025 COMPLETE CBC W/AUTO DIFF WBC: CPT | Performed by: PHYSICIAN ASSISTANT

## 2024-01-03 PROCEDURE — 96366 THER/PROPH/DIAG IV INF ADDON: CPT | Mod: 59

## 2024-01-03 PROCEDURE — 36415 COLL VENOUS BLD VENIPUNCTURE: CPT | Performed by: PHYSICIAN ASSISTANT

## 2024-01-03 PROCEDURE — 12002 RPR S/N/AX/GEN/TRNK2.6-7.5CM: CPT | Performed by: PHYSICIAN ASSISTANT

## 2024-01-03 PROCEDURE — 80143 DRUG ASSAY ACETAMINOPHEN: CPT | Performed by: PHYSICIAN ASSISTANT

## 2024-01-03 PROCEDURE — 90471 IMMUNIZATION ADMIN: CPT | Performed by: PHYSICIAN ASSISTANT

## 2024-01-03 PROCEDURE — 80053 COMPREHEN METABOLIC PANEL: CPT | Performed by: PHYSICIAN ASSISTANT

## 2024-01-03 PROCEDURE — 99285 EMERGENCY DEPT VISIT HI MDM: CPT | Mod: 25

## 2024-01-03 PROCEDURE — 96372 THER/PROPH/DIAG INJ SC/IM: CPT | Mod: 59

## 2024-01-03 PROCEDURE — 90715 TDAP VACCINE 7 YRS/> IM: CPT | Performed by: PHYSICIAN ASSISTANT

## 2024-01-03 PROCEDURE — 71045 X-RAY EXAM CHEST 1 VIEW: CPT | Mod: FR

## 2024-01-03 RX ORDER — HYDROXYZINE HYDROCHLORIDE 25 MG/1
50 TABLET, FILM COATED ORAL ONCE
Status: COMPLETED | OUTPATIENT
Start: 2024-01-03 | End: 2024-01-03

## 2024-01-03 RX ORDER — CEFTRIAXONE 1 G/50ML
1 INJECTION, SOLUTION INTRAVENOUS ONCE
Status: COMPLETED | OUTPATIENT
Start: 2024-01-03 | End: 2024-01-03

## 2024-01-03 RX ORDER — ACETAMINOPHEN 325 MG/1
975 TABLET ORAL ONCE
Status: COMPLETED | OUTPATIENT
Start: 2024-01-03 | End: 2024-01-03

## 2024-01-03 RX ORDER — LIDOCAINE HYDROCHLORIDE AND EPINEPHRINE 10; 10 MG/ML; UG/ML
10 INJECTION, SOLUTION INFILTRATION; PERINEURAL ONCE
Status: DISCONTINUED | OUTPATIENT
Start: 2024-01-03 | End: 2024-01-06 | Stop reason: HOSPADM

## 2024-01-03 RX ADMIN — ACETAMINOPHEN 975 MG: 325 TABLET ORAL at 18:58

## 2024-01-03 RX ADMIN — TETANUS TOXOID, REDUCED DIPHTHERIA TOXOID AND ACELLULAR PERTUSSIS VACCINE, ADSORBED 0.5 ML: 5; 2.5; 8; 8; 2.5 SUSPENSION INTRAMUSCULAR at 18:42

## 2024-01-03 RX ADMIN — HYDROXYZINE HYDROCHLORIDE 50 MG: 25 TABLET, FILM COATED ORAL at 18:58

## 2024-01-03 RX ADMIN — CEFTRIAXONE 1 G: 1 INJECTION, SOLUTION INTRAVENOUS at 21:04

## 2024-01-03 SDOH — HEALTH STABILITY: MENTAL HEALTH: SLEEP PATTERN: DIFFICULTY FALLING ASLEEP

## 2024-01-03 SDOH — HEALTH STABILITY: MENTAL HEALTH
HAVE YOU STARTED TO WORK OUT OR WORKED OUT THE DETAILS OF HOW TO KILL YOURSELF? DO YOU INTENT TO CARRY OUT THIS PLAN?: YES

## 2024-01-03 SDOH — HEALTH STABILITY: MENTAL HEALTH: WAS THIS WITHIN THE PAST THREE MONTHS?: NO

## 2024-01-03 SDOH — HEALTH STABILITY: MENTAL HEALTH: SUICIDE ASSESSMENT: ADULT (C-SSRS)

## 2024-01-03 SDOH — HEALTH STABILITY: MENTAL HEALTH: HAVE YOU ACTUALLY HAD ANY THOUGHTS OF KILLING YOURSELF?: YES

## 2024-01-03 SDOH — HEALTH STABILITY: MENTAL HEALTH: HAVE YOU WISHED YOU WERE DEAD OR WISHED YOU COULD GO TO SLEEP AND NOT WAKE UP?: YES

## 2024-01-03 SDOH — SOCIAL STABILITY: SOCIAL NETWORK: EMOTIONAL SUPPORT GIVEN: OTHER (COMMENT)

## 2024-01-03 SDOH — HEALTH STABILITY: MENTAL HEALTH: HAVE YOU HAD THESE THOUGHTS AND HAD SOME INTENTION OF ACTING ON THEM?: YES

## 2024-01-03 SDOH — HEALTH STABILITY: MENTAL HEALTH: BEHAVIORS/MOOD: COOPERATIVE

## 2024-01-03 SDOH — HEALTH STABILITY: MENTAL HEALTH: HAVE YOU EVER DONE ANYTHING, STARTED TO DO ANYTHING, OR PREPARED TO DO ANYTHING TO END YOUR LIFE?: NO

## 2024-01-03 SDOH — HEALTH STABILITY: MENTAL HEALTH: HAVE YOU BEEN THINKING ABOUT HOW YOU MIGHT DO THIS?: YES

## 2024-01-03 ASSESSMENT — PAIN DESCRIPTION - LOCATION: LOCATION: ARM

## 2024-01-03 ASSESSMENT — COLUMBIA-SUICIDE SEVERITY RATING SCALE - C-SSRS
6. HAVE YOU EVER DONE ANYTHING, STARTED TO DO ANYTHING, OR PREPARED TO DO ANYTHING TO END YOUR LIFE?: YES
1. IN THE PAST MONTH, HAVE YOU WISHED YOU WERE DEAD OR WISHED YOU COULD GO TO SLEEP AND NOT WAKE UP?: YES
6. HAVE YOU EVER DONE ANYTHING, STARTED TO DO ANYTHING, OR PREPARED TO DO ANYTHING TO END YOUR LIFE?: YES
2. HAVE YOU ACTUALLY HAD ANY THOUGHTS OF KILLING YOURSELF?: YES

## 2024-01-03 ASSESSMENT — PAIN SCALES - GENERAL
PAINLEVEL_OUTOF10: 6
PAINLEVEL_OUTOF10: 10 - WORST POSSIBLE PAIN

## 2024-01-03 ASSESSMENT — PAIN DESCRIPTION - ORIENTATION
ORIENTATION: LEFT
ORIENTATION: LEFT;UPPER

## 2024-01-03 ASSESSMENT — PAIN - FUNCTIONAL ASSESSMENT: PAIN_FUNCTIONAL_ASSESSMENT: 0-10

## 2024-01-04 LAB
ATRIAL RATE: 74 BPM
LACTATE SERPL-SCNC: 1.1 MMOL/L (ref 0.4–2)
P AXIS: 35 DEGREES
P OFFSET: 200 MS
P ONSET: 147 MS
PR INTERVAL: 150 MS
Q ONSET: 222 MS
QRS COUNT: 12 BEATS
QRS DURATION: 86 MS
QT INTERVAL: 400 MS
QTC CALCULATION(BAZETT): 444 MS
QTC FREDERICIA: 429 MS
R AXIS: -16 DEGREES
T AXIS: 29 DEGREES
T OFFSET: 422 MS
VENTRICULAR RATE: 74 BPM

## 2024-01-04 PROCEDURE — 2500000001 HC RX 250 WO HCPCS SELF ADMINISTERED DRUGS (ALT 637 FOR MEDICARE OP): Performed by: EMERGENCY MEDICINE

## 2024-01-04 PROCEDURE — 2500000004 HC RX 250 GENERAL PHARMACY W/ HCPCS (ALT 636 FOR OP/ED): Performed by: FAMILY MEDICINE

## 2024-01-04 PROCEDURE — 83605 ASSAY OF LACTIC ACID: CPT | Performed by: PHYSICIAN ASSISTANT

## 2024-01-04 PROCEDURE — 87040 BLOOD CULTURE FOR BACTERIA: CPT | Mod: GENLAB | Performed by: PHYSICIAN ASSISTANT

## 2024-01-04 PROCEDURE — 2500000002 HC RX 250 W HCPCS SELF ADMINISTERED DRUGS (ALT 637 FOR MEDICARE OP, ALT 636 FOR OP/ED): Performed by: EMERGENCY MEDICINE

## 2024-01-04 PROCEDURE — 2500000004 HC RX 250 GENERAL PHARMACY W/ HCPCS (ALT 636 FOR OP/ED): Performed by: EMERGENCY MEDICINE

## 2024-01-04 PROCEDURE — 36415 COLL VENOUS BLD VENIPUNCTURE: CPT | Performed by: PHYSICIAN ASSISTANT

## 2024-01-04 PROCEDURE — 2500000001 HC RX 250 WO HCPCS SELF ADMINISTERED DRUGS (ALT 637 FOR MEDICARE OP)

## 2024-01-04 RX ORDER — MIDAZOLAM HYDROCHLORIDE 1 MG/ML
0.5 INJECTION INTRAMUSCULAR; INTRAVENOUS ONCE
Status: COMPLETED | OUTPATIENT
Start: 2024-01-04 | End: 2024-01-04

## 2024-01-04 RX ORDER — LORAZEPAM 2 MG/ML
1 INJECTION INTRAMUSCULAR ONCE
Status: DISCONTINUED | OUTPATIENT
Start: 2024-01-04 | End: 2024-01-04

## 2024-01-04 RX ORDER — NALTREXONE HYDROCHLORIDE 50 MG/1
50 TABLET, FILM COATED ORAL DAILY
Status: DISCONTINUED | OUTPATIENT
Start: 2024-01-04 | End: 2024-01-06 | Stop reason: HOSPADM

## 2024-01-04 RX ORDER — METFORMIN HYDROCHLORIDE 500 MG/1
500 TABLET ORAL
Status: DISCONTINUED | OUTPATIENT
Start: 2024-01-04 | End: 2024-01-06 | Stop reason: HOSPADM

## 2024-01-04 RX ORDER — NALTREXONE HYDROCHLORIDE 50 MG/1
50 TABLET, FILM COATED ORAL DAILY
COMMUNITY
End: 2024-04-24 | Stop reason: HOSPADM

## 2024-01-04 RX ORDER — BUPROPION HYDROCHLORIDE 150 MG/1
300 TABLET ORAL EVERY MORNING
Status: DISCONTINUED | OUTPATIENT
Start: 2024-01-05 | End: 2024-01-06 | Stop reason: HOSPADM

## 2024-01-04 RX ORDER — LORAZEPAM 0.5 MG/1
2 TABLET ORAL ONCE
Status: COMPLETED | OUTPATIENT
Start: 2024-01-04 | End: 2024-01-04

## 2024-01-04 RX ORDER — AZITHROMYCIN 200 MG/5ML
250 POWDER, FOR SUSPENSION ORAL ONCE
Status: COMPLETED | OUTPATIENT
Start: 2024-01-04 | End: 2024-01-04

## 2024-01-04 RX ORDER — LORAZEPAM 0.5 MG/1
TABLET ORAL
Status: COMPLETED
Start: 2024-01-04 | End: 2024-01-04

## 2024-01-04 RX ORDER — CEFTRIAXONE 1 G/50ML
1 INJECTION, SOLUTION INTRAVENOUS EVERY 24 HOURS
Status: DISCONTINUED | OUTPATIENT
Start: 2024-01-04 | End: 2024-01-06

## 2024-01-04 RX ORDER — QUETIAPINE FUMARATE 100 MG/1
200 TABLET, FILM COATED ORAL 2 TIMES DAILY
Status: DISCONTINUED | OUTPATIENT
Start: 2024-01-04 | End: 2024-01-06 | Stop reason: HOSPADM

## 2024-01-04 RX ORDER — QUETIAPINE FUMARATE 25 MG/1
50 TABLET, FILM COATED ORAL NIGHTLY
Status: DISCONTINUED | OUTPATIENT
Start: 2024-01-04 | End: 2024-01-06 | Stop reason: HOSPADM

## 2024-01-04 RX ORDER — BENZTROPINE MESYLATE 1 MG/1
1 TABLET ORAL 2 TIMES DAILY
Status: DISCONTINUED | OUTPATIENT
Start: 2024-01-04 | End: 2024-01-06 | Stop reason: HOSPADM

## 2024-01-04 RX ORDER — KETOROLAC TROMETHAMINE 15 MG/ML
15 INJECTION, SOLUTION INTRAMUSCULAR; INTRAVENOUS ONCE
Status: COMPLETED | OUTPATIENT
Start: 2024-01-04 | End: 2024-01-04

## 2024-01-04 RX ADMIN — MIDAZOLAM HYDROCHLORIDE 0.5 MG: 1 INJECTION, SOLUTION INTRAMUSCULAR; INTRAVENOUS at 08:10

## 2024-01-04 RX ADMIN — NALTREXONE HYDROCHLORIDE 50 MG: 50 TABLET, FILM COATED ORAL at 18:00

## 2024-01-04 RX ADMIN — BENZTROPINE MESYLATE 1 MG: 1 TABLET ORAL at 23:53

## 2024-01-04 RX ADMIN — LORAZEPAM 2 MG: 0.5 TABLET ORAL at 21:30

## 2024-01-04 RX ADMIN — KETOROLAC TROMETHAMINE 15 MG: 15 INJECTION, SOLUTION INTRAMUSCULAR; INTRAVENOUS at 08:05

## 2024-01-04 RX ADMIN — CEFTRIAXONE 1 G: 1 INJECTION, SOLUTION INTRAVENOUS at 21:38

## 2024-01-04 RX ADMIN — QUETIAPINE FUMARATE 50 MG: 25 TABLET ORAL at 21:33

## 2024-01-04 RX ADMIN — QUETIAPINE FUMARATE 200 MG: 100 TABLET ORAL at 15:12

## 2024-01-04 RX ADMIN — BENZTROPINE MESYLATE 1 MG: 1 TABLET ORAL at 15:12

## 2024-01-04 RX ADMIN — QUETIAPINE FUMARATE 200 MG: 100 TABLET ORAL at 21:32

## 2024-01-04 RX ADMIN — AZITHROMYCIN 250 MG: 1200 POWDER, FOR SUSPENSION ORAL at 14:59

## 2024-01-04 RX ADMIN — METFORMIN HYDROCHLORIDE 500 MG: 500 TABLET ORAL at 18:00

## 2024-01-04 RX ADMIN — MIDAZOLAM HYDROCHLORIDE 0.5 MG: 1 INJECTION, SOLUTION INTRAMUSCULAR; INTRAVENOUS at 14:57

## 2024-01-04 ASSESSMENT — PAIN DESCRIPTION - LOCATION
LOCATION: OTHER (COMMENT)
LOCATION: ABDOMEN

## 2024-01-04 ASSESSMENT — PAIN - FUNCTIONAL ASSESSMENT
PAIN_FUNCTIONAL_ASSESSMENT: 0-10
PAIN_FUNCTIONAL_ASSESSMENT: 0-10

## 2024-01-04 ASSESSMENT — PAIN DESCRIPTION - PAIN TYPE: TYPE: ACUTE PAIN

## 2024-01-04 ASSESSMENT — PAIN SCALES - GENERAL
PAINLEVEL_OUTOF10: 3
PAINLEVEL_OUTOF10: 0 - NO PAIN

## 2024-01-04 NOTE — ED PROVIDER NOTES
HPI   Chief Complaint   Patient presents with    Suicide Attempt     Patient from nursing facility where the staff said he got a hold of a razor some how and cut his left forearm in the shower. The staff states he was upset that he did not get the medicine he wanted. He states he thinks he has pneumonia and everything else because he is paranoid. He states he is suicidal        This is a 63-year-old male coming in for multiple reasons.  Patient was sent in due to a suicidal attempt by lacerating the left proximal elbow with a razor blade that he has been hiding for the last 5 days after feeling increased suicidal ideations and depression for the last 3 days.  Patient reports that he he is feeling his way because for the last week he has had a cough and feels ill.  He states that subjective fever.  He states he is not eating and drinking as much and feels very weak.  Patient reports that he is feeling similar to when he has had pneumonia however the facility is not taking care of him and this made him want to attempt suicide with a razor blade today.      History provided by:  Patient, EMS personnel and nursing home                      Ren Coma Scale Score: 14                  Patient History   Past Medical History:   Diagnosis Date    At risk for falls     COPD (chronic obstructive pulmonary disease) (CMS/HCC)     Dementia (CMS/HCC)     Depression     GERD (gastroesophageal reflux disease)     Hypertension     Insomnia     Myocardial infarction (CMS/HCC)     Parkinson's disease     Schizophrenia (CMS/HCC)     Seizures (CMS/HCC)     Weakness      No past surgical history on file.  No family history on file.  Social History     Tobacco Use    Smoking status: Former     Types: Cigarettes    Smokeless tobacco: Never   Substance Use Topics    Alcohol use: Not Currently    Drug use: Not on file       Physical Exam   ED Triage Vitals [01/03/24 1734]   Temp Heart Rate Resp BP   36.8 °C (98.2 °F) 85 16 (!) 148/94       SpO2 Temp Source Heart Rate Source Patient Position   96 % Oral Monitor --      BP Location FiO2 (%)     -- --       Physical Exam  Vitals and nursing note reviewed.   Constitutional:       General: He is not in acute distress.     Appearance: Normal appearance. He is not toxic-appearing.   HENT:      Head: Normocephalic and atraumatic.      Nose: Nose normal.      Mouth/Throat:      Mouth: Mucous membranes are moist.      Pharynx: Oropharynx is clear.   Eyes:      Extraocular Movements: Extraocular movements intact.      Conjunctiva/sclera: Conjunctivae normal.      Pupils: Pupils are equal, round, and reactive to light.   Cardiovascular:      Rate and Rhythm: Regular rhythm.      Pulses: Normal pulses.      Heart sounds: Normal heart sounds.   Pulmonary:      Effort: Pulmonary effort is normal. No respiratory distress.      Breath sounds: Normal breath sounds.   Abdominal:      General: Abdomen is flat. Bowel sounds are normal.      Palpations: Abdomen is soft.      Tenderness: There is no abdominal tenderness.   Musculoskeletal:         General: Normal range of motion.      Cervical back: Normal range of motion and neck supple.   Skin:     General: Skin is warm and dry.      Coloration: Skin is not jaundiced or pale.      Findings: Laceration present. No bruising.             Comments: There is approximate 6 cm laceration to the left proximal elbow.  The wound is gaping.  No active bleeding.  Patient is still able to flex and extend at the elbow.   Neurological:      General: No focal deficit present.      Mental Status: He is alert and oriented to person, place, and time. Mental status is at baseline.   Psychiatric:         Mood and Affect: Mood normal.         Behavior: Behavior normal.         ED Course & MDM   Diagnoses as of 01/03/24 4315   Pneumonia of both lungs due to infectious organism, unspecified part of lung   Suicidal behavior with attempted self-injury (CMS/[facility])       Medical Decision  Making  Summary:  Medical Decision Making:   Patient presented as described in HPI. Patient case including ROS, PE, and treatment and plan discussed with ED attending if attached as cosigner. Results from labs and or imaging included below if completed. Gerardo Albright  is a 63 y.o. coming in for Patient presents with:  Suicide Attempt: Patient from nursing facility where the staff said he got a hold of a razor some how and cut his left forearm in the shower. The staff states he was upset that he did not get the medicine he wanted. He states he thinks he has pneumonia and everything else because he is paranoid. He states he is suicidal   .  Due to patient's complaint workup was completed.  Workup includes a chest x-ray and lab work as well as a CT of the head.  Lab work does show a white blood cell count of 21.2.  No electrolyte abnormalities of concern.  Chest x-ray does show a possible pneumonia.  Due to his white blood cell count as well as symptoms patient will be treated for pneumonia with Rocephin and azithromycin.  Unable to clear him for psychiatric evaluation secondary to the infection.  The wound to the left arm was cleaned and irrigated.  Sutures were used to approximate the laceration.  He will be hospitalized for Further care and management.  Hospitalist states that they are unable to admit the patient here at [facility].  Spoke with Dr. Miles at [facility] who agrees to accept the patient in transfer with treatment for pneumonia and also for psychiatric care with a sitter.  Awaiting transfer.    I also explained the plan and treatment course. Patient/guardian is in agreement with plan, treatment course, and states verbally that they will comply.    Labs Reviewed  CBC WITH AUTO DIFFERENTIAL - Abnormal     WBC                           21.1 (*)               nRBC                          0.0                    RBC                           4.46 (*)               Hemoglobin                    13.2 (*)                Hematocrit                    39.7 (*)               MCV                           89                     MCH                           29.6                   MCHC                          33.2                   RDW                           13.0                   Platelets                     412                    Neutrophils %                 88.9                   Immature Granulocytes %, Automated   0.4                    Lymphocytes %                 6.6                    Monocytes %                   3.7                    Eosinophils %                 0.3                    Basophils %                   0.1                    Neutrophils Absolute          18.78 (*)               Immature Granulocytes Absolute, Au*   0.08                   Lymphocytes Absolute          1.39                   Monocytes Absolute            0.78                   Eosinophils Absolute          0.07                   Basophils Absolute            0.03                COMPREHENSIVE METABOLIC PANEL - Abnormal     Glucose                       128 (*)                Sodium                        131 (*)                Potassium                     3.9                    Chloride                      97 (*)                 Bicarbonate                   25                     Anion Gap                     13                     Urea Nitrogen                 9                      Creatinine                    0.63                   eGFR                          >90                    Calcium                       9.3                    Albumin                       4.1                    Alkaline Phosphatase          58                     Total Protein                 7.1                    AST                           10                     Bilirubin, Total              0.4                    ALT                           11                  URINALYSIS WITH REFLEX CULTURE AND MICROSCOPIC - Abnormal     Color, Urine                   Straw                  Appearance, Urine             Clear                  Specific Gravity, Urine       1.003 (*)               pH, Urine                     7.0                    Protein, Urine                NEGATIVE                Glucose, Urine                NEGATIVE                Blood, Urine                  SMALL (1+) (*)               Ketones, Urine                NEGATIVE                Bilirubin, Urine              NEGATIVE                Urobilinogen, Urine           <2.0                   Nitrite, Urine                NEGATIVE                Leukocyte Esterase, Urine     NEGATIVE             DRUG SCREEN,URINE - Normal     Amphetamine Screen, Urine                            Barbiturate Screen, Urine                            Benzodiazepines Screen, Urine                          Cannabinoid Screen, Urine                            Cocaine Metabolite Screen, Urine                          Fentanyl Screen, Urine                               Opiate Screen, Urine                                 Oxycodone Screen, Urine                              PCP Screen, Urine                                      Narrative: Drug screen results are presumptive and should not be used to assess                 compliance with prescribed medication. Contact the performing [facility] laboratory                 to add-on definitive confirmatory testing if clinically indicated.                                Toxicology screening results are reported qualitatively. The concentration must                 be greater than or equal to the cutoff to be reported as positive. The concentration                 at which the screening test can detect an individual drug or metabolite varies.                 The absence of expected drug(s) and/or drug metabolite(s) may indicate non-compliance,                 inappropriate timing of specimen collection relative to drug administration, poor drug                 absorption,  diluted/adulterated urine, or limitations of testing. For medical purposes                 only; not valid for forensic use.                                Interpretive questions should be directed to the laboratory medical directors.  ACUTE TOXICOLOGY PANEL, BLOOD - Normal     Acetaminophen                 <10.0                  Salicylate                    <3                     Alcohol                       <10                 SARS-COV-2 AND INFLUENZA A/B PCR - Normal     Flu A Result                                         Flu B Result                                         Coronavirus 2019, PCR                                  Narrative: This assay has received FDA Emergency Use Authorization (EUA) and  is only authorized for the duration of time that circumstances exist to justify the authorization of the emergency use of in vitro diagnostic tests for the detection of SARS-CoV-2 virus and/or diagnosis of COVID-19 infection under section 564(b)(1) of the Act, 21 U.S.C. 360bbb-3(b)(1). Testing for SARS-CoV-2 is only recommended for patients who meet current clinical and/or epidemiological criteria as defined by federal, state, or local public health directives. This assay is an in vitro diagnostic nucleic acid amplification test for the qualitative detection of SARS-CoV-2, Influenza A, and Influenza B from nasopharyngeal specimens and has been validated for use at [facility]. Negative results do not preclude COVID-19 infections or Influenza A/B infections, and should not be used as the sole basis for diagnosis, treatment, or other management decisions. If Influenza A/B and RSV PCR results are negative, testing for Parainfluenza virus, Adenovirus and Metapneumovirus is routinely performed for [facility] pediatric oncology and intensive care inpatients, and is available on other patients by placing an add-on request.   URINALYSIS MICROSCOPIC WITH REFLEX CULTURE - Normal     WBC, Urine                     NONE                   RBC, Urine                    NONE                BLOOD CULTURE  BLOOD CULTURE  URINALYSIS WITH REFLEX CULTURE AND MICROSCOPIC       Narrative: The following orders were created for panel order Urinalysis with Reflex Culture and Microscopic.                Procedure                               Abnormality         Status                                   ---------                               -----------         ------                                   Urinalysis with Reflex C...[[id number]]  Abnormal            Final result                             Extra Urine Gray Tube[[id number]]                                                                                     Please view results for these tests on the individual orders.  EXTRA URINE GRAY TUBE  LACTATE   XR chest 1 view   Final Result    1. Previous anterior chest and heart surgery.    2. Minimal lung opacities especially in the right mid and lower lung:    possible pneumonia.    3. Follow-up advised.    Signed by Dano Carvalho MD     CT head wo IV contrast   Final Result    1. No acute intracranial abnormality identified.    2. Paranasal sinus mucosal disease and small right mastoid effusion.    Correlate for evidence of acute sinusitis/mastoiditis.                      MACRO:    None          Signed by: Jake Pappas 1/3/2024 6:34 PM    Dictation workstation:   [id number]          Tests/Medications/Escalations of Care considered but not given: As in MDM    Patient care discussed with: N/A  Social Determinants affecting care: N/A    Final diagnosis and disposition as documented       Hospitalize. I discussed the differential; results and admit plan with the patient and/or family/friend/caregiver if present.  I emphasized the importance of hospitalization need for re-evaluation/continued monitoring/interventions.. They agreed that if they feel their condition is worsening or if they have any other concern they should alert  staff immediately for further assistance. I gave the patient an opportunity to ask all questions they had and answered all of them accordingly. The patient and/or family/friend/caregiver expressed understanding verbally and that they would comply.    Disposition:  Hospitalize to [facility] under Dr. Miles per their orders. Discussed findings and treatment done here in ED with admitting physician. It would be a risk to discharge the patient in their condition due to possibility of deterioration in their condition and the need for urgent interventions.    This note has been transcribed using voice recognition and may contain grammatical errors, misplaced words, incorrect words, incorrect phrases or other errors.         Procedure  Laceration Repair    Performed by: Acosta Massey PA-C  Authorized by: Ender Grant MD    Consent:     Consent obtained:  Verbal  Universal protocol:     Patient identity confirmed:  Arm band and verbally with patient  Anesthesia:     Anesthesia method:  Local infiltration    Local anesthetic:  Lidocaine 1% WITH epi  Laceration details:     Location:  Shoulder/arm    Shoulder/arm location:  L elbow    Length (cm):  6  Exploration:     Wound exploration: wound explored through full range of motion      Wound extent: no tendon damage noted and no vascular damage noted    Treatment:     Area cleansed with:  Saline    Amount of cleaning:  Standard  Skin repair:     Repair method:  Sutures    Suture size:  3-0    Suture material:  Nylon    Suture technique:  Simple interrupted    Number of sutures:  6  Approximation:     Approximation:  Close  Repair type:     Repair type:  Simple  Post-procedure details:     Dressing:  Open (no dressing)    Procedure completion:  Tolerated       Acosta Massey PA-C  01/03/24 2251       Acosta Massey PA-C  01/03/24 2516

## 2024-01-05 LAB — GLUCOSE BLD MANUAL STRIP-MCNC: 159 MG/DL (ref 74–99)

## 2024-01-05 PROCEDURE — 2500000002 HC RX 250 W HCPCS SELF ADMINISTERED DRUGS (ALT 637 FOR MEDICARE OP, ALT 636 FOR OP/ED): Performed by: EMERGENCY MEDICINE

## 2024-01-05 PROCEDURE — 2500000001 HC RX 250 WO HCPCS SELF ADMINISTERED DRUGS (ALT 637 FOR MEDICARE OP): Performed by: EMERGENCY MEDICINE

## 2024-01-05 PROCEDURE — 82947 ASSAY GLUCOSE BLOOD QUANT: CPT

## 2024-01-05 PROCEDURE — C9113 INJ PANTOPRAZOLE SODIUM, VIA: HCPCS

## 2024-01-05 PROCEDURE — 2500000001 HC RX 250 WO HCPCS SELF ADMINISTERED DRUGS (ALT 637 FOR MEDICARE OP)

## 2024-01-05 PROCEDURE — 2500000004 HC RX 250 GENERAL PHARMACY W/ HCPCS (ALT 636 FOR OP/ED): Performed by: EMERGENCY MEDICINE

## 2024-01-05 PROCEDURE — 2500000004 HC RX 250 GENERAL PHARMACY W/ HCPCS (ALT 636 FOR OP/ED)

## 2024-01-05 RX ORDER — ALUMINUM HYDROXIDE, MAGNESIUM HYDROXIDE, AND SIMETHICONE 1200; 120; 1200 MG/30ML; MG/30ML; MG/30ML
SUSPENSION ORAL
Status: COMPLETED
Start: 2024-01-05 | End: 2024-01-05

## 2024-01-05 RX ORDER — LORAZEPAM 0.5 MG/1
2 TABLET ORAL ONCE
Status: COMPLETED | OUTPATIENT
Start: 2024-01-05 | End: 2024-01-05

## 2024-01-05 RX ORDER — LORAZEPAM 0.5 MG/1
1 TABLET ORAL ONCE
Status: COMPLETED | OUTPATIENT
Start: 2024-01-05 | End: 2024-01-05

## 2024-01-05 RX ORDER — PANTOPRAZOLE SODIUM 40 MG/10ML
INJECTION, POWDER, LYOPHILIZED, FOR SOLUTION INTRAVENOUS
Status: COMPLETED
Start: 2024-01-05 | End: 2024-01-05

## 2024-01-05 RX ORDER — ALUMINUM HYDROXIDE, MAGNESIUM HYDROXIDE, AND SIMETHICONE 1200; 120; 1200 MG/30ML; MG/30ML; MG/30ML
30 SUSPENSION ORAL ONCE
Status: COMPLETED | OUTPATIENT
Start: 2024-01-05 | End: 2024-01-05

## 2024-01-05 RX ORDER — LORAZEPAM 0.5 MG/1
TABLET ORAL
Status: COMPLETED
Start: 2024-01-05 | End: 2024-01-05

## 2024-01-05 RX ORDER — PANTOPRAZOLE SODIUM 40 MG/10ML
40 INJECTION, POWDER, LYOPHILIZED, FOR SOLUTION INTRAVENOUS ONCE
Status: COMPLETED | OUTPATIENT
Start: 2024-01-05 | End: 2024-01-05

## 2024-01-05 RX ADMIN — QUETIAPINE FUMARATE 200 MG: 100 TABLET ORAL at 08:40

## 2024-01-05 RX ADMIN — METFORMIN HYDROCHLORIDE 500 MG: 500 TABLET ORAL at 19:31

## 2024-01-05 RX ADMIN — QUETIAPINE FUMARATE 200 MG: 100 TABLET ORAL at 20:48

## 2024-01-05 RX ADMIN — LORAZEPAM 1 MG: 0.5 TABLET ORAL at 14:50

## 2024-01-05 RX ADMIN — BENZTROPINE MESYLATE 1 MG: 1 TABLET ORAL at 20:48

## 2024-01-05 RX ADMIN — QUETIAPINE FUMARATE 50 MG: 25 TABLET ORAL at 20:48

## 2024-01-05 RX ADMIN — CEFTRIAXONE 1 G: 1 INJECTION, SOLUTION INTRAVENOUS at 20:47

## 2024-01-05 RX ADMIN — NALTREXONE HYDROCHLORIDE 50 MG: 50 TABLET, FILM COATED ORAL at 08:40

## 2024-01-05 RX ADMIN — METFORMIN HYDROCHLORIDE 500 MG: 500 TABLET ORAL at 08:40

## 2024-01-05 RX ADMIN — ALUMINUM HYDROXIDE, MAGNESIUM HYDROXIDE, AND SIMETHICONE 30 ML: 200; 200; 20 SUSPENSION ORAL at 05:35

## 2024-01-05 RX ADMIN — BUPROPION HYDROCHLORIDE 300 MG: 150 TABLET, EXTENDED RELEASE ORAL at 08:40

## 2024-01-05 RX ADMIN — LORAZEPAM 2 MG: 0.5 TABLET ORAL at 21:47

## 2024-01-05 RX ADMIN — BENZTROPINE MESYLATE 1 MG: 1 TABLET ORAL at 08:40

## 2024-01-05 RX ADMIN — PANTOPRAZOLE SODIUM 40 MG: 40 INJECTION, POWDER, LYOPHILIZED, FOR SOLUTION INTRAVENOUS at 05:31

## 2024-01-05 RX ADMIN — ALUMINUM HYDROXIDE, MAGNESIUM HYDROXIDE, AND SIMETHICONE 30 ML: 1200; 120; 1200 SUSPENSION ORAL at 05:35

## 2024-01-05 RX ADMIN — PANTOPRAZOLE SODIUM 40 MG: 40 INJECTION, POWDER, FOR SOLUTION INTRAVENOUS at 05:31

## 2024-01-05 ASSESSMENT — PAIN SCALES - GENERAL: PAINLEVEL_OUTOF10: 2

## 2024-01-05 NOTE — ED PROVIDER NOTES
HPI   Chief Complaint   Patient presents with    Suicide Attempt     Patient from nursing facility where the staff said he got a hold of a razor some how and cut his left forearm in the shower. The staff states he was upset that he did not get the medicine he wanted. He states he thinks he has pneumonia and everything else because he is paranoid. He states he is suicidal        HPI                    No data recorded                Patient History   Past Medical History:   Diagnosis Date    At risk for falls     COPD (chronic obstructive pulmonary disease) (CMS/[facility])     Dementia (CMS/[facility])     Depression     GERD (gastroesophageal reflux disease)     Hypertension     Insomnia     Myocardial infarction (CMS/[facility])     Parkinson's disease     Schizophrenia (CMS/[facility])     Seizures (CMS/[facility])     Weakness      History reviewed. No pertinent surgical history.  No family history on file.  Social History     Tobacco Use    Smoking status: Former     Packs/day: .5     Types: Cigarettes     Passive exposure: Past    Smokeless tobacco: Never   Vaping Use    Vaping Use: Never used   Substance Use Topics    Alcohol use: Not Currently    Drug use: Never       Physical Exam   ED Triage Vitals   Temp Heart Rate Resp BP   01/03/24 1734 01/03/24 1734 01/03/24 1734 01/03/24 1734   36.8 °C (98.2 °F) 85 16 (!) 148/94      SpO2 Temp Source Heart Rate Source Patient Position   01/03/24 1734 01/03/24 1734 01/03/24 1734 01/03/24 2250   96 % Oral Monitor Sitting      BP Location FiO2 (%)     01/03/24 2250 --     Right arm        Physical Exam    ED Course & MDM   Diagnoses as of 01/05/24 1551   Pneumonia of both lungs due to infectious organism, unspecified part of lung   Suicidal behavior with attempted self-injury (CMS/[facility])       Medical Decision Making      Procedure  Procedures

## 2024-01-06 ENCOUNTER — APPOINTMENT (OUTPATIENT)
Dept: RADIOLOGY | Facility: HOSPITAL | Age: 64
End: 2024-01-06
Payer: COMMERCIAL

## 2024-01-06 ENCOUNTER — HOSPITAL ENCOUNTER (INPATIENT)
Facility: HOSPITAL | Age: 64
LOS: 1 days | Discharge: HOSPICE | DRG: 885 | End: 2024-01-07
Attending: PSYCHIATRY & NEUROLOGY | Admitting: PSYCHIATRY & NEUROLOGY
Payer: COMMERCIAL

## 2024-01-06 VITALS
TEMPERATURE: 98 F | HEART RATE: 82 BPM | HEIGHT: 70 IN | OXYGEN SATURATION: 95 % | BODY MASS INDEX: 25.77 KG/M2 | WEIGHT: 180 LBS | SYSTOLIC BLOOD PRESSURE: 142 MMHG | RESPIRATION RATE: 16 BRPM | DIASTOLIC BLOOD PRESSURE: 88 MMHG

## 2024-01-06 DIAGNOSIS — F32.89 OTHER DEPRESSION: ICD-10-CM

## 2024-01-06 DIAGNOSIS — K21.9 GASTROESOPHAGEAL REFLUX DISEASE WITHOUT ESOPHAGITIS: ICD-10-CM

## 2024-01-06 DIAGNOSIS — Z00.00 HEALTH CARE MAINTENANCE: ICD-10-CM

## 2024-01-06 DIAGNOSIS — F10.91 ALCOHOL USE DISORDER IN REMISSION: ICD-10-CM

## 2024-01-06 DIAGNOSIS — R78.81 BACTEREMIA: Primary | ICD-10-CM

## 2024-01-06 DIAGNOSIS — G25.89 EXTRAPYRAMIDAL MOVEMENT DISORDER, DRUG-INDUCED: ICD-10-CM

## 2024-01-06 DIAGNOSIS — T50.905A EXTRAPYRAMIDAL MOVEMENT DISORDER, DRUG-INDUCED: ICD-10-CM

## 2024-01-06 DIAGNOSIS — E11.69 TYPE 2 DIABETES MELLITUS WITH OTHER SPECIFIED COMPLICATION, WITHOUT LONG-TERM CURRENT USE OF INSULIN (MULTI): ICD-10-CM

## 2024-01-06 DIAGNOSIS — F20.89 OTHER SCHIZOPHRENIA (MULTI): ICD-10-CM

## 2024-01-06 PROBLEM — T14.91XA SUICIDE ATTEMPT (MULTI): Status: ACTIVE | Noted: 2024-01-06

## 2024-01-06 LAB
ANION GAP SERPL CALC-SCNC: 14 MMOL/L (ref 10–20)
BASOPHILS # BLD AUTO: 0.04 X10*3/UL (ref 0–0.1)
BASOPHILS NFR BLD AUTO: 0.5 %
BUN SERPL-MCNC: 13 MG/DL (ref 6–23)
CALCIUM SERPL-MCNC: 9.1 MG/DL (ref 8.6–10.3)
CHLORIDE SERPL-SCNC: 100 MMOL/L (ref 98–107)
CO2 SERPL-SCNC: 22 MMOL/L (ref 21–32)
CREAT SERPL-MCNC: 0.62 MG/DL (ref 0.5–1.3)
EOSINOPHIL # BLD AUTO: 0.29 X10*3/UL (ref 0–0.7)
EOSINOPHIL NFR BLD AUTO: 3.4 %
ERYTHROCYTE [DISTWIDTH] IN BLOOD BY AUTOMATED COUNT: 13 % (ref 11.5–14.5)
GFR SERPL CREATININE-BSD FRML MDRD: >90 ML/MIN/1.73M*2
GLUCOSE SERPL-MCNC: 124 MG/DL (ref 74–99)
HCT VFR BLD AUTO: 40.5 % (ref 41–52)
HGB BLD-MCNC: 13.4 G/DL (ref 13.5–17.5)
IMM GRANULOCYTES # BLD AUTO: 0.04 X10*3/UL (ref 0–0.7)
IMM GRANULOCYTES NFR BLD AUTO: 0.5 % (ref 0–0.9)
LYMPHOCYTES # BLD AUTO: 1.56 X10*3/UL (ref 1.2–4.8)
LYMPHOCYTES NFR BLD AUTO: 18.1 %
MCH RBC QN AUTO: 29.6 PG (ref 26–34)
MCHC RBC AUTO-ENTMCNC: 33.1 G/DL (ref 32–36)
MCV RBC AUTO: 89 FL (ref 80–100)
MONOCYTES # BLD AUTO: 0.75 X10*3/UL (ref 0.1–1)
MONOCYTES NFR BLD AUTO: 8.7 %
NEUTROPHILS # BLD AUTO: 5.94 X10*3/UL (ref 1.2–7.7)
NEUTROPHILS NFR BLD AUTO: 68.8 %
NRBC BLD-RTO: 0 /100 WBCS (ref 0–0)
PLATELET # BLD AUTO: 409 X10*3/UL (ref 150–450)
POTASSIUM SERPL-SCNC: 4.1 MMOL/L (ref 3.5–5.3)
RBC # BLD AUTO: 4.53 X10*6/UL (ref 4.5–5.9)
SODIUM SERPL-SCNC: 132 MMOL/L (ref 136–145)
WBC # BLD AUTO: 8.6 X10*3/UL (ref 4.4–11.3)

## 2024-01-06 PROCEDURE — 2500000002 HC RX 250 W HCPCS SELF ADMINISTERED DRUGS (ALT 637 FOR MEDICARE OP, ALT 636 FOR OP/ED): Performed by: EMERGENCY MEDICINE

## 2024-01-06 PROCEDURE — 94760 N-INVAS EAR/PLS OXIMETRY 1: CPT

## 2024-01-06 PROCEDURE — 1240000001 HC SEMI-PRIVATE BH ROOM DAILY

## 2024-01-06 PROCEDURE — 2500000004 HC RX 250 GENERAL PHARMACY W/ HCPCS (ALT 636 FOR OP/ED): Performed by: EMERGENCY MEDICINE

## 2024-01-06 PROCEDURE — 85025 COMPLETE CBC W/AUTO DIFF WBC: CPT | Performed by: EMERGENCY MEDICINE

## 2024-01-06 PROCEDURE — 2500000001 HC RX 250 WO HCPCS SELF ADMINISTERED DRUGS (ALT 637 FOR MEDICARE OP): Performed by: EMERGENCY MEDICINE

## 2024-01-06 PROCEDURE — G0378 HOSPITAL OBSERVATION PER HR: HCPCS

## 2024-01-06 PROCEDURE — 82374 ASSAY BLOOD CARBON DIOXIDE: CPT | Performed by: EMERGENCY MEDICINE

## 2024-01-06 PROCEDURE — 36415 COLL VENOUS BLD VENIPUNCTURE: CPT | Performed by: EMERGENCY MEDICINE

## 2024-01-06 PROCEDURE — 2500000001 HC RX 250 WO HCPCS SELF ADMINISTERED DRUGS (ALT 637 FOR MEDICARE OP): Performed by: STUDENT IN AN ORGANIZED HEALTH CARE EDUCATION/TRAINING PROGRAM

## 2024-01-06 PROCEDURE — 2500000001 HC RX 250 WO HCPCS SELF ADMINISTERED DRUGS (ALT 637 FOR MEDICARE OP)

## 2024-01-06 PROCEDURE — 2500000001 HC RX 250 WO HCPCS SELF ADMINISTERED DRUGS (ALT 637 FOR MEDICARE OP): Performed by: PSYCHIATRY & NEUROLOGY

## 2024-01-06 PROCEDURE — 71045 X-RAY EXAM CHEST 1 VIEW: CPT | Performed by: RADIOLOGY

## 2024-01-06 PROCEDURE — 71045 X-RAY EXAM CHEST 1 VIEW: CPT

## 2024-01-06 RX ORDER — DIPHENHYDRAMINE HCL 50 MG
50 CAPSULE ORAL EVERY 6 HOURS PRN
Status: DISCONTINUED | OUTPATIENT
Start: 2024-01-06 | End: 2024-01-07 | Stop reason: HOSPADM

## 2024-01-06 RX ORDER — LORAZEPAM 0.5 MG/1
TABLET ORAL
Status: COMPLETED
Start: 2024-01-06 | End: 2024-01-06

## 2024-01-06 RX ORDER — LORAZEPAM 0.5 MG/1
1 TABLET ORAL ONCE
Status: COMPLETED | OUTPATIENT
Start: 2024-01-06 | End: 2024-01-06

## 2024-01-06 RX ORDER — DOXYCYCLINE HYCLATE 50 MG/1
100 CAPSULE ORAL ONCE
Status: COMPLETED | OUTPATIENT
Start: 2024-01-06 | End: 2024-01-06

## 2024-01-06 RX ORDER — PANTOPRAZOLE SODIUM 40 MG/1
40 TABLET, DELAYED RELEASE ORAL
Status: DISCONTINUED | OUTPATIENT
Start: 2024-01-07 | End: 2024-01-07 | Stop reason: HOSPADM

## 2024-01-06 RX ORDER — OLANZAPINE 10 MG/2ML
5 INJECTION, POWDER, FOR SOLUTION INTRAMUSCULAR EVERY 6 HOURS PRN
Status: DISCONTINUED | OUTPATIENT
Start: 2024-01-06 | End: 2024-01-07 | Stop reason: HOSPADM

## 2024-01-06 RX ORDER — MULTIVIT-MIN/IRON FUM/FOLIC AC 7.5 MG-4
1 TABLET ORAL DAILY
Status: DISCONTINUED | OUTPATIENT
Start: 2024-01-07 | End: 2024-01-07 | Stop reason: HOSPADM

## 2024-01-06 RX ORDER — METFORMIN HYDROCHLORIDE 500 MG/1
500 TABLET ORAL
Status: DISCONTINUED | OUTPATIENT
Start: 2024-01-07 | End: 2024-01-07 | Stop reason: HOSPADM

## 2024-01-06 RX ORDER — TRAZODONE HYDROCHLORIDE 50 MG/1
50 TABLET ORAL NIGHTLY PRN
Status: DISCONTINUED | OUTPATIENT
Start: 2024-01-06 | End: 2024-01-07 | Stop reason: HOSPADM

## 2024-01-06 RX ORDER — IBUPROFEN 400 MG/1
400 TABLET ORAL EVERY 6 HOURS PRN
Status: DISCONTINUED | OUTPATIENT
Start: 2024-01-06 | End: 2024-01-07 | Stop reason: HOSPADM

## 2024-01-06 RX ORDER — ACETAMINOPHEN 500 MG
5 TABLET ORAL NIGHTLY
Status: DISCONTINUED | OUTPATIENT
Start: 2024-01-06 | End: 2024-01-07 | Stop reason: HOSPADM

## 2024-01-06 RX ORDER — POLYETHYLENE GLYCOL 3350 17 G/17G
17 POWDER, FOR SOLUTION ORAL DAILY PRN
Status: DISCONTINUED | OUTPATIENT
Start: 2024-01-06 | End: 2024-01-07 | Stop reason: HOSPADM

## 2024-01-06 RX ORDER — QUETIAPINE FUMARATE 25 MG/1
12.5 TABLET, FILM COATED ORAL EVERY 4 HOURS PRN
Status: DISCONTINUED | OUTPATIENT
Start: 2024-01-06 | End: 2024-01-07 | Stop reason: HOSPADM

## 2024-01-06 RX ORDER — DIPHENHYDRAMINE HYDROCHLORIDE 50 MG/ML
50 INJECTION INTRAMUSCULAR; INTRAVENOUS ONCE AS NEEDED
Status: DISCONTINUED | OUTPATIENT
Start: 2024-01-06 | End: 2024-01-07 | Stop reason: HOSPADM

## 2024-01-06 RX ADMIN — QUETIAPINE FUMARATE 200 MG: 100 TABLET ORAL at 20:32

## 2024-01-06 RX ADMIN — LORAZEPAM 1 MG: 0.5 TABLET ORAL at 08:03

## 2024-01-06 RX ADMIN — DOXYCYCLINE HYCLATE 100 MG: 50 CAPSULE ORAL at 20:31

## 2024-01-06 RX ADMIN — METFORMIN HYDROCHLORIDE 500 MG: 500 TABLET ORAL at 19:21

## 2024-01-06 RX ADMIN — Medication 5 MG: at 23:37

## 2024-01-06 RX ADMIN — NALTREXONE HYDROCHLORIDE 50 MG: 50 TABLET, FILM COATED ORAL at 09:31

## 2024-01-06 RX ADMIN — BENZTROPINE MESYLATE 1 MG: 1 TABLET ORAL at 20:31

## 2024-01-06 RX ADMIN — METFORMIN HYDROCHLORIDE 500 MG: 500 TABLET ORAL at 08:02

## 2024-01-06 RX ADMIN — LORAZEPAM 1 MG: 0.5 TABLET ORAL at 16:17

## 2024-01-06 RX ADMIN — BUPROPION HYDROCHLORIDE 300 MG: 150 TABLET, EXTENDED RELEASE ORAL at 08:01

## 2024-01-06 RX ADMIN — QUETIAPINE FUMARATE 200 MG: 100 TABLET ORAL at 08:01

## 2024-01-06 RX ADMIN — QUETIAPINE FUMARATE 50 MG: 25 TABLET ORAL at 20:33

## 2024-01-06 RX ADMIN — BENZTROPINE MESYLATE 1 MG: 1 TABLET ORAL at 08:01

## 2024-01-06 SDOH — HEALTH STABILITY: MENTAL HEALTH: HAVE YOU HAD THESE THOUGHTS AND HAD SOME INTENTION OF ACTING ON THEM?: YES

## 2024-01-06 SDOH — HEALTH STABILITY: MENTAL HEALTH: ACTIVE SUICIDAL IDEATION WITH SPECIFIC PLAN AND INTENT (PAST 1 MONTH): NO

## 2024-01-06 SDOH — HEALTH STABILITY: MENTAL HEALTH: HAVE YOU BEEN THINKING ABOUT HOW YOU MIGHT DO THIS?: YES

## 2024-01-06 SDOH — HEALTH STABILITY: MENTAL HEALTH: NON-SPECIFIC ACTIVE SUICIDAL THOUGHTS (PAST 1 MONTH): YES

## 2024-01-06 SDOH — HEALTH STABILITY: MENTAL HEALTH: WISH TO BE DEAD (PAST 1 MONTH): YES

## 2024-01-06 SDOH — HEALTH STABILITY: MENTAL HEALTH: SUICIDAL BEHAVIOR (LIFETIME): YES

## 2024-01-06 SDOH — HEALTH STABILITY: MENTAL HEALTH: ANXIETY SYMPTOMS: GENERALIZED;FEELINGS OF DOOM

## 2024-01-06 SDOH — HEALTH STABILITY: MENTAL HEALTH: ACTIVE SUICIDAL IDEATION WITH SOME INTENT TO ACT, WITHOUT SPECIFIC PLAN (PAST 1 MONTH): NO

## 2024-01-06 SDOH — HEALTH STABILITY: MENTAL HEALTH: RISK OF SUICIDE: HIGH RISK

## 2024-01-06 SDOH — ECONOMIC STABILITY: HOUSING INSECURITY: FEELS SAFE LIVING IN HOME: NO

## 2024-01-06 SDOH — HEALTH STABILITY: MENTAL HEALTH: HAVE YOU WISHED YOU WERE DEAD OR WISHED YOU COULD GO TO SLEEP AND NOT WAKE UP?: YES

## 2024-01-06 SDOH — HEALTH STABILITY: MENTAL HEALTH: DEPRESSION SYMPTOMS: APPETITE CHANGE;FEELINGS OF HELPLESSNESS;FEELINGS OF HOPELESSESS;LOSS OF INTEREST;SLEEP DISTURBANCE

## 2024-01-06 SDOH — HEALTH STABILITY: MENTAL HEALTH: HAVE YOU EVER DONE ANYTHING, STARTED TO DO ANYTHING, OR PREPARED TO DO ANYTHING TO END YOUR LIFE?: YES

## 2024-01-06 SDOH — HEALTH STABILITY: MENTAL HEALTH: WAS THIS WITHIN THE PAST THREE MONTHS?: NO

## 2024-01-06 SDOH — HEALTH STABILITY: MENTAL HEALTH: SUICIDAL BEHAVIOR (3 MONTHS): YES

## 2024-01-06 SDOH — HEALTH STABILITY: MENTAL HEALTH: HAVE YOU ACTUALLY HAD ANY THOUGHTS OF KILLING YOURSELF?: YES

## 2024-01-06 ASSESSMENT — LIFESTYLE VARIABLES
PRESCIPTION_ABUSE_PAST_12_MONTHS: NO
VISUAL DISTURBANCES: NOT PRESENT
ORIENTATION AND CLOUDING OF SENSORIUM: CANNOT DO SERIAL ADDITIONS OR IS UNCERTAIN ABOUT DATE
TREMOR: NO TREMOR
SUBSTANCE_ABUSE_PAST_12_MONTHS: NO
AGITATION: NORMAL ACTIVITY
CIWA TOTAL SCORE: 2
PAROXYSMAL SWEATS: NO SWEAT VISIBLE
ANXIETY: MILDLY ANXIOUS
NAUSEA AND VOMITING: NO NAUSEA AND NO VOMITING
AUDITORY DISTURBANCES: NOT PRESENT
HEADACHE, FULLNESS IN HEAD: NOT PRESENT

## 2024-01-06 ASSESSMENT — PAIN - FUNCTIONAL ASSESSMENT
PAIN_FUNCTIONAL_ASSESSMENT: 0-10
PAIN_FUNCTIONAL_ASSESSMENT: 0-10

## 2024-01-06 ASSESSMENT — ACTIVITIES OF DAILY LIVING (ADL)
PATIENT'S MEMORY ADEQUATE TO SAFELY COMPLETE DAILY ACTIVITIES?: UNABLE TO ASSESS
ADEQUATE_TO_COMPLETE_ADL: YES
BATHING: NEEDS ASSISTANCE
ASSISTIVE_DEVICE: COMMODE;WALKER;WHEELCHAIR
EFFECT OF PAIN ON DAILY ACTIVITIES: UTA
FEEDING YOURSELF: INDEPENDENT
JUDGMENT_ADEQUATE_SAFELY_COMPLETE_DAILY_ACTIVITIES: UNABLE TO ASSESS
HEARING - LEFT EAR: FUNCTIONAL
DRESSING YOURSELF: NEEDS ASSISTANCE
TOILETING: NEEDS ASSISTANCE
GROOMING: NEEDS ASSISTANCE
EFFECT OF PAIN ON DAILY ACTIVITIES: UTA
HEARING - RIGHT EAR: FUNCTIONAL
WALKS IN HOME: NEEDS ASSISTANCE

## 2024-01-06 ASSESSMENT — PAIN SCALES - GENERAL
PAINLEVEL_OUTOF10: 10 - WORST POSSIBLE PAIN

## 2024-01-06 ASSESSMENT — PAIN DESCRIPTION - DESCRIPTORS
DESCRIPTORS: ACHING
DESCRIPTORS: ACHING

## 2024-01-06 NOTE — HOSPITAL COURSE
EPAT - Social Work Psychiatric Assessment     Arrival Details  Mode of Arrival: Ambulance  Admission Source: Nursing home  Admission Type: Involuntary  EPAT Assessment Start Date: 01/06/24  EPAT Assessment Start Time: 1010  Name of : EKTA Sanchez LSW     Triggers:   (e.g. loss of relationship, financial or health status) (real or anticipated), Social isolation    Diagnosis:  Schizoaffective, depression  Ankit  Ncd   PsaCluster B personality disorders or traits (i.e., borderline, antisocial, histrionic & narcissistic)  Cluster b   Seizure history  Acute pneumonia  Chronic cutting, self harming  Resident of [facility]  Legal benjamin carrington  Falls risk      Psychiatric Symptoms  Anxiety Symptoms: Generalized, Feelings of doom  Depression Symptoms: Appetite change, Feelings of helplessness, Feelings of hopelessess, Loss of interest, Sleep disturbance  Gay Symptoms: No problems reported or observed.   Psychosis Symptoms  Hallucination Type: No problems reported or observed.  Delusion Type: No problems reported or observed.   Additional Symptoms - Adult  Generalized Anxiety Disorder: Difficult to control worry, Difficulity concentrating, Excessive anxiety/worry, Restlessness  Obsessive Compulsive Disorder: No problems reported or observed.  Panic Attack: No problems reported or observed.  Post Traumatic Stress Disorder: No problems reported or observed.  Delirium: No problems reported or observed.   Social History:       [place] Service/Education History  Current or Previous  Service: None  Education Level:  (did not assess)  History of Learning Problems: No  History of School Behavior Problems: No    General Appearance  Motor Activity: Unremarkable  Speech Pattern: Excessively soft, Speech impairments  General Attitude: Cooperative  Appearance/Hygiene: Disheveled     Thought Process  Coherency: [place] thinking  Content: Blaming others  Delusions:  (None)  Perception: Not  altered  Hallucination: None  Judgment/Insight: Impaired  Confusion: Mild  Cognition: Appropriate safety awareness, Follows commands     Sleep Pattern  Sleep Pattern: Difficulty falling asleep, Disturbed/interrupted sleep     Risk Factors  Self Harm/Suicidal Ideation Plan: Continued SI, no current plan  Previous Self Harm/Suicidal Plans: Presenting for SA by cutting; hx of such  Risk Factors: Male, Major mental illness, Personality disorder (antisocial, borderline)     Violence Risk Assessment  Assessment of Violence: None noted  Thoughts of Harm to Others: No       1. Wish to be Dead (Past 1 Month): Yes  2. Non-Specific Active Suicidal Thoughts (Past 1 Month): Yes  3. Active Suicidal Ideation with any Methods (Not Plan) Without Intent to Act (Past 1 Month): Yes  4. Active Suicidal Ideation with Some Intent to Act, Without Specific Plan (Past 1 Month): No  5. Active Suicidal Ideation with Specific Plan and Intent (Past 1 Month): No  6. Suicidal Behavior (Lifetime): Yes  6. Suicidal Behavior (3 Months): Yes  Calculated C-SSRS Risk Score (Lifetime/Recent): High Risk  Step 1: Risk Factors  Current & Past Psychiatric Dx: Mood disorder, Psychotic disorder, Alcohol/substance abuse disorders, Presenting Symptoms: Hopelessness or despair, Impulsivity  Precipitants/Stressors: Triggering events leading to humiliation, shame, and/or despair   Change in Treatment: Hopeless or dissatisfied with provider or treatment  Access to Lethal Methods : No  Step 2: Protective Factors   Protective Factors Internal: Fear of death or the actual act of killing self  Protective Factors External: Positive therapeutic relationships  Step 3: Suicidal Ideation Intensity  Most Severe Suicidal Ideation Identified: SI, no current plan; presenting for SA  How Many Times Have You Had These Thoughts: 2-5 times in a week  When You Have the Thoughts How Long do They Last : 1-4 hours/a lot of the time  Could/Can You Stop Thinking About Killing Yourself or  Wanting to Die if You Want to: Unable to control thoughts  Are There Things - Anyone or Anything - That Stopped You From Wanting to Die or Acting on: Deterrents most likely did not stop you  What Sort of Reasons Did You Have For Thinking About Wanting to Die or Killing Yourself: Equally to get attention, revenge or a reaction from others and to end/stop the pain  Total Score: 18  Step 5: Documentation  Risk Level:  (Patient remains high risk, discussed with Dr. Wesley)     Psychiatric Impression and Plan of Care  Assessment and Plan:      Upon assessment the patient remained calm and cooperative. Patient presented with soft, garbled speech that was difficult to understand at times. Patient endorsed initial suicide attempt and continued suicidal ideation while in the ED. He otherwise denied HI/AVH and no delusions were elicited. The patient reported he has been “depressed, suicidal” for the past week or so, stemming primarily from the belief the staff at his SNF are mistreating him. He states cutting had been him “asking for help” at his facility. Patient denied feeling safe at his facility because “they aren't caring for me”. The patient endorsed continued access to razor blades despite having a known history of cutting with razors at his facility. He reports his sleep has been “on and off” and reports diminished/no appetite. Patient states, “I think I need to go back on Clozaril, Seroquel is not working for me”. He denied knowledge of who his provider is at his facility but endorsed compliance with medications. Patient unable to identify any significant supports or deterrents to self-harm at this time.      A  was left for patient's LG with no return call at time of assessment.      Diagnostic Impression: Unspecified Depressive D/O, Cluster B Personality Traits   Psychiatric Impression and Plan for Care: Patient continues to be an elevated risk to self, endorsed continued SI in the context of recent SA. Patient  has not received psychiatric care while in the ED thus far, so no reasonable belief for stabilization at this time. Recommendation for admission discussed with Dr. Wesley who is in agreement.

## 2024-01-06 NOTE — SIGNIFICANT EVENT
Application for Emergency Admission      Ready for Transfer?  Is the patient medically cleared for transfer to inpatient psychiatry: Yes  Has the patient been accepted to an inpatient psychiatric hospital: Yes    Application for Emergency Admission  IN ACCORDANCE WITH SE[place] 5122.10 [place]  The Chief Clinical Officer of:  [facility] 1/6/2024 .5:53 PM    Reason for Hospitalization  The undersigned has reason to believe that: Gerardo Albright Is a mentally ill person subject to hospitalization by court order under division B Section 5122.01 of the Revised Code, i.e., this person:    1.Yes  Represents a substantial risk of physical harm to self as manifested by evidence of threats of, or attempts at, suicide or serious self-inflicted bodily harm    2.Yes Represents a substantial risk of physical harm to others as manifested by evidence of recent homicidal or other violent behavior, evidence of recent threats that place another in reasonable fear of violent behavior and serious physical harm, or other evidence of present dangerousness    3.Yes Represents a substantial and immediate risk of serious physical impairment or injury to self as manifested by  evidence that the person is unable to provide for and is not providing for the person's basic physical needs because of the person's mental illness and that appropriate provision for those needs cannot be made  immediately available in the community    4.Yes Would benefit from treatment in a hospital for his mental illness and is in need of such treatment as manifested by evidence of behavior that creates a grave and imminent risk to substantial rights of others or  himself.    5.Yes Would benefit from treatment as manifested by evidence of behavior that indicates all of the following:       (a) The person is unlikely to survive safely in the community without supervision, based on a clinical determination.       (b) The person has a history of lack of compliance with  treatment for mental illness and one of the following applies:      (i) At least twice within the thirty-six months prior to the filing of an affidavit seeking court-ordered treatment of the person under section 5122.111 of the Revised Code, the lack of compliance has been a significant factor in necessitating hospitalization in a hospital or receipt of services in a forensic or other mental health unit of a correctional facility, provided that the thirty-six-month period shall be extended by the length of any hospitalization or incarceration of the person that occurred within the thirty-six-month period.      (ii) Within the forty-eight months prior to the filing of an affidavit seeking court-ordered treatment of the person under section 5122.111 of the Revised Code, the lack of compliance resulted in one or more acts of serious violent behavior toward self or others or threats of, or attempts at, serious physical harm to self or others, provided that the forty-eight-month period shall be extended by the length of any hospitalization or incarceration of the person that occurred within the forty-eight-month period.      (c) The person, as a result of mental illness, is unlikely to voluntarily participate in necessary treatment.       (d) In view of the person's treatment history and current behavior, the person is in need of treatment in order to prevent a relapse or deterioration that would be likely to result in substantial risk of serious harm to the person or others.    (e) Represents a substantial risk of physical harm to self or others if allowed to remain at liberty pending examination.    Therefore, it is requested that said person be admitted to the above named facility.    STATEMENT OF BELIEF    Must be filled out by one of the following: a psychiatrist, licensed physician, licensed clinical psychologist, health or ,  or .  (Statement shall include the circumstances under  which the individual was taken into custody and the reason for the person's belief that hospitalization is necessary. The statement shall also include a reference to efforts made to secure the individual's property at his residence if he was taken into custody there. Every reasonable and appropriate effort should be made to take this person into custody in the least conspicuous manner possible.)    This is a patient presenting for SI and cut himself on the arm. He requires inpatient psych admission      Mikala Wesley DO 1/6/2024     _____________________________________________________________   Place of Employment: [facility]    STATEMENT OF OBSERVATION BY PSYCHIATRIST, LICENSED PHYSICIAN, OR LICENSED CLINICAL PSYCHOLOGIST, IF APPLICABLE    Place of Observation (e.g., [facility] mental health center, general hospital, office, emergency facility)  (If applicable, please complete)    Mikala Wesley,  1/6/2024    _____________________________________________________________

## 2024-01-06 NOTE — PROGRESS NOTES
Emergency Medicine Transition of Care Note.    I received Gerardo Albright in signout from Dr. KARLA Vital.  Please see the previous ED provider note for all HPI, PE and MDM up to the time of signout at 1800 on Friday 01/05/24. This is in addition to the primary record.    In brief Gerardo Albright is an 63 y.o. male presenting for   Chief Complaint   Patient presents with    Suicide Attempt     Patient from nursing facility where the staff said he got a hold of a razor some how and cut his left forearm in the shower. The staff states he was upset that he did not get the medicine he wanted. He states he thinks he has pneumonia and everything else because he is paranoid. He states he is suicidal      At the time of signout we were awaiting: Placement/acceptance at [facility]/Behavioral    Diagnoses as of 01/06/24 0403   Pneumonia of both lungs due to infectious organism, unspecified part of lung   Suicidal behavior with attempted self-injury (CMS/[facility])       Medical Decision Making  Patient was accepted for admission to [facility] by Dr. Miles  The patient is currently being treated for pneumonia.  Patient's vital signs have remained stable.  The patient has been cooperative here.  The patient has been getting some Ativan as needed for anxiety and to help him sleep.    THE PATIENT HAS TWO POSITIVE BLOOD CULTURES:  GRAM + COCCI IN CLUSTERS IN ANAEROBIC BOTTLES        Final diagnoses:   [J18.9] Pneumonia of both lungs due to infectious organism, unspecified part of lung   [T14.91XA] Suicidal behavior with attempted self-injury (CMS/[facility])           Procedure  Procedures    Gabino Osorio DO

## 2024-01-06 NOTE — SIGNIFICANT EVENT
Application for Emergency Admission      Ready for Transfer?  Is the patient medically cleared for transfer to inpatient psychiatry: Yes  Has the patient been accepted to an inpatient psychiatric hospital: Yes    Application for Emergency Admission  IN ACCORDANCE WITH SE[place] 5122.10 [place]  The Chief Clinical Officer of:  [facility] 1/6/2024 .5:15 PM    Reason for Hospitalization  The undersigned has reason to believe that: Gerardo Albright Is a mentally ill person subject to hospitalization by court order under division B Section 5122.01 of the Revised Code, i.e., this person:    1.Yes  Represents a substantial risk of physical harm to self as manifested by evidence of threats of, or attempts at, suicide or serious self-inflicted bodily harm    2.Yes Represents a substantial risk of physical harm to others as manifested by evidence of recent homicidal or other violent behavior, evidence of recent threats that place another in reasonable fear of violent behavior and serious physical harm, or other evidence of present dangerousness    3.Yes Represents a substantial and immediate risk of serious physical impairment or injury to self as manifested by  evidence that the person is unable to provide for and is not providing for the person's basic physical needs because of the person's mental illness and that appropriate provision for those needs cannot be made  immediately available in the community    4.Yes Would benefit from treatment in a hospital for his mental illness and is in need of such treatment as manifested by evidence of behavior that creates a grave and imminent risk to substantial rights of others or  himself.    5.Yes Would benefit from treatment as manifested by evidence of behavior that indicates all of the following:       (a) The person is unlikely to survive safely in the community without supervision, based on a clinical determination.       (b) The person has a history of lack of compliance with  treatment for mental illness and one of the following applies:      (i) At least twice within the thirty-six months prior to the filing of an affidavit seeking court-ordered treatment of the person under section 5122.111 of the Revised Code, the lack of compliance has been a significant factor in necessitating hospitalization in a hospital or receipt of services in a forensic or other mental health unit of a correctional facility, provided that the thirty-six-month period shall be extended by the length of any hospitalization or incarceration of the person that occurred within the thirty-six-month period.      (ii) Within the forty-eight months prior to the filing of an affidavit seeking court-ordered treatment of the person under section 5122.111 of the Revised Code, the lack of compliance resulted in one or more acts of serious violent behavior toward self or others or threats of, or attempts at, serious physical harm to self or others, provided that the forty-eight-month period shall be extended by the length of any hospitalization or incarceration of the person that occurred within the forty-eight-month period.      (c) The person, as a result of mental illness, is unlikely to voluntarily participate in necessary treatment.       (d) In view of the person's treatment history and current behavior, the person is in need of treatment in order to prevent a relapse or deterioration that would be likely to result in substantial risk of serious harm to the person or others.    (e) Represents a substantial risk of physical harm to self or others if allowed to remain at liberty pending examination.    Therefore, it is requested that said person be admitted to the above named facility.    STATEMENT OF BELIEF    Must be filled out by one of the following: a psychiatrist, licensed physician, licensed clinical psychologist, health or ,  or .  (Statement shall include the circumstances under  which the individual was taken into custody and the reason for the person's belief that hospitalization is necessary. The statement shall also include a reference to efforts made to secure the individual's property at his residence if he was taken into custody there. Every reasonable and appropriate effort should be made to take this person into custody in the least conspicuous manner possible.)    This patient presents with SI and self inflicted laceration to the upper arm.  He requires inpatient psych admission      Mikala Wesley DO 1/6/2024     _____________________________________________________________   Place of Employment: [facility] [employer]    STATEMENT OF OBSERVATION BY PSYCHIATRIST, LICENSED PHYSICIAN, OR LICENSED CLINICAL PSYCHOLOGIST, IF APPLICABLE    Place of Observation (e.g., [facility] mental health center, general hospital, office, emergency facility)  (If applicable, please complete)    Mikala Wesley,  1/6/2024    _____________________________________________________________

## 2024-01-06 NOTE — PROGRESS NOTES
Patient was signed out to me at change of shift by the previous ED team.  Please see previous provider's note for complete history and physical exam.    Briefly, this is a 63 year old presenting to the ED for SI and self harm, self inflicted laceration to the upper arm.  He was diagnosed with pneuonia and leukocytosis by the previous team, requiring inpatient medicine admission with plan for psych consult on the floor.  Pt has been in the ED for about 3 days on IV abx with improvement in his leukocytosis, stable vitals and no o2 requirement.  At time of signout the patient is still pending admission.  On my evaluation, the patient is resting comfortably in the bed, no acute distress.  Walking O2 was normal. WBC is back to normal.  I feel that he is medically cleared for admission to in patient psych.  EPAT evaluated the patient and he still endorses SI and will require admission for psych care.        Impression: Pneumonia, SI, Laceration   Disposition: admit to psych   Condition: stable and medically cleared for admission     Mikala Wesley,   Emergency Medicine

## 2024-01-06 NOTE — PROGRESS NOTES
EPAT - Social Work Psychiatric Assessment    Arrival Details  Mode of Arrival: Ambulance  Admission Source: Nursing home  Admission Type: Involuntary  EPAT Assessment Start Date: 01/06/24  EPAT Assessment Start Time: 1010  Name of : EKTA Sanchez LSW    History of Present Illness  Admission Reason: Suicide Attempt  HPI: Patient is a 62yo male presenting to the ED via EMS from SNF with chief complaint of suicide attempt. Reportedly, “patient from nursing facility where the staff said he got a hold of a razor somehow and cut his left forearm in the shower. The staff states he was upset that he did not get the medicine he wanted. He states he thinks he has pneumonia and everything else because he is paranoid. He states he is suicidal”. Patient’s chart, triage, and provider note reviewed prior to assessment. Patient has a psychiatric history of Schizoaffective D/O, Depression, Anxiety, Neurocognitive D/O, Polysubstance Use D/O, and Cluster B Personality traits. Patient has a significant history of inpatient admissions, often in the context of SI/SA by cutting. Of note, patient has a recent admission to [facility] on 5/9/2023 at which point patient had reported the same concerns. He had stated he “took razors from the thomas recently and kept them for a few days before acting to cut self (…) Patient has a known history of cutting his left arm”. The patient has a LG, Angel Holguin, who was unable to be reached at time of assessment. Patient currently resides at [facility] where he resides on a secure U, pt receives psychiatric treatment through this facility. Patient indicated high risk at triage, BAL and UTOX negative on arrival.    SW Readmission Information   Readmission within 30 Days: No    Psychiatric Symptoms  Anxiety Symptoms: Generalized, Feelings of doom  Depression Symptoms: Appetite change, Feelings of helplessness, Feelings of hopelessess, Loss of interest, Sleep disturbance  Gay  Symptoms: No problems reported or observed.    Psychosis Symptoms  Hallucination Type: No problems reported or observed.  Delusion Type: No problems reported or observed.    Additional Symptoms - Adult  Generalized Anxiety Disorder: Difficult to control worry, Difficulity concentrating, Excessive anxiety/worry, Restlessness  Obsessive Compulsive Disorder: No problems reported or observed.  Panic Attack: No problems reported or observed.  Post Traumatic Stress Disorder: No problems reported or observed.  Delirium: No problems reported or observed.    Past Psychiatric History/Meds/Treatments  Past Psychiatric History: Psychiatric Diagnosis: Schizoaffective D/O, Depression, Anxiety, Dementia, Cluster B Personality Traits // Current MH Center: [facility] // Previous Admissions: Numerous, most recently [facility] 5/2023 // History of self-harm/SA: over 7 SA, numerous by cutting  Past Psychiatric Meds/Treatments: Current medication: per most recent outpatient note:    benztropine 1 mg tablet, buPROPion  mg 24 hr tablet, gabapentin 600 mg tablet, metFORMIN 500 mg tablet  Take 1 tablet (500 mg) by mouth 2 times a day with meals, risperiDONE     QUEtiapine 200 mg tablet, QUEtiapine 50 mg tablet, QUEtiapine 200 mg tablet  Take 1 tablet (200 mg) by mouth 2 times a day.  Past Violence/Victimization History: None noted    Current Mental Health Contacts   Name/Phone Number: [facility]  Provider Name/Phone Number: [facility]  Provider Last Appointment Date: unknown    Support System: Community    Living Arrangement: House, Lives with someone (SNF)    Home Safety  Feels Safe Living in Home: No    Income Information  Employment Status for: Patient  Employment Status: Disabled  Income Source: Disability    [place] Service/Education History  Current or Previous  Service: None  Education Level:  (did not assess)  History of Learning Problems: No  History of School Behavior Problems:  No    Social/Cultural History  Social History: [place] Citizen: yes // Payee: none // Guardian/POA: Angel Holguin (LG)  Cultural Requests During Hospitalization: none  Spiritual Requests During Hospitalization: none  Important Activities:  (none reported)    Legal  Legal Considerations: Patient/ Family Capacity to Make Sound Judgments  Assistance with Managing/Advocating Healthcare Needs: Legal Guardian  Criminal Activity/ Legal Involvement Pertinent to Current Situation/ Hospitalization: None  Legal Concerns: Denies    Drug Screening  Have you used any substances (canabis, cocaine, heroin, hallucinogens, inhalants, etc.) in the past 12 months?: No  Have you used any prescription drugs other than prescribed in the past 12 months?: No  Is a toxicology screen needed?: Yes    Stage of Change  Stage of Change: Maintenance  History of Treatment:  (unknown)  Type of Treatment Offered: Inpatient (psych)  Treatment Offered:  (n/a)  Duration of Substance Use: unknown  Frequency of Substance Use: chart hx  Age of First Substance Use: unknown    Psychosocial  Psychosocial (WDL): Exceptions to WDL  Behaviors/Mood: Cooperative, Calm  Affect: Appropriate to circumstances  Needs Expressed: Denies    Orientation  Orientation Level: Disoriented to time    General Appearance  Motor Activity: Unremarkable  Speech Pattern: Excessively soft, Speech impairments  General Attitude: Cooperative  Appearance/Hygiene: Disheveled    Thought Process  Coherency: [place] thinking  Content: Blaming others  Delusions:  (None)  Perception: Not altered  Hallucination: None  Judgment/Insight: Impaired  Confusion: Mild  Cognition: Appropriate safety awareness, Follows commands    Sleep Pattern  Sleep Pattern: Difficulty falling asleep, Disturbed/interrupted sleep    Risk Factors  Self Harm/Suicidal Ideation Plan: Continued SI, no current plan  Previous Self Harm/Suicidal Plans: Presenting for SA by cutting; hx of such  Risk Factors: Male, Major mental  illness, Personality disorder (antisocial, borderline)    Violence Risk Assessment  Assessment of Violence: None noted  Thoughts of Harm to Others: No    Ability to Assess Risk Screen  Risk Screen - Ability to Assess: Able to be screened  [place] Suicide Severity Rating Scale (Screener/Recent Self-Report)  1. Wish to be Dead (Past 1 Month): Yes  2. Non-Specific Active Suicidal Thoughts (Past 1 Month): Yes  3. Active Suicidal Ideation with any Methods (Not Plan) Without Intent to Act (Past 1 Month): Yes  4. Active Suicidal Ideation with Some Intent to Act, Without Specific Plan (Past 1 Month): No  5. Active Suicidal Ideation with Specific Plan and Intent (Past 1 Month): No  6. Suicidal Behavior (Lifetime): Yes  6. Suicidal Behavior (3 Months): Yes  Calculated C-SSRS Risk Score (Lifetime/Recent): High Risk  Step 1: Risk Factors  Current & Past Psychiatric Dx: Mood disorder, Psychotic disorder, Alcohol/substance abuse disorders, Cluster B personality disorders or traits (i.e., borderline, antisocial, histrionic & narcissistic)  Presenting Symptoms: Hopelessness or despair, Impulsivity  Precipitants/Stressors: Triggering events leading to humiliation, shame, and/or despair (e.g. loss of relationship, financial or health status) (real or anticipated), Social isolation  Change in Treatment: Hopeless or dissatisfied with provider or treatment  Access to Lethal Methods : No  Step 2: Protective Factors   Protective Factors Internal: Fear of death or the actual act of killing self  Protective Factors External: Positive therapeutic relationships  Step 3: Suicidal Ideation Intensity  Most Severe Suicidal Ideation Identified: SI, no current plan; presenting for SA  How Many Times Have You Had These Thoughts: 2-5 times in a week  When You Have the Thoughts How Long do They Last : 1-4 hours/a lot of the time  Could/Can You Stop Thinking About Killing Yourself or Wanting to Die if You Want to: Unable to control thoughts  Are There  Things - Anyone or Anything - That Stopped You From Wanting to Die or Acting on: Deterrents most likely did not stop you  What Sort of Reasons Did You Have For Thinking About Wanting to Die or Killing Yourself: Equally to get attention, revenge or a reaction from others and to end/stop the pain  Total Score: 18  Step 5: Documentation  Risk Level:  (Patient remains high risk, discussed with Dr. Wesley)    Psychiatric Impression and Plan of Care  Assessment and Plan:     Upon assessment the patient remained calm and cooperative. Patient presented with soft, garbled speech that was difficult to understand at times. Patient endorsed initial suicide attempt and continued suicidal ideation while in the ED. He otherwise denied HI/AVH and no delusions were elicited. The patient reported he has been “depressed, suicidal” for the past week or so, stemming primarily from the belief the staff at his SNF are mistreating him. He states cutting had been him “asking for help” at his facility. Patient denied feeling safe at his facility because “they aren't caring for me”. The patient endorsed continued access to razor blades despite having a known history of cutting with razors at his facility. He reports his sleep has been “on and off” and reports diminished/no appetite. Patient states, “I think I need to go back on Clozaril, Seroquel is not working for me”. He denied knowledge of who his provider is at his facility but endorsed compliance with medications. Patient unable to identify any significant supports or deterrents to self-harm at this time.     A  was left for patient's LG with no return call at time of assessment.     Diagnostic Impression: Unspecified Depressive D/O, Cluster B Personality Traits   Psychiatric Impression and Plan for Care: Patient continues to be an elevated risk to self, endorsed continued SI in the context of recent SA. Patient has not received psychiatric care while in the ED thus far, so no  "reasonable belief for stabilization at this time. Recommendation for admission discussed with Dr. Wesley who is in agreement.     Specific Resources Provided to Patient: admission    Outcome/Disposition  Patient's Perception of Outcome Achieved: \"I don't feel safe\"  Assessment, Recommendations and Risk Level Reviewed with: Dr. Wesley  Contact Name: Angel Holguin  Contact Number(s): [phone]  Contact Relationship: LG  EPAT Assessment Completed Date: 01/06/24  EPAT Assessment Completed Time: 1224      "

## 2024-01-07 ENCOUNTER — HOSPITAL ENCOUNTER (INPATIENT)
Facility: HOSPITAL | Age: 64
LOS: 4 days | Discharge: NURSING, REHAB OR LONG-TERM CARE | DRG: 194 | End: 2024-01-11
Attending: FAMILY MEDICINE | Admitting: STUDENT IN AN ORGANIZED HEALTH CARE EDUCATION/TRAINING PROGRAM
Payer: COMMERCIAL

## 2024-01-07 ENCOUNTER — APPOINTMENT (OUTPATIENT)
Dept: CARDIOLOGY | Facility: HOSPITAL | Age: 64
DRG: 194 | End: 2024-01-07
Payer: COMMERCIAL

## 2024-01-07 VITALS
OXYGEN SATURATION: 96 % | BODY MASS INDEX: 24.78 KG/M2 | WEIGHT: 173.06 LBS | HEIGHT: 70 IN | HEART RATE: 76 BPM | SYSTOLIC BLOOD PRESSURE: 118 MMHG | RESPIRATION RATE: 18 BRPM | TEMPERATURE: 97 F | DIASTOLIC BLOOD PRESSURE: 83 MMHG

## 2024-01-07 DIAGNOSIS — K21.9 GASTROESOPHAGEAL REFLUX DISEASE WITHOUT ESOPHAGITIS: ICD-10-CM

## 2024-01-07 DIAGNOSIS — M79.89 LEFT UPPER EXTREMITY SWELLING: ICD-10-CM

## 2024-01-07 DIAGNOSIS — R78.81 BACTEREMIA: Primary | ICD-10-CM

## 2024-01-07 DIAGNOSIS — T50.905A EXTRAPYRAMIDAL MOVEMENT DISORDER, DRUG-INDUCED: ICD-10-CM

## 2024-01-07 DIAGNOSIS — F20.89 OTHER SCHIZOPHRENIA (MULTI): ICD-10-CM

## 2024-01-07 DIAGNOSIS — F41.9 ANXIETY: ICD-10-CM

## 2024-01-07 DIAGNOSIS — S41.112D LACERATION OF LEFT UPPER EXTREMITY, SUBSEQUENT ENCOUNTER: ICD-10-CM

## 2024-01-07 DIAGNOSIS — G20.A1 PARKINSON'S DISEASE, UNSPECIFIED WHETHER DYSKINESIA PRESENT, UNSPECIFIED WHETHER MANIFESTATIONS FLUCTUATE (MULTI): ICD-10-CM

## 2024-01-07 DIAGNOSIS — M79.2 CHRONIC NEUROPATHIC PAIN: ICD-10-CM

## 2024-01-07 DIAGNOSIS — F10.91 ALCOHOL USE DISORDER IN REMISSION: ICD-10-CM

## 2024-01-07 DIAGNOSIS — G25.89 EXTRAPYRAMIDAL MOVEMENT DISORDER, DRUG-INDUCED: ICD-10-CM

## 2024-01-07 DIAGNOSIS — E11.69 TYPE 2 DIABETES MELLITUS WITH OTHER SPECIFIED COMPLICATION, WITHOUT LONG-TERM CURRENT USE OF INSULIN (MULTI): ICD-10-CM

## 2024-01-07 DIAGNOSIS — G89.29 CHRONIC NEUROPATHIC PAIN: ICD-10-CM

## 2024-01-07 DIAGNOSIS — M79.609: ICD-10-CM

## 2024-01-07 DIAGNOSIS — F32.89 OTHER DEPRESSION: ICD-10-CM

## 2024-01-07 DIAGNOSIS — Z00.00 HEALTH CARE MAINTENANCE: ICD-10-CM

## 2024-01-07 LAB
25(OH)D3 SERPL-MCNC: 38 NG/ML (ref 30–100)
ALBUMIN SERPL BCP-MCNC: 4 G/DL (ref 3.4–5)
ALP SERPL-CCNC: 67 U/L (ref 33–136)
ALT SERPL W P-5'-P-CCNC: 11 U/L (ref 10–52)
ANION GAP SERPL CALC-SCNC: 15 MMOL/L (ref 10–20)
AST SERPL W P-5'-P-CCNC: 10 U/L (ref 9–39)
BASOPHILS # BLD AUTO: 0.06 X10*3/UL (ref 0–0.1)
BASOPHILS NFR BLD AUTO: 0.7 %
BILIRUB SERPL-MCNC: 0.6 MG/DL (ref 0–1.2)
BUN SERPL-MCNC: 11 MG/DL (ref 6–23)
CALCIUM SERPL-MCNC: 9.3 MG/DL (ref 8.6–10.3)
CARDIAC TROPONIN I PNL SERPL HS: 5 NG/L (ref 0–20)
CHLORIDE SERPL-SCNC: 95 MMOL/L (ref 98–107)
CHOLEST SERPL-MCNC: 161 MG/DL (ref 0–199)
CHOLESTEROL/HDL RATIO: 3.7
CO2 SERPL-SCNC: 24 MMOL/L (ref 21–32)
CREAT SERPL-MCNC: 0.56 MG/DL (ref 0.5–1.3)
EOSINOPHIL # BLD AUTO: 0.2 X10*3/UL (ref 0–0.7)
EOSINOPHIL NFR BLD AUTO: 2.4 %
ERYTHROCYTE [DISTWIDTH] IN BLOOD BY AUTOMATED COUNT: 12.4 % (ref 11.5–14.5)
GFR SERPL CREATININE-BSD FRML MDRD: >90 ML/MIN/1.73M*2
GLUCOSE BLD MANUAL STRIP-MCNC: 116 MG/DL (ref 74–99)
GLUCOSE BLD MANUAL STRIP-MCNC: 173 MG/DL (ref 74–99)
GLUCOSE P FAST SERPL-MCNC: 126 MG/DL (ref 74–99)
GLUCOSE SERPL-MCNC: 105 MG/DL (ref 74–99)
HCT VFR BLD AUTO: 41.6 % (ref 41–52)
HDLC SERPL-MCNC: 43.9 MG/DL
HGB BLD-MCNC: 14.1 G/DL (ref 13.5–17.5)
HOLD SPECIMEN: NORMAL
IMM GRANULOCYTES # BLD AUTO: 0.03 X10*3/UL (ref 0–0.7)
IMM GRANULOCYTES NFR BLD AUTO: 0.4 % (ref 0–0.9)
LACTATE SERPL-SCNC: 0.8 MMOL/L (ref 0.4–2)
LDLC SERPL CALC-MCNC: 88 MG/DL
LYMPHOCYTES # BLD AUTO: 1.59 X10*3/UL (ref 1.2–4.8)
LYMPHOCYTES NFR BLD AUTO: 18.8 %
MAGNESIUM SERPL-MCNC: 1.86 MG/DL (ref 1.6–2.4)
MCH RBC QN AUTO: 29.3 PG (ref 26–34)
MCHC RBC AUTO-ENTMCNC: 33.9 G/DL (ref 32–36)
MCV RBC AUTO: 86 FL (ref 80–100)
MONOCYTES # BLD AUTO: 0.87 X10*3/UL (ref 0.1–1)
MONOCYTES NFR BLD AUTO: 10.3 %
MRSA DNA SPEC QL NAA+PROBE: NOT DETECTED
NEUTROPHILS # BLD AUTO: 5.72 X10*3/UL (ref 1.2–7.7)
NEUTROPHILS NFR BLD AUTO: 67.4 %
NON HDL CHOLESTEROL: 117 MG/DL (ref 0–149)
NRBC BLD-RTO: 0 /100 WBCS (ref 0–0)
PHOSPHATE SERPL-MCNC: 4.1 MG/DL (ref 2.5–4.9)
PLATELET # BLD AUTO: 430 X10*3/UL (ref 150–450)
POTASSIUM SERPL-SCNC: 4 MMOL/L (ref 3.5–5.3)
PROT SERPL-MCNC: 7.2 G/DL (ref 6.4–8.2)
RBC # BLD AUTO: 4.82 X10*6/UL (ref 4.5–5.9)
SODIUM SERPL-SCNC: 130 MMOL/L (ref 136–145)
TRIGL SERPL-MCNC: 146 MG/DL (ref 0–149)
VLDL: 29 MG/DL (ref 0–40)
WBC # BLD AUTO: 8.5 X10*3/UL (ref 4.4–11.3)

## 2024-01-07 PROCEDURE — 2500000001 HC RX 250 WO HCPCS SELF ADMINISTERED DRUGS (ALT 637 FOR MEDICARE OP): Performed by: INTERNAL MEDICINE

## 2024-01-07 PROCEDURE — 94760 N-INVAS EAR/PLS OXIMETRY 1: CPT

## 2024-01-07 PROCEDURE — A4217 STERILE WATER/SALINE, 500 ML: HCPCS

## 2024-01-07 PROCEDURE — 87040 BLOOD CULTURE FOR BACTERIA: CPT | Mod: GEALAB

## 2024-01-07 PROCEDURE — 9420000001 HC RT PATIENT EDUCATION 5 MIN

## 2024-01-07 PROCEDURE — 82947 ASSAY GLUCOSE BLOOD QUANT: CPT

## 2024-01-07 PROCEDURE — 87449 NOS EACH ORGANISM AG IA: CPT | Mod: GEALAB

## 2024-01-07 PROCEDURE — 2500000001 HC RX 250 WO HCPCS SELF ADMINISTERED DRUGS (ALT 637 FOR MEDICARE OP)

## 2024-01-07 PROCEDURE — 2500000004 HC RX 250 GENERAL PHARMACY W/ HCPCS (ALT 636 FOR OP/ED): Performed by: INTERNAL MEDICINE

## 2024-01-07 PROCEDURE — 84100 ASSAY OF PHOSPHORUS: CPT

## 2024-01-07 PROCEDURE — 85025 COMPLETE CBC W/AUTO DIFF WBC: CPT

## 2024-01-07 PROCEDURE — 2500000001 HC RX 250 WO HCPCS SELF ADMINISTERED DRUGS (ALT 637 FOR MEDICARE OP): Performed by: PSYCHIATRY & NEUROLOGY

## 2024-01-07 PROCEDURE — 2500000004 HC RX 250 GENERAL PHARMACY W/ HCPCS (ALT 636 FOR OP/ED): Performed by: PSYCHIATRY & NEUROLOGY

## 2024-01-07 PROCEDURE — 2500000002 HC RX 250 W HCPCS SELF ADMINISTERED DRUGS (ALT 637 FOR MEDICARE OP, ALT 636 FOR OP/ED): Performed by: PSYCHIATRY & NEUROLOGY

## 2024-01-07 PROCEDURE — 96374 THER/PROPH/DIAG INJ IV PUSH: CPT

## 2024-01-07 PROCEDURE — G0378 HOSPITAL OBSERVATION PER HR: HCPCS

## 2024-01-07 PROCEDURE — 99236 HOSP IP/OBS SAME DATE HI 85: CPT | Performed by: PSYCHIATRY & NEUROLOGY

## 2024-01-07 PROCEDURE — 83605 ASSAY OF LACTIC ACID: CPT

## 2024-01-07 PROCEDURE — 83735 ASSAY OF MAGNESIUM: CPT

## 2024-01-07 PROCEDURE — 93010 ELECTROCARDIOGRAM REPORT: CPT | Performed by: INTERNAL MEDICINE

## 2024-01-07 PROCEDURE — 80061 LIPID PANEL: CPT | Performed by: PSYCHIATRY & NEUROLOGY

## 2024-01-07 PROCEDURE — 93005 ELECTROCARDIOGRAM TRACING: CPT

## 2024-01-07 PROCEDURE — 82306 VITAMIN D 25 HYDROXY: CPT | Mod: GEALAB | Performed by: PSYCHIATRY & NEUROLOGY

## 2024-01-07 PROCEDURE — 84075 ASSAY ALKALINE PHOSPHATASE: CPT

## 2024-01-07 PROCEDURE — 36415 COLL VENOUS BLD VENIPUNCTURE: CPT | Performed by: PSYCHIATRY & NEUROLOGY

## 2024-01-07 PROCEDURE — 84484 ASSAY OF TROPONIN QUANT: CPT

## 2024-01-07 PROCEDURE — 99232 SBSQ HOSP IP/OBS MODERATE 35: CPT | Performed by: INTERNAL MEDICINE

## 2024-01-07 PROCEDURE — 82947 ASSAY GLUCOSE BLOOD QUANT: CPT | Performed by: PSYCHIATRY & NEUROLOGY

## 2024-01-07 PROCEDURE — 87899 AGENT NOS ASSAY W/OPTIC: CPT | Mod: GEALAB

## 2024-01-07 PROCEDURE — 2500000004 HC RX 250 GENERAL PHARMACY W/ HCPCS (ALT 636 FOR OP/ED)

## 2024-01-07 PROCEDURE — A4217 STERILE WATER/SALINE, 500 ML: HCPCS | Performed by: INTERNAL MEDICINE

## 2024-01-07 PROCEDURE — 36415 COLL VENOUS BLD VENIPUNCTURE: CPT

## 2024-01-07 PROCEDURE — 1200000002 HC GENERAL ROOM WITH TELEMETRY DAILY

## 2024-01-07 PROCEDURE — 99222 1ST HOSP IP/OBS MODERATE 55: CPT | Performed by: STUDENT IN AN ORGANIZED HEALTH CARE EDUCATION/TRAINING PROGRAM

## 2024-01-07 PROCEDURE — 87640 STAPH A DNA AMP PROBE: CPT

## 2024-01-07 RX ORDER — AZITHROMYCIN 500 MG/1
500 TABLET, FILM COATED ORAL
Status: COMPLETED | OUTPATIENT
Start: 2024-01-07 | End: 2024-01-09

## 2024-01-07 RX ORDER — ALPRAZOLAM 0.5 MG/1
0.25 TABLET ORAL 2 TIMES DAILY PRN
Status: DISCONTINUED | OUTPATIENT
Start: 2024-01-07 | End: 2024-01-11 | Stop reason: HOSPADM

## 2024-01-07 RX ORDER — LORAZEPAM 1 MG/1
1 TABLET ORAL ONCE
Status: COMPLETED | OUTPATIENT
Start: 2024-01-07 | End: 2024-01-07

## 2024-01-07 RX ORDER — BENZTROPINE MESYLATE 1 MG/1
1 TABLET ORAL 2 TIMES DAILY
Status: CANCELLED | OUTPATIENT
Start: 2024-01-07

## 2024-01-07 RX ORDER — QUETIAPINE FUMARATE 50 MG/1
50 TABLET, FILM COATED ORAL NIGHTLY
Status: CANCELLED | OUTPATIENT
Start: 2024-01-07

## 2024-01-07 RX ORDER — QUETIAPINE FUMARATE 100 MG/1
100 TABLET, FILM COATED ORAL 2 TIMES DAILY
Status: CANCELLED | OUTPATIENT
Start: 2024-01-07

## 2024-01-07 RX ORDER — BUPROPION HYDROCHLORIDE 150 MG/1
300 TABLET ORAL EVERY MORNING
Status: CANCELLED | OUTPATIENT
Start: 2024-01-07

## 2024-01-07 RX ORDER — CEFTRIAXONE 1 G/50ML
1 INJECTION, SOLUTION INTRAVENOUS EVERY 24 HOURS
Status: DISCONTINUED | OUTPATIENT
Start: 2024-01-07 | End: 2024-01-11

## 2024-01-07 RX ORDER — BENZTROPINE MESYLATE 1 MG/1
1 TABLET ORAL 2 TIMES DAILY
Status: DISCONTINUED | OUTPATIENT
Start: 2024-01-07 | End: 2024-01-07 | Stop reason: HOSPADM

## 2024-01-07 RX ORDER — CALCIUM CARBONATE 200(500)MG
500 TABLET,CHEWABLE ORAL ONCE
Status: DISCONTINUED | OUTPATIENT
Start: 2024-01-07 | End: 2024-01-07 | Stop reason: HOSPADM

## 2024-01-07 RX ORDER — PANTOPRAZOLE SODIUM 40 MG/1
40 TABLET, DELAYED RELEASE ORAL
Status: CANCELLED | OUTPATIENT
Start: 2024-01-07

## 2024-01-07 RX ORDER — QUETIAPINE FUMARATE 100 MG/1
100 TABLET, FILM COATED ORAL 2 TIMES DAILY
Status: DISCONTINUED | OUTPATIENT
Start: 2024-01-07 | End: 2024-01-11 | Stop reason: HOSPADM

## 2024-01-07 RX ORDER — BISMUTH SUBSALICYLATE 262 MG
1 TABLET,CHEWABLE ORAL DAILY
Status: CANCELLED | OUTPATIENT
Start: 2024-01-07

## 2024-01-07 RX ORDER — LORAZEPAM 0.5 MG/1
0.5 TABLET ORAL ONCE
Status: COMPLETED | OUTPATIENT
Start: 2024-01-07 | End: 2024-01-07

## 2024-01-07 RX ORDER — ACETAMINOPHEN 325 MG/1
650 TABLET ORAL EVERY 4 HOURS PRN
Status: DISCONTINUED | OUTPATIENT
Start: 2024-01-07 | End: 2024-01-09

## 2024-01-07 RX ORDER — QUETIAPINE FUMARATE 100 MG/1
100 TABLET, FILM COATED ORAL 2 TIMES DAILY
Status: DISCONTINUED | OUTPATIENT
Start: 2024-01-07 | End: 2024-01-07 | Stop reason: HOSPADM

## 2024-01-07 RX ORDER — MULTIVIT-MIN/IRON FUM/FOLIC AC 7.5 MG-4
1 TABLET ORAL DAILY
Status: DISCONTINUED | OUTPATIENT
Start: 2024-01-07 | End: 2024-01-11 | Stop reason: HOSPADM

## 2024-01-07 RX ORDER — INSULIN LISPRO 100 [IU]/ML
0-10 INJECTION, SOLUTION INTRAVENOUS; SUBCUTANEOUS
Status: DISCONTINUED | OUTPATIENT
Start: 2024-01-07 | End: 2024-01-11 | Stop reason: HOSPADM

## 2024-01-07 RX ORDER — ENOXAPARIN SODIUM 100 MG/ML
40 INJECTION SUBCUTANEOUS EVERY 24 HOURS
Status: DISCONTINUED | OUTPATIENT
Start: 2024-01-07 | End: 2024-01-11 | Stop reason: HOSPADM

## 2024-01-07 RX ORDER — METFORMIN HYDROCHLORIDE 500 MG/1
500 TABLET ORAL
Status: CANCELLED | OUTPATIENT
Start: 2024-01-07

## 2024-01-07 RX ORDER — QUETIAPINE FUMARATE 25 MG/1
50 TABLET, FILM COATED ORAL NIGHTLY
Status: DISCONTINUED | OUTPATIENT
Start: 2024-01-07 | End: 2024-01-11 | Stop reason: HOSPADM

## 2024-01-07 RX ORDER — NALTREXONE HYDROCHLORIDE 50 MG/1
50 TABLET, FILM COATED ORAL DAILY
Status: CANCELLED | OUTPATIENT
Start: 2024-01-07

## 2024-01-07 RX ORDER — METFORMIN HYDROCHLORIDE 500 MG/1
500 TABLET ORAL
Status: DISCONTINUED | OUTPATIENT
Start: 2024-01-07 | End: 2024-01-11 | Stop reason: HOSPADM

## 2024-01-07 RX ORDER — BUPROPION HYDROCHLORIDE 150 MG/1
300 TABLET ORAL EVERY MORNING
Status: DISCONTINUED | OUTPATIENT
Start: 2024-01-07 | End: 2024-01-07 | Stop reason: HOSPADM

## 2024-01-07 RX ORDER — DEXTROSE MONOHYDRATE 100 MG/ML
0.3 INJECTION, SOLUTION INTRAVENOUS ONCE AS NEEDED
Status: DISCONTINUED | OUTPATIENT
Start: 2024-01-07 | End: 2024-01-11 | Stop reason: HOSPADM

## 2024-01-07 RX ORDER — METFORMIN HYDROCHLORIDE 500 MG/1
500 TABLET ORAL
Status: DISCONTINUED | OUTPATIENT
Start: 2024-01-07 | End: 2024-01-07 | Stop reason: HOSPADM

## 2024-01-07 RX ORDER — BUPROPION HYDROCHLORIDE 150 MG/1
300 TABLET ORAL EVERY MORNING
Status: DISCONTINUED | OUTPATIENT
Start: 2024-01-08 | End: 2024-01-11 | Stop reason: HOSPADM

## 2024-01-07 RX ORDER — BENZTROPINE MESYLATE 1 MG/1
1 TABLET ORAL 2 TIMES DAILY
Status: DISCONTINUED | OUTPATIENT
Start: 2024-01-07 | End: 2024-01-11 | Stop reason: HOSPADM

## 2024-01-07 RX ORDER — NALTREXONE HYDROCHLORIDE 50 MG/1
50 TABLET, FILM COATED ORAL DAILY
Status: DISCONTINUED | OUTPATIENT
Start: 2024-01-07 | End: 2024-01-11 | Stop reason: HOSPADM

## 2024-01-07 RX ORDER — QUETIAPINE FUMARATE 50 MG/1
50 TABLET, FILM COATED ORAL NIGHTLY
Status: DISCONTINUED | OUTPATIENT
Start: 2024-01-07 | End: 2024-01-07 | Stop reason: HOSPADM

## 2024-01-07 RX ORDER — NALTREXONE HYDROCHLORIDE 50 MG/1
50 TABLET, FILM COATED ORAL DAILY
Status: DISCONTINUED | OUTPATIENT
Start: 2024-01-07 | End: 2024-01-07 | Stop reason: HOSPADM

## 2024-01-07 RX ORDER — PANTOPRAZOLE SODIUM 40 MG/1
40 TABLET, DELAYED RELEASE ORAL
Status: DISCONTINUED | OUTPATIENT
Start: 2024-01-07 | End: 2024-01-11 | Stop reason: HOSPADM

## 2024-01-07 RX ORDER — DEXTROSE 50 % IN WATER (D50W) INTRAVENOUS SYRINGE
25
Status: DISCONTINUED | OUTPATIENT
Start: 2024-01-07 | End: 2024-01-11 | Stop reason: HOSPADM

## 2024-01-07 RX ORDER — OXYCODONE HYDROCHLORIDE 5 MG/1
2.5 TABLET ORAL EVERY 6 HOURS PRN
Status: DISCONTINUED | OUTPATIENT
Start: 2024-01-07 | End: 2024-01-09

## 2024-01-07 RX ADMIN — PANTOPRAZOLE SODIUM 40 MG: 40 TABLET, DELAYED RELEASE ORAL at 06:29

## 2024-01-07 RX ADMIN — Medication 1 TABLET: at 17:58

## 2024-01-07 RX ADMIN — VANCOMYCIN HYDROCHLORIDE 1250 MG: 10 INJECTION, POWDER, LYOPHILIZED, FOR SOLUTION INTRAVENOUS at 11:16

## 2024-01-07 RX ADMIN — LORAZEPAM 0.5 MG: 0.5 TABLET ORAL at 16:30

## 2024-01-07 RX ADMIN — PANTOPRAZOLE SODIUM 40 MG: 40 TABLET, DELAYED RELEASE ORAL at 16:30

## 2024-01-07 RX ADMIN — BENZTROPINE MESYLATE 1 MG: 1 TABLET ORAL at 20:19

## 2024-01-07 RX ADMIN — CEFTRIAXONE SODIUM 1 G: 1 INJECTION, SOLUTION INTRAVENOUS at 17:58

## 2024-01-07 RX ADMIN — ENOXAPARIN SODIUM 40 MG: 40 INJECTION SUBCUTANEOUS at 20:19

## 2024-01-07 RX ADMIN — QUETIAPINE FUMARATE 100 MG: 100 TABLET ORAL at 20:19

## 2024-01-07 RX ADMIN — OXYCODONE HYDROCHLORIDE 2.5 MG: 5 TABLET ORAL at 20:20

## 2024-01-07 RX ADMIN — ALPRAZOLAM 0.25 MG: 0.5 TABLET ORAL at 19:43

## 2024-01-07 RX ADMIN — LORAZEPAM 1 MG: 1 TABLET ORAL at 06:29

## 2024-01-07 RX ADMIN — VANCOMYCIN HYDROCHLORIDE 1250 MG: 10 INJECTION, POWDER, LYOPHILIZED, FOR SOLUTION INTRAVENOUS at 23:16

## 2024-01-07 RX ADMIN — Medication 1 TABLET: at 08:47

## 2024-01-07 RX ADMIN — BENZTROPINE MESYLATE 1 MG: 1 TABLET ORAL at 10:28

## 2024-01-07 RX ADMIN — BUPROPION HYDROCHLORIDE 300 MG: 150 TABLET, FILM COATED, EXTENDED RELEASE ORAL at 10:28

## 2024-01-07 RX ADMIN — IBUPROFEN 400 MG: 400 TABLET, FILM COATED ORAL at 07:04

## 2024-01-07 RX ADMIN — METFORMIN HYDROCHLORIDE 500 MG: 500 TABLET ORAL at 08:47

## 2024-01-07 RX ADMIN — AZITHROMYCIN 500 MG: 500 TABLET, FILM COATED ORAL at 17:58

## 2024-01-07 RX ADMIN — QUETIAPINE FUMARATE 50 MG: 25 TABLET ORAL at 20:19

## 2024-01-07 RX ADMIN — NALTREXONE HYDROCHLORIDE 50 MG: 50 TABLET, FILM COATED ORAL at 10:28

## 2024-01-07 RX ADMIN — NALTREXONE HYDROCHLORIDE 50 MG: 50 TABLET, FILM COATED ORAL at 17:59

## 2024-01-07 SDOH — SOCIAL STABILITY: SOCIAL INSECURITY: DOES ANYONE TRY TO KEEP YOU FROM HAVING/CONTACTING OTHER FRIENDS OR DOING THINGS OUTSIDE YOUR HOME?: NO

## 2024-01-07 SDOH — SOCIAL STABILITY: SOCIAL INSECURITY: ARE YOU OR HAVE YOU BEEN THREATENED OR ABUSED PHYSICALLY, EMOTIONALLY, OR SEXUALLY BY ANYONE?: NO

## 2024-01-07 SDOH — SOCIAL STABILITY: SOCIAL INSECURITY: DO YOU FEEL ANYONE HAS EXPLOITED OR TAKEN ADVANTAGE OF YOU FINANCIALLY OR OF YOUR PERSONAL PROPERTY?: NO

## 2024-01-07 SDOH — SOCIAL STABILITY: SOCIAL INSECURITY: ARE THERE ANY APPARENT SIGNS OF INJURIES/BEHAVIORS THAT COULD BE RELATED TO ABUSE/NEGLECT?: NO

## 2024-01-07 SDOH — SOCIAL STABILITY: SOCIAL INSECURITY: DO YOU FEEL UNSAFE GOING BACK TO THE PLACE WHERE YOU ARE LIVING?: NO

## 2024-01-07 SDOH — SOCIAL STABILITY: SOCIAL INSECURITY: DO YOU FEEL ANYONE HAS EXPLOITED OR TAKEN ADVANTAGE OF YOU FINANCIALLY OR OF YOUR PERSONAL PROPERTY?: UNABLE TO ASSESS

## 2024-01-07 SDOH — SOCIAL STABILITY: SOCIAL INSECURITY: WERE YOU ABLE TO COMPLETE ALL THE BEHAVIORAL HEALTH SCREENINGS?: NO

## 2024-01-07 SDOH — SOCIAL STABILITY: SOCIAL INSECURITY: HAS ANYONE EVER THREATENED TO HURT YOUR FAMILY OR YOUR PETS?: NO

## 2024-01-07 SDOH — SOCIAL STABILITY: SOCIAL INSECURITY: HAVE YOU HAD THOUGHTS OF HARMING ANYONE ELSE?: NO

## 2024-01-07 SDOH — SOCIAL STABILITY: SOCIAL INSECURITY: WERE YOU ABLE TO COMPLETE ALL THE BEHAVIORAL HEALTH SCREENINGS?: YES

## 2024-01-07 SDOH — SOCIAL STABILITY: SOCIAL INSECURITY: HAS ANYONE EVER THREATENED TO HURT YOUR FAMILY OR YOUR PETS?: UNABLE TO ASSESS

## 2024-01-07 SDOH — SOCIAL STABILITY: SOCIAL INSECURITY: ABUSE: ADULT

## 2024-01-07 SDOH — SOCIAL STABILITY: SOCIAL INSECURITY: DOES ANYONE TRY TO KEEP YOU FROM HAVING/CONTACTING OTHER FRIENDS OR DOING THINGS OUTSIDE YOUR HOME?: UNABLE TO ASSESS

## 2024-01-07 SDOH — SOCIAL STABILITY: SOCIAL INSECURITY: ARE YOU OR HAVE YOU BEEN THREATENED OR ABUSED PHYSICALLY, EMOTIONALLY, OR SEXUALLY BY ANYONE?: UNABLE TO ASSESS

## 2024-01-07 ASSESSMENT — ENCOUNTER SYMPTOMS
DYSURIA: 0
COUGH: 1
ABDOMINAL PAIN: 0
VOMITING: 0
NERVOUS/ANXIOUS: 1
SHORTNESS OF BREATH: 1
NAUSEA: 0

## 2024-01-07 ASSESSMENT — ACTIVITIES OF DAILY LIVING (ADL)
WALKS IN HOME: NEEDS ASSISTANCE
JUDGMENT_ADEQUATE_SAFELY_COMPLETE_DAILY_ACTIVITIES: YES
FEEDING YOURSELF: INDEPENDENT
ASSISTIVE_DEVICE: WALKER;WHEELCHAIR
TOILETING: NEEDS ASSISTANCE
GROOMING: NEEDS ASSISTANCE
BATHING: NEEDS ASSISTANCE
LACK_OF_TRANSPORTATION: PATIENT UNABLE TO ANSWER
DRESSING YOURSELF: NEEDS ASSISTANCE
HEARING - RIGHT EAR: FUNCTIONAL
HEARING - LEFT EAR: FUNCTIONAL
PATIENT'S MEMORY ADEQUATE TO SAFELY COMPLETE DAILY ACTIVITIES?: YES
LACK_OF_TRANSPORTATION: PATIENT UNABLE TO ANSWER
ADEQUATE_TO_COMPLETE_ADL: YES

## 2024-01-07 ASSESSMENT — LIFESTYLE VARIABLES
SUBSTANCE_ABUSE_PAST_12_MONTHS: NO
AUDIT-C TOTAL SCORE: 0
AUDIT-C TOTAL SCORE: 0
TOTAL_SCORE: 2
SKIP TO QUESTIONS 9-10: 1
AUDIT-C TOTAL SCORE: 0
HOW OFTEN DO YOU HAVE A DRINK CONTAINING ALCOHOL: NEVER
HOW OFTEN DO YOU HAVE 6 OR MORE DRINKS ON ONE OCCASION: NEVER
AUDIT-C TOTAL SCORE: 0
HOW MANY STANDARD DRINKS CONTAINING ALCOHOL DO YOU HAVE ON A TYPICAL DAY: PATIENT DOES NOT DRINK
SKIP TO QUESTIONS 9-10: 1
HOW OFTEN DO YOU HAVE 6 OR MORE DRINKS ON ONE OCCASION: NEVER
HOW OFTEN DO YOU HAVE A DRINK CONTAINING ALCOHOL: NEVER
PRESCIPTION_ABUSE_PAST_12_MONTHS: NO
HOW MANY STANDARD DRINKS CONTAINING ALCOHOL DO YOU HAVE ON A TYPICAL DAY: PATIENT DOES NOT DRINK
PRESCIPTION_ABUSE_PAST_12_MONTHS: NO
SUBSTANCE_ABUSE_PAST_12_MONTHS: NO

## 2024-01-07 ASSESSMENT — PATIENT HEALTH QUESTIONNAIRE - PHQ9
1. LITTLE INTEREST OR PLEASURE IN DOING THINGS: MORE THAN HALF THE DAYS
SUM OF ALL RESPONSES TO PHQ9 QUESTIONS 1 & 2: 5
SUM OF ALL RESPONSES TO PHQ9 QUESTIONS 1 & 2: 5
2. FEELING DOWN, DEPRESSED OR HOPELESS: NEARLY EVERY DAY
2. FEELING DOWN, DEPRESSED OR HOPELESS: NEARLY EVERY DAY
1. LITTLE INTEREST OR PLEASURE IN DOING THINGS: MORE THAN HALF THE DAYS

## 2024-01-07 ASSESSMENT — PAIN SCALES - GENERAL
PAINLEVEL_OUTOF10: 10 - WORST POSSIBLE PAIN
PAINLEVEL_OUTOF10: 6
PAINLEVEL_OUTOF10: 0 - NO PAIN
PAINLEVEL_OUTOF10: 10 - WORST POSSIBLE PAIN

## 2024-01-07 ASSESSMENT — COLUMBIA-SUICIDE SEVERITY RATING SCALE - C-SSRS
4. HAVE YOU HAD THESE THOUGHTS AND HAD SOME INTENTION OF ACTING ON THEM?: NO
2. HAVE YOU ACTUALLY HAD ANY THOUGHTS OF KILLING YOURSELF?: NO
6. HAVE YOU EVER DONE ANYTHING, STARTED TO DO ANYTHING, OR PREPARED TO DO ANYTHING TO END YOUR LIFE?: YES
1. IN THE PAST MONTH, HAVE YOU WISHED YOU WERE DEAD OR WISHED YOU COULD GO TO SLEEP AND NOT WAKE UP?: YES
6. HAVE YOU EVER DONE ANYTHING, STARTED TO DO ANYTHING, OR PREPARED TO DO ANYTHING TO END YOUR LIFE?: YES
5. HAVE YOU STARTED TO WORK OUT OR WORKED OUT THE DETAILS OF HOW TO KILL YOURSELF? DO YOU INTEND TO CARRY OUT THIS PLAN?: YES

## 2024-01-07 ASSESSMENT — COGNITIVE AND FUNCTIONAL STATUS - GENERAL
DRESSING REGULAR LOWER BODY CLOTHING: A LITTLE
DAILY ACTIVITIY SCORE: 20
PATIENT BASELINE BEDBOUND: NO
TOILETING: A LITTLE
HELP NEEDED FOR BATHING: A LITTLE
DRESSING REGULAR UPPER BODY CLOTHING: A LITTLE
CLIMB 3 TO 5 STEPS WITH RAILING: A LOT
MOVING FROM LYING ON BACK TO SITTING ON SIDE OF FLAT BED WITH BEDRAILS: A LITTLE
TURNING FROM BACK TO SIDE WHILE IN FLAT BAD: A LITTLE
MOVING TO AND FROM BED TO CHAIR: A LITTLE
STANDING UP FROM CHAIR USING ARMS: A LITTLE
WALKING IN HOSPITAL ROOM: A LOT
MOBILITY SCORE: 16

## 2024-01-07 ASSESSMENT — PAIN - FUNCTIONAL ASSESSMENT
PAIN_FUNCTIONAL_ASSESSMENT: 0-10
PAIN_FUNCTIONAL_ASSESSMENT: 0-10

## 2024-01-07 NOTE — CARE PLAN
"The patient's goals for the shift include ESTHER    The clinical goals for the shift include Maintain a safe environment for the patient, pain relief and medication compliance    Gerardo has slept well at intervals this evening.  When awake, he continues to ask for \"Lorazepam.\"  "

## 2024-01-07 NOTE — NURSING NOTE
"Gerardo was partially cooperative with his admission questions, often, he would answer non-sensibly, or I could not understand what he was saying.  He did indicated a cut, that had been sutured in the ER, was why he was in the hospital, I felt he was oriented X 1-2.  He claimed to feel no anxiety, he wondered aloud about having depression, he said he \"would off\" himself, I.e. \"kill himself\" prior to his discharge from the U.  He would not harm anyone else while he was hospitalized here.  He hears constant voices that tell him he \"is a bad person and he should hurt himself and die!\"  He complained of pain 10/10 in his left mid arm where he had cut himself with a razor in an attempt to kill himself, when I offered him Ibuprofen he told me \"I don't want that.\"  He looks unsteady when he tries to stand up, we assisted him to use a urinal, and used a wheelchair since he needed 2-3 staff to help him safely walk.  I helped him change his clothing and he lay down, but would spring up from time to time.  I gave him Melatonin 5 mg by mouth, while he requested to have \"Lorazepam\" several times.    0326 - Gerardo is sleeping soundly.    0528 - Gerardo continues to sleep in his bed.    0605 - Dr. Miles came to see Gerardo.  He gave Gerardo Lorazepam 1 mg, by mouth to be taken now, for a single dose.    0638 -  I reviewed with Gerardo a one-time dose of Lorazepam 1 mg, by mouth and pantoprazole 40 mg, by mouth.  He was appreciative of getting the Lorazepam 1 mg, by mouth.  "

## 2024-01-07 NOTE — CONSULTS
History Of Present Illness  Gerardo Albright is a 63 y.o. male with a past medical history of COPD, GERD, aspiration pneumonia, dementia, depression, anxiety, hypertension, insomnia, seizure disorder, Parkinson's disease, schizophrenia and generalized weakness who was admitted to the behavioral health unit for suicide attempt.  Patient has a self-inflicted laceration of the left upper arm right above the antecubital fossa.  He was also diagnosed with pneumonia and was treated with Zithromax, doxycycline and Rocephin.  He is on room air.  Medicine was consulted for the comorbidities mentioned above.  Patient states that he has been very anxious.  He used to be on Klonopin in the past but the doctor left the practice.  He was also receiving Ativan at the facility but the new physician does not want to give that to him anymore.  He did received multiple doses of Ativan in the emergency room.  Review he denies fever, chills, chest pain, palpitation, difficulty breathing, nausea, vomiting, leg pain or leg swelling.  He is complaining of pain in the left forearm where he has a laceration status post multiple stitches.     Past Medical History  He has a past medical history of At risk for falls, COPD (chronic obstructive pulmonary disease) (CMS/HCC), Dementia (CMS/HCC), Depression, GERD (gastroesophageal reflux disease), Hypertension, Insomnia, Myocardial infarction (CMS/HCC), Parkinson's disease, Schizophrenia (CMS/HCC), Seizures (CMS/HCC), and Weakness.    Surgical History  He has no past surgical history on file.     Social History  He reports that he has quit smoking. His smoking use included cigarettes. He smoked an average of .5 packs per day. He has been exposed to tobacco smoke. He has never used smokeless tobacco. He reports that he does not currently use alcohol. He reports that he does not use drugs.    Family History  No family history on file.     Allergies  Patient has no known  allergies.    Medications  Scheduled medications  calcium carbonate, 500 mg, oral, Once  melatonin, 5 mg, oral, Nightly  metFORMIN, 500 mg, oral, BID with meals  multivitamin with minerals, 1 tablet, oral, Daily  neomycin-bacitracnZn-polymyxnB, , Topical, TID  pantoprazole, 40 mg, oral, BID AC      Continuous medications     PRN medications  PRN medications: diphenhydrAMINE **OR** diphenhydrAMINE, ibuprofen, QUEtiapine **OR** OLANZapine, polyethylene glycol, traZODone    Review of systems: 10-point review of systems is negative.     Physical Exam  Constitutional: alert and oriented x 2, awake, cooperative, no acute distress  Skin: warm and dry  Head/Neck: Normocephalic, atraumatic  Eyes: clear sclera  ENMT: mucous membranes moist  Cardio: Regular rate and rhythm  Resp: Slightly diminished on the right side.  Gastrointestinal: Soft, nontender, nondistended  Musculoskeletal: ROM intact, no joint swelling  Extremities: No edema, cyanosis, or clubbing.  Patient has a laceration status post stitches in the left upper extremity right above the antecubital fossa.  Neuro: lert and oriented in person and place, sensation is intact.  Patient moves all limbs against resistance.  Psychological: Appropriate mood and behavior.      Last Recorded Vitals  /83 (BP Location: Right arm, Patient Position: Lying)   Pulse 76   Temp 36.1 °C (97 °F) (Temporal)   Resp 18   Wt 78.5 kg (173 lb 1 oz)   SpO2 96%     Relevant Results  Admission on 01/06/2024   Component Date Value Ref Range Status    Glucose, Fasting 01/07/2024 126 (H)  74 - 99 mg/dL Final    Cholesterol 01/07/2024 161  0 - 199 mg/dL Final          Age      Desirable   Borderline High   High     0-19 Y     0 - 169       170 - 199     >/= 200    20-24 Y     0 - 189       190 - 224     >/= 225         >24 Y     0 - 199       200 - 239     >/= 240   **All ranges are based on fasting samples. Specific   therapeutic targets will vary based on patient-specific   cardiac  risk.    Pediatric guidelines reference:Pediatrics 2011, 128(S5).Adult guidelines reference: NCEP ATPIII Guidelines,ADDIE 2001, 258:1216-75    Venipuncture immediately after or during the administration of Metamizole may lead to falsely low results. Testing should be performed immediately prior to Metamizole dosing.    HDL-Cholesterol 01/07/2024 43.9  mg/dL Final      Age       Very Low   Low     Normal    High    0-19 Y    < 35      < 40     40-45     ----  20-24 Y    ----     < 40      >45      ----        >24 Y      ----     < 40     40-60      >60      Cholesterol/HDL Ratio 01/07/2024 3.7   Final      Ref Values  Desirable  < 3.4  High Risk  > 5.0    LDL Calculated 01/07/2024 88  <=99 mg/dL Final                                Near   Borderline      AGE      Desirable  Optimal    High     High     Very High     0-19 Y     0 - 109     ---    110-129   >/= 130     ----    20-24 Y     0 - 119     ---    120-159   >/= 160     ----      >24 Y     0 -  99   100-129  130-159   160-189     >/=190      VLDL 01/07/2024 29  0 - 40 mg/dL Final    Triglycerides 01/07/2024 146  0 - 149 mg/dL Final       Age         Desirable   Borderline High   High     Very High   0 D-90 D    19 - 174         ----         ----        ----  91 D- 9 Y     0 -  74        75 -  99     >/= 100      ----    10-19 Y     0 -  89        90 - 129     >/= 130      ----    20-24 Y     0 - 114       115 - 149     >/= 150      ----         >24 Y     0 - 149       150 - 199    200- 499    >/= 500    Venipuncture immediately after or during the administration of Metamizole may lead to falsely low results. Testing should be performed immediately prior to Metamizole dosing.    Non HDL Cholesterol 01/07/2024 117  0 - 149 mg/dL Final          Age       Desirable   Borderline High   High     Very High     0-19 Y     0 - 119       120 - 144     >/= 145    >/= 160    20-24 Y     0 - 149       150 - 189     >/= 190      ----         >24 Y    30 mg/dL above LDL  Cholesterol goal     Admission on 01/03/2024, Discharged on 01/06/2024   Component Date Value Ref Range Status    WBC 01/03/2024 21.1 (H)  4.4 - 11.3 x10*3/uL Final    nRBC 01/03/2024 0.0  0.0 - 0.0 /100 WBCs Final    RBC 01/03/2024 4.46 (L)  4.50 - 5.90 x10*6/uL Final    Hemoglobin 01/03/2024 13.2 (L)  13.5 - 17.5 g/dL Final    Hematocrit 01/03/2024 39.7 (L)  41.0 - 52.0 % Final    MCV 01/03/2024 89  80 - 100 fL Final    MCH 01/03/2024 29.6  26.0 - 34.0 pg Final    MCHC 01/03/2024 33.2  32.0 - 36.0 g/dL Final    RDW 01/03/2024 13.0  11.5 - 14.5 % Final    Platelets 01/03/2024 412  150 - 450 x10*3/uL Final    Neutrophils % 01/03/2024 88.9  40.0 - 80.0 % Final    Immature Granulocytes %, Automated 01/03/2024 0.4  0.0 - 0.9 % Final    Immature Granulocyte Count (IG) includes promyelocytes, myelocytes and metamyelocytes but does not include bands. Percent differential counts (%) should be interpreted in the context of the absolute cell counts (cells/UL).    Lymphocytes % 01/03/2024 6.6  13.0 - 44.0 % Final    Monocytes % 01/03/2024 3.7  2.0 - 10.0 % Final    Eosinophils % 01/03/2024 0.3  0.0 - 6.0 % Final    Basophils % 01/03/2024 0.1  0.0 - 2.0 % Final    Neutrophils Absolute 01/03/2024 18.78 (H)  1.20 - 7.70 x10*3/uL Final    Percent differential counts (%) should be interpreted in the context of the absolute cell counts (cells/uL).    Immature Granulocytes Absolute, Au* 01/03/2024 0.08  0.00 - 0.70 x10*3/uL Final    Lymphocytes Absolute 01/03/2024 1.39  1.20 - 4.80 x10*3/uL Final    Monocytes Absolute 01/03/2024 0.78  0.10 - 1.00 x10*3/uL Final    Eosinophils Absolute 01/03/2024 0.07  0.00 - 0.70 x10*3/uL Final    Basophils Absolute 01/03/2024 0.03  0.00 - 0.10 x10*3/uL Final    Glucose 01/03/2024 128 (H)  74 - 99 mg/dL Final    Sodium 01/03/2024 131 (L)  136 - 145 mmol/L Final    Potassium 01/03/2024 3.9  3.5 - 5.3 mmol/L Final    Chloride 01/03/2024 97 (L)  98 - 107 mmol/L Final    Bicarbonate 01/03/2024 25  21 -  32 mmol/L Final    Anion Gap 01/03/2024 13  10 - 20 mmol/L Final    Urea Nitrogen 01/03/2024 9  6 - 23 mg/dL Final    Creatinine 01/03/2024 0.63  0.50 - 1.30 mg/dL Final    eGFR 01/03/2024 >90  >60 mL/min/1.73m*2 Final    Calculations of estimated GFR are performed using the 2021 CKD-EPI Study Refit equation without the race variable for the IDMS-Traceable creatinine methods.  https://jasn.asnjournals.org/content/early/2021/09/22/ASN.8361261708    Calcium 01/03/2024 9.3  8.6 - 10.3 mg/dL Final    Albumin 01/03/2024 4.1  3.4 - 5.0 g/dL Final    Alkaline Phosphatase 01/03/2024 58  33 - 136 U/L Final    Total Protein 01/03/2024 7.1  6.4 - 8.2 g/dL Final    AST 01/03/2024 10  9 - 39 U/L Final    Bilirubin, Total 01/03/2024 0.4  0.0 - 1.2 mg/dL Final    ALT 01/03/2024 11  10 - 52 U/L Final    Patients treated with Sulfasalazine may generate falsely decreased results for ALT.    Amphetamine Screen, Urine 01/03/2024 Presumptive Negative  Presumptive Negative Final    CUTOFF LEVEL: 500 NG/ML   Cross-reactivity has been reported with high concentrations   of the following drugs: buproprion, chloroquine, chlorpromazine,   ephedrine, mephentermine, fenfluramine, phentermine,   phenylpropanolamine, pseudoephedrine, and propranolol.    Barbiturate Screen, Urine 01/03/2024 Presumptive Negative  Presumptive Negative Final    CUTOFF LEVEL: 200 NG/ML    Benzodiazepines Screen, Urine 01/03/2024 Presumptive Negative  Presumptive Negative Final    CUTOFF LEVEL: 200 NG/ML    Cannabinoid Screen, Urine 01/03/2024 Presumptive Negative  Presumptive Negative Final    CUTOFF LEVEL: 50 NG/ML    Cocaine Metabolite Screen, Urine 01/03/2024 Presumptive Negative  Presumptive Negative Final    CUTOFF LEVEL: 150 NG/ML    Fentanyl Screen, Urine 01/03/2024 Presumptive Negative  Presumptive Negative Final    CUTOFF LEVEL: 5 NG/ML    Opiate Screen, Urine 01/03/2024 Presumptive Negative  Presumptive Negative Final    CUTOFF LEVEL: 300 NG/ML  The opiate  screen does not detect fentanyl, meperidine, or   tramadol. Oxycodone is not consistently detected (refer to  Oxycodone Screen, Urine result).    Oxycodone Screen, Urine 01/03/2024 Presumptive Negative  Presumptive Negative Final    CUTOFF LEVEL: 100 NG/ML  This test will accurately detect both oxycodone and oxymorphone.    PCP Screen, Urine 01/03/2024 Presumptive Negative  Presumptive Negative Final    CUTOFF LEVEL:  25 NG/ML  Cross-reactivity has been reported with dextromethorphan.    Acetaminophen 01/03/2024 <10.0  10.0 - 30.0 ug/mL Final    Salicylate  01/03/2024 <3  4 - 20 mg/dL Final    Alcohol 01/03/2024 <10  <=10 mg/dL Final    Ventricular Rate 01/03/2024 74  BPM Final    Atrial Rate 01/03/2024 74  BPM Final    AL Interval 01/03/2024 150  ms Final    QRS Duration 01/03/2024 86  ms Final    QT Interval 01/03/2024 400  ms Final    QTC Calculation(Bazett) 01/03/2024 444  ms Final    P Axis 01/03/2024 35  degrees Final    R Axis 01/03/2024 -16  degrees Final    T Axis 01/03/2024 29  degrees Final    QRS Count 01/03/2024 12  beats Final    Q Onset 01/03/2024 222  ms Final    P Onset 01/03/2024 147  ms Final    P Offset 01/03/2024 200  ms Final    T Offset 01/03/2024 422  ms Final    QTC Fredericia 01/03/2024 429  ms Final    Flu A Result 01/03/2024 Not Detected  Not Detected Final    Flu B Result 01/03/2024 Not Detected  Not Detected Final    Coronavirus 2019, PCR 01/03/2024 Not Detected  Not Detected Final    Color, Urine 01/03/2024 Straw  Straw, Yellow Final    Appearance, Urine 01/03/2024 Clear  Clear Final    Specific Gravity, Urine 01/03/2024 1.003 (N)  1.005 - 1.035 Final    pH, Urine 01/03/2024 7.0  5.0, 5.5, 6.0, 6.5, 7.0, 7.5, 8.0 Final    Protein, Urine 01/03/2024 NEGATIVE  NEGATIVE mg/dL Final    Glucose, Urine 01/03/2024 NEGATIVE  NEGATIVE mg/dL Final    Blood, Urine 01/03/2024 SMALL (1+) (A)  NEGATIVE Final    Ketones, Urine 01/03/2024 NEGATIVE  NEGATIVE mg/dL Final    Bilirubin, Urine 01/03/2024  NEGATIVE  NEGATIVE Final    Urobilinogen, Urine 01/03/2024 <2.0  <2.0 mg/dL Final    Nitrite, Urine 01/03/2024 NEGATIVE  NEGATIVE Final    Leukocyte Esterase, Urine 01/03/2024 NEGATIVE  NEGATIVE Final    WBC, Urine 01/03/2024 NONE  1-5, NONE /HPF Final    RBC, Urine 01/03/2024 NONE  NONE, 1-2, 3-5 /HPF Final    Blood Culture 01/04/2024 Identification and susceptibility testing to follow   Preliminary    Gram Stain 01/04/2024 Gram positive cocci, clusters (AA)   Preliminary    Anaerobic Bottle Positive    Blood Culture 01/04/2024 Identification and susceptibility testing to follow   Preliminary    Gram Stain 01/04/2024 Gram positive cocci, clusters (AA)   Preliminary    Anaerobic Bottle Positive    Lactate 01/04/2024 1.1  0.4 - 2.0 mmol/L Final    POCT Glucose 01/05/2024 159 (H)  74 - 99 mg/dL Final    WBC 01/06/2024 8.6  4.4 - 11.3 x10*3/uL Final    nRBC 01/06/2024 0.0  0.0 - 0.0 /100 WBCs Final    RBC 01/06/2024 4.53  4.50 - 5.90 x10*6/uL Final    Hemoglobin 01/06/2024 13.4 (L)  13.5 - 17.5 g/dL Final    Hematocrit 01/06/2024 40.5 (L)  41.0 - 52.0 % Final    MCV 01/06/2024 89  80 - 100 fL Final    MCH 01/06/2024 29.6  26.0 - 34.0 pg Final    MCHC 01/06/2024 33.1  32.0 - 36.0 g/dL Final    RDW 01/06/2024 13.0  11.5 - 14.5 % Final    Platelets 01/06/2024 409  150 - 450 x10*3/uL Final    Neutrophils % 01/06/2024 68.8  40.0 - 80.0 % Final    Immature Granulocytes %, Automated 01/06/2024 0.5  0.0 - 0.9 % Final    Immature Granulocyte Count (IG) includes promyelocytes, myelocytes and metamyelocytes but does not include bands. Percent differential counts (%) should be interpreted in the context of the absolute cell counts (cells/UL).    Lymphocytes % 01/06/2024 18.1  13.0 - 44.0 % Final    Monocytes % 01/06/2024 8.7  2.0 - 10.0 % Final    Eosinophils % 01/06/2024 3.4  0.0 - 6.0 % Final    Basophils % 01/06/2024 0.5  0.0 - 2.0 % Final    Neutrophils Absolute 01/06/2024 5.94  1.20 - 7.70 x10*3/uL Final    Percent  differential counts (%) should be interpreted in the context of the absolute cell counts (cells/uL).    Immature Granulocytes Absolute, Au* 01/06/2024 0.04  0.00 - 0.70 x10*3/uL Final    Lymphocytes Absolute 01/06/2024 1.56  1.20 - 4.80 x10*3/uL Final    Monocytes Absolute 01/06/2024 0.75  0.10 - 1.00 x10*3/uL Final    Eosinophils Absolute 01/06/2024 0.29  0.00 - 0.70 x10*3/uL Final    Basophils Absolute 01/06/2024 0.04  0.00 - 0.10 x10*3/uL Final    Glucose 01/06/2024 124 (H)  74 - 99 mg/dL Final    Sodium 01/06/2024 132 (L)  136 - 145 mmol/L Final    Potassium 01/06/2024 4.1  3.5 - 5.3 mmol/L Final    Chloride 01/06/2024 100  98 - 107 mmol/L Final    Bicarbonate 01/06/2024 22  21 - 32 mmol/L Final    Anion Gap 01/06/2024 14  10 - 20 mmol/L Final    Urea Nitrogen 01/06/2024 13  6 - 23 mg/dL Final    Creatinine 01/06/2024 0.62  0.50 - 1.30 mg/dL Final    eGFR 01/06/2024 >90  >60 mL/min/1.73m*2 Final    Calculations of estimated GFR are performed using the 2021 CKD-EPI Study Refit equation without the race variable for the IDMS-Traceable creatinine methods.  https://jasn.asnjournals.org/content/early/2021/09/22/ASN.4346142409    Calcium 01/06/2024 9.1  8.6 - 10.3 mg/dL Final      XR chest 1 view    Result Date: 1/6/2024  Interpreted By:  Radames Chavez, STUDY: XR CHEST 1 VIEW;  1/6/2024 6:22 am   INDICATION: Signs/Symptoms:PNEUMONIA / SOB.   COMPARISON: 01/03/2024   ACCESSION NUMBER(S): [id number]   ORDERING CLINICIAN: JOSE C GRAF   FINDINGS:     Median sternotomy wires and mediastinal surgical clips. Normal heart size. No pleural effusion or pneumothorax. Similar patchy right lung airspace opacities. Upper abdomen is unremarkable. Elevation of the left distal clavicle relative to the acromion and absence of the left distal clavicle, likely secondary to prior acromioclavicular separation and possible distal clavicle resection. No acute osseous abnormality.       1. Similar patchy right lung airspace  opacities. Consider pneumonia. Follow-up is recommended to confirm resolution.   Signed by: Radames Chavez 1/6/2024 6:49 AM Dictation workstation:   [id number]    Electrocardiogram, 12-lead    Result Date: 1/4/2024  Normal sinus rhythm Inferior infarct , age undetermined Abnormal ECG When compared with ECG of 28-SEP-2023 16:06, (unconfirmed) No significant change was found See ED provider note for full interpretation and clinical correlation Confirmed by Royce Goddard ([id number]) on 1/4/2024 4:32:00 PM    XR chest 1 view    Result Date: 1/3/2024  STUDY: Chest Radiograph;  01/03/2023 6:09 pm INDICATION: Cough. COMPARISON: XR Chest 09/21/2023 and 08/06/2021 ACCESSION NUMBER(S): [id number] ORDERING CLINICIAN: MICA HANSEN TECHNIQUE:  Frontal chest was obtained at 1809 hours. FINDINGS: There has been previous anterior chest and heart surgery with coronary artery bypass and midline sternotomy. The heart is normal size There are minimal lung opacities especially in the right mid and lower lung: possible pneumonia. There are no detectable pleural effusions. There is no pneumothorax.    1. Previous anterior chest and heart surgery. 2. Minimal lung opacities especially in the right mid and lower lung: possible pneumonia. 3. Follow-up advised. Signed by Dano Carvalho MD    CT head wo IV contrast    Result Date: 1/3/2024  Interpreted By:  Jake Pappas, STUDY: CT HEAD WO IV CONTRAST;  1/3/2024 6:07 pm   INDICATION: Signs/Symptoms:ams.   COMPARISON: CT head 01/28/2022   ACCESSION NUMBER(S): [id number]   ORDERING CLINICIAN: MICA HANSEN   TECHNIQUE: Noncontrast axial CT images of head were obtained with coronal and sagittal reconstructed images.   FINDINGS: BRAIN PARENCHYMA: Gray-white differentiation is preserved. No mass-effect, midline shift or effacement of cerebral sulci. No significant white matter disease.   HEMORRHAGE: No acute intracranial hemorrhage.   VENTRICLES and EXTRA-AXIAL SPACES: The ventricles and  sulci are within normal limits for brain volume. No abnormal extra-axial fluid collection.   ORBITS: The visualized orbits and globes are within normal limits.   EXTRACRANIAL SOFT TISSUES: Within normal limits.   PARANASAL SINUSES/MASTOIDS: Diffuse paranasal sinus mucosal thickening. Small right mastoid effusion.   CALVARIUM: No depressed skull fracture.         1. No acute intracranial abnormality identified. 2. Paranasal sinus mucosal disease and small right mastoid effusion. Correlate for evidence of acute sinusitis/mastoiditis.       MACRO: None   Signed by: Jake Pappas 1/3/2024 6:34 PM Dictation workstation:   [id number]       Assessment/Plan   Principal Problem:    Suicide attempt (CMS/[facility])  Anxiety  Probable pneumonia  Left arm laceration  Hyponatremia  Bacteremia patient with gram-positive cocci in clusters x2.    Mr. Gerardo Albright is a 63-year-old male with past medical history of psychiatric illness who was admitted to the behavioral health unit for suicide attempt.  He tried to cut his left arm.  He is status post stitches.  He was found to have probable pneumonia in the ED.  But review of the chart shows that he was managed in September for aspiration pneumonia.  At that time he had right-sided infiltrate.  He still having right-sided infiltrate.  He is not coughing but he did have a white count when he first came to the ED.  Unclear if this is pneumonia.  Follow-up procalcitonin.    Patient was also found to have bacteremia on January 4.  Blood culture grew gram-positive cocci in cluster x 2.  In view of the bacteremia.  I recommend transferring patient to the medical floor.  Will need a 2D echo  Repeat cultures  Consult ID  Start antibiotics  Repeat labs including procalcitonin  Monitor electrolytes  Follow-up with psychiatry for further management of the psychiatric issues  Patient was given 1 dose of Ativan while in BHU.  Consider DVT prophylaxis when transferred to the floor.          Emre Miles MD

## 2024-01-07 NOTE — PROGRESS NOTES
"Vancomycin Dosing by Pharmacy- INITIAL    Gerardo Albright is a 63 y.o. year old male who Pharmacy has been consulted for vancomycin dosing for endocarditis/endovascular infection. Based on the patient's indication and renal status this patient will be dosed based on a goal AUC of 400-600.     Renal function is currently stable.    Visit Vitals  BP (!) 176/123 (BP Location: Right arm, Patient Position: Lying)   Pulse 82   Temp 36.4 °C (97.5 °F)        Lab Results   Component Value Date    CREATININE 0.62 01/06/2024    CREATININE 0.63 01/03/2024    CREATININE 0.75 09/30/2023    CREATININE 0.63 09/28/2023    CREATININE CANCELED 09/27/2023    CREATININE 0.66 09/26/2023    CREATININE 0.68 09/25/2023        Patient weight is No results found for: \"PTWEIGHT\"    No results found for: \"CULTURE\"     No intake/output data recorded.  [unfilled]    Lab Results   Component Value Date    PATIENTTEMP 37.0 09/21/2023          Assessment/Plan     Patient will not be given a loading dose.  Will initiate vancomycin maintenance,  1250 mg every 12 hours.    This dosing regimen is predicted by [employer] to result in the following pharmacokinetic parameters:    See previous note from [facility] chart    Follow-up level will be ordered on 1/8  at am labs unless clinically indicated sooner.  Will continue to monitor renal function daily while on vancomycin and order serum creatinine at least every 48 hours if not already ordered.  Follow for continued vancomycin needs, clinical response, and signs/symptoms of toxicity.       Swati Oquendo, PharmD       "

## 2024-01-07 NOTE — CARE PLAN
Problem: Fall/Injury  Goal: Not fall by end of shift  Outcome: Progressing  Goal: Be free from injury by end of the shift  Outcome: Progressing  Goal: Verbalize understanding of personal risk factors for fall in the hospital  Outcome: Progressing  Goal: Verbalize understanding of risk factor reduction measures to prevent injury from fall in the home  Outcome: Progressing  Goal: Use assistive devices by end of the shift  Outcome: Progressing  Goal: Pace activities to prevent fatigue by end of the shift  Outcome: Progressing     Problem: Safety  Goal: Patient will be injury free during hospitalization  Outcome: Progressing  Goal: I will remain free of falls  Outcome: Progressing     Problem: Psychosocial Needs  Goal: Demonstrates ability to cope with hospitalization/illness  Outcome: Progressing  Goal: Collaborate with me, my family, and caregiver to identify my specific goals  Outcome: Progressing   The patient's goals for the shift include ESTHER    The clinical goals for the shift include Maintain a safe environment for the patient, pain relief and medication compliance

## 2024-01-07 NOTE — PROGRESS NOTES
"Vancomycin Dosing by Pharmacy- INITIAL    Gerardo Albright is a 63 y.o. year old male who Pharmacy has been consulted for vancomycin dosing for endocarditis/endovascular infection. Based on the patient's indication and renal status this patient will be dosed based on a goal AUC of 400-600.     Renal function is currently stable.    Visit Vitals  /83 (BP Location: Right arm, Patient Position: Lying)   Pulse 76   Temp 36.1 °C (97 °F) (Temporal)   Resp 18        Lab Results   Component Value Date    CREATININE 0.62 01/06/2024    CREATININE 0.63 01/03/2024    CREATININE 0.75 09/30/2023    CREATININE 0.63 09/28/2023    CREATININE CANCELED 09/27/2023    CREATININE 0.66 09/26/2023    CREATININE 0.68 09/25/2023        Patient weight is No results found for: \"PTWEIGHT\"    No results found for: \"CULTURE\"     No intake/output data recorded.  [unfilled]    Lab Results   Component Value Date    PATIENTTEMP 37.0 09/21/2023          Assessment/Plan     Patient will not be given a loading dose.  Will initiate vancomycin maintenance,  1250 mg every 12 hours.    This dosing regimen is predicted by [employer] to result in the following pharmacokinetic parameters:  AUC24,ss: 505 mg/L.hr  Probability of AUC24 > 400: 74 %  Ctrough,ss: 14.8 mg/L  Probability of Ctrough,ss > 20: 27 %  Probability of nephrotoxicity (Lodise OTIS 2009): 10 %      Follow-up level will be ordered on 1/8 at am labs unless clinically indicated sooner.  Will continue to monitor renal function daily while on vancomycin and order serum creatinine at least every 48 hours if not already ordered.  Follow for continued vancomycin needs, clinical response, and signs/symptoms of toxicity.       Swati Oquendo, PharmD       "

## 2024-01-07 NOTE — H&P
Physician Certification & Recertification:  Certification/Re-Certification: INITIAL   I certify that the inpatient psychiatric hospital admission is medically necessary for:  treatment which could reasonably be expected to improve the patient's condition that could not be provided in a less restrictive setting   I estimate the period of hospitalization are necessary for treatment of this patient will be:  8-14 days    My plans for post hospital care for this patient are:  home        Admission Reason:    Admission Reason: Suicide Attempt  Pneumonia of both lungs due to infectious organism, unspecified part of lung  Suicidal behavior with attempted self-injury                  HPI:  Per er EPAT Assessment:    64yo male presenting to the ED via EMS from SNF with chief complaint of suicide attempt. Reportedly, “patient from nursing facility where the staff said he got a hold of a razor somehow and cut his left forearm in the shower. The staff states he was upset that he did not get the medicine he wanted. He states he thinks he has pneumonia and everything else because he is paranoid. He states he is suicidal”. Patient’s chart, triage, and provider note reviewed prior to assessment. Patient has a psychiatric history of Schizoaffective D/O, Depression, Anxiety, Neurocognitive D/O, Polysubstance Use D/O, and Cluster B Personality traits. Patient has a significant history of inpatient admissions, often in the context of SI/SA by cutting. Of note, patient has a recent admission to [facility] on 5/9/2023 at which point patient had reported the same concerns. He had stated he “took razors from the thomas recently and kept them for a few days before acting to cut self (…) Patient has a known history of cutting his left arm”. The patient has a LG, Angel Holguin, who was unable to be reached at time of assessment. Patient currently resides at [facility] where he resides on a secure [facility], pt receives psychiatric treatment  through this facility. Patient indicated high risk at triage, BAL and UTOX negative on arrival.    2 N assessment. Patient was seen and interviewed, admitted overnight. Per nursing, patient assessed as a high falls risk, recent pneumonia, unable to contract for safety overnight. This am patient reported that he feels Seroquel is not helping, is asking to be restarted on Clozaril. Patient received ativan which he found helped decrease his anxiety regarding circumstances surrounding his recent pneumonia (which has increased his anxiety) and current admission. Patient is help seeking, wishes to have medications changed to decrease anxiety.       PSYCHIATRIC REVIEW OF SYMPTOMS  Anxiety: General Anxiety Disorder (ANGELA)ANGELA Behaviors: difficult to control worry, excessive anxiety/worry, and difficulty concentrating  Depression: anhedonia, helpless, and suicidal thoughts  Delirium: negative  Delirium/Altered Mental Status Symptoms: none  Psychosis: negative  Gay: negative      PAST PSYCHIATRIC HISTORY    Psychiatric Diagnosis: Schizoaffective D/O, Depression, Anxiety, Dementia, Cluster B Personality Traits // Current MH Center: [facility] // Previous Admissions: Numerous, most recently  5/2023 // History of self-harm/SA: over 7 SA, numerous by cutting    Current Mental Health Contacts   Name/Phone Number: [facility]  Provider Name/Phone Number: [facility]  Provider Last Appointment Date: unknown     PAST PSYCHIATRIC MEDICATION HISTORY  Past Psychiatric Meds/Treatments: Current medication: per most recent outpatient note:    benztropine 1 mg tablet, buPROPion  mg 24 hr tablet, gabapentin 600 mg tablet, metFORMIN 500 mg tablet  Take 1 tablet (500 mg) by mouth 2 times a day with meals, risperiDONE     QUEtiapine 200 mg tablet, QUEtiapine 50 mg tablet, QUEtiapine 200 mg tablet  Take 1 tablet (200 mg) by mouth 2 times a day.    SOCIAL HISTORY  Disabled, legal guardian Angel Holguin  phone?  [facility] resident    SUBSTANCE USE HISTORY  On naltrexone     TRAUMA HISTORY  denied  FAMILY HISTORY  unknown  -------------------------------  PAST MEDICAL HISTORY  Past Medical History:   Diagnosis Date    At risk for falls     COPD (chronic obstructive pulmonary disease) (CMS/[facility])     Dementia (CMS/[facility])     Depression     GERD (gastroesophageal reflux disease)     Hypertension     Insomnia     Myocardial infarction (CMS/[facility])     Parkinson's disease     Schizophrenia (CMS/[facility])     Seizures (CMS/[facility])     Weakness        CURRENT MEDICATIONS  Current Outpatient Medications   Medication Instructions    aspirin 81 mg, oral, Daily    benztropine (COGENTIN) 1 mg, oral, 2 times daily    buPROPion XL (WELLBUTRIN XL) 300 mg, oral, Every morning, Do not crush, chew, or split.    gabapentin (NEURONTIN) 600 mg, oral, 3 times daily    metFORMIN (GLUCOPHAGE) 500 mg, oral, 2 times daily with meals    multivitamin tablet 1 tablet, oral, Daily    naltrexone (DEPADE) 50 mg, oral, Daily    pantoprazole (PROTONIX) 40 mg, oral, 2 times daily before meals, Do not crush, chew, or split.    Perseris 90 mg, subcutaneous, Every 30 days    QUEtiapine (SEROQUEL) 50 mg, oral, Nightly, Take in addition to 200 mg BID to total 250 mg at bedtime.    QUEtiapine (SEROQUEL) 200 mg, oral, 2 times daily    Gabapenin 600mg tid DOSE NOT SHOW UP ON OARRS    PRN medications: diphenhydrAMINE **OR** diphenhydrAMINE, ibuprofen, QUEtiapine **OR** OLANZapine, polyethylene glycol, traZODone    ALLERGIES  No Known Allergies    ----------------------  XR chest 1 view    Result Date: 1/6/2024  Interpreted By:  Radames Chavez, STUDY: XR CHEST 1 VIEW;  1/6/2024 6:22 am   INDICATION: Signs/Symptoms:PNEUMONIA / SOB.   COMPARISON: 01/03/2024   ACCESSION NUMBER(S): [id number]   ORDERING CLINICIAN: JOSE C GRAF   FINDINGS:     Median sternotomy wires and mediastinal surgical clips. Normal heart size. No pleural effusion or pneumothorax. Similar  patchy right lung airspace opacities. Upper abdomen is unremarkable. Elevation of the left distal clavicle relative to the acromion and absence of the left distal clavicle, likely secondary to prior acromioclavicular separation and possible distal clavicle resection. No acute osseous abnormality.       1. Similar patchy right lung airspace opacities. Consider pneumonia. Follow-up is recommended to confirm resolution.   Signed by: Radames Chavez 1/6/2024 6:49 AM Dictation workstation:   [id number]    Electrocardiogram, 12-lead    Result Date: 1/4/2024  Normal sinus rhythm Inferior infarct , age undetermined Abnormal ECG When compared with ECG of 28-SEP-2023 16:06, (unconfirmed) No significant change was found See ED provider note for full interpretation and clinical correlation Confirmed by Royce Goddard ([id number]) on 1/4/2024 4:32:00 PM    XR chest 1 view    Result Date: 1/3/2024  STUDY: Chest Radiograph;  01/03/2023 6:09 pm INDICATION: Cough. COMPARISON: XR Chest 09/21/2023 and 08/06/2021 ACCESSION NUMBER(S): [id number] ORDERING CLINICIAN: MICA HANSEN TECHNIQUE:  Frontal chest was obtained at 1809 hours. FINDINGS: There has been previous anterior chest and heart surgery with coronary artery bypass and midline sternotomy. The heart is normal size There are minimal lung opacities especially in the right mid and lower lung: possible pneumonia. There are no detectable pleural effusions. There is no pneumothorax.    1. Previous anterior chest and heart surgery. 2. Minimal lung opacities especially in the right mid and lower lung: possible pneumonia. 3. Follow-up advised. Signed by Dano Carvalho MD    CT head wo IV contrast    Result Date: 1/3/2024  Interpreted By:  Jake Pappas, STUDY: CT HEAD WO IV CONTRAST;  1/3/2024 6:07 pm   INDICATION: Signs/Symptoms:ams.   COMPARISON: CT head 01/28/2022   ACCESSION NUMBER(S): [id number]   ORDERING CLINICIAN: MICA HANSEN   TECHNIQUE: Noncontrast axial CT images of  "head were obtained with coronal and sagittal reconstructed images.   FINDINGS: BRAIN PARENCHYMA: Gray-white differentiation is preserved. No mass-effect, midline shift or effacement of cerebral sulci. No significant white matter disease.   HEMORRHAGE: No acute intracranial hemorrhage.   VENTRICLES and EXTRA-AXIAL SPACES: The ventricles and sulci are within normal limits for brain volume. No abnormal extra-axial fluid collection.   ORBITS: The visualized orbits and globes are within normal limits.   EXTRACRANIAL SOFT TISSUES: Within normal limits.   PARANASAL SINUSES/MASTOIDS: Diffuse paranasal sinus mucosal thickening. Small right mastoid effusion.   CALVARIUM: No depressed skull fracture.         1. No acute intracranial abnormality identified. 2. Paranasal sinus mucosal disease and small right mastoid effusion. Correlate for evidence of acute sinusitis/mastoiditis.       MACRO: None   Signed by: Jake Pappas 1/3/2024 6:34 PM Dictation workstation:   [id number]         REVIEW OF SYSTEMS  Review of Systems       1/6/2024     6:30 AM 1/6/2024    11:00 AM 1/6/2024     8:28 PM 1/6/2024    10:45 PM 1/6/2024    10:57 PM 1/6/2024    11:39 PM 1/7/2024     6:26 AM   Vitals   Systolic 137 155 142 153 153 153 118   Diastolic 89 89 88 88 88 88 83   Heart Rate 79 78 82 79 79 79 76   Temp   36.7 °C (98 °F)   36.7 °C (98.1 °F) 36.1 °C (97 °F)   Resp 16 17 16 20  20 18   Height (in)    1.778 m (5' 10\")      Weight (lb)    173.06 173.06     BMI    24.83 kg/m2 24.83 kg/m2     BSA (m2)    1.97 m2 1.97 m2       Daily Weight  01/06/24 : 78.5 kg (173 lb 1 oz)    Results for orders placed or performed during the hospital encounter of 01/06/24 (from the past 96 hour(s))   Glucose, Fasting   Result Value Ref Range    Glucose, Fasting 126 (H) 74 - 99 mg/dL   Lipid Panel   Result Value Ref Range    Cholesterol 161 0 - 199 mg/dL    HDL-Cholesterol 43.9 mg/dL    Cholesterol/HDL Ratio 3.7     LDL Calculated 88 <=99 mg/dL    VLDL 29 0 - 40 " mg/dL    Triglycerides 146 0 - 149 mg/dL    Non HDL Cholesterol 117 0 - 149 mg/dL       MSE:                                                                                                                  General: CM with mental health, admitted with partial treatment of pneumonia, for increased anxiety related to pneumonia sx, self harming  Appearance: Appears stated age, dressed in hospital attire, appropriate grooming and hygiene  Attitude:  Calm, cooperative  Behavior:  Appropriate eye contact  Movement: No psychomotor agitation or retardation. No EPS/TD. Normal gait and station. Normal muscle tone/bulk..  Speech and language: Regular rate, rhythm, volume and tone, spontaneous, fluent.   Mood: depressed, decreased sleep, interest  Affect:  anxious  Thought process:  linear, organized  Thought content:  +endorse suicidal ideation to cut self but can contract for safety, denied  homicidal ideations, no delusions elicited.  Perception: Does not endorse auditory/visual hallucinations. Does not appear to be responding to hallucinatory stimuli. No perceptual abnormalities noted  Cognition:  alert and oriented x 4, some noted short and long term memory issues, attention and concentration grossly intact   Insight:  Fair, as patient recognizes symptoms of illness and need for recommended treatments.   Judgment: public guardian     ASSESSMENT AND PLAN  PSYCHIATRIC RISK ASSESSMENT  Violence Risk Assessment: major mental illness, male, personality disorder (antisocial, borderline), and substance abuse  Acute Risk of Harm to Others is Considered: low   Suicide Risk Assessment: borderline personality disorder, , chronic medical illness, command hallucinations, life crisis (shame/despair), and recent suicide attempt  Protective Factors against Suicide: none  Acute Risk of Harm to Self is Considered: moderate    IMPRESSION    Positive Blood Cultures MSSA on 1/4/24  Recent partially treated pneumonia  Suicide  "attempt  Schizoaffective, depression  Ankit  Ncd   PsaCluster B personality disorders or traits (i.e., borderline, antisocial, histrionic & narcissistic)  Cluster b   Seizure history  Chronic cutting, self harming  Resident of [facility]  Legal benjamin carrington  Falls risk    Plan:  Admit to [facility] Inpatient Psychiatry Unit  Restrict to Sharma  Legal Status: INVOLUNTARY  Suicide/Behavioral/Elopement Precautions  Collateral from outside resource  General PRNs: Acetaminophen prn pain, MoM prn constipation, Maalox prn dyspepsia  DVT prophylaxis: Ambulatory  Diet: Regular  Group/Milieu & Music therapy - Coping Strategies, Social Skills  EKG/Labs/imaging:  Add vitamin d levels  WILL ASSESS FOR NEED OF A MEDICAL TYPE BED FOR THE FOLLOWING CRITERIA:  fall risk r/t weakness, confusion, malnutrition, and potential medication se's (orthostaic bp, sedation dizziness). Risk of Pressure ulcers r/t malnutrition and weight loss.     ---------------  Consults:  Music therapy-coping   OT consult- safety assessment, coping, collateral  Internal Medicine- medical management  SW consult-safety assessment, coping, collateral      Medication recommendations:   Blood cultures + 1/4/23 clusters staph MSSA-Discharge/Transfer to Medical Unit, Dr Miles called and requested discharge  2.  Guardian has not called unit, will have team leave his voice message that patient has beenadmitted to  and transferred this am to [facility]    3. Suicidal ideation: this am, patient agreed to contract for safety, 'I will sign paper to contract for safety\". WILL NEED A SITTER    4. PT consult to be ordered by medicine, when appropriate, as patient is septic      5. TRANSFER TO MEDICINE. PSYCHIATRY WILL FOLLOW.   Due to recent illness:  Reduce home seroquel 200mg bid to 100mg bid  Continue seroquel 50mg at bedtime  Continue wellbutrin XL 300mg am  Continue cogentin 1mg bid    Other medical meds addressed by " medicine, Dr Miles      Goal of inpatient psychiatry includes:  -symptom relief and coordination of care to promote recovery by daily assessment of risk  - collaboration of family/friends, continuing support plans are developed in preparation for discharge  -prevention of harm/destruction of self, others, and/or property  -prevention of of exacerbation of psychiatric symptoms  -management of medication with close monitoring of effects and control of side effects  -clinical interventions to address lack of impulse control OR SI,  HI, psychotic state, decreased functioning, failure to take medication resulting in symptom increase  - group therapy and psychoeducational groups daily  - SW consult dc planning  - The patient's admitting diagnosis, average indicated length of stay, risks and benefits of medication management the duration of need for medication treatment and post discharge plan of referral for follow up with mental health care, which will include referral to outpatient psychiatry/therapy, was reviewed with the patient, at the time of this admission.   - Safety counseling with emphasis on when and how to access 24 hour emergency services in mental as well as medical health (Mobile Crisis, 911 or coming to the nearest ER) was reviewed with the patient at the time of admission and will be reiterated during inpatient stay and at the time of discharge. Referral will be made to return to medication management, therapist, case management as is indicated.  - Discharge to community once psychiatrically stable with outpatient follow up     Medication Consent,  risks, benefits, side effects reviewed for all ordered medications    Kaycee Martinez MD

## 2024-01-08 ENCOUNTER — APPOINTMENT (OUTPATIENT)
Dept: CARDIOLOGY | Facility: HOSPITAL | Age: 64
DRG: 194 | End: 2024-01-08
Payer: COMMERCIAL

## 2024-01-08 ENCOUNTER — APPOINTMENT (OUTPATIENT)
Dept: RADIOLOGY | Facility: HOSPITAL | Age: 64
DRG: 194 | End: 2024-01-08
Payer: COMMERCIAL

## 2024-01-08 LAB
ALBUMIN SERPL BCP-MCNC: 3.7 G/DL (ref 3.4–5)
ANION GAP SERPL CALC-SCNC: 14 MMOL/L (ref 10–20)
BUN SERPL-MCNC: 11 MG/DL (ref 6–23)
CALCIUM SERPL-MCNC: 9.2 MG/DL (ref 8.6–10.3)
CHLORIDE SERPL-SCNC: 98 MMOL/L (ref 98–107)
CO2 SERPL-SCNC: 24 MMOL/L (ref 21–32)
CREAT SERPL-MCNC: 0.59 MG/DL (ref 0.5–1.3)
ERYTHROCYTE [DISTWIDTH] IN BLOOD BY AUTOMATED COUNT: 12.4 % (ref 11.5–14.5)
GFR SERPL CREATININE-BSD FRML MDRD: >90 ML/MIN/1.73M*2
GLUCOSE BLD MANUAL STRIP-MCNC: 124 MG/DL (ref 74–99)
GLUCOSE BLD MANUAL STRIP-MCNC: 125 MG/DL (ref 74–99)
GLUCOSE BLD MANUAL STRIP-MCNC: 133 MG/DL (ref 74–99)
GLUCOSE BLD MANUAL STRIP-MCNC: 170 MG/DL (ref 74–99)
GLUCOSE SERPL-MCNC: 102 MG/DL (ref 74–99)
HCT VFR BLD AUTO: 43.5 % (ref 41–52)
HGB BLD-MCNC: 14.2 G/DL (ref 13.5–17.5)
LEGIONELLA AG UR QL: NEGATIVE
MAGNESIUM SERPL-MCNC: 2.05 MG/DL (ref 1.6–2.4)
MCH RBC QN AUTO: 28.9 PG (ref 26–34)
MCHC RBC AUTO-ENTMCNC: 32.6 G/DL (ref 32–36)
MCV RBC AUTO: 89 FL (ref 80–100)
NRBC BLD-RTO: 0 /100 WBCS (ref 0–0)
PHOSPHATE SERPL-MCNC: 4.2 MG/DL (ref 2.5–4.9)
PLATELET # BLD AUTO: 413 X10*3/UL (ref 150–450)
POTASSIUM SERPL-SCNC: 4 MMOL/L (ref 3.5–5.3)
PROCALCITONIN SERPL-MCNC: 0.07 NG/ML
RBC # BLD AUTO: 4.91 X10*6/UL (ref 4.5–5.9)
S PNEUM AG UR QL: NEGATIVE
SODIUM SERPL-SCNC: 132 MMOL/L (ref 136–145)
VANCOMYCIN SERPL-MCNC: 8.8 UG/ML (ref 5–20)
WBC # BLD AUTO: 6.4 X10*3/UL (ref 4.4–11.3)

## 2024-01-08 PROCEDURE — 2500000004 HC RX 250 GENERAL PHARMACY W/ HCPCS (ALT 636 FOR OP/ED): Performed by: PSYCHIATRY & NEUROLOGY

## 2024-01-08 PROCEDURE — 71250 CT THORAX DX C-: CPT | Performed by: STUDENT IN AN ORGANIZED HEALTH CARE EDUCATION/TRAINING PROGRAM

## 2024-01-08 PROCEDURE — A4217 STERILE WATER/SALINE, 500 ML: HCPCS

## 2024-01-08 PROCEDURE — 94760 N-INVAS EAR/PLS OXIMETRY 1: CPT

## 2024-01-08 PROCEDURE — 1200000002 HC GENERAL ROOM WITH TELEMETRY DAILY

## 2024-01-08 PROCEDURE — 2500000004 HC RX 250 GENERAL PHARMACY W/ HCPCS (ALT 636 FOR OP/ED)

## 2024-01-08 PROCEDURE — 99232 SBSQ HOSP IP/OBS MODERATE 35: CPT

## 2024-01-08 PROCEDURE — 2500000001 HC RX 250 WO HCPCS SELF ADMINISTERED DRUGS (ALT 637 FOR MEDICARE OP): Performed by: PSYCHIATRY & NEUROLOGY

## 2024-01-08 PROCEDURE — 2500000001 HC RX 250 WO HCPCS SELF ADMINISTERED DRUGS (ALT 637 FOR MEDICARE OP)

## 2024-01-08 PROCEDURE — 80069 RENAL FUNCTION PANEL: CPT

## 2024-01-08 PROCEDURE — 83735 ASSAY OF MAGNESIUM: CPT

## 2024-01-08 PROCEDURE — 84145 PROCALCITONIN (PCT): CPT | Mod: GEALAB

## 2024-01-08 PROCEDURE — 36415 COLL VENOUS BLD VENIPUNCTURE: CPT

## 2024-01-08 PROCEDURE — 93306 TTE W/DOPPLER COMPLETE: CPT

## 2024-01-08 PROCEDURE — 97165 OT EVAL LOW COMPLEX 30 MIN: CPT | Mod: GO | Performed by: OCCUPATIONAL THERAPIST

## 2024-01-08 PROCEDURE — 85027 COMPLETE CBC AUTOMATED: CPT

## 2024-01-08 PROCEDURE — 93306 TTE W/DOPPLER COMPLETE: CPT | Performed by: INTERNAL MEDICINE

## 2024-01-08 PROCEDURE — 80202 ASSAY OF VANCOMYCIN: CPT

## 2024-01-08 PROCEDURE — 97161 PT EVAL LOW COMPLEX 20 MIN: CPT | Mod: GP

## 2024-01-08 PROCEDURE — 82947 ASSAY GLUCOSE BLOOD QUANT: CPT

## 2024-01-08 PROCEDURE — 92610 EVALUATE SWALLOWING FUNCTION: CPT | Mod: GN

## 2024-01-08 PROCEDURE — 71250 CT THORAX DX C-: CPT

## 2024-01-08 RX ORDER — NAPROXEN SODIUM 220 MG/1
81 TABLET, FILM COATED ORAL DAILY
Status: DISCONTINUED | OUTPATIENT
Start: 2024-01-08 | End: 2024-01-11 | Stop reason: HOSPADM

## 2024-01-08 RX ORDER — ACETAMINOPHEN 500 MG
5 TABLET ORAL NIGHTLY
Status: DISCONTINUED | OUTPATIENT
Start: 2024-01-08 | End: 2024-01-11 | Stop reason: HOSPADM

## 2024-01-08 RX ORDER — HYDROXYZINE HYDROCHLORIDE 10 MG/1
10 TABLET, FILM COATED ORAL ONCE
Status: COMPLETED | OUTPATIENT
Start: 2024-01-08 | End: 2024-01-08

## 2024-01-08 RX ORDER — GABAPENTIN 300 MG/1
300 CAPSULE ORAL 3 TIMES DAILY
Status: DISCONTINUED | OUTPATIENT
Start: 2024-01-08 | End: 2024-01-11 | Stop reason: HOSPADM

## 2024-01-08 RX ORDER — PETROLATUM 420 MG/G
OINTMENT TOPICAL
Status: DISCONTINUED | OUTPATIENT
Start: 2024-01-08 | End: 2024-01-11 | Stop reason: HOSPADM

## 2024-01-08 RX ADMIN — BENZTROPINE MESYLATE 1 MG: 1 TABLET ORAL at 20:32

## 2024-01-08 RX ADMIN — WHITE PETROLATUM: 1.75 OINTMENT TOPICAL at 17:43

## 2024-01-08 RX ADMIN — AZITHROMYCIN 500 MG: 500 TABLET, FILM COATED ORAL at 08:06

## 2024-01-08 RX ADMIN — PANTOPRAZOLE SODIUM 40 MG: 40 TABLET, DELAYED RELEASE ORAL at 07:00

## 2024-01-08 RX ADMIN — GABAPENTIN 300 MG: 300 CAPSULE ORAL at 20:32

## 2024-01-08 RX ADMIN — VANCOMYCIN HYDROCHLORIDE 1500 MG: 10 INJECTION, POWDER, LYOPHILIZED, FOR SOLUTION INTRAVENOUS at 23:53

## 2024-01-08 RX ADMIN — CEFTRIAXONE SODIUM 1 G: 1 INJECTION, SOLUTION INTRAVENOUS at 17:40

## 2024-01-08 RX ADMIN — ASPIRIN 81 MG 81 MG: 81 TABLET ORAL at 16:04

## 2024-01-08 RX ADMIN — OXYCODONE HYDROCHLORIDE 2.5 MG: 5 TABLET ORAL at 10:20

## 2024-01-08 RX ADMIN — ALPRAZOLAM 0.25 MG: 0.5 TABLET ORAL at 20:32

## 2024-01-08 RX ADMIN — Medication 1 TABLET: at 08:06

## 2024-01-08 RX ADMIN — QUETIAPINE FUMARATE 100 MG: 100 TABLET ORAL at 20:32

## 2024-01-08 RX ADMIN — NALTREXONE HYDROCHLORIDE 50 MG: 50 TABLET, FILM COATED ORAL at 08:07

## 2024-01-08 RX ADMIN — OXYCODONE HYDROCHLORIDE 2.5 MG: 5 TABLET ORAL at 02:15

## 2024-01-08 RX ADMIN — BUPROPION HYDROCHLORIDE 300 MG: 150 TABLET, FILM COATED, EXTENDED RELEASE ORAL at 08:06

## 2024-01-08 RX ADMIN — OXYCODONE HYDROCHLORIDE 2.5 MG: 5 TABLET ORAL at 16:57

## 2024-01-08 RX ADMIN — ALPRAZOLAM 0.25 MG: 0.5 TABLET ORAL at 08:10

## 2024-01-08 RX ADMIN — VANCOMYCIN HYDROCHLORIDE 1500 MG: 10 INJECTION, POWDER, LYOPHILIZED, FOR SOLUTION INTRAVENOUS at 12:20

## 2024-01-08 RX ADMIN — GABAPENTIN 300 MG: 300 CAPSULE ORAL at 16:04

## 2024-01-08 RX ADMIN — QUETIAPINE FUMARATE 100 MG: 100 TABLET ORAL at 08:06

## 2024-01-08 RX ADMIN — PANTOPRAZOLE SODIUM 40 MG: 40 TABLET, DELAYED RELEASE ORAL at 16:04

## 2024-01-08 RX ADMIN — QUETIAPINE FUMARATE 50 MG: 25 TABLET ORAL at 20:33

## 2024-01-08 RX ADMIN — ENOXAPARIN SODIUM 40 MG: 40 INJECTION SUBCUTANEOUS at 20:32

## 2024-01-08 RX ADMIN — HYDROXYZINE HYDROCHLORIDE 10 MG: 10 TABLET ORAL at 02:39

## 2024-01-08 RX ADMIN — BENZTROPINE MESYLATE 1 MG: 1 TABLET ORAL at 08:07

## 2024-01-08 RX ADMIN — Medication 5 MG: at 20:32

## 2024-01-08 ASSESSMENT — COGNITIVE AND FUNCTIONAL STATUS - GENERAL
DRESSING REGULAR LOWER BODY CLOTHING: A LITTLE
DAILY ACTIVITIY SCORE: 20
HELP NEEDED FOR BATHING: A LITTLE
PERSONAL GROOMING: A LITTLE
TURNING FROM BACK TO SIDE WHILE IN FLAT BAD: A LITTLE
CLIMB 3 TO 5 STEPS WITH RAILING: A LOT
DAILY ACTIVITIY SCORE: 16
EATING MEALS: A LITTLE
WALKING IN HOSPITAL ROOM: A LITTLE
MOVING FROM LYING ON BACK TO SITTING ON SIDE OF FLAT BED WITH BEDRAILS: A LITTLE
DRESSING REGULAR UPPER BODY CLOTHING: A LITTLE
STANDING UP FROM CHAIR USING ARMS: A LITTLE
MOBILITY SCORE: 17
MOVING TO AND FROM BED TO CHAIR: A LITTLE
DRESSING REGULAR UPPER BODY CLOTHING: A LITTLE
STANDING UP FROM CHAIR USING ARMS: A LITTLE
WALKING IN HOSPITAL ROOM: A LOT
HELP NEEDED FOR BATHING: A LOT
CLIMB 3 TO 5 STEPS WITH RAILING: A LOT
DRESSING REGULAR LOWER BODY CLOTHING: A LITTLE
TOILETING: A LITTLE
MOVING TO AND FROM BED TO CHAIR: A LITTLE
MOBILITY SCORE: 18
TOILETING: A LOT

## 2024-01-08 ASSESSMENT — PAIN - FUNCTIONAL ASSESSMENT
PAIN_FUNCTIONAL_ASSESSMENT: 0-10
PAIN_FUNCTIONAL_ASSESSMENT: WONG-BAKER FACES

## 2024-01-08 ASSESSMENT — PAIN SCALES - GENERAL
PAINLEVEL_OUTOF10: 0 - NO PAIN
PAINLEVEL_OUTOF10: 0 - NO PAIN
PAINLEVEL_OUTOF10: 3

## 2024-01-08 ASSESSMENT — ACTIVITIES OF DAILY LIVING (ADL)
ADL_ASSISTANCE: INDEPENDENT
ADL_ASSISTANCE: INDEPENDENT
BATHING_ASSISTANCE: MODIFIED INDEPENDENT (DEVICE)

## 2024-01-08 ASSESSMENT — PAIN SCALES - WONG BAKER: WONGBAKER_NUMERICALRESPONSE: HURTS LITTLE BIT

## 2024-01-08 NOTE — PROGRESS NOTES
"Speech-Language Pathology    Speech-Language Pathology Clinical Swallow Evaluation    Patient Name: Gerardo Albright  MRN: [id number]  : 1960  Today's Date: 24  Start time: Start Time: 1310  Stop time: Stop Time: 1330  Time calculation (min) : Time Calculation (min): 20 min  PMHx relevant to rehab:     Relevant imaging results:  23 MBS at this facility with the following results:  \"SLP impressions with severity rating: Moderate oral phase dysphagia  with inability to successfully chew solid boluses. The result with  solids was an inability to efficiently propel the boluses through the  pharyngoesophageal segment. Multiple swallows required to clear. Mild  oral residue after all textures, cleared with re swallow. No  laryngeal penetration, no aspiration.\"     Chest CT today :   \"IMPRESSION:  1.  Mild paraseptal emphysematous changes in bilateral lungs.  Multiple scattered tiny peribronchovascular reticulonodular opacities  in the right lung as described above. While this could be residual  from prior resolving infection versus inflammatory changes,  possibility of new early developing pneumonia can not be completely  excluded in appropriate clinical setting.  2. Mildly enlarged right perihilar lymph node is similar compared to  prior CT examination and is likely favored to be reactive.  3. Suggestion of tiny hiatal hernia. Mild circumferential thickening  of the distal thoracic esophagus is most likely related to reflux.  Recommend correlation with patient's symptomatology.  4. Mild atherosclerotic vascular calcifications.  5. Stable thickening of the left adrenal gland.\"    Chest x ray yesterday:  IMPRESSION:  1. Similar patchy right lung airspace opacities. Consider pneumonia.  Follow-up is recommended to confirm resolution.    PLAN  Recommendations:  Solid consistency: Soft/bite-sized (IDDSI level 6)  Liquid consistency: Thin (IDDSI 0)  Medication administration: Whole in puree  Compensatory swallow " strategies: Upright positioning for all PO intake and Check for pocketing during/after meals    Recommended frequency/duration:  Skilled SLP services recommended: Yes  Frequency: 1x/week  Duration: x1 follow-up visit to ensure ongoing safety with current diet  Discharge recommendation: Home with no further SLP    Goals:  Pt will consume prescribed diet (current diet is soft and bite sized with thin liquids) without overt s/sx aspiration/penetration >95% of the time.      SUBJECTIVE  SLP Received On: 01/08/24  Patient Class: Inpatient  Ordering Physician: Isamar Alves DO  Reason for Referral: Concern for dysphagia  Prior to Session Communication: Bedside nurse  [employer] Diet: minced and moist with honey thick liquids  Current Diet : Soft and bite sized foods with thin liquids.  Pain Assessment  Pain Assessment: 0-10  Pain Score: 0 - No pain  Herring-Baker FACES Pain Rating: Hurts little bit  Pain Type: Acute pain  Pain Location: Arm  Pain Orientation: Left  Behavior/Cognition: Alert, Cooperative  Orientation: Oriented to self, Oriented to location, Oriented to month, and Oriented to year  Baseline Vocal Quality: Normal  Volitional Cough: Strong  Volitional Swallow: Within Functional Limits  Patient positioning: Upright in bed    OBJECTIVE  Clinical swallow evaluation completed and consisted of interview, oral motor assessment, and PO trials (Thin, nectar thick, puree, soft solids).  ORAL PHASE: Edentulous. Oral mucosa were pink, moist, and free of obvious lesions. Lingual strength and ROM were wfl. Labial strength/ROM were wfl. Labial seal was adequate. Mastication of regular solids was diminished due to dental status. A/P transit was wfl. No oral residuals were seen.  PHARYNGEAL PHASE: Laryngeal elevation was visualized or palpated with all trials, however adequacy of hyolaryngeal elevation/excursion cannot be determined at bedside. No immediate or delayed s/sx aspiration/penetration were observed with any  consistencies.    ASSESSMENT  Impressions:  Diminished mastication due to edentulous status. Good oral manipulation of soft solids, complete oral clearance. Pharyngeal swallow appeared to be within normal limits: no coughing/ choking/ change in vocal quality after any swallows including 3oz challenge: pt drank 3oz of thin liquid in one attempt, without breaking, with no overt s/sx aspiration/penetration observed.   Prognosis: Good    Treatment/Education:  Results and recommendations were relayed to: Patient, Bedside nurse, and Physician  Education provided: Yes   Learner: Patient   Barriers to learning: Cognitive limitations barrier   Method of teaching: Verbal and Demonstration   Topic: Role of ST, results of assessment, risk for aspiration, recommended diet modifications, recommendation for MBSS, recommended safe swallow strategies, and recommendation for dysphagia follow-up   Outcome of teaching: Caregiver demonstrated good understanding and Pt demonstrated partial understanding  Treatment provided: No

## 2024-01-08 NOTE — DISCHARGE SUMMARY
Discharge Summary      Reason For Admission: Suicide Attempt, cutting forearm due to not receiving ativan at the nursing home, per his request for self reported anxiety attacks, acute pneumonia of both lungs due to infectious organism, unspecified part of lung, reported suicidal ideation due to uncontrolled anxiety               Discharge Destination: Discharged from  and transferred/admitted to internal medicine inpatient, partially treated pneuomnia, + blood cultures for MSSA on 1/4/24    Discharge Diagnosis:  Legal guardian, Isaiah Holguin was called by the er and received a second call this morning (went to voice mail) patient was admitted to [address] and is being transferred to medicine. As of last night, I did not hear the guardian had called back our unit. The information for the guardian was forwarded to the admitting team, Dr Miles and Dr Portillo via secure text.    Discharge Diagnosis:  Pneumonia, +blood cultures for MSSA 1/7/24  Suicide attempt  ALCOHOL USE DISORDER-UNKNOWN DURATION OF REMISSION  Schizoaffective, depression  Ankit  Former smoker  Ncd   PsaCluster B personality disorders or traits (i.e., borderline, antisocial, histrionic & narcissistic)  Cluster b   Seizure history  Chronic cutting, self harming  Resident of [facility]  Legal benjamin  [facility]  Falls risk    Hospital Course:    Patient was seen this morning, records accessed, er notes, labs, referral to [facility] reviewed. Call was placed to the guardian for collateral, went to voice mail. Our nurse called [facility] for updated medication list, we were told patient has been medication adherent, often requesting more medication, for uncontrolled anxiety. Patient reports he has had varying opinions by their facility CNP and prior providers of his possible diagnosis of Parkinson's Disease with increased falls on historic klonopin. Patient reports latter medication was stopped, he continued to be a high falls risk.  "Medical chart from [facility] was unavailable at the time of this admission and transfer.    Issues addressed during this admission:  Active medication list obtained, admission medication reconciliation was updated to reflect current medication facility list  Call to public guardian was made, voice message, went to voice mail. As of today, the guardian had not called back before the patient was transferred to [facility].  Sitter was recommended on medicine, as patient voiced to me, during this admission assessment, \"if you wont give me ativan, I will kill myself\"  Cuts were assessed, addressed  Care reviewed with Dr Miles  Short acting xanax was recommended to medical team    MMSE:                                                                                                                       General:   Appearance: appropriate grooming and hygiene, appears stated age  Attitude:  calm, cooperative  Behavior: requesting ativan, repeatedly during the assessment, reporting high anxiety   Movement: no psychomotor agitation or retardation. No EPS/TD. Normal gait and station. Normal muscle tone/bulk..  Speech and language:  regular rate, rhythm, volume and tone, spontaneous, fluent.   Mood: reported depression, anxiety  Affect:  anxious, mood congruent  Thought process: linear, organized, logical, goal directed thinking and processing  Thought content: +endorse suicidal ideation to harm self if anxiety is not controlled, contracted for safety if he would receive ativan, denied  homicidal ideations, no delusions elicited.  Perception: does not endorse auditory/visual hallucinations. Does not appear to be responding to hallucinatory stimuli.   Cognition:  alert and oriented to person, place, time and purpose,, short and long term memory within normal limits, attention and concentration within normal limits  Insight:  fair, as patient recognizes symptoms of illness and need for recommended treatments.   Judgment:  can make " reasonable decisions about ordinary activities of daily living and necessary medical care recommendations    LABS   CT chest wo IV contrast    Result Date: 1/8/2024  Interpreted By:  Vika Gonzalez, STUDY: CT CHEST WO IV CONTRAST;  1/8/2024 11:42 am   INDICATION: Signs/Symptoms:concern for pna.   COMPARISON: CT chest without contrast dated 09/26/2023   ACCESSION NUMBER(S): [id number]   ORDERING CLINICIAN: HÉCTOR FAGAN   TECHNIQUE: Helical data acquisition of the chest was obtained  without IV contrast material.  Images were reformatted in axial, coronal, and sagittal planes.   FINDINGS: LUNGS AND AIRWAYS: The trachea and central airways are patent. No endobronchial lesion.   Bilateral lungs demonstrate mild paraseptal emphysematous changes. There is interval resolution of previously noted peribronchovascular nodular airspace opacities in the right lung compared to immediate prior CT examination dated 09/26/2023. However there is persistent evidence of ill-defined very tiny peribronchovascular nodular airspace opacities in the right lung predominantly involving the right upper lobe and superior segment of right lower lobe. Left lung is relatively clear. No large pleural effusions or pneumothorax. Evaluation for small pulmonary nodules is limited due to background motion artifacts and background parenchymal changes. Within this limitation, there is no large suspicious pulmonary nodule.   MEDIASTINUM AND KOBI, LOWER NECK AND AXILLA: The visualized thyroid gland is within normal limits.   There are mildly enlarged right perihilar lymph nodes. For example the largest right perihilar lymph node measures approximately 1 cm in short axis. This is similar compared to immediate prior CT examination. No evidence of new enlarged lymphadenopathy. No evidence of bilateral axillary lymphadenopathy.   There is suggestion of tiny hiatal hernia. There is diffuse circumferential thickening of the distal thoracic esophagus.   HEART  AND VESSELS: Thoracic aorta is normal in course and caliber and demonstrates mild atherosclerotic calcifications in the arch and descending segments.   Main pulmonary artery and its branches are normal in caliber.   Mild atherosclerotic calcifications of the coronary arteries are noted. The study is not optimized for evaluation of coronary arteries.   The cardiac chambers are not enlarged.   No evidence of pericardial effusion.   UPPER ABDOMEN: Visualized subdiaphragmatic region demonstrates mild diffuse non nodular thickening of the left adrenal gland, similar compared to immediate prior CT examination. Otherwise rest of the visualized upper abdomen appears grossly unremarkable.   CHEST WALL AND OSSEOUS STRUCTURES: Postsurgical changes of median sternotomy are noted. No suspicious osseous lesions are noted. Mild discogenic degenerative changes of the thoracic spine are noted.       1.  Mild paraseptal emphysematous changes in bilateral lungs. Multiple scattered tiny peribronchovascular reticulonodular opacities in the right lung as described above. While this could be residual from prior resolving infection versus inflammatory changes, possibility of new early developing pneumonia can not be completely excluded in appropriate clinical setting. 2. Mildly enlarged right perihilar lymph node is similar compared to prior CT examination and is likely favored to be reactive. 3. Suggestion of tiny hiatal hernia. Mild circumferential thickening of the distal thoracic esophagus is most likely related to reflux. Recommend correlation with patient's symptomatology. 4. Mild atherosclerotic vascular calcifications. 5. Stable thickening of the left adrenal gland.   MACRO: None   Signed by: Vika Gonzalez 1/8/2024 12:04 PM Dictation workstation:   [id number]    ECG 12 lead    Result Date: 1/8/2024  Normal sinus rhythm Cannot rule out Inferior infarct (cited on or before 03-JAN-2024) T wave abnormality, consider anterior  ischemia Abnormal ECG When compared with ECG of 03-JAN-2024 18:50, Nonspecific T wave abnormality, worse in Inferior leads T wave inversion now evident in Anterior leads    XR chest 1 view    Result Date: 1/6/2024  Interpreted By:  Radames Chavez, STUDY: XR CHEST 1 VIEW;  1/6/2024 6:22 am   INDICATION: Signs/Symptoms:PNEUMONIA / SOB.   COMPARISON: 01/03/2024   ACCESSION NUMBER(S): [id number]   ORDERING CLINICIAN: JOSE C GRAF   FINDINGS:     Median sternotomy wires and mediastinal surgical clips. Normal heart size. No pleural effusion or pneumothorax. Similar patchy right lung airspace opacities. Upper abdomen is unremarkable. Elevation of the left distal clavicle relative to the acromion and absence of the left distal clavicle, likely secondary to prior acromioclavicular separation and possible distal clavicle resection. No acute osseous abnormality.       1. Similar patchy right lung airspace opacities. Consider pneumonia. Follow-up is recommended to confirm resolution.   Signed by: Radames Chavez 1/6/2024 6:49 AM Dictation workstation:   [id number]    Electrocardiogram, 12-lead    Result Date: 1/4/2024  Normal sinus rhythm Inferior infarct , age undetermined Abnormal ECG When compared with ECG of 28-SEP-2023 16:06, (unconfirmed) No significant change was found See ED provider note for full interpretation and clinical correlation Confirmed by Royce Goddard ([id number]) on 1/4/2024 4:32:00 PM    XR chest 1 view    Result Date: 1/3/2024  STUDY: Chest Radiograph;  01/03/2023 6:09 pm INDICATION: Cough. COMPARISON: XR Chest 09/21/2023 and 08/06/2021 ACCESSION NUMBER(S): [id number] ORDERING CLINICIAN: MICA HANSEN TECHNIQUE:  Frontal chest was obtained at 1809 hours. FINDINGS: There has been previous anterior chest and heart surgery with coronary artery bypass and midline sternotomy. The heart is normal size There are minimal lung opacities especially in the right mid and lower lung: possible pneumonia.  There are no detectable pleural effusions. There is no pneumothorax.    1. Previous anterior chest and heart surgery. 2. Minimal lung opacities especially in the right mid and lower lung: possible pneumonia. 3. Follow-up advised. Signed by Dano Carvalho MD      FUNCTIONAL ESTIMATES  Estimate of Intelligence: average   Estimate of Capacity for Activities of Daily Living:  requires full care     A safety risk assessment was completed on the day of discharge. The patient is judged a minimal suicide risk due to:  1)  No access to guns.  2)  Prior suicide attempts, over 7 reported, chronic cutting  3) Reports suicidal ideation to harm self if not given anxiety control, sitter placed, medical made aware, our CNP covering medical consults, Joanne Salazar, was made aware.  4) Goal directed to the future:  5) No current symptoms of a major depressive episode.  The team deemed the patient to be a low risk of self harm and recommend the patient for discharge today.  I spent over 30 minutes in the preparation of this summary. All 11 elements of the transition record were discussed with the patient and or caregiver and the receiving inpatient facility (if applicable).  A copy of the transition record was given to the patient and was transmitted to the outpatient provider accepting the patient's care following  discharge.    Patient's illness, medication/potential for medication side effects, and the medication recommended along with the importance of mediation compliance benefits and risk were reviewed prior to discharge with the patient and with designated family member patient (if applicable).  The patient voiced understanding of their diagnosis and treatment plan.  The patient was counseled not to stop medications without the supervision of a psychiatrist.  The patient was counseled to follow-up with their outpatient medical provider as indicated.   The patient was counseled that if there was an increase in mental health  issues, depression, anxiety, medication side effects, self harming thoughts or thoughts to harm others, to call [facility] or 911 and come to the nearest emergency room.   The patient also received information regarding advanced mental and medical health directives during this hospitalization which they could discuss with their outpatient provider.   The plan was discussed with the patient, the nurse and the social work department. The patient voiced understanding and agreement with the plan.     Medications on Discharge:       Follow up appointments:  To be arranged at the time the patient will be discharged from [facility]    Kaycee Mratinez MD

## 2024-01-08 NOTE — H&P
History Of Present Illness  Gerardo Albright is a 63 y.o. male with parkinsons, chronic neuropathy, chronic pain, hx aspiration PNA, HTN, HLD, CAD, T2DM, schizoaffective disorder (depression), hx substance abuse who initially presented to the ED for SI related to his physical illnesses, and admitted to medicine for gram positive bacteremia. Pt is A&O x2 but provided hx, and additional hx obtained from prior charts.     He was hospitalized 9/2023 for respiratory failure due aspiration PNA, treated with IV vanc + zosyn then. CT chest then showed RLL PNA with no parapneumonic effusion. Had positive MRSA colonization screening back then.     Presented from [facility] to [place] ED 1/3/24 with SI due to, per ED notes, “feeling similar to when he has had pneumonia however the facility is not taking care of him and this made him want to attempt suicide with a razor blade.”     On 1/3/21, his WBC was 21.1, which went to 8.6 on 1/6/24s. 2x blood cultures from 1/4/23 grew positive for gram positive cocci in clusters coming back 1/7/24 morning.   Vitals stable on room air, afebrile. CXR showed signs of RLL PNA. CT head from 1/3/23 showed no bleed or stroke, though showed signs of sinusitis and small r mastoid effusion. Pt did get 1mg po Ativan 1/7/23 morning. Was started on IV Vanc and TTE ordered. Per psych note, will follow pt.     On interview 1/7/24, pt says he has had dyspnea since 1 week ago. Has chronic cough. Has had dry mouth since 5 days ago. Has some chest pain on deep breath.   Pt notes he “snorts” water up his nose to clean it free of organic debris, comparing it to how he used to “snort” recreational substances. He says he does this daily.   Pt says he is very anxious and asks for benzodiazepines repeatedly. He recalls multiple times the different types of benzodiazepines he has taken, saying he regularly gets it and has gotten it for years for anxiety. Last dose was 2 days ago per pt.   Denies dysuria.      Allergies - NKDA  PSH - cardiac cath, PCI, CABG “5 years ago”, “left collarbone surgery”   FH - unreliable  SocHx - Current everyday smoker of cigars and cigarettes ½ PPD for 40+ years. No alcohol. Denies hx of IV drug / needle use.  Legal guardian: Angel Holguin, [phone].     Past Medical History  Past Medical History:   Diagnosis Date    At risk for falls     COPD (chronic obstructive pulmonary disease) (CMS/HCC)     Dementia (CMS/HCC)     Depression     GERD (gastroesophageal reflux disease)     Hypertension     Insomnia     Myocardial infarction (CMS/HCC)     Parkinson's disease     Schizophrenia (CMS/HCC)     Seizures (CMS/HCC)     Weakness        Surgical History  No past surgical history on file.     Social History  He reports that he has quit smoking. His smoking use included cigarettes. He smoked an average of .5 packs per day. He has been exposed to tobacco smoke. He has never used smokeless tobacco. He reports that he does not currently use alcohol. He reports that he does not use drugs.    Family History  No family history on file.     Allergies  Patient has no known allergies.    Review of Systems   Reason unable to perform ROS: not A&O x3.   Respiratory:  Positive for cough and shortness of breath.    Cardiovascular:  Negative for leg swelling.   Gastrointestinal:  Negative for abdominal pain, nausea and vomiting.   Genitourinary:  Negative for dysuria.   Psychiatric/Behavioral:  Positive for self-injury. The patient is nervous/anxious.    All other systems reviewed and are negative.       Physical Exam  Vitals reviewed.   Constitutional:       General: He is not in acute distress.  HENT:      Head: Normocephalic and atraumatic.   Eyes:      Extraocular Movements: Extraocular movements intact.      Conjunctiva/sclera: Conjunctivae normal.   Cardiovascular:      Rate and Rhythm: Normal rate and regular rhythm.      Heart sounds: No murmur heard.     No friction rub. No gallop.   Pulmonary:       "Effort: Pulmonary effort is normal.      Breath sounds: Normal breath sounds. No wheezing, rhonchi or rales.   Abdominal:      General: Bowel sounds are normal.      Palpations: Abdomen is soft. There is no mass.      Tenderness: There is no abdominal tenderness. There is no guarding or rebound.   Musculoskeletal:         General: Tenderness present.      Cervical back: Neck supple.      Right lower leg: No edema.      Left lower leg: No edema.      Comments: Left biceps laceration with erythema round sutures.    Skin:     General: Skin is warm and dry.      Findings: Erythema present. No bruising or rash.   Neurological:      Mental Status: He is alert.      Sensory: No sensory deficit.      Comments: Answering questions, following commands. Moving all extremities.   A&O x2.   Psychiatric:         Mood and Affect: Mood and affect normal.        Last Recorded Vitals  Blood pressure (!) 165/97, pulse 87, temperature 36.3 °C (97.3 °F), temperature source Temporal, height 1.778 m (5' 10\"), weight 78.1 kg (172 lb 1.6 oz), SpO2 93 %.    Relevant Results      Scheduled medications  azithromycin, 500 mg, oral, q24h CASTILLO  benztropine, 1 mg, oral, BID  [START ON 1/8/2024] buPROPion XL, 300 mg, oral, q AM  cefTRIAXone, 1 g, intravenous, q24h  enoxaparin, 40 mg, subcutaneous, q24h  insulin lispro, 0-10 Units, subcutaneous, TID with meals  [Held by provider] metFORMIN, 500 mg, oral, BID with meals  multivitamin with minerals, 1 tablet, oral, Daily  naltrexone, 50 mg, oral, Daily  pantoprazole, 40 mg, oral, BID AC  perflutren lipid microspheres, 0.5-10 mL of dilution, intravenous, Once in imaging  perflutren protein A microsphere, 0.5 mL, intravenous, Once in imaging  QUEtiapine, 100 mg, oral, BID  QUEtiapine, 50 mg, oral, Nightly  sulfur hexafluoride microsphr, 2 mL, intravenous, Once in imaging  vancomycin, 1,250 mg, intravenous, q12h      Continuous medications     PRN medications  PRN medications: acetaminophen, ALPRAZolam, " dextrose 10 % in water (D10W), dextrose, glucagon, oxyCODONE, oxygen    XR chest 1 view    Result Date: 1/6/2024  Interpreted By:  Radames Chavez, STUDY: XR CHEST 1 VIEW;  1/6/2024 6:22 am   INDICATION: Signs/Symptoms:PNEUMONIA / SOB.   COMPARISON: 01/03/2024   ACCESSION NUMBER(S): [id number]   ORDERING CLINICIAN: JOSE C GRAF   FINDINGS:     Median sternotomy wires and mediastinal surgical clips. Normal heart size. No pleural effusion or pneumothorax. Similar patchy right lung airspace opacities. Upper abdomen is unremarkable. Elevation of the left distal clavicle relative to the acromion and absence of the left distal clavicle, likely secondary to prior acromioclavicular separation and possible distal clavicle resection. No acute osseous abnormality.       1. Similar patchy right lung airspace opacities. Consider pneumonia. Follow-up is recommended to confirm resolution.   Signed by: Radames Chavez 1/6/2024 6:49 AM Dictation workstation:   [id number]    Electrocardiogram, 12-lead    Result Date: 1/4/2024  Normal sinus rhythm Inferior infarct , age undetermined Abnormal ECG When compared with ECG of 28-SEP-2023 16:06, (unconfirmed) No significant change was found See ED provider note for full interpretation and clinical correlation Confirmed by Royce Goddard ([id number]) on 1/4/2024 4:32:00 PM    XR chest 1 view    Result Date: 1/3/2024  STUDY: Chest Radiograph;  01/03/2023 6:09 pm INDICATION: Cough. COMPARISON: XR Chest 09/21/2023 and 08/06/2021 ACCESSION NUMBER(S): [id number] ORDERING CLINICIAN: MICA HANSEN TECHNIQUE:  Frontal chest was obtained at 1809 hours. FINDINGS: There has been previous anterior chest and heart surgery with coronary artery bypass and midline sternotomy. The heart is normal size There are minimal lung opacities especially in the right mid and lower lung: possible pneumonia. There are no detectable pleural effusions. There is no pneumothorax.    1. Previous anterior chest  and heart surgery. 2. Minimal lung opacities especially in the right mid and lower lung: possible pneumonia. 3. Follow-up advised. Signed by Dano Carvalho MD    CT head wo IV contrast    Result Date: 1/3/2024  Interpreted By:  Jake Pappas, STUDY: CT HEAD WO IV CONTRAST;  1/3/2024 6:07 pm   INDICATION: Signs/Symptoms:ams.   COMPARISON: CT head 01/28/2022   ACCESSION NUMBER(S): [id number]   ORDERING CLINICIAN: MICA HANSEN   TECHNIQUE: Noncontrast axial CT images of head were obtained with coronal and sagittal reconstructed images.   FINDINGS: BRAIN PARENCHYMA: Gray-white differentiation is preserved. No mass-effect, midline shift or effacement of cerebral sulci. No significant white matter disease.   HEMORRHAGE: No acute intracranial hemorrhage.   VENTRICLES and EXTRA-AXIAL SPACES: The ventricles and sulci are within normal limits for brain volume. No abnormal extra-axial fluid collection.   ORBITS: The visualized orbits and globes are within normal limits.   EXTRACRANIAL SOFT TISSUES: Within normal limits.   PARANASAL SINUSES/MASTOIDS: Diffuse paranasal sinus mucosal thickening. Small right mastoid effusion.   CALVARIUM: No depressed skull fracture.         1. No acute intracranial abnormality identified. 2. Paranasal sinus mucosal disease and small right mastoid effusion. Correlate for evidence of acute sinusitis/mastoiditis.       MACRO: None   Signed by: Jake Pappas 1/3/2024 6:34 PM Dictation workstation:   [id number]     Results for orders placed or performed during the hospital encounter of 01/07/24 (from the past 24 hour(s))   POCT GLUCOSE   Result Value Ref Range    POCT Glucose 116 (H) 74 - 99 mg/dL   Green Top   Result Value Ref Range    Extra Tube Hold for add-ons.    CBC and Auto Differential   Result Value Ref Range    WBC 8.5 4.4 - 11.3 x10*3/uL    nRBC 0.0 0.0 - 0.0 /100 WBCs    RBC 4.82 4.50 - 5.90 x10*6/uL    Hemoglobin 14.1 13.5 - 17.5 g/dL    Hematocrit 41.6 41.0 - 52.0 %    MCV 86 80 - 100  fL    MCH 29.3 26.0 - 34.0 pg    MCHC 33.9 32.0 - 36.0 g/dL    RDW 12.4 11.5 - 14.5 %    Platelets 430 150 - 450 x10*3/uL    Neutrophils % 67.4 40.0 - 80.0 %    Immature Granulocytes %, Automated 0.4 0.0 - 0.9 %    Lymphocytes % 18.8 13.0 - 44.0 %    Monocytes % 10.3 2.0 - 10.0 %    Eosinophils % 2.4 0.0 - 6.0 %    Basophils % 0.7 0.0 - 2.0 %    Neutrophils Absolute 5.72 1.20 - 7.70 x10*3/uL    Immature Granulocytes Absolute, Automated 0.03 0.00 - 0.70 x10*3/uL    Lymphocytes Absolute 1.59 1.20 - 4.80 x10*3/uL    Monocytes Absolute 0.87 0.10 - 1.00 x10*3/uL    Eosinophils Absolute 0.20 0.00 - 0.70 x10*3/uL    Basophils Absolute 0.06 0.00 - 0.10 x10*3/uL   Comprehensive metabolic panel   Result Value Ref Range    Glucose 105 (H) 74 - 99 mg/dL    Sodium 130 (L) 136 - 145 mmol/L    Potassium 4.0 3.5 - 5.3 mmol/L    Chloride 95 (L) 98 - 107 mmol/L    Bicarbonate 24 21 - 32 mmol/L    Anion Gap 15 10 - 20 mmol/L    Urea Nitrogen 11 6 - 23 mg/dL    Creatinine 0.56 0.50 - 1.30 mg/dL    eGFR >90 >60 mL/min/1.73m*2    Calcium 9.3 8.6 - 10.3 mg/dL    Albumin 4.0 3.4 - 5.0 g/dL    Alkaline Phosphatase 67 33 - 136 U/L    Total Protein 7.2 6.4 - 8.2 g/dL    AST 10 9 - 39 U/L    Bilirubin, Total 0.6 0.0 - 1.2 mg/dL    ALT 11 10 - 52 U/L   Magnesium   Result Value Ref Range    Magnesium 1.86 1.60 - 2.40 mg/dL   Phosphorus   Result Value Ref Range    Phosphorus 4.1 2.5 - 4.9 mg/dL   Lactate   Result Value Ref Range    Lactate 0.8 0.4 - 2.0 mmol/L   Troponin I, High Sensitivity   Result Value Ref Range    Troponin I, High Sensitivity 5 0 - 20 ng/L   MRSA Surveillance for Vancomycin De-escalation, PCR    Specimen: Anterior Nares; Swab   Result Value Ref Range    MRSA PCR Not Detected Not Detected   POCT GLUCOSE   Result Value Ref Range    POCT Glucose 173 (H) 74 - 99 mg/dL        Assessment/Plan   Principal Problem:    Bacteremia  Active Problems:    Left upper extremity swelling    63m with parkinsons, chronic neuropathy, chronic  pain, hx aspiration PNA, HTN, HLD, CAD, T2DM, schizoaffective disorder (depression), hx substance abuse who initially presented to the ED for SI related to his physical illnesses, and admitted to medicine for gram positive bacteremia.    #Dyspnea  #gram positive bacteremia   #Concern for PNA, possible aspiration   -hx positive MRSA nares 9/2023 with tx for PNA with IV vanc and zosyn then, thought to be from aspiration. However based on pt’s story, pt intentionally “snorts” water also. Denies IVDU.   -2x gram positive 1/4/24, repeat 1/7/24  -iv vanc (1/6-)   -ceftriaxone 1g (1/7-), azithromycin 3d   -urine legionella and strep, sputum culture, repeat MRSA nares, procal. Echo.   -ID consult     #concern for dysphagia with hx of it   -bite size food with nectar thick liquids. No nectar thick available, will do moderately thick  -SLP    #anxiety  #hx benzodiazepine use  -alprazolam 0.25mg BID PRN     #SI   #schizoaffective disorder  #parkinsons  -per psych recs, continue benztropine 1mg po, Wellbutrin 300mg mornings. Quetiapine changed to 100mg two times during day and 50mg nightly.   -psych consult. sitter (1/7-)     Chronic    #GERD  -pantoprazole 40     #HTN/CAD  -pt reportedly takes asa 81mg daily. Will need med list.     #chronic neuropathy  - pt reportedly takes gabapentin 600mg TID. Will need med list.     #T2DM  - pt reportedly takes 500mg BID metformin. Will need med list.   - SSI     Fluids: PO   Nutrition: bite sized food, moderately thick (consider nectar thick)  VTE ppx: lonvenox   O2: room air  Code status: will need to discuss with POA, full code for now. Legal guardian: Angel Holguin, [phone]    Dispo: pt admitted for gram positive bacteremia concern for pna, estimated LOS > 48h       Isamar Alves DO

## 2024-01-08 NOTE — CONSULTS
Consults  Referred by JAX Madrigal MD: Amparo Galarza MD    Reason For Consult  Bacteremia / pneumonia    History Of Present Illness  Gerardo Albright is a 63 y.o. male, hx of parkinsonism, hx of CAD, hx of HTN, hx of DM, hx of schizoaffective disorder, hx of aspiration pneumonia in Sept, hx of MRSA colonization, he was admitted following a suicide attempt, his BC showed g+ve cocci, he has been feeling weak, not eating well. Cough, no chest pain, no fever     Past Medical History  He has a past medical history of At risk for falls, COPD (chronic obstructive pulmonary disease) (CMS/HCC), Dementia (CMS/[facility]), Depression, GERD (gastroesophageal reflux disease), Hypertension, Insomnia, Myocardial infarction (CMS/HCC), Parkinson's disease, Schizophrenia (CMS/HCC), Seizures (CMS/[facility]), and Weakness.    Surgical History  He has no past surgical history on file.     Social History     Occupational History    Not on file   Tobacco Use    Smoking status: Former     Packs/day: .5     Types: Cigarettes     Passive exposure: Past    Smokeless tobacco: Never   Vaping Use    Vaping Use: Never used   Substance and Sexual Activity    Alcohol use: Not Currently    Drug use: Never    Sexual activity: Not Currently     Travel History   Travel since 12/08/23    No documented travel since 12/08/23          Family History  No family history on file., no immunodeficiency  Allergies  Patient has no known allergies.     Immunization History   Administered Date(s) Administered    Pfizer Purple Cap SARS-CoV-2 12/23/2020    Tdap vaccine, age 7 year and older (BOOSTRIX) 01/03/2024   Influenza vaccines is up to date  Depression screen is positive, psych on the case    Medications  Home medications:  Medications Prior to Admission   Medication Sig Dispense Refill Last Dose    aspirin 81 mg chewable tablet Chew 1 tablet (81 mg) once daily.       benztropine (Cogentin) 1 mg tablet Take 1 tablet (1 mg) by mouth 2 times a day.       buPROPion  XL (Wellbutrin XL) 300 mg 24 hr tablet Take 1 tablet (300 mg) by mouth once daily in the morning. Do not crush, chew, or split.       gabapentin (Neurontin) 600 mg tablet Take 1 tablet (600 mg) by mouth 3 times a day.       metFORMIN (Glucophage) 500 mg tablet Take 1 tablet (500 mg) by mouth 2 times a day with meals. 60 tablet 0     multivitamin tablet Take 1 tablet by mouth once daily.       naltrexone (Depade) 50 mg tablet Take 1 tablet (50 mg) by mouth once daily.       pantoprazole (ProtoNix) 40 mg EC tablet Take 1 tablet (40 mg) by mouth 2 times a day before meals. Do not crush, chew, or split.       QUEtiapine (SEROquel) 200 mg tablet Take 1 tablet (200 mg) by mouth 2 times a day. 60 tablet 0     QUEtiapine (SEROquel) 50 mg tablet Take 1 tablet (50 mg) by mouth once daily at bedtime. Take in addition to 200 mg BID to total 250 mg at bedtime.       risperiDONE (Perseris) 90 mg suspension,extended rel syring subcutaneous injection Inject 90 mg under the skin every 30 (thirty) days.        Current medications:  Scheduled medications  azithromycin, 500 mg, oral, q24h CASTILLO  benztropine, 1 mg, oral, BID  buPROPion XL, 300 mg, oral, q AM  cefTRIAXone, 1 g, intravenous, q24h  enoxaparin, 40 mg, subcutaneous, q24h  insulin lispro, 0-10 Units, subcutaneous, TID with meals  [Held by provider] metFORMIN, 500 mg, oral, BID with meals  multivitamin with minerals, 1 tablet, oral, Daily  naltrexone, 50 mg, oral, Daily  pantoprazole, 40 mg, oral, BID AC  perflutren lipid microspheres, 0.5-10 mL of dilution, intravenous, Once in imaging  perflutren protein A microsphere, 0.5 mL, intravenous, Once in imaging  QUEtiapine, 100 mg, oral, BID  QUEtiapine, 50 mg, oral, Nightly  sulfur hexafluoride microsphr, 2 mL, intravenous, Once in imaging  vancomycin, 1,500 mg, intravenous, q12h      Continuous medications     PRN medications  PRN medications: acetaminophen, ALPRAZolam, dextrose 10 % in water (D10W), dextrose, glucagon, oxyCODONE,  "oxygen    Review of Systems   All other systems reviewed and are negative.       Objective  Range of Vitals (last 24 hours)  Heart Rate:  [74-87]   Temp:  [36.3 °C (97.3 °F)-37.4 °C (99.3 °F)]   Resp:  [18]   BP: (127-176)/()   Height:  [177.8 cm (5' 10\")]   Weight:  [76.8 kg (169 lb 5 oz)-78.1 kg (172 lb 1.6 oz)]   SpO2:  [93 %-97 %]   Daily Weight  01/08/24 : 76.8 kg (169 lb 5 oz)    Body mass index is 24.29 kg/m².     Physical Exam  Constitutional:       Appearance: Normal appearance.   HENT:      Head: Normocephalic and atraumatic.      Mouth/Throat:      Mouth: Mucous membranes are moist.      Pharynx: Oropharynx is clear.   Eyes:      Pupils: Pupils are equal, round, and reactive to light.   Cardiovascular:      Rate and Rhythm: Normal rate and regular rhythm.      Heart sounds: Normal heart sounds.   Pulmonary:      Effort: Pulmonary effort is normal.      Breath sounds: Normal breath sounds.   Abdominal:      General: Abdomen is flat. Bowel sounds are normal.      Palpations: Abdomen is soft.   Musculoskeletal:      Cervical back: Normal range of motion.      Comments: Lt antecubital fossa sutures laceration, no cellulitis   Neurological:      Mental Status: He is alert.          Relevant Results  Outside Hospital Results  reviewed  Labs  Results from last 72 hours   Lab Units 01/08/24 0622 01/07/24  1809 01/06/24  0600   WBC AUTO x10*3/uL 6.4 8.5 8.6   HEMOGLOBIN g/dL 14.2 14.1 13.4*   HEMATOCRIT % 43.5 41.6 40.5*   PLATELETS AUTO x10*3/uL 413 430 409   NEUTROS PCT AUTO %  --  67.4 68.8   LYMPHS PCT AUTO %  --  18.8 18.1   MONOS PCT AUTO %  --  10.3 8.7   EOS PCT AUTO %  --  2.4 3.4     Results from last 72 hours   Lab Units 01/08/24 0622 01/07/24  1809 01/06/24  0600   SODIUM mmol/L 132* 130* 132*   POTASSIUM mmol/L 4.0 4.0 4.1   CHLORIDE mmol/L 98 95* 100   CO2 mmol/L 24 24 22   BUN mg/dL 11 11 13   CREATININE mg/dL 0.59 0.56 0.62   GLUCOSE mg/dL 102* 105* 124*   CALCIUM mg/dL 9.2 9.3 9.1   ANION " "GAP mmol/L 14 15 14   EGFR mL/min/1.73m*2 >90 >90 >90   PHOSPHORUS mg/dL 4.2 4.1  --      Results from last 72 hours   Lab Units 01/08/24  0622 01/07/24  1809   ALK PHOS U/L  --  67   BILIRUBIN TOTAL mg/dL  --  0.6   PROTEIN TOTAL g/dL  --  7.2   ALT U/L  --  11   AST U/L  --  10   ALBUMIN g/dL 3.7 4.0     Estimated Creatinine Clearance: 125 mL/min (by C-G formula based on SCr of 0.59 mg/dL).  CRP   Date Value Ref Range Status   04/03/2020 4.33 (A) mg/dL Final     Comment:     REF VALUE  < 1.00       No results found for: \"HIV1X2\", \"HIVCONF\", \"LIJSAB3PG\"  No results found for: \"HEPCABINIT\", \"HEPCAB\", \"HCVPCRQUANT\"  Microbiology  Susceptibility data from last 90 days.  Collected Specimen Info Organism Clindamycin Erythromycin Oxacillin Tetracycline Trimethoprim/Sulfamethoxazole Vancomycin   01/04/24 Blood culture from Peripheral Venipuncture Staphylococcus epidermidis         01/04/24 Blood culture from Peripheral Venipuncture Staphylococcus epidermidis I I R S S S     Imaging  Reviewed       Assessment/Plan   Bacteremia  Pneumonia    Recommendations :  Continue Vancomycin  Can deescalate Zoosyn to Rocephin  Swallowing evaluation  Repeat the CT chest when able to    I spent minutes in the professional and overall care of this patient.      Francine Wilson MD  "

## 2024-01-08 NOTE — SIGNIFICANT EVENT
Attempted to evaluate patient.  Patient leaving unit for testing.  Will see patient in morning.   Continue 1:1 sitter.

## 2024-01-08 NOTE — PROGRESS NOTES
"Vancomycin Dosing by Pharmacy- FOLLOW UP    Gerardo Albright is a 63 y.o. year old male who Pharmacy has been consulted for vancomycin dosing for endocarditis/endovascular infection. Based on the patient's indication and renal status this patient is being dosed based on a goal AUC of 500-600.     Renal function is currently stable.    Current vancomycin dose: 1250 mg given every 12 hours    Estimated vancomycin AUC on current dose: 420 mg/L.hr     Visit Vitals  /82 (BP Location: Left arm, Patient Position: Lying)   Pulse 81   Temp 36.3 °C (97.3 °F) (Temporal)   Resp 18        Lab Results   Component Value Date    CREATININE 0.59 01/08/2024    CREATININE 0.56 01/07/2024    CREATININE 0.62 01/06/2024    CREATININE 0.63 01/03/2024        Patient weight is No results found for: \"PTWEIGHT\"    No results found for: \"CULTURE\"     I/O last 3 completed shifts:  In: 4750 (61.8 mL/kg) [P.O.:4700; IV Piggyback:50]  Out: 5300 (69 mL/kg) [Urine:5300 (1.9 mL/kg/hr)]  Weight: 76.8 kg   [unfilled]    Lab Results   Component Value Date    PATIENTTEMP 37.0 09/21/2023        Assessment/Plan    Below goal AUC. Orders placed for new vancomcyin regimen of 1500 every 12 hours to begin at 11:00.     This dosing regimen is predicted by [employer] to result in the following pharmacokinetic parameters:  <<<<<PASTE [employer] DATA HERE>>>>>  Loading dose: N/A  Regimen: 1500 mg IV every 12 hours.  Start time: 11:16 on 01/08/2024  Exposure target: AUC24 (range)400-600 mg/L.hr   AUC24,ss: 504 mg/L.hr  Probability of AUC24 > 400: 82 %  Ctrough,ss: 14.2 mg/L  Probability of Ctrough,ss > 20: 25 %  Probability of nephrotoxicity (Lodise OTIS 2009): 9 %  The next level will be obtained on 01/09 at 0500. May be obtained sooner if clinically indicated.   Will continue to monitor renal function daily while on vancomycin and order serum creatinine at least every 48 hours if not already ordered.  Follow for continued vancomycin needs, clinical response, " and signs/symptoms of toxicity.       Sultana Rodrigez, PharmD

## 2024-01-08 NOTE — PROGRESS NOTES
01/08/24 1508   Discharge Planning   Living Arrangements Other (Comment)  (from [facility])   Support Systems Spouse/significant other;/   Assistance Needed spoke to nurse from facility, patient A&Ox2, independent but shuffles, hx falls but none recently   Type of Residence Nursing home/residential care   Home or Post Acute Services Post acute facilities (Rehab/SNF/etc)   Type of Post Acute Facility Services Long term care   Patient expects to be discharged to: pending psych consult; anticipate return to [facility]   Does the patient need discharge transport arranged? Yes   RoundTrip coordination needed? Yes   Has discharge transport been arranged? No   Patient Choice   Patient / Family choosing to utilize agency / facility established prior to hospitalization Yes     01/08/2024 1509: [facility] sent referral to [facility], confirmed he is long term there. Psych consult pending.

## 2024-01-08 NOTE — PROGRESS NOTES
"Overnight Events     No acute events overnight.    Subjective   Patient repeatedly asks for stronger pain medication and for more anxiety medication. Continues to ask even after it is explained to him that we are not going to increase these medications. Confirms that his arm is still painful, but does not appear in any acute distress and does not react when the cut on his bicep is palpated.    Objective    Physical Exam  Constitutional:       Comments: Hospital gown stained with food.   HENT:      Mouth/Throat:      Mouth: Mucous membranes are moist.      Pharynx: Oropharynx is clear.   Eyes:      Extraocular Movements: Extraocular movements intact.      Conjunctiva/sclera: Conjunctivae normal.   Cardiovascular:      Rate and Rhythm: Normal rate and regular rhythm.      Pulses: Normal pulses.      Heart sounds: Normal heart sounds.   Pulmonary:      Effort: Pulmonary effort is normal.      Breath sounds: Normal breath sounds.   Abdominal:      Palpations: Abdomen is soft.      Tenderness: There is no abdominal tenderness.   Musculoskeletal:      Right lower leg: No edema.      Left lower leg: No edema.   Skin:     General: Skin is warm and dry.      Comments: Laceration across left bicep with intact stitches. Markedly dry erythema around laceration.   Neurological:      Mental Status: He is alert.   Psychiatric:      Comments: Repeating questions about pain and anxiety medication despite receiving answers moments before.          Visit Vitals  /84 (BP Location: Left arm, Patient Position: Sitting)   Pulse 85   Temp 37.4 °C (99.3 °F) (Temporal)   Resp 18   Ht 1.778 m (5' 10\")   Wt 76.8 kg (169 lb 5 oz)   SpO2 93%   BMI 24.29 kg/m²   Smoking Status Former   BSA 1.95 m²          Intake/Output Summary (Last 24 hours) at 1/8/2024 1155  Last data filed at 1/8/2024 1038  Gross per 24 hour   Intake 5720 ml   Output 5900 ml   Net -180 ml           I/Os    Intake/Output Summary (Last 24 hours) at 1/8/2024 1157  Last " "data filed at 1/8/2024 1038  Gross per 24 hour   Intake 5720 ml   Output 5900 ml   Net -180 ml       Labs:   Results from last 72 hours   Lab Units 01/08/24  0622 01/07/24  1809 01/06/24  0600   SODIUM mmol/L 132* 130* 132*   POTASSIUM mmol/L 4.0 4.0 4.1   CHLORIDE mmol/L 98 95* 100   CO2 mmol/L 24 24 22   BUN mg/dL 11 11 13   CREATININE mg/dL 0.59 0.56 0.62   GLUCOSE mg/dL 102* 105* 124*   CALCIUM mg/dL 9.2 9.3 9.1   ANION GAP mmol/L 14 15 14   EGFR mL/min/1.73m*2 >90 >90 >90   PHOSPHORUS mg/dL 4.2 4.1  --       Results from last 72 hours   Lab Units 01/08/24  0622 01/07/24  1809 01/06/24  0600   WBC AUTO x10*3/uL 6.4 8.5 8.6   HEMOGLOBIN g/dL 14.2 14.1 13.4*   HEMATOCRIT % 43.5 41.6 40.5*   PLATELETS AUTO x10*3/uL 413 430 409   NEUTROS PCT AUTO %  --  67.4 68.8   LYMPHS PCT AUTO %  --  18.8 18.1   MONOS PCT AUTO %  --  10.3 8.7   EOS PCT AUTO %  --  2.4 3.4      Lab Results   Component Value Date    CALCIUM 9.2 01/08/2024    PHOS 4.2 01/08/2024      Lab Results   Component Value Date    CRP 4.33 (A) 04/03/2020       [unfilled]     Micro/ID:   Susceptibility data from last 90 days.  Collected Specimen Info Organism Clindamycin Erythromycin Oxacillin Tetracycline Trimethoprim/Sulfamethoxazole Vancomycin   01/04/24 Blood culture from Peripheral Venipuncture Staphylococcus epidermidis         01/04/24 Blood culture from Peripheral Venipuncture Staphylococcus epidermidis I I R S S S                    No lab exists for component: \"AGALPCRNB\"   .ID  Lab Results   Component Value Date    BLOODCULT Loaded on Instrument - Culture in progress 01/07/2024    BLOODCULT Loaded on Instrument - Culture in progress 01/07/2024       Meds    Scheduled medications  azithromycin, 500 mg, oral, q24h CASTILLO  benztropine, 1 mg, oral, BID  buPROPion XL, 300 mg, oral, q AM  cefTRIAXone, 1 g, intravenous, q24h  enoxaparin, 40 mg, subcutaneous, q24h  insulin lispro, 0-10 Units, subcutaneous, TID with meals  melatonin, 5 mg, oral, " Nightly  [Held by provider] metFORMIN, 500 mg, oral, BID with meals  multivitamin with minerals, 1 tablet, oral, Daily  naltrexone, 50 mg, oral, Daily  pantoprazole, 40 mg, oral, BID AC  perflutren lipid microspheres, 0.5-10 mL of dilution, intravenous, Once in imaging  perflutren protein A microsphere, 0.5 mL, intravenous, Once in imaging  QUEtiapine, 100 mg, oral, BID  QUEtiapine, 50 mg, oral, Nightly  sulfur hexafluoride microsphr, 2 mL, intravenous, Once in imaging  vancomycin, 1,500 mg, intravenous, q12h      Continuous medications     PRN medications  PRN medications: acetaminophen, ALPRAZolam, dextrose 10 % in water (D10W), dextrose, glucagon, oxyCODONE, oxygen, white petrolatum       Images    ECG 12 lead    Result Date: 1/8/2024  Normal sinus rhythm Cannot rule out Inferior infarct (cited on or before 03-JAN-2024) T wave abnormality, consider anterior ischemia Abnormal ECG When compared with ECG of 03-JAN-2024 18:50, Nonspecific T wave abnormality, worse in Inferior leads T wave inversion now evident in Anterior leads    XR chest 1 view    Result Date: 1/6/2024  Interpreted By:  Radames Chavez, STUDY: XR CHEST 1 VIEW;  1/6/2024 6:22 am   INDICATION: Signs/Symptoms:PNEUMONIA / SOB.   COMPARISON: 01/03/2024   ACCESSION NUMBER(S): [id number]   ORDERING CLINICIAN: JOSE C GRAF   FINDINGS:     Median sternotomy wires and mediastinal surgical clips. Normal heart size. No pleural effusion or pneumothorax. Similar patchy right lung airspace opacities. Upper abdomen is unremarkable. Elevation of the left distal clavicle relative to the acromion and absence of the left distal clavicle, likely secondary to prior acromioclavicular separation and possible distal clavicle resection. No acute osseous abnormality.       1. Similar patchy right lung airspace opacities. Consider pneumonia. Follow-up is recommended to confirm resolution.   Signed by: Radames Chavez 1/6/2024 6:49 AM Dictation workstation:    [id number]    Electrocardiogram, 12-lead    Result Date: 1/4/2024  Normal sinus rhythm Inferior infarct , age undetermined Abnormal ECG When compared with ECG of 28-SEP-2023 16:06, (unconfirmed) No significant change was found See ED provider note for full interpretation and clinical correlation Confirmed by Royce Goddard ([id number]) on 1/4/2024 4:32:00 PM    XR chest 1 view    Result Date: 1/3/2024  STUDY: Chest Radiograph;  01/03/2023 6:09 pm INDICATION: Cough. COMPARISON: XR Chest 09/21/2023 and 08/06/2021 ACCESSION NUMBER(S): [id number] ORDERING CLINICIAN: MICA HANSEN TECHNIQUE:  Frontal chest was obtained at 1809 hours. FINDINGS: There has been previous anterior chest and heart surgery with coronary artery bypass and midline sternotomy. The heart is normal size There are minimal lung opacities especially in the right mid and lower lung: possible pneumonia. There are no detectable pleural effusions. There is no pneumothorax.    1. Previous anterior chest and heart surgery. 2. Minimal lung opacities especially in the right mid and lower lung: possible pneumonia. 3. Follow-up advised. Signed by Dano Carvalho MD    CT head wo IV contrast    Result Date: 1/3/2024  Interpreted By:  Jake Pappas, STUDY: CT HEAD WO IV CONTRAST;  1/3/2024 6:07 pm   INDICATION: Signs/Symptoms:ams.   COMPARISON: CT head 01/28/2022   ACCESSION NUMBER(S): [id number]   ORDERING CLINICIAN: MICA HANSEN   TECHNIQUE: Noncontrast axial CT images of head were obtained with coronal and sagittal reconstructed images.   FINDINGS: BRAIN PARENCHYMA: Gray-white differentiation is preserved. No mass-effect, midline shift or effacement of cerebral sulci. No significant white matter disease.   HEMORRHAGE: No acute intracranial hemorrhage.   VENTRICLES and EXTRA-AXIAL SPACES: The ventricles and sulci are within normal limits for brain volume. No abnormal extra-axial fluid collection.   ORBITS: The visualized orbits and globes are within normal  limits.   EXTRACRANIAL SOFT TISSUES: Within normal limits.   PARANASAL SINUSES/MASTOIDS: Diffuse paranasal sinus mucosal thickening. Small right mastoid effusion.   CALVARIUM: No depressed skull fracture.         1. No acute intracranial abnormality identified. 2. Paranasal sinus mucosal disease and small right mastoid effusion. Correlate for evidence of acute sinusitis/mastoiditis.       MACRO: None   Signed by: Jake Pappas 1/3/2024 6:34 PM Dictation workstation:   [id number]     Encounter Date: 01/07/24   ECG 12 lead   Result Value    Ventricular Rate 76    Atrial Rate 76    WA Interval 146    QRS Duration 98    QT Interval 382    QTC Calculation(Bazett) 429    R Axis -8    T Axis 1    QRS Count 13    Q Onset 224    P Onset 151    P Offset 198    T Offset 415    QTC Fredericia 413    Narrative    Normal sinus rhythm  Cannot rule out Inferior infarct (cited on or before 03-JAN-2024)  T wave abnormality, consider anterior ischemia  Abnormal ECG  When compared with ECG of 03-JAN-2024 18:50,  Nonspecific T wave abnormality, worse in Inferior leads  T wave inversion now evident in Anterior leads      Encounter Date: 01/07/24   ECG 12 lead   Result Value    Ventricular Rate 76    Atrial Rate 76    WA Interval 146    QRS Duration 98    QT Interval 382    QTC Calculation(Bazett) 429    R Axis -8    T Axis 1    QRS Count 13    Q Onset 224    P Onset 151    P Offset 198    T Offset 415    QTC Fredericia 413    Narrative    Normal sinus rhythm  Cannot rule out Inferior infarct (cited on or before 03-JAN-2024)  T wave abnormality, consider anterior ischemia  Abnormal ECG  When compared with ECG of 03-JAN-2024 18:50,  Nonspecific T wave abnormality, worse in Inferior leads  T wave inversion now evident in Anterior leads      @CT@   [unfilled]   [unfilled]   === 01/03/24 ===    CT HEAD WO IV CONTRAST    - Impression -  1. No acute intracranial abnormality identified.  2. Paranasal sinus mucosal disease and small right mastoid  effusion.  Correlate for evidence of acute sinusitis/mastoiditis.        MACRO:  None    Signed by: Jake Pappas 1/3/2024 6:34 PM  Dictation workstation:   [id number]     Assessment and Plan    63m with parkinsons, chronic neuropathy, chronic pain, hx aspiration PNA, HTN, HLD, CAD, T2DM, schizoaffective disorder (depression), hx substance abuse who initially presented to the ED for SI related to his physical illnesses, and admitted to medicine for gram positive bacteremia.    #Dyspnea  #gram positive bacteremia   #Concern for PNA, possible aspiration   -hx positive MRSA nares 9/2023 with tx for PNA with IV vanc and zosyn then, thought to be from aspiration. However based on pt’s story, pt intentionally “snorts” water also. Denies IVDU.   -2x Staph epidermidis susceptible to tetracycline, vanc, and Bactrim 1/4/24, repeat 1/7/24  -iv vanc (1/6-)   -ceftriaxone 1g (1/7-), azithromycin 3d   -urine legionella and strep, MRSA nares negative  -echo results pending  -procal 0.07  -ID consult   -ordered CT chest per ID     #concern for dysphagia with hx of it   -SLP onboard     #anxiety  #hx benzodiazepine use  -alprazolam 0.25mg BID PRN      #SI   #schizoaffective disorder  #parkinsons  -per psych recs, continue benztropine 1mg po, Wellbutrin 300mg mornings. Quetiapine changed to 100mg two times during day and 50mg nightly.   -psych consult. sitter (1/7-)      Chronic   #GERD - pantoprazole 40    #HTN/CAD - cont asa 81mg daily.   #chronic neuropathy - takes gabapentin 600mg TID. Changing to 300mg TID.   #T2DM - hold home 500mg BID metformin, started SSI      Fluids: PO   Nutrition: soft/bite-sized solids, thin liquids, medication whole in puree, upright intake and check for pocketing during/after meals  VTE ppx: lonvenox   O2: room air  Code status: will need to discuss with POA, full code for now. Legal guardian: Angel Holguin, [phone] (did not answer phone)     Dispo: 64yo admitted for concern for SI,  transferred/admitted to floor for bacteremia. ID and Psych onboard. On IV vanc/zosyn/azithro.    Kamran Harris,   Internal Medicine PGY1

## 2024-01-08 NOTE — PROGRESS NOTES
Occupational Therapy    Evaluation    Patient Name: Gerardo Albright  MRN: [id number]  Today's Date: 1/8/2024       Assessment  IP OT Assessment  OT Assessment: Pt presents with decreased ADL performance, decreased safety awareness and decreased activity tolerance and would benefit from continued skilled OT intervention  Prognosis: Good  Evaluation/Treatment Tolerance: Patient tolerated treatment well  Medical Staff Made Aware: Yes  End of Session Communication: Bedside nurse (pt with sitter in room)  Plan:  Treatment Interventions: ADL retraining, Functional transfer training, UE strengthening/ROM, Cognitive reorientation, Equipment evaluation/education, Compensatory technique education  OT Frequency: 3 times per week  OT Discharge Recommendations: Moderate intensity level of continued care  OT - OK to Discharge: Yes    Subjective   Current Problem:  1. Bacteremia  Transthoracic Echo (TTE) Complete    Transthoracic Echo (TTE) Complete      2. Extrapyramidal movement disorder, drug-induced  benztropine (Cogentin) tablet 1 mg      3. Other depression  buPROPion XL (Wellbutrin XL) 24 hr tablet 300 mg      4. Type 2 diabetes mellitus with other specified complication, without long-term current use of insulin (CMS/[facility])  metFORMIN (Glucophage) tablet 500 mg      5. Health care maintenance  multivitamin with minerals 1 tablet      6. Alcohol use disorder in remission  naltrexone (Depade) tablet 50 mg      7. Gastroesophageal reflux disease without esophagitis  pantoprazole (ProtoNix) EC tablet 40 mg      8. Other schizophrenia (CMS/HCC)  QUEtiapine (SEROquel) tablet 50 mg    QUEtiapine (SEROquel) tablet 100 mg      9. Left upper extremity swelling  CANCELED: Vascular US upper extremity venous duplex left    CANCELED: Vascular US upper extremity venous duplex left        General:  General  Reason for Referral: OT for ADL and safety assessment  Referred By: Isamar Alves DO  Past Medical History Relevant to Rehab: COPD,  dementia, depression, GERD, HTN, MI, Parkinson's Disease, Schizophrenia, seizures, weakness  Family/Caregiver Present: No (pt with sitter)  Prior to Session Communication: Bedside nurse  Patient Position Received: Alarm off, caregiver present, Up in chair  Preferred Learning Style: auditory, verbal  General Comment: Pt presented from [facility]; pt with sutured laceration L distal bicep from self-injury.  Pt difficult to understand at times due to poor articulation.  Telemetry, soiled gown, unkept long fingernails.  Precautions:  Hearing/Visual Limitations: Appears WFL; pt with readers on head  Medical Precautions: Fall precautions  Precautions Comment: Pt with sitter for self-injurious behavior PTA  Vital Signs:     Pain:  Pain Assessment  Pain Assessment: 0-10  Pain Score:  (not rated; pt reports pain in bottom of his feet)    Objective   Cognition:  Overall Cognitive Status: Impaired  Arousal/Alertness: Delayed responses to stimuli  Orientation Level: Disoriented to place, Disoriented to time  Following Commands: Follows one step commands with repetition  Safety Judgment: Decreased awareness of need for safety precautions  Problem Solving: Assistance required to identify errors made  Attention: Exceptions to WFL (pt easily distracted)  Alternating Attention: Impaired  Divided Attention: Impaired  Selective Attention: Impaired  Sustained Attention: Impaired  Memory: Exceptions to WFL  Long-Term Memory: Unable to Assess  Short-Term Memory: To be assessed in therapy  Working Memory: To be assessed in therapy  Problem Solving: Exceptions to WFL  Safety/Judgement: Exceptions to WFL  Complex Functional Tasks: Moderate  Routine Tasks: Moderate  Unable to Self-Monitor and Self-Correct Consistently: Moderate  Insight: Moderate  Impulsive: Moderately  Task Initiation: Initiates with cues  Flexibility of Thought: Reduced flexibility  Planning: Reduced planning skills  Organization: Mildly disorganized  Processing Speed:  Delayed     Confusion Assessment Method (CAM)  Acute Onset and Fluctuating Course (1A): No  Galan Agitation Sedation Scale  Galan Agitation Sedation Scale (RASS): Alert and calm  Home Living:  Type of Home: Skilled Nursing facility ([facility])  Home Adaptive Equipment: None  Home Layout: One level  Bathroom Shower/Tub: Walk-in shower  Home Living Comments: All needs provided in setting   Prior Function:  Level of [place]: Independent with ADLs and functional transfers (per pt, he does not receive any assistance)  ADL Assistance: Independent  Homemaking Assistance:  (meals/laundry/transportatin provided by facility)  Ambulatory Assistance: Independent (pt reports no use of AE PTA)  IADL History:  IADL Comments: Pt indicates he stays in his room; anticipate IADLs are managed for pt by facility  ADL:  Eating Assistance: Modified independent (Device) (pt presents with soiled gown and beard/mustache from meals)  Eating Deficit: Setup  Grooming Assistance: Modified independent (Device)  Grooming Deficit: Setup, Supervision/safety, Increased time to complete  Bathing Assistance: Modified independent (Device)  Bathing Deficit: Setup, Supervision/safety, Increased time to complete  (anticipate min A at least due to poor balance and need for UE support)  UE Dressing Assistance: Stand by  UE Dressing Deficit: Setup, Verbal cueing, Supervision/safety, Increased time to complete  LE Dressing Assistance: Stand by  LE Dressing Deficit: Setup, Requires assistive device for steadying, Increased time to complete, Supervision/safety  Toileting Assistance with Device: Minimal  Toileting Deficit: Setup, Use of bedpan/urinal setup, Increased time to complete, Supervison/safety, Verbal cueing, Clothing management up, Clothing management down  Functional Assistance: Moderate  Functional Deficit: Verbal cueing, Supervision/safety, Increased time to complete, Setup  ADL Comments: Pt is a falls risk during ADL performance;  presents with decreased balance and safety awareness.  Activity Tolerance:  Endurance: Tolerates less than 10 min exercise, no significant change in vital signs, Endurance does not limit participation in activity  Activity Tolerance Comments: pt unable to monitor actity level and tolerance to maintain safety  Bed Mobility/Transfers: Transfers  Transfer: Yes  Transfer 1  Transfer From 1: Sit to  Transfer to 1: Stand  Technique 1: Sit to stand, Stand to sit  Transfer Device 1:  (no device)  Transfer Level of Assistance 1: Set up, Moderate verbal cues, Contact guard  Trials/Comments 1: from chair with arms; decreased awareness, cues for hand placement and sequencing.  Increased risk of falling    Sitting Balance:  Static Sitting Balance  Static Sitting-Comment/Number of Minutes: Independent  Dynamic Sitting Balance  Dynamic Sitting-Comments: Independent  Standing Balance:  Static Standing Balance  Static Standing-Comment/Number of Minutes: CGA  Dynamic Standing Balance  Dynamic Standing-Comments: Min to Mod A  Modalities:     IADL's:   IADL Comments: Pt indicates he stays in his room; anticipate IADLs are managed for pt by facility  Vision: Vision - Basic Assessment  Current Vision: Wears glasses only for reading  Sensation:  Light Touch:  (pt reports numbness/tingling B hands and bottom of feet-baseline)  Strength:  Strength Comments: BUE WFLs overall  Perception:     Coordination:  Movements are Fluid and Coordinated: No  Coordination Comment: pt with hx of parkinson's decreasing coordination   Hand Function:  Hand Function  Gross Grasp: Functional  Coordination: Functional  Extremities: RUE   RUE : Within Functional Limits and LUE   LUE: Within Functional Limits      Outcome Me[facility]: [facility]  Putting on and taking off regular lower body clothing: A little  Bathing (including washing, rinsing, drying): A lot  Putting on and taking off regular upper body clothing: A little  Toileting, which includes  using toilet, bedpan or urinal: A lot  Taking care of personal grooming such as brushing teeth: A little  Eating Meals: A little  Daily Activity - Total Score: 16      Education Documentation  Precautions, taught by Alejandra Mcdaniel OT at 1/8/2024  9:32 AM.  Learner: Patient  Readiness: Acceptance  Method: Explanation  Response: Verbalizes Understanding, Needs Reinforcement    Home Exercise Program, taught by Alejandra Mcdaniel OT at 1/8/2024  9:32 AM.  Learner: Patient  Readiness: Acceptance  Method: Explanation  Response: Verbalizes Understanding, Needs Reinforcement    ADL Training, taught by Alejandra Mcdaniel OT at 1/8/2024  9:32 AM.  Learner: Patient  Readiness: Acceptance  Method: Explanation  Response: Verbalizes Understanding, Needs Reinforcement    Education Comments  No comments found.      Goals:   Encounter Problems       Encounter Problems (Active)       ADLs       Patient with complete upper body dressing with independent level of assistance donning and doffing all UE clothes with no adaptive equipment while edge of bed  and standing       Start:  01/08/24    Expected End:  01/22/24            Patient with complete lower body dressing with independent level of assistance donning and doffing all LE clothes  with PRN adaptive equipment while edge of bed  and standing       Start:  01/08/24    Expected End:  01/22/24            Patient will complete daily grooming tasks brushing teeth, shaving, washing face/hair, and unsupported sitting with independent level of assistance and PRN adaptive equipment while supported sitting.       Start:  01/08/24    Expected End:  01/22/24            Patient will complete toileting including hygiene clothing management/hygiene with independent level of assistance and grab bars.       Start:  01/08/24    Expected End:  01/22/24                     EXERCISE/STRENGTHENING       Patient will be educated on BUE HEP for increased ADL performance.       Start:  01/08/24    Expected End:   01/22/24                 TRANSFERS       Patient will complete functional transfer with least restrictive device with independent level of assistance.       Start:  01/08/24    Expected End:  01/22/24

## 2024-01-08 NOTE — PROGRESS NOTES
Physical Therapy    Physical Therapy Evaluation    Patient Name: Gerardo Albright  MRN: [id number]  Today's Date: 1/8/2024   Time Calculation  Start Time: 0856  Stop Time: 0909  Time Calculation (min): 13 min    Assessment/Plan   PT Assessment  PT Assessment Results: Decreased endurance, Impaired balance, Decreased mobility, Decreased safety awareness  Rehab Prognosis: Good  Evaluation/Treatment Tolerance: Patient tolerated treatment well  Medical Staff Made Aware: Yes  Strengths: Housing layout, Support of Caregivers  Barriers to Participation: Ability to acquire knowledge  End of Session Communication: Bedside nurse, PCT/NA/CTA  Assessment Comment: Patient tolerated mobility well, decreased safety awareness, decreased ability to implement cues to improve quality of gait.  End of Session Patient Position: Up in chair, Alarm off, caregiver present  IP OR SWING BED PT PLAN  Inpatient or Swing Bed: Inpatient  PT Plan  Treatment/Interventions: Bed mobility, Transfer training, Gait training, Balance training, Neuromuscular re-education  PT Plan: Skilled PT  PT Frequency: 3 times per week  PT Discharge Recommendations: Moderate intensity level of continued care  Equipment Recommended upon Discharge: Wheeled walker  PT Recommended Transfer Status: Assist x1  PT - OK to Discharge: Yes (per PT POC)      Subjective   General Visit Information:  General  Reason for Referral: Impaired functional mobility; Pneumonia, SI  Referred By: Vanesa Madrigal  Past Medical History Relevant to Rehab: parkinsons, chronic neuropathy, chronic pain, hx aspiration PNA, HTN, HLD, CAD, T2DM, schizoaffective disorder (depression), hx substance abuse  Family/Caregiver Present: Yes (sitter)  Co-Treatment: OT  Co-Treatment Reason: Optimize patients functional mobility and safety  Patient Position Received: Up in chair, Alarm off, caregiver present  General Comment: Patient with flat affect, pleasant, agreeable to therapy evals  Home Living:  Home  Living  Type of Home: Long Term Care facility  Lives With: Alone  Home Adaptive Equipment: None (Accuracy of information is questionable)  Home Layout: One level  Home Access: Level entry  Prior Level of Function:  Prior Function Per Pt/Caregiver Report  Level of [place]:  (Accuracy of information is questionable)  Receives Help From: Other (Comment) (Facility staff)  ADL Assistance: Independent (per patient report)  Homemaking Assistance: Independent (per patient report)  Ambulatory Assistance: Independent (per patient report)  Precautions:  Precautions  Medical Precautions: Fall precautions (tele, sutures L distal bicep)  Vital Signs:       Objective   Pain:  Pain Assessment  Pain Assessment: Herring-Baker FACES  Herring-Baker FACES Pain Rating: Hurts little bit  Pain Type: Acute pain  Pain Location: Arm  Pain Orientation: Left  Cognition:  Cognition  Overall Cognitive Status: Impaired at baseline  Orientation Level: Disoriented to place    General Assessments:                  Activity Tolerance  Endurance: Tolerates less than 10 min exercise, no significant change in vital signs         Strength  Strength Comments: Functionally B LE 4/5  Strength  Strength Comments: Functionally B LE 4/5           Coordination  Movements are Fluid and Coordinated: Yes    Postural Control  Postural Control: Within Functional Limits    Static Sitting Balance  Static Sitting-Balance Support: No upper extremity supported, Feet supported  Static Sitting-Level of Assistance: Independent    Static Standing Balance  Static Standing-Balance Support: Bilateral upper extremity supported  Static Standing-Level of Assistance: Contact guard  Functional Assessments:       Transfers  Transfer: Yes  Transfer 1  Transfer From 1: Sit to, Stand to  Transfer to 1: Sit, Stand  Technique 1: Sit to stand, Stand to sit  Transfer Device 1: Walker  Transfer Level of Assistance 1: Close supervision  Trials/Comments 1: Patient initially spontaneously stood  without walker present, provided to patient for improved safety    Ambulation/Gait Training  Ambulation/Gait Training Performed: Yes  Ambulation/Gait Training 1  Surface 1: Level tile  Device 1: Rolling walker  Assistance 1: Contact guard  Quality of Gait 1:  (Flexed posture, shuffling gait with verbal cues for BIG steps)  Comments/Distance (ft) 1: 100'    Outcome Measures:  [facility] Basic Mobility  Turning from your back to your side while in a flat bed without using bedrails: A little  Moving from lying on your back to sitting on the side of a flat bed without using bedrails: A little  Moving to and from bed to chair (including a wheelchair): A little  Standing up from a chair using your arms (e.g. wheelchair or bedside chair): A little  To walk in hospital room: A little  Climbing 3-5 steps with railing: A lot  Basic Mobility - Total Score: 17    Encounter Problems       Encounter Problems (Active)       Balance       STG - Maintains dynamic standing balance with upper extremity support with dual task x 5'  (Progressing)       Start:  01/08/24    Expected End:  01/22/24               Mobility       STG - Patient will ambulate with ' mod I  (Progressing)       Start:  01/08/24    Expected End:  01/22/24               Pain - Adult          Transfers       STG - Patient will perform bed mobility independently  (Progressing)       Start:  01/08/24    Expected End:  01/22/24            STG - Patient will transfer sit to and from stand mod I  (Progressing)       Start:  01/08/24    Expected End:  01/22/24                   Education Documentation  Precautions, taught by Ramonita Blanco PT at 1/8/2024  9:32 AM.  Learner: Patient  Readiness: Acceptance  Method: Explanation  Response: Verbalizes Understanding, Needs Reinforcement    Body Mechanics, taught by Ramonita Blanco PT at 1/8/2024  9:32 AM.  Learner: Patient  Readiness: Acceptance  Method: Explanation  Response: Verbalizes Understanding, Needs  Reinforcement    Mobility Training, taught by Ramonita Blanoc, PT at 1/8/2024  9:32 AM.  Learner: Patient  Readiness: Acceptance  Method: Explanation  Response: Verbalizes Understanding, Needs Reinforcement    Education Comments  No comments found.

## 2024-01-08 NOTE — PROGRESS NOTES
"Vancomycin Dosing by Pharmacy- FOLLOW UP    Gerardo Albright is a 63 y.o. year old male who Pharmacy has been consulted for vancomycin dosing for endocarditis/endovascular infection. Based on the patient's indication and renal status this patient is being dosed based on a goal AUC of 500-600.     Renal function is currently stable.    Current vancomycin dose: 1250 mg given every 12 hours    Estimated vancomycin AUC on current dose: 420 mg/L.hr     Visit Vitals  /82 (BP Location: Left arm, Patient Position: Lying)   Pulse 81   Temp 36.3 °C (97.3 °F) (Temporal)   Resp 18        Lab Results   Component Value Date    CREATININE 0.59 01/08/2024    CREATININE 0.56 01/07/2024    CREATININE 0.62 01/06/2024    CREATININE 0.63 01/03/2024        Patient weight is No results found for: \"PTWEIGHT\"    No results found for: \"CULTURE\"     I/O last 3 completed shifts:  In: 4750 (61.8 mL/kg) [P.O.:4700; IV Piggyback:50]  Out: 5300 (69 mL/kg) [Urine:5300 (1.9 mL/kg/hr)]  Weight: 76.8 kg   [unfilled]    Lab Results   Component Value Date    PATIENTTEMP 37.0 09/21/2023        Assessment/Plan    Below goal AUC. Orders placed for new vancomcyin regimen of 1500 every 12 hours to begin at 11:00.     This dosing regimen is predicted by [facility] to result in the following pharmacokinetic parameters:  <<<<<PASTE [employer] DATA HERE>>>>>  Loading dose: N/A  Regimen: 1500 mg IV every 12 hours.  Start time: 11:16 on 01/08/2024  Exposure target: AUC24 (range)400-600 mg/L.hr   AUC24,ss: 504 mg/L.hr  Probability of AUC24 > 400: 82 %  Ctrough,ss: 14.2 mg/L  Probability of Ctrough,ss > 20: 25 %  Probability of nephrotoxicity (Lodise OTIS 2009): 9 %  The next level will be obtained on 01/09 at 0500. May be obtained sooner if clinically indicated.   Will continue to monitor renal function daily while on vancomycin and order serum creatinine at least every 48 hours if not already ordered.  Follow for continued vancomycin needs, clinical response, " and signs/symptoms of toxicity.       Sultana Rodrigez, PharmD

## 2024-01-09 LAB
ALBUMIN SERPL BCP-MCNC: 3.5 G/DL (ref 3.4–5)
ANION GAP SERPL CALC-SCNC: 13 MMOL/L (ref 10–20)
AORTIC VALVE MEAN GRADIENT: 4
AORTIC VALVE PEAK VELOCITY: 1.41
AV PEAK GRADIENT: 8
AVA (PEAK VEL): 1.89
AVA (VTI): 1.98
BACTERIA BLD AEROBE CULT: ABNORMAL
BACTERIA BLD CULT: ABNORMAL
BUN SERPL-MCNC: 10 MG/DL (ref 6–23)
CALCIUM SERPL-MCNC: 8.9 MG/DL (ref 8.6–10.3)
CHLORIDE SERPL-SCNC: 97 MMOL/L (ref 98–107)
CO2 SERPL-SCNC: 25 MMOL/L (ref 21–32)
CREAT SERPL-MCNC: 0.62 MG/DL (ref 0.5–1.3)
EGFRCR SERPLBLD CKD-EPI 2021: >90 ML/MIN/1.73M*2
EJECTION FRACTION APICAL 4 CHAMBER: 61.9
EJECTION FRACTION: 55
ERYTHROCYTE [DISTWIDTH] IN BLOOD BY AUTOMATED COUNT: 12.8 % (ref 11.5–14.5)
GLUCOSE BLD MANUAL STRIP-MCNC: 105 MG/DL (ref 74–99)
GLUCOSE BLD MANUAL STRIP-MCNC: 123 MG/DL (ref 74–99)
GLUCOSE BLD MANUAL STRIP-MCNC: 202 MG/DL (ref 74–99)
GLUCOSE SERPL-MCNC: 166 MG/DL (ref 74–99)
GRAM STN SPEC: ABNORMAL
HCT VFR BLD AUTO: 39.1 % (ref 41–52)
HGB BLD-MCNC: 13 G/DL (ref 13.5–17.5)
LEFT ATRIUM VOLUME AREA LENGTH INDEX BSA: 14.3
LEFT VENTRICLE INTERNAL DIMENSION DIASTOLE: 4.34 (ref 3.5–6)
LEFT VENTRICULAR OUTFLOW TRACT DIAMETER: 1.9
MAGNESIUM SERPL-MCNC: 1.96 MG/DL (ref 1.6–2.4)
MCH RBC QN AUTO: 29.7 PG (ref 26–34)
MCHC RBC AUTO-ENTMCNC: 33.2 G/DL (ref 32–36)
MCV RBC AUTO: 89 FL (ref 80–100)
MITRAL VALVE E/A RATIO: 0.81
MITRAL VALVE E/E' RATIO: 10.35
NRBC BLD-RTO: 0 /100 WBCS (ref 0–0)
PHOSPHATE SERPL-MCNC: 3.7 MG/DL (ref 2.5–4.9)
PLATELET # BLD AUTO: 372 X10*3/UL (ref 150–450)
POTASSIUM SERPL-SCNC: 4 MMOL/L (ref 3.5–5.3)
RBC # BLD AUTO: 4.38 X10*6/UL (ref 4.5–5.9)
RIGHT VENTRICLE FREE WALL PEAK S': 7.51
RIGHT VENTRICLE PEAK SYSTOLIC PRESSURE: 31.5
SODIUM SERPL-SCNC: 131 MMOL/L (ref 136–145)
TRICUSPID ANNULAR PLANE SYSTOLIC EXCURSION: 1.4
VANCOMYCIN SERPL-MCNC: 14.9 UG/ML (ref 5–20)
WBC # BLD AUTO: 6.9 X10*3/UL (ref 4.4–11.3)

## 2024-01-09 PROCEDURE — 83735 ASSAY OF MAGNESIUM: CPT

## 2024-01-09 PROCEDURE — 85027 COMPLETE CBC AUTOMATED: CPT

## 2024-01-09 PROCEDURE — 2500000001 HC RX 250 WO HCPCS SELF ADMINISTERED DRUGS (ALT 637 FOR MEDICARE OP): Performed by: PSYCHIATRY & NEUROLOGY

## 2024-01-09 PROCEDURE — 2500000004 HC RX 250 GENERAL PHARMACY W/ HCPCS (ALT 636 FOR OP/ED)

## 2024-01-09 PROCEDURE — 2500000004 HC RX 250 GENERAL PHARMACY W/ HCPCS (ALT 636 FOR OP/ED): Performed by: PSYCHIATRY & NEUROLOGY

## 2024-01-09 PROCEDURE — 99232 SBSQ HOSP IP/OBS MODERATE 35: CPT

## 2024-01-09 PROCEDURE — 36415 COLL VENOUS BLD VENIPUNCTURE: CPT

## 2024-01-09 PROCEDURE — A4217 STERILE WATER/SALINE, 500 ML: HCPCS

## 2024-01-09 PROCEDURE — 1200000002 HC GENERAL ROOM WITH TELEMETRY DAILY

## 2024-01-09 PROCEDURE — 2500000001 HC RX 250 WO HCPCS SELF ADMINISTERED DRUGS (ALT 637 FOR MEDICARE OP)

## 2024-01-09 PROCEDURE — 80069 RENAL FUNCTION PANEL: CPT

## 2024-01-09 PROCEDURE — 2500000002 HC RX 250 W HCPCS SELF ADMINISTERED DRUGS (ALT 637 FOR MEDICARE OP, ALT 636 FOR OP/ED)

## 2024-01-09 PROCEDURE — 82947 ASSAY GLUCOSE BLOOD QUANT: CPT

## 2024-01-09 PROCEDURE — 80202 ASSAY OF VANCOMYCIN: CPT

## 2024-01-09 PROCEDURE — 99222 1ST HOSP IP/OBS MODERATE 55: CPT | Performed by: PSYCHIATRY & NEUROLOGY

## 2024-01-09 RX ORDER — OXYCODONE HYDROCHLORIDE 5 MG/1
2.5 TABLET ORAL EVERY 12 HOURS PRN
Status: DISCONTINUED | OUTPATIENT
Start: 2024-01-09 | End: 2024-01-11 | Stop reason: HOSPADM

## 2024-01-09 RX ORDER — ACETAMINOPHEN 325 MG/1
650 TABLET ORAL EVERY 6 HOURS
Status: DISCONTINUED | OUTPATIENT
Start: 2024-01-09 | End: 2024-01-11 | Stop reason: HOSPADM

## 2024-01-09 RX ADMIN — PANTOPRAZOLE SODIUM 40 MG: 40 TABLET, DELAYED RELEASE ORAL at 16:38

## 2024-01-09 RX ADMIN — BUPROPION HYDROCHLORIDE 300 MG: 150 TABLET, FILM COATED, EXTENDED RELEASE ORAL at 09:40

## 2024-01-09 RX ADMIN — OXYCODONE HYDROCHLORIDE 2.5 MG: 5 TABLET ORAL at 21:45

## 2024-01-09 RX ADMIN — ENOXAPARIN SODIUM 40 MG: 40 INJECTION SUBCUTANEOUS at 21:41

## 2024-01-09 RX ADMIN — OXYCODONE HYDROCHLORIDE 2.5 MG: 5 TABLET ORAL at 09:40

## 2024-01-09 RX ADMIN — NALTREXONE HYDROCHLORIDE 50 MG: 50 TABLET, FILM COATED ORAL at 09:40

## 2024-01-09 RX ADMIN — ALPRAZOLAM 0.25 MG: 0.5 TABLET ORAL at 09:41

## 2024-01-09 RX ADMIN — QUETIAPINE FUMARATE 100 MG: 100 TABLET ORAL at 09:40

## 2024-01-09 RX ADMIN — AZITHROMYCIN 500 MG: 500 TABLET, FILM COATED ORAL at 09:40

## 2024-01-09 RX ADMIN — GABAPENTIN 300 MG: 300 CAPSULE ORAL at 16:38

## 2024-01-09 RX ADMIN — ACETAMINOPHEN 650 MG: 325 TABLET ORAL at 16:38

## 2024-01-09 RX ADMIN — INSULIN LISPRO 4 UNITS: 100 INJECTION, SOLUTION INTRAVENOUS; SUBCUTANEOUS at 09:44

## 2024-01-09 RX ADMIN — BENZTROPINE MESYLATE 1 MG: 1 TABLET ORAL at 21:42

## 2024-01-09 RX ADMIN — VANCOMYCIN HYDROCHLORIDE 1500 MG: 10 INJECTION, POWDER, LYOPHILIZED, FOR SOLUTION INTRAVENOUS at 22:38

## 2024-01-09 RX ADMIN — ALPRAZOLAM 0.25 MG: 0.5 TABLET ORAL at 21:45

## 2024-01-09 RX ADMIN — GABAPENTIN 300 MG: 300 CAPSULE ORAL at 09:40

## 2024-01-09 RX ADMIN — VANCOMYCIN HYDROCHLORIDE 1500 MG: 10 INJECTION, POWDER, LYOPHILIZED, FOR SOLUTION INTRAVENOUS at 11:09

## 2024-01-09 RX ADMIN — CEFTRIAXONE SODIUM 1 G: 1 INJECTION, SOLUTION INTRAVENOUS at 17:54

## 2024-01-09 RX ADMIN — GABAPENTIN 300 MG: 300 CAPSULE ORAL at 21:42

## 2024-01-09 RX ADMIN — BENZTROPINE MESYLATE 1 MG: 1 TABLET ORAL at 09:41

## 2024-01-09 RX ADMIN — QUETIAPINE FUMARATE 50 MG: 25 TABLET ORAL at 21:43

## 2024-01-09 RX ADMIN — QUETIAPINE FUMARATE 100 MG: 100 TABLET ORAL at 21:42

## 2024-01-09 RX ADMIN — ASPIRIN 81 MG 81 MG: 81 TABLET ORAL at 09:41

## 2024-01-09 RX ADMIN — Medication 5 MG: at 21:42

## 2024-01-09 RX ADMIN — Medication 1 TABLET: at 09:40

## 2024-01-09 RX ADMIN — ACETAMINOPHEN 650 MG: 325 TABLET ORAL at 21:43

## 2024-01-09 ASSESSMENT — COGNITIVE AND FUNCTIONAL STATUS - GENERAL
CLIMB 3 TO 5 STEPS WITH RAILING: A LITTLE
HELP NEEDED FOR BATHING: A LITTLE
MOBILITY SCORE: 20
MOVING TO AND FROM BED TO CHAIR: A LITTLE
MOVING TO AND FROM BED TO CHAIR: A LITTLE
CLIMB 3 TO 5 STEPS WITH RAILING: A LITTLE
TOILETING: A LITTLE
STANDING UP FROM CHAIR USING ARMS: A LITTLE
WALKING IN HOSPITAL ROOM: A LITTLE
DAILY ACTIVITIY SCORE: 21
DRESSING REGULAR LOWER BODY CLOTHING: A LITTLE
DAILY ACTIVITIY SCORE: 21
MOBILITY SCORE: 20
WALKING IN HOSPITAL ROOM: A LITTLE
STANDING UP FROM CHAIR USING ARMS: A LITTLE
DRESSING REGULAR LOWER BODY CLOTHING: A LITTLE
TOILETING: A LITTLE
HELP NEEDED FOR BATHING: A LITTLE

## 2024-01-09 ASSESSMENT — PAIN SCALES - GENERAL
PAINLEVEL_OUTOF10: 7
PAINLEVEL_OUTOF10: 6

## 2024-01-09 ASSESSMENT — ENCOUNTER SYMPTOMS
CARDIOVASCULAR NEGATIVE: 1
NERVOUS/ANXIOUS: 1
WOUND: 1
ALLERGIC/IMMUNOLOGIC NEGATIVE: 1
NEUROLOGICAL NEGATIVE: 1
HEMATOLOGIC/LYMPHATIC NEGATIVE: 1
CONSTITUTIONAL NEGATIVE: 1
GASTROINTESTINAL NEGATIVE: 1
ENDOCRINE NEGATIVE: 1
EYES NEGATIVE: 1
ACTIVITY CHANGE: 0
RESPIRATORY NEGATIVE: 1
MUSCULOSKELETAL NEGATIVE: 1

## 2024-01-09 ASSESSMENT — PAIN - FUNCTIONAL ASSESSMENT: PAIN_FUNCTIONAL_ASSESSMENT: 0-10

## 2024-01-09 ASSESSMENT — PAIN DESCRIPTION - LOCATION
LOCATION: HEAD
LOCATION: ARM

## 2024-01-09 ASSESSMENT — PAIN DESCRIPTION - ORIENTATION: ORIENTATION: LEFT

## 2024-01-09 NOTE — CONSULTS
"Referring Provider: Dr. Guanaco Hunter  Date: 1/9/24    Reason For Consult:  Suicidal Ideation      Chief Complaint: \"Felt scared and depressed I wanted to commit suicide\"    History Of Present Illness  Gerardo Albright is a 63 y.o. year old male patient that presented to the ED for suicide attempt with a cut to left upper arm, required sutures.  Patient reported that someone left a used razor in the bathroom and that's what he used to cut his arm.  Patient stated \"I felt scared and depressed and I wanted to commit suicide\".  Gerardo reports experiencing worsening feelings of depression \"since they took me off my klonopin\" along with decreased sleep, decreased appetite, decreased energy, decreased concentration, increased feelings of hopelessness and helplessness, and anhedonia. He also reports experiencing intermittent suicidal ideation along with a suicide plan to cut himself.  Patient reports experiencing prior depressive episodes, but not manic symptoms, in the past. No hallucinations or paranoia was endorsed or noted.  Gerardo reports experiencing excessive worries about everyday activities that he can't control, and result in problems sleeping, concentrating, decreased energy, irritability, and restlessness.  Gerardo also reports having panic attacks \"all day long\", reports symptoms include chest pain, shortness of breath, and \"feel like I'm dying\".       Spoke to nurse at [facility].  She reported that patient was taken off of his benzodiazapine due to frequent falls, she also reported that patient was falling even after benzodiazapine was discontinued.  Nurse reported that new policies are in place to prevent patient from being in shower alone and having access to razors.      Attempted to call guardian Angel Holguin left voicemail on 1/9/24 at 1436 to update him on plan of care.        PSYCHIATRIC REVIEW OF SYMPTOMS  Depressive Symptoms: depressed or irritable mood, weight or appetite change, insomnia " or hypersomnia, psychomotor agitation or retardation, fatigue or loss of energy, worthlessness or guilt, poor concentration or indecisiveness, and suicidal ideation or plan  Manic Symptoms: negative  Anxiety Symptoms: excessive worry Worry Symptoms: difficulty concentrating due to worry, difficulty controlling worry, easily fatigued due to worry, irritability due to worry, muscle tensions due to worry, restlessness or feeling on edge due to worry, and sleep disturbances due to worry, exposure to traumatic event Trauma Symptoms: re-experiencing traumatic event, and panic attacks Panic Symptoms: concern about future panic attacks  Psychotic Symptoms:  negative  Delirium/Altered Mental Status Symptoms:  negative  Other Symptoms/Concerns: personality disorder symptoms and self-injurious behaviors    Developmental Concerns:  negative  Disordered Eating Symptoms:  negative  Inattentive Symptoms:  negative  Hyperactive/Impulsive Symptoms:  negative  Conduct Issues:  negative      Past Medical History  Past Medical History:   Diagnosis Date    At risk for falls     COPD (chronic obstructive pulmonary disease) (CMS/[facility])     Dementia (CMS/[facility])     Depression     GERD (gastroesophageal reflux disease)     Hypertension     Insomnia     Myocardial infarction (CMS/[facility])     Parkinson's disease     Schizophrenia (CMS/[facility])     Seizures (CMS/[facility])     Weakness         Past Psychiatric History: 1) Past Dx: Schizoaffective Disorder, depressive type, anxiety, neurocognitive disorder, cluster B personality traits, ADHD                                            2) Multiple psychiatric hospitalizations-Most recent [facility] 5/2023.                                              3) Multiple suicide attempts- by cutting to LFA                                            4) SIB- Cutting to LFA                                            5) No prior rehab treatment programs                                            6) Current psych meds:  Cogentin 1mg PO BID, bupropion hcl er 300mg PO QDAY, naltrexone hcl 50mg PO QDAY, Perseris injection (risperidone) 90mg subcutaneous Q30 days at bedtime, quetiapine 200mg PO BID, quetiapine 50mg PO at bedtime,     Past Psychiatric Meds: 1) haldol- increased depression, increased SI                                         2) adderall, ritalin- effective                                         3) klonopin, valium, ativan- effective                                         4) paxil, zoloft- not effective                                             Family History: 1) Mother- unknown mood disorder                             2) No known suicides in the family.    Social History  Social History     Socioeconomic History    Marital status:      Spouse name: Not on file    Number of children: Not on file    Years of education: Not on file    Highest education level: Not on file   Occupational History    Not on file   Tobacco Use    Smoking status: Former     Packs/day: .5     Types: Cigarettes     Passive exposure: Past    Smokeless tobacco: Never   Vaping Use    Vaping Use: Never used   Substance and Sexual Activity    Alcohol use: Not Currently    Drug use: Never    Sexual activity: Not Currently   Other Topics Concern    Not on file   Social History Narrative    Not on file     Social Determinants of Health     Financial Resource Strain: Patient Unable To Answer (1/7/2024)    Overall Financial Resource Strain (CARDIA)     Difficulty of Paying Living Expenses: Patient unable to answer   Food Insecurity: Not on file   Transportation Needs: Patient Unable To Answer (1/7/2024)    PRAPARE - Transportation     Lack of Transportation (Medical): Patient unable to answer     Lack of Transportation (Non-Medical): Patient unable to answer   Physical Activity: Not on file   Stress: Not on file   Social Connections: Not on file   Intimate Partner Violence: Not on file   Housing Stability: Patient Unable To Answer (1/7/2024)     Housing Stability Vital Sign     Unable to Pay for Housing in the Last Year: Patient unable to answer     Number of Places Lived in the Last Year: 1     Unstable Housing in the Last Year: Patient unable to answer        Substance Abuse History:  1) Tobacco - Reports 1/2 PPD  2) ETOH - Denies   3) Cannabis - Denies   4) Denies any other illicit drug use.        The patient did not graduate high school, reports he has a learning disability.  Reports he has no work history and that he has been on disability his entire life.  Reports he is currently  and his wife is in a nursing home in [place], reports he has been  for 10 years. No children. No significant legal history. The patient lives at [facility] and resided on there locked behavioral health unit.       Allergies  No Known Allergies     Scheduled medications  aspirin, 81 mg, oral, Daily  benztropine, 1 mg, oral, BID  buPROPion XL, 300 mg, oral, q AM  cefTRIAXone, 1 g, intravenous, q24h  enoxaparin, 40 mg, subcutaneous, q24h  gabapentin, 300 mg, oral, TID  insulin lispro, 0-10 Units, subcutaneous, TID with meals  melatonin, 5 mg, oral, Nightly  [Held by provider] metFORMIN, 500 mg, oral, BID with meals  multivitamin with minerals, 1 tablet, oral, Daily  naltrexone, 50 mg, oral, Daily  pantoprazole, 40 mg, oral, BID AC  perflutren lipid microspheres, 0.5-10 mL of dilution, intravenous, Once in imaging  perflutren protein A microsphere, 0.5 mL, intravenous, Once in imaging  QUEtiapine, 100 mg, oral, BID  QUEtiapine, 50 mg, oral, Nightly  sulfur hexafluoride microsphr, 2 mL, intravenous, Once in imaging  vancomycin, 1,500 mg, intravenous, q12h      Continuous medications     PRN medications  PRN medications: acetaminophen, ALPRAZolam, dextrose 10 % in water (D10W), dextrose, glucagon, oxyCODONE, oxygen, white petrolatum         Review of Systems   Review of Systems   Constitutional: Negative.  Negative for activity change.   HENT: Negative.    "  Eyes: Negative.    Respiratory: Negative.     Cardiovascular: Negative.    Gastrointestinal: Negative.    Endocrine: Negative.    Genitourinary: Negative.    Musculoskeletal: Negative.    Skin:  Positive for wound.        Laceration to left upper arm    Allergic/Immunologic: Negative.    Neurological: Negative.    Hematological: Negative.    Psychiatric/Behavioral:  Positive for self-injury and suicidal ideas. The patient is nervous/anxious.         Physical Exam  Mental Status Exam:   General: Appropriately groomed and dressed in hospital attire.   Appearance: Appears stated age.   Attitude: Calm, cooperative.   Behavior: Appropriate eye contact.   Motor Activity: No agitation. Psychomotor retardation. No EPS/TD.  Unsteady gait and station. Normal muscle tone and bulk.   Speech: Regular rate, rhythm, volume and tone, spontaneous,  fluent. Non-pressured.   Mood: \"Real bad\"   Affect: Flat   Thought Process: Organized.   Thought Content: Does endorse suicidal ideation.  Denies any current suicide plans.   Does not endorse homicidal ideation.  No overt delusions or paranoia elicited.    Thought Perception: Endorse's auditory and visual hallucinations, does not appear to be responding to hallucinatory stimuli.   Cognition: Alert, oriented x 3.  Deficits noted to time.  Adequate fund of knowledge. No deficit in recent and remote memory. No deficits in attention, concentration or language.   Insight: Poor, as patient does not recognize symptoms of  illness and need for recommended treatments.    Judgment: Impaired, as patient can not make reasonable decisions about ordinary activities of daily living and necessary medical care recommendations.       Last Recorded Vitals  Visit Vitals  BP (!) 144/93 (BP Location: Right arm, Patient Position: Lying)   Pulse 87   Temp 36.8 °C (98.2 °F) (Temporal)   Resp 18        Relevant Results  Results for orders placed or performed during the hospital encounter of 01/07/24 (from the " past 24 hour(s))   Transthoracic Echo (TTE) Complete   Result Value Ref Range    AV pk jhonny 1.41     AV mn grad 4.0     LVOT diam 1.90     LV biplane EF 55     MV avg E/e' ratio 10.35     MV E/A ratio 0.81     LA vol index A/L 14.3     Tricuspid annular plane systolic excursion 1.4     RV free wall pk S' 7.51     LVIDd 4.34     RVSP 31.5     Aortic Valve Area by Continuity of VTI 1.98     Aortic Valve Area by Continuity of Peak Velocity 1.89     AV pk grad 8.0     LV A4C EF 61.9    POCT GLUCOSE   Result Value Ref Range    POCT Glucose 124 (H) 74 - 99 mg/dL   POCT GLUCOSE   Result Value Ref Range    POCT Glucose 170 (H) 74 - 99 mg/dL   Vancomycin   Result Value Ref Range    Vancomycin 14.9 5.0 - 20.0 ug/mL   Magnesium   Result Value Ref Range    Magnesium 1.96 1.60 - 2.40 mg/dL   Renal Function Panel   Result Value Ref Range    Glucose 166 (H) 74 - 99 mg/dL    Sodium 131 (L) 136 - 145 mmol/L    Potassium 4.0 3.5 - 5.3 mmol/L    Chloride 97 (L) 98 - 107 mmol/L    Bicarbonate 25 21 - 32 mmol/L    Anion Gap 13 10 - 20 mmol/L    Urea Nitrogen 10 6 - 23 mg/dL    Creatinine 0.62 0.50 - 1.30 mg/dL    eGFR >90 >60 mL/min/1.73m*2    Calcium 8.9 8.6 - 10.3 mg/dL    Phosphorus 3.7 2.5 - 4.9 mg/dL    Albumin 3.5 3.4 - 5.0 g/dL   CBC   Result Value Ref Range    WBC 6.9 4.4 - 11.3 x10*3/uL    nRBC 0.0 0.0 - 0.0 /100 WBCs    RBC 4.38 (L) 4.50 - 5.90 x10*6/uL    Hemoglobin 13.0 (L) 13.5 - 17.5 g/dL    Hematocrit 39.1 (L) 41.0 - 52.0 %    MCV 89 80 - 100 fL    MCH 29.7 26.0 - 34.0 pg    MCHC 33.2 32.0 - 36.0 g/dL    RDW 12.8 11.5 - 14.5 %    Platelets 372 150 - 450 x10*3/uL   POCT GLUCOSE   Result Value Ref Range    POCT Glucose 202 (H) 74 - 99 mg/dL   POCT GLUCOSE   Result Value Ref Range    POCT Glucose 105 (H) 74 - 99 mg/dL      The patient is judged a mild suicide risk due to: 1) No access to guns, 2) Recent suicide attempt due to uncontrolled anxiety/panic, restarted anxiety medication, 3) Reports current suicidal ideation and  "suicide plan, however patient will be discharged to a locked behavioral unit at [facility].   4) +plans for the future: \"Go back to [place]\" and \"Im a Restorationist so I'm trying to behave\" and 5) Only with moderate Major depressive disorder.       Diagnostic Impression:  1) Schizoaffective Disorder, Depressive type  2) Generalized Anxiety Disorder  3) Mild Neurocognitive Disorder  4) Cluster B Personality Traits   5) Tobacco Use Disorder  6) Pneumonia      Recommendations:  1) Continue alprazolam 0.25 mg PO BID PRN anxiety  2) Recommend Neuro consult for treatment of Parkinsons.  3) Continue 1-to-1 sitter while in hospital.  4) Patient can be discharged back to Locked behavioral health unit at [facility].    5) Will continue to follow.         I spent 90 minutes in the professional and overall care of this patient.        Joanne Salazar, PMHNP   "

## 2024-01-09 NOTE — HOSPITAL COURSE
Gerardo Albright is a 63 y.o. male with parkinsons, chronic neuropathy, chronic pain, hx aspiration PNA, HTN, HLD, CAD, T2DM, schizoaffective disorder (depression), hx substance abuse who initially presented to the ED for SI related to his physical illnesses, and admitted to medicine for gram positive bacteremia. Pt is A&O x2 but provided hx, and additional hx obtained from prior charts.      He was hospitalized 9/2023 for respiratory failure due aspiration PNA, treated with IV vanc + zosyn then. CT chest then showed RLL PNA with no parapneumonic effusion. Had positive MRSA colonization screening back then.      Presented from [facility] to [place] [facility] 1/3/24 with SI due to, per ED notes, “feeling similar to when he has had pneumonia however the facility is not taking care of him and this made him want to attempt suicide with a razor blade.”      On 1/3/21, his WBC was 21.1, which went to 8.6 on 1/6/24s. 2x blood cultures from 1/4/23 grew positive for gram positive cocci in clusters coming back 1/7/24 morning.   Vitals stable on room air, afebrile. CXR showed signs of RLL PNA. CT head from 1/3/23 showed no bleed or stroke, though showed signs of sinusitis and small r mastoid effusion. Pt did get 1mg po Ativan 1/7/23 morning. Was started on IV Vanc and TTE ordered. Per psych note, will follow pt.      On interview 1/7/24, pt says he has had dyspnea since 1 week ago. Has chronic cough. Has had dry mouth since 5 days ago. Has some chest pain on deep breath.   Pt notes he “snorts” water up his nose to clean it free of organic debris, comparing it to how he used to “snort” recreational substances. He says he does this daily.   Pt says he is very anxious and asks for benzodiazepines repeatedly. He recalls multiple times the different types of benzodiazepines he has taken, saying he regularly gets it and has gotten it for years for anxiety. Last dose was 2 days ago per pt.   Denies dysuria.      Allergies - NKDA  PSH  - cardiac cath, PCI, CABG “5 years ago”, “left collarbone surgery”   FH - unreliable  SocHx - Current everyday smoker of cigars and cigarettes ½ PPD for 40+ years. No alcohol. Denies hx of IV drug / needle use.  Legal guardian: Angel Holguin, [phone].    Floor Course:  Patient started on IV Vanc, Ceftriaxone, and Azithromycin with blood cultures positive for gram positive bacteria. Strep pneumo and Legionella urine Ag negative. MRSA nares negative. Patient with healing stitched laceration to left bicep that did not appear infected.    CT of the chest showed the followin.  Mild paraseptal emphysematous changes in bilateral lungs.  Multiple scattered tiny peribronchovascular reticulonodular opacities  in the right lung as described above. While this could be residual  from prior resolving infection versus inflammatory changes,  possibility of new early developing pneumonia can not be completely  excluded in appropriate clinical setting.  2. Mildly enlarged right perihilar lymph node is similar compared to  prior CT examination and is likely favored to be reactive.  3. Suggestion of tiny hiatal hernia. Mild circumferential thickening  of the distal thoracic esophagus is most likely related to reflux.  Recommend correlation with patient's symptomatology.  4. Mild atherosclerotic vascular calcifications.  5. Stable thickening of the left adrenal gland.    Blood cultures further identified as Staph epidermidis, but repeat cultures remained negative. Patient remained sitter free for 24 hrs. Patient discharged back to his locked behavioral health unit at [facility]. Patient sent with script for two more days of pain medication and two days of his xanax. Further prescription of Xanax to be determined by facility physician.

## 2024-01-09 NOTE — PROGRESS NOTES
Gerardo Albright is a 63 y.o. male on day 2 of admission presenting with Bacteremia.    Subjective   Interval History: no fever, no new complaints        Review of Systems    Objective   Range of Vitals (last 24 hours)  Heart Rate:  [62-87]   Temp:  [36.4 °C (97.5 °F)-36.8 °C (98.2 °F)]   Resp:  [18-19]   BP: (101-144)/(63-93)   Weight:  [79.8 kg (175 lb 14.8 oz)]   SpO2:  [95 %-96 %]   Daily Weight  01/09/24 : 79.8 kg (175 lb 14.8 oz)    Body mass index is 25.24 kg/m².    Physical Exam  Constitutional:       Appearance: Normal appearance.   HENT:      Head: Normocephalic and atraumatic.      Mouth/Throat:      Mouth: Mucous membranes are moist.      Pharynx: Oropharynx is clear.   Eyes:      Pupils: Pupils are equal, round, and reactive to light.   Cardiovascular:      Rate and Rhythm: Normal rate and regular rhythm.      Heart sounds: Normal heart sounds.   Pulmonary:      Effort: Pulmonary effort is normal.      Breath sounds: Normal breath sounds.   Abdominal:      General: Abdomen is flat. Bowel sounds are normal.      Palpations: Abdomen is soft.   Musculoskeletal:      Cervical back: Normal range of motion.   Neurological:      Mental Status: He is alert.         Antibiotics  perflutren lipid microspheres (Definity) injection 0.5-10 mL of dilution  sulfur hexafluoride microsphr (Lumason) injection 24.28 mg  perflutren protein A microsphere (Optison) injection 0.5 mL  benztropine (Cogentin) tablet 1 mg  buPROPion XL (Wellbutrin XL) 24 hr tablet 300 mg  metFORMIN (Glucophage) tablet 500 mg  multivitamin with minerals 1 tablet  naltrexone (Depade) tablet 50 mg  pantoprazole (ProtoNix) EC tablet 40 mg  QUEtiapine (SEROquel) tablet 50 mg  QUEtiapine (SEROquel) tablet 100 mg  LORazepam (Ativan) tablet 0.5 mg  vancomycin (Vancocin) in dextrose 5% 250 mL IV 1,250 mg  dextrose 50 % injection 25 g  glucagon (Glucagen) injection 1 mg  dextrose 10 % in water (D10W) infusion  insulin lispro (HumaLOG) injection 0-10  Units  azithromycin (Zithromax) tablet 500 mg  cefTRIAXone (Rocephin) IVPB 1 g  oxygen (O2) therapy  ALPRAZolam (Xanax) tablet 0.25 mg  enoxaparin (Lovenox) syringe 40 mg  acetaminophen (Tylenol) tablet 650 mg  oxyCODONE (Roxicodone) immediate release tablet 2.5 mg  hydrOXYzine HCL (Atarax) tablet 10 mg  vancomycin (Vancocin) in dextrose 5 % water (D5W) 500 mL IV 1,500 mg  white petrolatum (Aquaphor) ointment  melatonin tablet 5 mg  aspirin chewable tablet 81 mg  gabapentin (Neurontin) capsule 300 mg      Relevant Results  Labs  Results from last 72 hours   Lab Units 01/09/24 0707 01/08/24 0622 01/07/24  1809   WBC AUTO x10*3/uL 6.9 6.4 8.5   HEMOGLOBIN g/dL 13.0* 14.2 14.1   HEMATOCRIT % 39.1* 43.5 41.6   PLATELETS AUTO x10*3/uL 372 413 430   NEUTROS PCT AUTO %  --   --  67.4   LYMPHS PCT AUTO %  --   --  18.8   MONOS PCT AUTO %  --   --  10.3   EOS PCT AUTO %  --   --  2.4     Results from last 72 hours   Lab Units 01/09/24 0707 01/08/24 0622 01/07/24  1809   SODIUM mmol/L 131* 132* 130*   POTASSIUM mmol/L 4.0 4.0 4.0   CHLORIDE mmol/L 97* 98 95*   CO2 mmol/L 25 24 24   BUN mg/dL 10 11 11   CREATININE mg/dL 0.62 0.59 0.56   GLUCOSE mg/dL 166* 102* 105*   CALCIUM mg/dL 8.9 9.2 9.3   ANION GAP mmol/L 13 14 15   EGFR mL/min/1.73m*2 >90 >90 >90   PHOSPHORUS mg/dL 3.7 4.2 4.1     Results from last 72 hours   Lab Units 01/09/24 0707 01/08/24 0622 01/07/24  1809   ALK PHOS U/L  --   --  67   BILIRUBIN TOTAL mg/dL  --   --  0.6   PROTEIN TOTAL g/dL  --   --  7.2   ALT U/L  --   --  11   AST U/L  --   --  10   ALBUMIN g/dL 3.5 3.7 4.0     Estimated Creatinine Clearance: 124.3 mL/min (by C-G formula based on SCr of 0.62 mg/dL).  CRP   Date Value Ref Range Status   04/03/2020 4.33 (A) mg/dL Final     Comment:     REF VALUE  < 1.00       Microbiology  Susceptibility data from last 14 days.  Collected Specimen Info Organism Clindamycin Erythromycin Oxacillin Tetracycline Trimethoprim/Sulfamethoxazole Vancomycin    01/04/24 Blood culture from Peripheral Venipuncture Staphylococcus epidermidis         01/04/24 Blood culture from Peripheral Venipuncture Staphylococcus epidermidis I I R S S S     Imaging  Reviewed        Assessment/Plan   Bacteremia, CNS, the repeat BC is negative  Pneumonia     Recommendations :  Plan to stop the antibiotics if the BC remain negative  Discussed with the medical team     I spent minutes in the professional and overall care of this patient.      Francine Wilson MD

## 2024-01-09 NOTE — PROGRESS NOTES
"Overnight Events     No acute events overnight.    Subjective   Patient repeatedly asks for stronger pain medication and for more anxiety medication. Continues to ask even after it is explained to him that we are not going to increase these medications. Confirms that his arm is still painful, but does not appear in any acute distress and does not react when the cut on his bicep is palpated.    Objective    Physical Exam  HENT:      Mouth/Throat:      Mouth: Mucous membranes are moist.      Pharynx: Oropharynx is clear.   Eyes:      Extraocular Movements: Extraocular movements intact.      Conjunctiva/sclera: Conjunctivae normal.   Cardiovascular:      Rate and Rhythm: Normal rate and regular rhythm.      Pulses: Normal pulses.      Heart sounds: Normal heart sounds.   Pulmonary:      Effort: Pulmonary effort is normal.      Breath sounds: Normal breath sounds.   Abdominal:      Palpations: Abdomen is soft.      Tenderness: There is no abdominal tenderness.   Musculoskeletal:      Right lower leg: No edema.      Left lower leg: No edema.   Skin:     General: Skin is warm and dry.      Comments: Laceration across left bicep with intact stitches. Markedly dry erythema around laceration.   Neurological:      Mental Status: He is alert.   Psychiatric:      Comments: Repeating questions about pain and anxiety medication despite receiving answers moments before.          Visit Vitals  BP (!) 144/93 (BP Location: Right arm, Patient Position: Lying)   Pulse 87   Temp 36.8 °C (98.2 °F) (Temporal)   Resp 18   Ht 1.778 m (5' 10\")   Wt 79.8 kg (175 lb 14.8 oz)   SpO2 95%   BMI 25.24 kg/m²   Smoking Status Former   BSA 1.99 m²          Intake/Output Summary (Last 24 hours) at 1/9/2024 1045  Last data filed at 1/9/2024 0721  Gross per 24 hour   Intake 2250 ml   Output 4075 ml   Net -1825 ml             I/Os    Intake/Output Summary (Last 24 hours) at 1/9/2024 1045  Last data filed at 1/9/2024 0721  Gross per 24 hour   Intake 2250 " "ml   Output 4075 ml   Net -1825 ml         Labs:   Results from last 72 hours   Lab Units 01/09/24  0707 01/08/24  0622 01/07/24  1809   SODIUM mmol/L 131* 132* 130*   POTASSIUM mmol/L 4.0 4.0 4.0   CHLORIDE mmol/L 97* 98 95*   CO2 mmol/L 25 24 24   BUN mg/dL 10 11 11   CREATININE mg/dL 0.62 0.59 0.56   GLUCOSE mg/dL 166* 102* 105*   CALCIUM mg/dL 8.9 9.2 9.3   ANION GAP mmol/L 13 14 15   EGFR mL/min/1.73m*2 >90 >90 >90   PHOSPHORUS mg/dL 3.7 4.2 4.1        Results from last 72 hours   Lab Units 01/09/24  0707 01/08/24 0622 01/07/24  1809   WBC AUTO x10*3/uL 6.9 6.4 8.5   HEMOGLOBIN g/dL 13.0* 14.2 14.1   HEMATOCRIT % 39.1* 43.5 41.6   PLATELETS AUTO x10*3/uL 372 413 430   NEUTROS PCT AUTO %  --   --  67.4   LYMPHS PCT AUTO %  --   --  18.8   MONOS PCT AUTO %  --   --  10.3   EOS PCT AUTO %  --   --  2.4        Lab Results   Component Value Date    CALCIUM 8.9 01/09/2024    PHOS 3.7 01/09/2024      Lab Results   Component Value Date    CRP 4.33 (A) 04/03/2020       [unfilled]     Micro/ID:   Susceptibility data from last 90 days.  Collected Specimen Info Organism Clindamycin Erythromycin Oxacillin Tetracycline Trimethoprim/Sulfamethoxazole Vancomycin   01/04/24 Blood culture from Peripheral Venipuncture Staphylococcus epidermidis         01/04/24 Blood culture from Peripheral Venipuncture Staphylococcus epidermidis I I R S S S                      No lab exists for component: \"AGALPCRNB\"   .ID  Lab Results   Component Value Date    BLOODCULT No growth at 1 day 01/07/2024    BLOODCULT No growth at 1 day 01/07/2024       Meds    Scheduled medications  aspirin, 81 mg, oral, Daily  benztropine, 1 mg, oral, BID  buPROPion XL, 300 mg, oral, q AM  cefTRIAXone, 1 g, intravenous, q24h  enoxaparin, 40 mg, subcutaneous, q24h  gabapentin, 300 mg, oral, TID  insulin lispro, 0-10 Units, subcutaneous, TID with meals  melatonin, 5 mg, oral, Nightly  [Held by provider] metFORMIN, 500 mg, oral, BID with meals  multivitamin with " minerals, 1 tablet, oral, Daily  naltrexone, 50 mg, oral, Daily  pantoprazole, 40 mg, oral, BID AC  perflutren lipid microspheres, 0.5-10 mL of dilution, intravenous, Once in imaging  perflutren protein A microsphere, 0.5 mL, intravenous, Once in imaging  QUEtiapine, 100 mg, oral, BID  QUEtiapine, 50 mg, oral, Nightly  sulfur hexafluoride microsphr, 2 mL, intravenous, Once in imaging  vancomycin, 1,500 mg, intravenous, q12h      Continuous medications     PRN medications  PRN medications: acetaminophen, ALPRAZolam, dextrose 10 % in water (D10W), dextrose, glucagon, oxyCODONE, oxygen, white petrolatum       Images    ECG 12 lead    Result Date: 1/8/2024  Normal sinus rhythm Cannot rule out Inferior infarct (cited on or before 03-JAN-2024) T wave abnormality, consider anterior ischemia Abnormal ECG When compared with ECG of 03-JAN-2024 18:50, Nonspecific T wave abnormality, worse in Inferior leads T wave inversion now evident in Anterior leads    XR chest 1 view    Result Date: 1/6/2024  Interpreted By:  Radames Chavez, STUDY: XR CHEST 1 VIEW;  1/6/2024 6:22 am   INDICATION: Signs/Symptoms:PNEUMONIA / SOB.   COMPARISON: 01/03/2024   ACCESSION NUMBER(S): [id number]   ORDERING CLINICIAN: JOSE C GRAF   FINDINGS:     Median sternotomy wires and mediastinal surgical clips. Normal heart size. No pleural effusion or pneumothorax. Similar patchy right lung airspace opacities. Upper abdomen is unremarkable. Elevation of the left distal clavicle relative to the acromion and absence of the left distal clavicle, likely secondary to prior acromioclavicular separation and possible distal clavicle resection. No acute osseous abnormality.       1. Similar patchy right lung airspace opacities. Consider pneumonia. Follow-up is recommended to confirm resolution.   Signed by: Radames Chavez 1/6/2024 6:49 AM Dictation workstation:   [id number]    Electrocardiogram, 12-lead    Result Date: 1/4/2024  Normal sinus rhythm  Inferior infarct , age undetermined Abnormal ECG When compared with ECG of 28-SEP-2023 16:06, (unconfirmed) No significant change was found See ED provider note for full interpretation and clinical correlation Confirmed by Royce Goddard ([id number]) on 1/4/2024 4:32:00 PM    XR chest 1 view    Result Date: 1/3/2024  STUDY: Chest Radiograph;  01/03/2023 6:09 pm INDICATION: Cough. COMPARISON: XR Chest 09/21/2023 and 08/06/2021 ACCESSION NUMBER(S): [id number] ORDERING CLINICIAN: MICA HANSEN TECHNIQUE:  Frontal chest was obtained at 1809 hours. FINDINGS: There has been previous anterior chest and heart surgery with coronary artery bypass and midline sternotomy. The heart is normal size There are minimal lung opacities especially in the right mid and lower lung: possible pneumonia. There are no detectable pleural effusions. There is no pneumothorax.    1. Previous anterior chest and heart surgery. 2. Minimal lung opacities especially in the right mid and lower lung: possible pneumonia. 3. Follow-up advised. Signed by Dano Carvalho MD    CT head wo IV contrast    Result Date: 1/3/2024  Interpreted By:  Jaek Pappas, STUDY: CT HEAD WO IV CONTRAST;  1/3/2024 6:07 pm   INDICATION: Signs/Symptoms:ams.   COMPARISON: CT head 01/28/2022   ACCESSION NUMBER(S): [id number]   ORDERING CLINICIAN: MICA HANSEN   TECHNIQUE: Noncontrast axial CT images of head were obtained with coronal and sagittal reconstructed images.   FINDINGS: BRAIN PARENCHYMA: Gray-white differentiation is preserved. No mass-effect, midline shift or effacement of cerebral sulci. No significant white matter disease.   HEMORRHAGE: No acute intracranial hemorrhage.   VENTRICLES and EXTRA-AXIAL SPACES: The ventricles and sulci are within normal limits for brain volume. No abnormal extra-axial fluid collection.   ORBITS: The visualized orbits and globes are within normal limits.   EXTRACRANIAL SOFT TISSUES: Within normal limits.   PARANASAL SINUSES/MASTOIDS:  Diffuse paranasal sinus mucosal thickening. Small right mastoid effusion.   CALVARIUM: No depressed skull fracture.         1. No acute intracranial abnormality identified. 2. Paranasal sinus mucosal disease and small right mastoid effusion. Correlate for evidence of acute sinusitis/mastoiditis.       MACRO: None   Signed by: Jake Pappas 1/3/2024 6:34 PM Dictation workstation:   [id number]     Encounter Date: 01/07/24   ECG 12 lead   Result Value    Ventricular Rate 76    Atrial Rate 76    IA Interval 146    QRS Duration 98    QT Interval 382    QTC Calculation(Bazett) 429    R Axis -8    T Axis 1    QRS Count 13    Q Onset 224    P Onset 151    P Offset 198    T Offset 415    QTC Fredericia 413    Narrative    Normal sinus rhythm  Cannot rule out Inferior infarct (cited on or before 03-JAN-2024)  T wave abnormality, consider anterior ischemia  Abnormal ECG  When compared with ECG of 03-JAN-2024 18:50,  Nonspecific T wave abnormality, worse in Inferior leads  T wave inversion now evident in Anterior leads      Encounter Date: 01/07/24   ECG 12 lead   Result Value    Ventricular Rate 76    Atrial Rate 76    IA Interval 146    QRS Duration 98    QT Interval 382    QTC Calculation(Bazett) 429    R Axis -8    T Axis 1    QRS Count 13    Q Onset 224    P Onset 151    P Offset 198    T Offset 415    QTC Fredericia 413    Narrative    Normal sinus rhythm  Cannot rule out Inferior infarct (cited on or before 03-JAN-2024)  T wave abnormality, consider anterior ischemia  Abnormal ECG  When compared with ECG of 03-JAN-2024 18:50,  Nonspecific T wave abnormality, worse in Inferior leads  T wave inversion now evident in Anterior leads      @CT@   [unfilled]   [unfilled]   === 01/03/24 ===    CT HEAD WO IV CONTRAST    - Impression -  1. No acute intracranial abnormality identified.  2. Paranasal sinus mucosal disease and small right mastoid effusion.  Correlate for evidence of acute  sinusitis/mastoiditis.        MACRO:  None    Signed by: Jake Pappas 1/3/2024 6:34 PM  Dictation workstation:   [id number]     Assessment and Plan    63m with parkinsons, chronic neuropathy, chronic pain, hx aspiration PNA, HTN, HLD, CAD, T2DM, schizoaffective disorder (depression), hx substance abuse who initially presented to the ED for SI related to his physical illnesses, and admitted to medicine for gram positive bacteremia.    # Staph epidermidis bacteremia - Most likely contamination  #Concern for PNA, possible aspiration   -hx positive MRSA nares 9/2023 with tx for PNA with IV vanc and zosyn then, thought to be from aspiration. However based on pt’s story, pt intentionally “snorts” water also. Denies IVDU.   -2x Staph epidermidis susceptible to tetracycline, vanc, and Bactrim 1/4/24, repeat 1/7/24  -iv vanc (1/6-)   -ceftriaxone 1g (1/7-), azithromycin 3d   -urine legionella and strep, MRSA nares negative  -echo results pending  -procal 0.07  -ID consult   -CT chest: residual evidence of previous infection vs inflamm changes, possibility of early developing pneumonia, tiny hiatal hernia, mild atherosclerotic vasc calcifications  -recommend follow-up CT chest in 1 month     #concern for dysphagia with hx of it   -SLP onboard     #anxiety  #hx benzodiazepine use  -alprazolam 0.25mg BID PRN      #SI   #schizoaffective disorder  #parkinsons  -per psych recs, continue benztropine 1mg po, Wellbutrin 300mg mornings. Quetiapine changed to 100mg two times during day and 50mg nightly.   -psych consult. sitter (1/7-)      Chronic   #GERD - pantoprazole 40    #HTN/CAD - cont asa 81mg daily.   #chronic neuropathy - takes gabapentin 600mg TID. Changedto 300mg TID.   #T2DM - hold home 500mg BID metformin, started SSI      Fluids: PO   Nutrition: soft/bite-sized solids, thin liquids, medication whole in puree, upright intake and check for pocketing during/after meals  VTE ppx: lonvenox   O2: room air  Code status: will  need to discuss with POA, full code for now. Legal guardian: Angel Holguin, [phone] (did not answer phone)     Dispo: 62yo admitted for concern for SI, transferred/admitted to floor for bacteremia. ID and Psych onboard. On IV vanc/zosyn/azithro. Plan to discharge tomorrow pending negative blood cultures and psych recs.    Kamran Harris, DO  Internal Medicine PGY1

## 2024-01-09 NOTE — CARE PLAN
ADELINA updated that pt has been cleared by Psychiatry; plan to return to [facility].    1/10/24:  ADELINA updated that pt is stable for discharge.  ADELINA confirmed that pt can return today to facility, physician updated, transport arranged, TCC & RN updated.  ADELINA also left message for guardian.  1218:  ADELINA updated that [facility] can not accept pt until pt is sitter free for 24 hours.  ADOD is planned for 1/11/24.  1345:  ADELINA spoke to [facility] who confirmed pt must be sitter free for the 24 hours due to SI.  [facility] requested for 1/11/24 @ 1400 per facility request.  ADELINA updated physician, RN & TCC.    1/11/24 0944:  ADELINA sent updates to [facility] who confirmed they can accept pt today, transport confirmed for 1400 via [facility].  RN, TCC & physician updated.

## 2024-01-09 NOTE — PROGRESS NOTES
"Vancomycin Dosing by Pharmacy- FOLLOW UP    Gerardo Albright is a 63 y.o. year old male who Pharmacy has been consulted for vancomycin dosing for pneumonia. Based on the patient's indication and renal status this patient is being dosed based on a goal AUC of 400-600.     Renal function is currently stable.    Current vancomycin dose: 1500 mg given every 12 hours    Estimated vancomycin AUC on current dose: 480 mg/L.hr     Visit Vitals  BP (!) 144/93 (BP Location: Right arm, Patient Position: Lying)   Pulse 87   Temp 36.8 °C (98.2 °F) (Temporal)   Resp 18        Lab Results   Component Value Date    CREATININE 0.62 01/09/2024    CREATININE 0.59 01/08/2024    CREATININE 0.56 01/07/2024    CREATININE 0.62 01/06/2024        Patient weight is No results found for: \"PTWEIGHT\"    No results found for: \"CULTURE\"     I/O last 3 completed shifts:  In: 7420 (93 mL/kg) [P.O.:6120; IV Piggyback:1300]  Out: 8250 (103.4 mL/kg) [Urine:8250 (2.9 mL/kg/hr)]  Weight: 79.8 kg   [unfilled]    Lab Results   Component Value Date    PATIENTTEMP 37.0 09/21/2023        Assessment/Plan    Within goal AUC range. Continue current vancomycin regimen.    This dosing regimen is predicted by [employer] to result in the following pharmacokinetic parameters:  AUC24,ss: 480 mg/L.hr  Probability of AUC24 > 400: 86 %  Ctrough,ss: 12.8 mg/L  Probability of Ctrough,ss > 20: 10 %  Probability of nephrotoxicity (Lodise OTIS 2009): 8 %      The next level will be obtained on 1/12 at am labs. May be obtained sooner if clinically indicated.   Will continue to monitor renal function daily while on vancomycin and order serum creatinine at least every 48 hours if not already ordered.  Follow for continued vancomycin needs, clinical response, and signs/symptoms of toxicity.       Swati Oquendo, PharmD           "

## 2024-01-10 PROBLEM — M79.609: Status: RESOLVED | Noted: 2024-01-10 | Resolved: 2024-01-10

## 2024-01-10 PROBLEM — M79.89 LEFT UPPER EXTREMITY SWELLING: Status: RESOLVED | Noted: 2024-01-07 | Resolved: 2024-01-10

## 2024-01-10 PROBLEM — M79.2 CHRONIC NEUROPATHIC PAIN: Status: RESOLVED | Noted: 2024-01-10 | Resolved: 2024-01-10

## 2024-01-10 PROBLEM — G89.29 CHRONIC NEUROPATHIC PAIN: Status: RESOLVED | Noted: 2024-01-10 | Resolved: 2024-01-10

## 2024-01-10 PROBLEM — S41.112A LACERATION OF LEFT UPPER EXTREMITY: Status: ACTIVE | Noted: 2024-01-10

## 2024-01-10 PROBLEM — M79.609: Status: ACTIVE | Noted: 2024-01-10

## 2024-01-10 PROBLEM — G89.29 CHRONIC NEUROPATHIC PAIN: Status: ACTIVE | Noted: 2024-01-10

## 2024-01-10 PROBLEM — M79.2 CHRONIC NEUROPATHIC PAIN: Status: ACTIVE | Noted: 2024-01-10

## 2024-01-10 PROBLEM — R78.81 BACTEREMIA: Status: RESOLVED | Noted: 2024-01-07 | Resolved: 2024-01-10

## 2024-01-10 LAB
ALBUMIN SERPL BCP-MCNC: 3.7 G/DL (ref 3.4–5)
ANION GAP SERPL CALC-SCNC: 11 MMOL/L (ref 10–20)
BACTERIA BLD AEROBE CULT: ABNORMAL
BACTERIA BLD CULT: ABNORMAL
BUN SERPL-MCNC: 7 MG/DL (ref 6–23)
CALCIUM SERPL-MCNC: 9 MG/DL (ref 8.6–10.3)
CHLORIDE SERPL-SCNC: 97 MMOL/L (ref 98–107)
CO2 SERPL-SCNC: 26 MMOL/L (ref 21–32)
CREAT SERPL-MCNC: 0.63 MG/DL (ref 0.5–1.3)
EGFRCR SERPLBLD CKD-EPI 2021: >90 ML/MIN/1.73M*2
ERYTHROCYTE [DISTWIDTH] IN BLOOD BY AUTOMATED COUNT: 12.7 % (ref 11.5–14.5)
GLUCOSE BLD MANUAL STRIP-MCNC: 137 MG/DL (ref 74–99)
GLUCOSE BLD MANUAL STRIP-MCNC: 145 MG/DL (ref 74–99)
GLUCOSE BLD MANUAL STRIP-MCNC: 146 MG/DL (ref 74–99)
GLUCOSE SERPL-MCNC: 116 MG/DL (ref 74–99)
GRAM STN SPEC: ABNORMAL
HCT VFR BLD AUTO: 41.8 % (ref 41–52)
HGB BLD-MCNC: 13.6 G/DL (ref 13.5–17.5)
MAGNESIUM SERPL-MCNC: 2.08 MG/DL (ref 1.6–2.4)
MCH RBC QN AUTO: 30 PG (ref 26–34)
MCHC RBC AUTO-ENTMCNC: 32.5 G/DL (ref 32–36)
MCV RBC AUTO: 92 FL (ref 80–100)
NRBC BLD-RTO: 0 /100 WBCS (ref 0–0)
PHOSPHATE SERPL-MCNC: 3.7 MG/DL (ref 2.5–4.9)
PLATELET # BLD AUTO: 334 X10*3/UL (ref 150–450)
POTASSIUM SERPL-SCNC: 3.7 MMOL/L (ref 3.5–5.3)
RBC # BLD AUTO: 4.54 X10*6/UL (ref 4.5–5.9)
SODIUM SERPL-SCNC: 130 MMOL/L (ref 136–145)
WBC # BLD AUTO: 6.1 X10*3/UL (ref 4.4–11.3)

## 2024-01-10 PROCEDURE — 2500000004 HC RX 250 GENERAL PHARMACY W/ HCPCS (ALT 636 FOR OP/ED)

## 2024-01-10 PROCEDURE — 94760 N-INVAS EAR/PLS OXIMETRY 1: CPT

## 2024-01-10 PROCEDURE — 2500000004 HC RX 250 GENERAL PHARMACY W/ HCPCS (ALT 636 FOR OP/ED): Performed by: PSYCHIATRY & NEUROLOGY

## 2024-01-10 PROCEDURE — A4217 STERILE WATER/SALINE, 500 ML: HCPCS

## 2024-01-10 PROCEDURE — 2500000001 HC RX 250 WO HCPCS SELF ADMINISTERED DRUGS (ALT 637 FOR MEDICARE OP)

## 2024-01-10 PROCEDURE — 97530 THERAPEUTIC ACTIVITIES: CPT | Mod: GO,CO

## 2024-01-10 PROCEDURE — 99231 SBSQ HOSP IP/OBS SF/LOW 25: CPT | Performed by: PSYCHIATRY & NEUROLOGY

## 2024-01-10 PROCEDURE — 1200000002 HC GENERAL ROOM WITH TELEMETRY DAILY

## 2024-01-10 PROCEDURE — 80069 RENAL FUNCTION PANEL: CPT

## 2024-01-10 PROCEDURE — 97535 SELF CARE MNGMENT TRAINING: CPT | Mod: GO,CO

## 2024-01-10 PROCEDURE — 82947 ASSAY GLUCOSE BLOOD QUANT: CPT

## 2024-01-10 PROCEDURE — 36415 COLL VENOUS BLD VENIPUNCTURE: CPT

## 2024-01-10 PROCEDURE — 99231 SBSQ HOSP IP/OBS SF/LOW 25: CPT

## 2024-01-10 PROCEDURE — 83735 ASSAY OF MAGNESIUM: CPT

## 2024-01-10 PROCEDURE — 2500000001 HC RX 250 WO HCPCS SELF ADMINISTERED DRUGS (ALT 637 FOR MEDICARE OP): Performed by: PSYCHIATRY & NEUROLOGY

## 2024-01-10 PROCEDURE — 92526 ORAL FUNCTION THERAPY: CPT | Mod: GN

## 2024-01-10 PROCEDURE — 85027 COMPLETE CBC AUTOMATED: CPT

## 2024-01-10 PROCEDURE — 99222 1ST HOSP IP/OBS MODERATE 55: CPT | Performed by: PSYCHIATRY & NEUROLOGY

## 2024-01-10 RX ORDER — OXYCODONE HYDROCHLORIDE 5 MG/1
2.5 TABLET ORAL EVERY 12 HOURS PRN
Qty: 2 TABLET | Refills: 0 | Status: SHIPPED | OUTPATIENT
Start: 2024-01-10 | End: 2024-01-12

## 2024-01-10 RX ORDER — ALPRAZOLAM 0.25 MG/1
0.25 TABLET ORAL 2 TIMES DAILY PRN
Qty: 4 TABLET | Refills: 0 | Status: SHIPPED | OUTPATIENT
Start: 2024-01-10 | End: 2024-04-24 | Stop reason: HOSPADM

## 2024-01-10 RX ORDER — BACITRACIN ZINC 500 UNIT/G
OINTMENT (GRAM) TOPICAL 2 TIMES DAILY
Start: 2024-01-10 | End: 2024-01-15

## 2024-01-10 RX ORDER — GABAPENTIN 300 MG/1
300 CAPSULE ORAL 3 TIMES DAILY
Start: 2024-01-10 | End: 2024-04-24 | Stop reason: HOSPADM

## 2024-01-10 RX ADMIN — BENZTROPINE MESYLATE 1 MG: 1 TABLET ORAL at 09:17

## 2024-01-10 RX ADMIN — PANTOPRAZOLE SODIUM 40 MG: 40 TABLET, DELAYED RELEASE ORAL at 06:45

## 2024-01-10 RX ADMIN — OXYCODONE HYDROCHLORIDE 2.5 MG: 5 TABLET ORAL at 22:01

## 2024-01-10 RX ADMIN — Medication 1 TABLET: at 09:17

## 2024-01-10 RX ADMIN — ACETAMINOPHEN 650 MG: 325 TABLET ORAL at 22:00

## 2024-01-10 RX ADMIN — GABAPENTIN 300 MG: 300 CAPSULE ORAL at 22:00

## 2024-01-10 RX ADMIN — ASPIRIN 81 MG 81 MG: 81 TABLET ORAL at 09:17

## 2024-01-10 RX ADMIN — ACETAMINOPHEN 650 MG: 325 TABLET ORAL at 15:25

## 2024-01-10 RX ADMIN — Medication 5 MG: at 22:01

## 2024-01-10 RX ADMIN — ALPRAZOLAM 0.25 MG: 0.5 TABLET ORAL at 22:01

## 2024-01-10 RX ADMIN — CEFTRIAXONE SODIUM 1 G: 1 INJECTION, SOLUTION INTRAVENOUS at 17:08

## 2024-01-10 RX ADMIN — QUETIAPINE FUMARATE 100 MG: 100 TABLET ORAL at 09:17

## 2024-01-10 RX ADMIN — BUPROPION HYDROCHLORIDE 300 MG: 150 TABLET, FILM COATED, EXTENDED RELEASE ORAL at 09:17

## 2024-01-10 RX ADMIN — QUETIAPINE FUMARATE 100 MG: 100 TABLET ORAL at 22:04

## 2024-01-10 RX ADMIN — ACETAMINOPHEN 650 MG: 325 TABLET ORAL at 09:17

## 2024-01-10 RX ADMIN — ACETAMINOPHEN 650 MG: 325 TABLET ORAL at 03:53

## 2024-01-10 RX ADMIN — ALPRAZOLAM 0.25 MG: 0.5 TABLET ORAL at 09:41

## 2024-01-10 RX ADMIN — GABAPENTIN 300 MG: 300 CAPSULE ORAL at 09:18

## 2024-01-10 RX ADMIN — PANTOPRAZOLE SODIUM 40 MG: 40 TABLET, DELAYED RELEASE ORAL at 15:25

## 2024-01-10 RX ADMIN — OXYCODONE HYDROCHLORIDE 2.5 MG: 5 TABLET ORAL at 09:41

## 2024-01-10 RX ADMIN — VANCOMYCIN HYDROCHLORIDE 1500 MG: 10 INJECTION, POWDER, LYOPHILIZED, FOR SOLUTION INTRAVENOUS at 11:08

## 2024-01-10 RX ADMIN — NALTREXONE HYDROCHLORIDE 50 MG: 50 TABLET, FILM COATED ORAL at 09:17

## 2024-01-10 RX ADMIN — BENZTROPINE MESYLATE 1 MG: 1 TABLET ORAL at 22:01

## 2024-01-10 RX ADMIN — ENOXAPARIN SODIUM 40 MG: 40 INJECTION SUBCUTANEOUS at 22:06

## 2024-01-10 RX ADMIN — QUETIAPINE FUMARATE 50 MG: 25 TABLET ORAL at 22:00

## 2024-01-10 RX ADMIN — GABAPENTIN 300 MG: 300 CAPSULE ORAL at 15:26

## 2024-01-10 ASSESSMENT — COGNITIVE AND FUNCTIONAL STATUS - GENERAL
DRESSING REGULAR LOWER BODY CLOTHING: A LITTLE
DAILY ACTIVITIY SCORE: 19
HELP NEEDED FOR BATHING: A LITTLE
CLIMB 3 TO 5 STEPS WITH RAILING: A LITTLE
PERSONAL GROOMING: A LITTLE
TOILETING: A LITTLE
MOVING TO AND FROM BED TO CHAIR: A LITTLE
MOBILITY SCORE: 20
DRESSING REGULAR UPPER BODY CLOTHING: A LITTLE
HELP NEEDED FOR BATHING: A LITTLE
WALKING IN HOSPITAL ROOM: A LITTLE
DRESSING REGULAR LOWER BODY CLOTHING: A LITTLE
STANDING UP FROM CHAIR USING ARMS: A LITTLE
MOVING TO AND FROM BED TO CHAIR: A LITTLE
DAILY ACTIVITIY SCORE: 19
DRESSING REGULAR LOWER BODY CLOTHING: A LITTLE
TOILETING: A LITTLE
WALKING IN HOSPITAL ROOM: A LITTLE
DAILY ACTIVITIY SCORE: 19
DRESSING REGULAR UPPER BODY CLOTHING: A LITTLE
HELP NEEDED FOR BATHING: A LITTLE
DRESSING REGULAR UPPER BODY CLOTHING: A LITTLE
MOBILITY SCORE: 20
PERSONAL GROOMING: A LITTLE
TOILETING: A LITTLE
CLIMB 3 TO 5 STEPS WITH RAILING: A LITTLE
STANDING UP FROM CHAIR USING ARMS: A LITTLE
PERSONAL GROOMING: A LITTLE

## 2024-01-10 ASSESSMENT — PAIN SCALES - GENERAL
PAINLEVEL_OUTOF10: 9
PAINLEVEL_OUTOF10: 0 - NO PAIN
PAINLEVEL_OUTOF10: 7
PAINLEVEL_OUTOF10: 0 - NO PAIN

## 2024-01-10 ASSESSMENT — ENCOUNTER SYMPTOMS
NUMBNESS: 1
AGITATION: 0
BACK PAIN: 0
POLYDIPSIA: 0
FATIGUE: 0
DIARRHEA: 0
SHORTNESS OF BREATH: 0
CHILLS: 0
DIZZINESS: 1
WOUND: 0
CONSTIPATION: 0
ARTHRALGIAS: 0
COUGH: 0
ADENOPATHY: 0
FEVER: 0
EYE DISCHARGE: 0
DIAPHORESIS: 0
DIFFICULTY URINATING: 0

## 2024-01-10 ASSESSMENT — PAIN - FUNCTIONAL ASSESSMENT
PAIN_FUNCTIONAL_ASSESSMENT: 0-10

## 2024-01-10 ASSESSMENT — ACTIVITIES OF DAILY LIVING (ADL): HOME_MANAGEMENT_TIME_ENTRY: 12

## 2024-01-10 ASSESSMENT — VISUAL ACUITY: VA_NORMAL: 1

## 2024-01-10 NOTE — PROGRESS NOTES
Physical Therapy                 Therapy Communication Note    Patient Name: Gerardo Albright  MRN: [id number]  Today's Date: 1/10/2024     Discipline: Physical Therapy    Missed Visit Reason: Missed Visit Reason: Patient refused (pt politely deferred PT for today)    Missed Time: Attempt    Comment: tx attempted 2x, @930, and 1249.  In the morning pt was lethargic and mildy incoherent, at 2nd attempt pt was concerned that he was too unstable to participate and would like to try another day.

## 2024-01-10 NOTE — CONSULTS
Vancomycin Dosing by Pharmacy- Cessation of Therapy    Consult to pharmacy for vancomycin dosing has been discontinued by the prescriber, pharmacy will sign off at this time.    Please call pharmacy if there are further questions or re-enter a consult if vancomycin is resumed.     Swati Oquendo, PharmD

## 2024-01-10 NOTE — PROGRESS NOTES
"Overnight Events     No acute events overnight.    Subjective   Patient resting this morning. Again asks for more pain medication. In no acute distress.    Objective    Physical Exam  HENT:      Mouth/Throat:      Mouth: Mucous membranes are moist.      Pharynx: Oropharynx is clear.   Eyes:      Extraocular Movements: Extraocular movements intact.      Conjunctiva/sclera: Conjunctivae normal.   Cardiovascular:      Rate and Rhythm: Normal rate and regular rhythm.      Pulses: Normal pulses.      Heart sounds: Normal heart sounds.   Pulmonary:      Effort: Pulmonary effort is normal.      Breath sounds: Normal breath sounds.   Abdominal:      Palpations: Abdomen is soft.      Tenderness: There is no abdominal tenderness.   Musculoskeletal:      Right lower leg: No edema.      Left lower leg: No edema.   Skin:     General: Skin is warm and dry.      Comments: Laceration across left bicep with intact stitches. Dry erythema around laceration.   Neurological:      Mental Status: He is alert.   Psychiatric:      Comments: Repeating questions about pain and anxiety medication despite receiving answers moments before.     Visit Vitals  /73   Pulse 59   Temp 35.7 °C (96.3 °F)   Resp 18   Ht 1.778 m (5' 10\")   Wt 79.8 kg (175 lb 14.8 oz)   SpO2 98%   BMI 25.24 kg/m²   Smoking Status Former   BSA 1.99 m²          Intake/Output Summary (Last 24 hours) at 1/10/2024 1305  Last data filed at 1/10/2024 0942  Gross per 24 hour   Intake 1240 ml   Output 3150 ml   Net -1910 ml         FiO2 (%):  [21 %] 21 %   I/Os    Intake/Output Summary (Last 24 hours) at 1/10/2024 1305  Last data filed at 1/10/2024 0942  Gross per 24 hour   Intake 1240 ml   Output 3150 ml   Net -1910 ml         Labs:   Results from last 72 hours   Lab Units 01/10/24  0650 01/09/24  0707 01/08/24  0622   SODIUM mmol/L 130* 131* 132*   POTASSIUM mmol/L 3.7 4.0 4.0   CHLORIDE mmol/L 97* 97* 98   CO2 mmol/L 26 25 24   BUN mg/dL 7 10 11   CREATININE mg/dL 0.63 " "0.62 0.59   GLUCOSE mg/dL 116* 166* 102*   CALCIUM mg/dL 9.0 8.9 9.2   ANION GAP mmol/L 11 13 14   EGFR mL/min/1.73m*2 >90 >90 >90   PHOSPHORUS mg/dL 3.7 3.7 4.2        Results from last 72 hours   Lab Units 01/10/24  0650 01/09/24  0707 01/08/24  0622 01/07/24  1809   WBC AUTO x10*3/uL 6.1 6.9 6.4 8.5   HEMOGLOBIN g/dL 13.6 13.0* 14.2 14.1   HEMATOCRIT % 41.8 39.1* 43.5 41.6   PLATELETS AUTO x10*3/uL 334 372 413 430   NEUTROS PCT AUTO %  --   --   --  67.4   LYMPHS PCT AUTO %  --   --   --  18.8   MONOS PCT AUTO %  --   --   --  10.3   EOS PCT AUTO %  --   --   --  2.4        Lab Results   Component Value Date    CALCIUM 9.0 01/10/2024    PHOS 3.7 01/10/2024      Lab Results   Component Value Date    CRP 4.33 (A) 04/03/2020       [unfilled]     Micro/ID:   Susceptibility data from last 90 days.  Collected Specimen Info Organism Clindamycin Erythromycin Oxacillin Tetracycline Trimethoprim/Sulfamethoxazole Vancomycin   01/04/24 Blood culture from Peripheral Venipuncture Staphylococcus epidermidis         01/04/24 Blood culture from Peripheral Venipuncture Staphylococcus epidermidis I I R S S S                      No lab exists for component: \"AGALPCRNB\"   .ID  Lab Results   Component Value Date    BLOODCULT No growth at 2 days 01/07/2024    BLOODCULT No growth at 2 days 01/07/2024       Meds    Scheduled medications  acetaminophen, 650 mg, oral, q6h  aspirin, 81 mg, oral, Daily  benztropine, 1 mg, oral, BID  buPROPion XL, 300 mg, oral, q AM  cefTRIAXone, 1 g, intravenous, q24h  enoxaparin, 40 mg, subcutaneous, q24h  gabapentin, 300 mg, oral, TID  insulin lispro, 0-10 Units, subcutaneous, TID with meals  melatonin, 5 mg, oral, Nightly  [Held by provider] metFORMIN, 500 mg, oral, BID with meals  multivitamin with minerals, 1 tablet, oral, Daily  naltrexone, 50 mg, oral, Daily  pantoprazole, 40 mg, oral, BID AC  perflutren lipid microspheres, 0.5-10 mL of dilution, intravenous, Once in imaging  perflutren protein " A microsphere, 0.5 mL, intravenous, Once in imaging  QUEtiapine, 100 mg, oral, BID  QUEtiapine, 50 mg, oral, Nightly  sulfur hexafluoride microsphr, 2 mL, intravenous, Once in imaging  vancomycin, 1,500 mg, intravenous, q12h      Continuous medications     PRN medications  PRN medications: ALPRAZolam, dextrose 10 % in water (D10W), dextrose, glucagon, oxyCODONE, oxygen, white petrolatum       Images    ECG 12 lead    Result Date: 1/8/2024  Normal sinus rhythm Cannot rule out Inferior infarct (cited on or before 03-JAN-2024) T wave abnormality, consider anterior ischemia Abnormal ECG When compared with ECG of 03-JAN-2024 18:50, Nonspecific T wave abnormality, worse in Inferior leads T wave inversion now evident in Anterior leads    XR chest 1 view    Result Date: 1/6/2024  Interpreted By:  Radames Chavez, STUDY: XR CHEST 1 VIEW;  1/6/2024 6:22 am   INDICATION: Signs/Symptoms:PNEUMONIA / SOB.   COMPARISON: 01/03/2024   ACCESSION NUMBER(S): [id number]   ORDERING CLINICIAN: JOSE C GRAF   FINDINGS:     Median sternotomy wires and mediastinal surgical clips. Normal heart size. No pleural effusion or pneumothorax. Similar patchy right lung airspace opacities. Upper abdomen is unremarkable. Elevation of the left distal clavicle relative to the acromion and absence of the left distal clavicle, likely secondary to prior acromioclavicular separation and possible distal clavicle resection. No acute osseous abnormality.       1. Similar patchy right lung airspace opacities. Consider pneumonia. Follow-up is recommended to confirm resolution.   Signed by: Radames Chavez 1/6/2024 6:49 AM Dictation workstation:   [id number]    Electrocardiogram, 12-lead    Result Date: 1/4/2024  Normal sinus rhythm Inferior infarct , age undetermined Abnormal ECG When compared with ECG of 28-SEP-2023 16:06, (unconfirmed) No significant change was found See ED provider note for full interpretation and clinical correlation Confirmed  by Royce Goddard ([id number]) on 1/4/2024 4:32:00 PM    XR chest 1 view    Result Date: 1/3/2024  STUDY: Chest Radiograph;  01/03/2023 6:09 pm INDICATION: Cough. COMPARISON: XR Chest 09/21/2023 and 08/06/2021 ACCESSION NUMBER(S): [id number] ORDERING CLINICIAN: MICA HANSEN TECHNIQUE:  Frontal chest was obtained at 1809 hours. FINDINGS: There has been previous anterior chest and heart surgery with coronary artery bypass and midline sternotomy. The heart is normal size There are minimal lung opacities especially in the right mid and lower lung: possible pneumonia. There are no detectable pleural effusions. There is no pneumothorax.    1. Previous anterior chest and heart surgery. 2. Minimal lung opacities especially in the right mid and lower lung: possible pneumonia. 3. Follow-up advised. Signed by Dano Carvalho MD    CT head wo IV contrast    Result Date: 1/3/2024  Interpreted By:  Jake Pappas, STUDY: CT HEAD WO IV CONTRAST;  1/3/2024 6:07 pm   INDICATION: Signs/Symptoms:ams.   COMPARISON: CT head 01/28/2022   ACCESSION NUMBER(S): [id number]   ORDERING CLINICIAN: MICA HANSEN   TECHNIQUE: Noncontrast axial CT images of head were obtained with coronal and sagittal reconstructed images.   FINDINGS: BRAIN PARENCHYMA: Gray-white differentiation is preserved. No mass-effect, midline shift or effacement of cerebral sulci. No significant white matter disease.   HEMORRHAGE: No acute intracranial hemorrhage.   VENTRICLES and EXTRA-AXIAL SPACES: The ventricles and sulci are within normal limits for brain volume. No abnormal extra-axial fluid collection.   ORBITS: The visualized orbits and globes are within normal limits.   EXTRACRANIAL SOFT TISSUES: Within normal limits.   PARANASAL SINUSES/MASTOIDS: Diffuse paranasal sinus mucosal thickening. Small right mastoid effusion.   CALVARIUM: No depressed skull fracture.         1. No acute intracranial abnormality identified. 2. Paranasal sinus mucosal disease and small right  mastoid effusion. Correlate for evidence of acute sinusitis/mastoiditis.       MACRO: None   Signed by: Jake Pappas 1/3/2024 6:34 PM Dictation workstation:   [id number]     Encounter Date: 01/07/24   ECG 12 lead   Result Value    Ventricular Rate 76    Atrial Rate 76    WA Interval 146    QRS Duration 98    QT Interval 382    QTC Calculation(Bazett) 429    R Axis -8    T Axis 1    QRS Count 13    Q Onset 224    P Onset 151    P Offset 198    T Offset 415    QTC Fredericia 413    Narrative    Normal sinus rhythm  Cannot rule out Inferior infarct (cited on or before 03-JAN-2024)  T wave abnormality, consider anterior ischemia  Abnormal ECG  When compared with ECG of 03-JAN-2024 18:50,  Nonspecific T wave abnormality, worse in Inferior leads  T wave inversion now evident in Anterior leads      Encounter Date: 01/07/24   ECG 12 lead   Result Value    Ventricular Rate 76    Atrial Rate 76    WA Interval 146    QRS Duration 98    QT Interval 382    QTC Calculation(Bazett) 429    R Axis -8    T Axis 1    QRS Count 13    Q Onset 224    P Onset 151    P Offset 198    T Offset 415    QTC Fredericia 413    Narrative    Normal sinus rhythm  Cannot rule out Inferior infarct (cited on or before 03-JAN-2024)  T wave abnormality, consider anterior ischemia  Abnormal ECG  When compared with ECG of 03-JAN-2024 18:50,  Nonspecific T wave abnormality, worse in Inferior leads  T wave inversion now evident in Anterior leads      @CT@   [unfilled]   [unfilled]   === 01/03/24 ===    CT HEAD WO IV CONTRAST    - Impression -  1. No acute intracranial abnormality identified.  2. Paranasal sinus mucosal disease and small right mastoid effusion.  Correlate for evidence of acute sinusitis/mastoiditis.        MACRO:  None    Signed by: Jake Pappas 1/3/2024 6:34 PM  Dictation workstation:   [id number]     Assessment and Plan    63m with parkinsons, chronic neuropathy, chronic pain, hx aspiration PNA, HTN, HLD, CAD, T2DM, schizoaffective  disorder (depression), hx substance abuse who initially presented to the ED for SI related to his physical illnesses, and admitted to medicine for gram positive bacteremia.    # Staph epidermidis bacteremia - Most likely contamination  #Concern for PNA, possible aspiration   -hx positive MRSA nares 9/2023 with tx for PNA with IV vanc and zosyn then, thought to be from aspiration. However based on pt’s story, pt intentionally “snorts” water also. Denies IVDU.   -2x Staph epidermidis susceptible to tetracycline, vanc, and Bactrim 1/4/24, repeat 1/7/24  -iv vanc (1/6-)   -ceftriaxone 1g (1/7-), azithromycin 3d   -urine legionella and strep, MRSA nares negative  -echo results pending  -procal 0.07  -ID consult   -CT chest: residual evidence of previous infection vs inflamm changes, possibility of early developing pneumonia, tiny hiatal hernia, mild atherosclerotic vasc calcifications  -recommend follow-up CT chest in 1 month  - blood cultures negative x2 days    #concern for dysphagia with hx of it   -SLP onboard     #anxiety  #hx benzodiazepine use  -alprazolam 0.25mg BID PRN      #SI   #schizoaffective disorder  #parkinsons  -per psych recs, continue benztropine 1mg po, Wellbutrin 300mg mornings. Quetiapine changed to 100mg two times during day and 50mg nightly.   -discontinued sitter, facility requiring 24 hrs sitter free, psych aware     Chronic   #GERD - pantoprazole 40    #HTN/CAD - cont asa 81mg daily.   #chronic neuropathy - takes gabapentin 600mg TID. Changedto 300mg TID.   #T2DM - hold home 500mg BID metformin, started SSI      Fluids: PO   Nutrition: soft/bite-sized solids, thin liquids, medication whole in puree, upright intake and check for pocketing during/after meals  VTE ppx: lonvenox   O2: room air  Code status: will need to discuss with POA, full code for now. Legal guardian: Angel Holguin, [phone] (did not answer phone)     Dispo: 62yo admitted for concern for SI, transferred/admitted to floor for  bacteremia. ID and Psych onboard. On IV vanc/zosyn/azithro. Plan to discharge tomorrow.    Kamran Harris, DO  Internal Medicine PGY1

## 2024-01-10 NOTE — CARE PLAN
The patient's goals for the shift include  pain control and rest    The clinical goals for the shift include pt will remain safe throughout shift

## 2024-01-10 NOTE — PROGRESS NOTES
"  Subjective   Gerardo Albright is a 63 y.o. year old male patient who was personally seen and interviewed.  The patient was interviewed in his room with sitter at bedside (interviewed standing next to bed), and was easily engaged and cooperative. This morning Gerardo reports feeling \"really bad\" and currently rates his depression at a 10 out of 10.  Gerardo presents with chronic SI, states \"If you discharge me I will jump in front of a car\".  Patient will be discharged to a secured locked behavioral health unit.  Patient also presents manipulative by stating \"If I behave will you send me back to [facility] with xanax?\"  Patient fixated on getting his xanax when returning to [facility], asking over an over again and asking if this writer will call the supervisor at the facility and tell them he can have his xanax.  He also rates his anxiety at a 10 out of 10, however reports the xanax is helpful. No hallucinations or paranoia were endorsed or noted.      Objective   Mental Status Exam:   General: Appropriately groomed and dressed in hospital attire.   Appearance: Appears stated age.   Attitude: Calm, cooperative.   Behavior: Appropriate eye contact.   Motor Activity: No agitation or retardation. No EPS/TD.  Slow gait and station. Normal muscle tone and bulk.   Speech: Regular rate, rhythm, volume and tone, spontaneous,  fluent. Non-pressured.   Mood: \"real bad\"   Affect: Neutral.   Thought Process: Organized.   Thought Content: Does endorse chronic suicidal ideation and plan to jump in front of a car.  Does not endorse homicidal ideation.  No overt delusions or paranoia elicited.    Thought Perception: Does not endorse auditory or visual hallucinations, does not appear to be responding to hallucinatory stimuli.   Cognition: Alert, oriented x 3. No deficits noted.  Adequate fund of knowledge. No deficit in recent and remote memory. No deficits in attention, concentration or language.   Insight: Poor, " as patient does not recognize symptoms of  illness and need for recommended treatments.    Judgment: Impaired, as patient can not make reasonable decisions about ordinary activities of daily living and necessary medical care recommendations.       LABS:  Results for orders placed or performed during the hospital encounter of 01/07/24 (from the past 24 hour(s))   POCT GLUCOSE   Result Value Ref Range    POCT Glucose 105 (H) 74 - 99 mg/dL   POCT GLUCOSE   Result Value Ref Range    POCT Glucose 123 (H) 74 - 99 mg/dL   Magnesium   Result Value Ref Range    Magnesium 2.08 1.60 - 2.40 mg/dL   Renal Function Panel   Result Value Ref Range    Glucose 116 (H) 74 - 99 mg/dL    Sodium 130 (L) 136 - 145 mmol/L    Potassium 3.7 3.5 - 5.3 mmol/L    Chloride 97 (L) 98 - 107 mmol/L    Bicarbonate 26 21 - 32 mmol/L    Anion Gap 11 10 - 20 mmol/L    Urea Nitrogen 7 6 - 23 mg/dL    Creatinine 0.63 0.50 - 1.30 mg/dL    eGFR >90 >60 mL/min/1.73m*2    Calcium 9.0 8.6 - 10.3 mg/dL    Phosphorus 3.7 2.5 - 4.9 mg/dL    Albumin 3.7 3.4 - 5.0 g/dL   CBC   Result Value Ref Range    WBC 6.1 4.4 - 11.3 x10*3/uL    nRBC 0.0 0.0 - 0.0 /100 WBCs    RBC 4.54 4.50 - 5.90 x10*6/uL    Hemoglobin 13.6 13.5 - 17.5 g/dL    Hematocrit 41.8 41.0 - 52.0 %    MCV 92 80 - 100 fL    MCH 30.0 26.0 - 34.0 pg    MCHC 32.5 32.0 - 36.0 g/dL    RDW 12.7 11.5 - 14.5 %    Platelets 334 150 - 450 x10*3/uL   POCT GLUCOSE   Result Value Ref Range    POCT Glucose 137 (H) 74 - 99 mg/dL        Last Recorded Vitals  Visit Vitals  /73   Pulse 59   Temp 35.7 °C (96.3 °F)   Resp 18        Intake/Output last 3 Shifts:  No intake/output data recorded.    Relevant Results  Scheduled medications  acetaminophen, 650 mg, oral, q6h  aspirin, 81 mg, oral, Daily  benztropine, 1 mg, oral, BID  buPROPion XL, 300 mg, oral, q AM  cefTRIAXone, 1 g, intravenous, q24h  enoxaparin, 40 mg, subcutaneous, q24h  gabapentin, 300 mg, oral, TID  insulin lispro, 0-10 Units, subcutaneous, TID with  "meals  melatonin, 5 mg, oral, Nightly  [Held by provider] metFORMIN, 500 mg, oral, BID with meals  multivitamin with minerals, 1 tablet, oral, Daily  naltrexone, 50 mg, oral, Daily  pantoprazole, 40 mg, oral, BID AC  perflutren lipid microspheres, 0.5-10 mL of dilution, intravenous, Once in imaging  perflutren protein A microsphere, 0.5 mL, intravenous, Once in imaging  QUEtiapine, 100 mg, oral, BID  QUEtiapine, 50 mg, oral, Nightly  sulfur hexafluoride microsphr, 2 mL, intravenous, Once in imaging  vancomycin, 1,500 mg, intravenous, q12h      Continuous medications     PRN medications  PRN medications: ALPRAZolam, dextrose 10 % in water (D10W), dextrose, glucagon, oxyCODONE, oxygen, white petrolatum           The patient is judged a mild suicide risk due to: 1) No access to guns, 2) Recent suicide attempt due to uncontrolled anxiety/panic, restarted anxiety medication, 3) Reports current suicidal ideation and suicide plan, however patient will be discharged to a locked behavioral unit at [facility].  4) +plans for the future: \"Go back to [place]\" and \"Im a [employer] so I'm trying to behave\" and 5) Only with moderate Major depressive disorder.         Diagnostic Impression:  1) Schizoaffective Disorder, Depressive type  2) Generalized Anxiety Disorder  3) Mild Neurocognitive Disorder  4) Cluster B Personality Traits   5) Tobacco Use Disorder  6) Pneumonia        Recommendations:  1) Continue alprazolam 0.25 mg PO BID PRN anxiety  2) Recommend Neuro consult for treatment of Parkinsons.  3) Continue 1-to-1 sitter while in hospital.  4) Patient can be discharged back to Locked behavioral health unit at [facility].    5) Will continue to follow while here.            NIKHIL MurrayP    "

## 2024-01-10 NOTE — PROGRESS NOTES
01/10/24 1126   Discharge Planning   Living Arrangements Other (Comment)  (from [facility])   Support Systems Spouse/significant other;/   Assistance Needed spoke to nurse from facility, patient A&Ox2, independent but shuffles, hx falls but none recently   Type of Residence Nursing home/residential care   Home or Post Acute Services Post acute facilities (Rehab/SNF/etc)   Type of Post Acute Facility Services Long term care   Patient expects to be discharged to: return [facility]   Does the patient need discharge transport arranged? Yes   RoundTrip coordination needed? Yes   Has discharge transport been arranged? Yes   What day is the transport expected? 01/10/24   What time is the transport expected? 1300     1/10/2024 1207: Transport cancelled due to facility not being able to accommodate since patient continues to have sitter at bedside per psychiatry.

## 2024-01-10 NOTE — PROGRESS NOTES
Speech-Language Pathology    Speech-Language Pathology Clinical Swallow Treatment    Patient Name: Gerardo Albright  MRN: [id number]  : 1960  Today's Date: 01/10/24  Start time: Start Time: 1600  Stop time: Stop Time: 1611  Time calculation (min) : Time Calculation (min): 11 min    ASSESSMENT  Managing diet well. Return to baseline.     PLAN  Recommended solid consistency: Easy to chew and Soft/bite-sized (IDDSI level 6)  Recommended liquid consistency: Thin (IDDSI 0)  Recommended medication administration: Whole, Crushed, and in puree  Safe swallow strategies: Upright positioning for all PO intake and Slow rate of intake  Discharge recommendation: Home with no further SLP     Discussed POC: Patient, Nursing  Patient/Caregiver Agreeable: Yes    SUBJECTIVE  Prior to Session Communication: Bedside nurse  RN cleared pt to participate in session and reported he has been taking his meals fairly well. Reporting decreased appetite.   Respiratory status: Room air  Positioning: Upright in bed  Pt was alert, pleasant, and cooperative for session. He was verbalizing concerns about decreased appetite, discharge plans. He requested a visit from the .   Pain Assessment: 0-10  Pain Score: 0 - No pain   Oriented to self, Oriented to hospital but not name of facility, and Oriented to year    OBJECTIVE  Swallow reassessment:  Therapeutic Swallow  Liquid Diet Recommendations: Thin (IDDSI Level 0)    Treatment/Education:  Results and recommendations were relayed to: Patient and Bedside nurse  Education provided: Yes   Learner: Patient   Barriers to learning: Cognitive limitations barrier   Method of teaching: Verbal   Topic: Role of ST, results of assessment, risk for aspiration, recommended diet modifications, recommendation for MBSS, recommended safe swallow strategies, and recommendation for dysphagia follow-up   Outcome of teaching: Meets goals/outcomes    Goals:     Pt will consume prescribed diet (current diet is  soft and bite sized with thin liquids) without overt s/sx aspiration/penetration >95% of the time.   GOAL ACHIEVED

## 2024-01-10 NOTE — PROGRESS NOTES
Occupational Therapy    Occupational Therapy Treatment    Name: Gerardo Albright  MRN: [id number]  : 1960  Date: 01/10/24  Time Calculation  Start Time: 1205  Stop Time: 1228  Time Calculation (min): 23 min    Assessment:  OT Assessment: Pt presents with decreased ADL performance, decreased safety awareness and decreased activity tolerance and would benefit from continued skilled OT intervention  Prognosis: Good  Barriers to Discharge: None  Evaluation/Treatment Tolerance: Patient tolerated treatment well  Medical Staff Made Aware: Yes  End of Session Communication: Bedside nurse (Sitter transitioning from bedside.)  End of Session Patient Position: Up in chair, Alarm off, caregiver present  Plan:  Treatment Interventions: ADL retraining, Functional transfer training, Patient/family training  OT Frequency: 3 times per week  OT Discharge Recommendations: Moderate intensity level of continued care  Equipment Recommended upon Discharge: Wheeled walker  OT Recommended Transfer Status: Assist of 1  OT - OK to Discharge: Yes    Subjective   Previous Visit Info:  OT Last Visit  OT Received On: 01/10/24  General:  General  Reason for Referral: OT for ADL and safety assessment  Referred By: Isamar Alves DO  Past Medical History Relevant to Rehab: COPD, dementia, depression, GERD, HTN, MI, Parkinson's Disease, Schizophrenia, seizures, weakness  Family/Caregiver Present: No  Prior to Session Communication: Bedside nurse  Patient Position Received: Bed, 3 rail up, Alarm on  Preferred Learning Style: auditory, verbal  General Comment: Pt attention and med seeking, flat affect, cooperative and compliant.  Precautions:     Vitals:     Pain Assessment:  Pain Assessment  Pain Assessment: 0-10  Pain Score: 0 - No pain (no noted or verbalized pain complaints)     Objective   Activities of Daily Living: LE Dressing  LE Dressing: Yes  Sock Level of Assistance: Minimum assistance  LE Dressing Where Assessed: Edge of bed  LE Dressing  Comments: set-up using figure four position and significantly increased time on task.    Functional Standing Tolerance:     Bed Mobility/Transfers: Bed Mobility  Bed Mobility: Yes  Bed Mobility 1  Bed Mobility 1: Supine to sitting  Level of Assistance 1: Close supervision  Bed Mobility Comments 1: increased time on task with max verbal cues for effective technique.  Bed Mobility 2  Bed Mobility  2: Rolling right  Level of Assistance 2: Close supervision  Bed Mobility Comments 2: min cues and encouragement using bed rail    Transfers  Transfer: Yes  Transfer 1  Transfer From 1: Sit to  Transfer to 1: Stand  Technique 1: Sit to stand, Stand to sit  Transfer Device 1: Walker  Transfer Level of Assistance 1: Contact guard, Moderate verbal cues  Trials/Comments 1: cues for weightshifts and assist to counter mild retro-lean.  Transfers 2  Transfer From 2: Bed to  Transfer to 2: Chair with arms  Technique 2: Stand pivot  Transfer Device 2: Walker  Transfer Level of Assistance 2: Minimum assistance  Trials/Comments 2: verbal and tactile cues for device management and assist to maintain COG over EL.    Outcome Measures:  [facility] Daily Activity  Putting on and taking off regular lower body clothing: A little  Bathing (including washing, rinsing, drying): A little  Putting on and taking off regular upper body clothing: A little  Toileting, which includes using toilet, bedpan or urinal: A little  Taking care of personal grooming such as brushing teeth: A little  Eating Meals: None  Daily Activity - Total Score: 19    Education Documentation  Precautions, taught by ОЛЕГ Delgado at 1/10/2024 12:56 PM.  Learner: Patient  Readiness: Acceptance  Method: Explanation, Demonstration  Response: Verbalizes Understanding, Demonstrated Understanding, Needs Reinforcement    Home Exercise Program, taught by ОЛЕГ Delgado at 1/10/2024 12:56 PM.  Learner: Patient  Readiness: Acceptance  Method: Explanation, Demonstration  Response:  Verbalizes Understanding, Demonstrated Understanding, Needs Reinforcement    ADL Training, taught by ОЛЕГ Delgado at 1/10/2024 12:56 PM.  Learner: Patient  Readiness: Acceptance  Method: Explanation, Demonstration  Response: Verbalizes Understanding, Demonstrated Understanding, Needs Reinforcement    Education Comments  Pt compliant to instruction, carryover and follow-through unlikely.    Goals:  Encounter Problems       Encounter Problems (Active)       ADLs       Patient with complete upper body dressing with independent level of assistance donning and doffing all UE clothes with no adaptive equipment while edge of bed  and standing (Progressing)       Start:  01/08/24    Expected End:  01/22/24            Patient with complete lower body dressing with independent level of assistance donning and doffing all LE clothes  with PRN adaptive equipment while edge of bed  and standing (Progressing)       Start:  01/08/24    Expected End:  01/22/24            Patient will complete daily grooming tasks brushing teeth, shaving, washing face/hair, and unsupported sitting with independent level of assistance and PRN adaptive equipment while supported sitting. (Progressing)       Start:  01/08/24    Expected End:  01/22/24            Patient will complete toileting including hygiene clothing management/hygiene with independent level of assistance and grab bars. (Progressing)       Start:  01/08/24    Expected End:  01/22/24               EXERCISE/STRENGTHENING       Patient will be educated on BUE HEP for increased ADL performance. (Progressing)       Start:  01/08/24    Expected End:  01/22/24               TRANSFERS       Patient will complete functional transfer with least restrictive device with independent level of assistance. (Progressing)       Start:  01/08/24    Expected End:  01/22/24

## 2024-01-10 NOTE — CONSULTS
Inpatient consult to Neurology  Consult performed by: Rigo Benedict MD  Consult ordered by: Guanaco Hunter DO          Chief complaint: Poor balance     History Of Present Illness  Gerardo Albright is a 63 y.o. male presenting with DM and poor gait and balance. He has poor gait and balance and trips and at times falls.        Past Medical and Surgical History    Past Medical History:   Diagnosis Date    At risk for falls     COPD (chronic obstructive pulmonary disease) (CMS/[facility])     Dementia (CMS/[facility])     Depression     GERD (gastroesophageal reflux disease)     Hypertension     Insomnia     Myocardial infarction (CMS/HCC)     Parkinson's disease     Schizophrenia (CMS/HCC)     Seizures (CMS/[facility])     Weakness           No past surgical history on file.     Social History  Social History     Tobacco Use    Smoking status: Former     Packs/day: .5     Types: Cigarettes     Passive exposure: Past    Smokeless tobacco: Never   Vaping Use    Vaping Use: Never used   Substance Use Topics    Alcohol use: Not Currently    Drug use: Never     Allergies  Patient has no known allergies.  Medications Prior to Admission   Medication Sig Dispense Refill Last Dose    aspirin 81 mg chewable tablet Chew 1 tablet (81 mg) once daily.       benztropine (Cogentin) 1 mg tablet Take 1 tablet (1 mg) by mouth 2 times a day.       buPROPion XL (Wellbutrin XL) 300 mg 24 hr tablet Take 1 tablet (300 mg) by mouth once daily in the morning. Do not crush, chew, or split.       metFORMIN (Glucophage) 500 mg tablet Take 1 tablet (500 mg) by mouth 2 times a day with meals. 60 tablet 0     multivitamin tablet Take 1 tablet by mouth once daily.       naltrexone (Depade) 50 mg tablet Take 1 tablet (50 mg) by mouth once daily.       pantoprazole (ProtoNix) 40 mg EC tablet Take 1 tablet (40 mg) by mouth 2 times a day before meals. Do not crush, chew, or split.       QUEtiapine (SEROquel) 200 mg tablet Take 1 tablet (200 mg) by mouth 2 times a day. 60  tablet 0     QUEtiapine (SEROquel) 50 mg tablet Take 1 tablet (50 mg) by mouth once daily at bedtime. Take in addition to 200 mg BID to total 250 mg at bedtime.       risperiDONE (Perseris) 90 mg suspension,extended rel syring subcutaneous injection Inject 90 mg under the skin every 30 (thirty) days.       [DISCONTINUED] gabapentin (Neurontin) 600 mg tablet Take 1 tablet (600 mg) by mouth 3 times a day.          Review of Systems   Constitutional:  Negative for chills, diaphoresis, fatigue and fever.   HENT:  Negative for congestion.    Eyes:  Negative for discharge.   Respiratory:  Negative for cough and shortness of breath.    Cardiovascular:  Negative for chest pain.   Gastrointestinal:  Negative for constipation and diarrhea.   Endocrine: Negative for polydipsia.   Genitourinary:  Negative for difficulty urinating.   Musculoskeletal:  Negative for arthralgias and back pain.   Skin:  Negative for pallor and wound.   Allergic/Immunologic: Negative for environmental allergies.   Neurological:  Positive for dizziness and numbness.   Hematological:  Negative for adenopathy.   Psychiatric/Behavioral:  Negative for agitation and behavioral problems.          Cardiovascular system: S1-S2 intact  Respiratory clear to auscultation bilateral on deep breathing he feels irritability on the left side of the chest.    Neurological Exam  Mental Status  Awake, alert and oriented to person, place and time. Recent and remote memory are intact. Able to copy figure. Clock drawing is normal. Speech is normal. Able to name objects, name parts of objects, repeat, read and write. Follows three-step commands. Able to perform serial calculations. Able to spell words backwards. Fund of knowledge is appropriate for level of education.    Cranial Nerves  CN II: Visual acuity is normal. Right funduscopic exam: disc intact. Left funduscopic exam: disc intact.  CN III, IV, VI: Extraocular movements intact bilaterally. Normal lids and orbits  "bilaterally.   Right pupil: Round. Reactive to light. Reactive to accommodation.  CN V:  Right: Facial sensation is normal.  Left: Facial sensation is normal on the left.  CN VII: Full and symmetric facial movement.  CN VIII:  Right: Hearing is normal.  Left: Hearing is normal.  CN IX, X:  Right: Palate is normal.  Left: Palate is normal.  CN XI:  Right: Sternocleidomastoid strength is normal.  Left: Sternocleidomastoid strength is normal.  CN XII: Tongue midline without atrophy or fasciculations.    Motor  Normal muscle bulk throughout. No fasciculations present. Normal muscle tone. No abnormal involuntary movements. Strength is 5/5 throughout all four extremities.    Sensory  Light touch is normal in upper and lower extremities. Pinprick is normal in upper and lower extremities. Temperature is normal in upper and lower extremities. Vibration is normal in upper and lower extremities.     Reflexes  Deep tendon reflexes are 2+ and symmetric in all four extremities.  Jaw jerk absent.  Right pathological reflexes: Misty's absent.  Left pathological reflexes: Misty's absent.  Glabellar tap absent. Snout absent. Right palmomental absent. Left palmomental absent. Right palmar grasp absent. Left palmar grasp absent.    Coordination  Right: Finger-to-nose normal. Rapid alternating movement normal. Heel-to-shin normal.Left: Finger-to-nose normal. Rapid alternating movement normal. Heel-to-shin normal.    Gait  Normal casual, toe, heel and tandem gait.        Last Recorded Vitals  Blood pressure 136/78, pulse 65, temperature 36 °C (96.8 °F), temperature source Temporal, resp. rate 18, height 1.778 m (5' 10\"), weight 79.8 kg (175 lb 14.8 oz), SpO2 97 %.    Relevant Results      Current Facility-Administered Medications:     acetaminophen (Tylenol) tablet 650 mg, 650 mg, oral, q6h, Kamran Harris, DO, 650 mg at 01/10/24 1525    ALPRAZolam (Xanax) tablet 0.25 mg, 0.25 mg, oral, BID PRN, Isamar Alves, DO, 0.25 mg at 01/10/24 " 0941    aspirin chewable tablet 81 mg, 81 mg, oral, Daily, Isamar Alves DO, 81 mg at 01/10/24 0917    benztropine (Cogentin) tablet 1 mg, 1 mg, oral, BID, Kaycee Martinez MD, 1 mg at 01/10/24 0917    buPROPion XL (Wellbutrin XL) 24 hr tablet 300 mg, 300 mg, oral, q AM, Kaycee Martinez MD, 300 mg at 01/10/24 0917    cefTRIAXone (Rocephin) IVPB 1 g, 1 g, intravenous, q24h, Isamar Alves DO, Stopped at 01/09/24 1824    dextrose 10 % in water (D10W) infusion, 0.3 g/kg/hr, intravenous, Once PRN, Isamar lAves DO    dextrose 50 % injection 25 g, 25 g, intravenous, q15 min PRN, Isamar Alves DO    enoxaparin (Lovenox) syringe 40 mg, 40 mg, subcutaneous, q24h, Isamar Alves DO, 40 mg at 01/09/24 2141    gabapentin (Neurontin) capsule 300 mg, 300 mg, oral, TID, Isamar Alves DO, 300 mg at 01/10/24 1526    glucagon (Glucagen) injection 1 mg, 1 mg, intramuscular, q15 min PRN, Isamar Alves DO    insulin lispro (HumaLOG) injection 0-10 Units, 0-10 Units, subcutaneous, TID with meals, Isamar Alves DO, 4 Units at 01/09/24 0944    melatonin tablet 5 mg, 5 mg, oral, Nightly, Isamar Alves DO, 5 mg at 01/09/24 2142    [Held by provider] metFORMIN (Glucophage) tablet 500 mg, 500 mg, oral, BID with meals, Kaycee Martinez MD    multivitamin with minerals 1 tablet, 1 tablet, oral, Daily, Kaycee Martinez MD, 1 tablet at 01/10/24 0917    naltrexone (Depade) tablet 50 mg, 50 mg, oral, Daily, Kaycee Martinez MD, 50 mg at 01/10/24 0917    oxyCODONE (Roxicodone) immediate release tablet 2.5 mg, 2.5 mg, oral, q12h PRN, Kamran Harris DO, 2.5 mg at 01/10/24 0941    oxygen (O2) therapy, , inhalation, Continuous PRN - O2/gases, Isamar Alves DO    pantoprazole (ProtoNix) EC tablet 40 mg, 40 mg, oral, BID AC, Kaycee Martinez MD, 40 mg at 01/10/24 1525    perflutren lipid microspheres (Definity) injection 0.5-10 mL of dilution, 0.5-10 mL of dilution, intravenous, Once in imaging, Nadia Harris DO    perflutren protein A microsphere (Optison) injection 0.5 mL, 0.5  mL, intravenous, Once in imaging, Nadia Harris DO    QUEtiapine (SEROquel) tablet 100 mg, 100 mg, oral, BID, Harjeet Martinez MD, 100 mg at 01/10/24 0917    QUEtiapine (SEROquel) tablet 50 mg, 50 mg, oral, Nightly, Harjeet Martinez MD, 50 mg at 01/09/24 2143    sulfur hexafluoride microsphr (Lumason) injection 24.28 mg, 2 mL, intravenous, Once in imaging, Nadia Harris DO    white petrolatum (Aquaphor) ointment, , Topical, q1h PRN, Isamar Alves DO, Given at 01/08/24 1743     Continuous medications    PRN medications, reviewed                [place] Coma Scale  Best Eye Response: Spontaneous  Best Verbal Response: Oriented  Best Motor Response: Follows commands  [place] Coma Scale Score: 15                       I have personally reviewed the following imaging for brain (CT/ MR) and results and discussed in detail with the patient/care giver/family     Transthoracic Echo (TTE) Complete    Result Date: 1/9/2024   [facility], [address] [place] [address]               Tel [phone] and Fax [phone] TRANSTHORACIC ECHOCARDIOGRAM REPORT  Patient Name:     ELIER Elder Physician:  [id number] Reynaldo Cardona MD Study Date:       1/8/2024           Ordering Provider:  [id number] HARJEET MARTINEZ MRN/PID:          [id number]           Fellow: Accession#:       [id number]       Nurse: Date of           1960 / 63      Sonographer:        Suzie Aparicio RDCS Birth/Age:        years Gender:           M                  Additional Staff: Height:           177.80 cm          Admit Date:         1/7/2024 Weight:           76.66 kg           Admission Status:   Inpatient - Routine BSA:              1.94 m2            Encounter#:         [id number]                                      [facility]                                      Location:           Invasive Blood Pressure: 136 /82 mmHg Study Type:    TRANSTHORACIC  ECHO (TTE) COMPLETE Diagnosis/ICD: Bacteremia-R78.81 Indication:    Bacteremia CPT Code:      Echo Complete w Full [id number] Patient History: Pertinent History: HTN, Hyperlipidemia, CAD and DM II, Parkinson's, Chronic                    neuropathy, H/O aspitation pneumonia,. Study Detail: The following Echo studies were performed: 2D, M-Mode, Doppler and               color flow. Technically challenging study due to body habitus.               Unable to obtain subcostal view. The patient was awake.  PHYSICIAN INTERPRETATION: Left Ventricle: The left ventricular systolic function is normal, with an estimated ejection fraction of 55-60%. There are no regional wall motion abnormalities. The left ventricular cavity size is normal. Spectral Doppler shows an impaired relaxation pattern of left ventricular diastolic filling. Left Atrium: The left atrium is normal in size. Right Ventricle: The right ventricle is normal in size. There is normal right ventricular global systolic function. Right Atrium: The right atrium is normal in size. Aortic Valve: The aortic valve appears structurally normal. There is trace to mild aortic valve regurgitation. The peak instantaneous gradient of the aortic valve is 8.0 mmHg. The mean gradient of the aortic valve is 4.0 mmHg. Mitral Valve: The mitral valve is normal in structure. There is trace to mild mitral valve regurgitation. Tricuspid Valve: The tricuspid valve is structurally normal. There is trace to mild tricuspid regurgitation. Pulmonic Valve: The pulmonic valve is structurally normal. There is trace to mild pulmonic valve regurgitation. Pericardium: There is no pericardial effusion noted. Aorta: The aortic root is normal. Pulmonary Artery: The tricuspid regurgitant velocity is 2.32 m/s, and with an estimated right atrial pressure of 10 mmHg, the estimated pulmonary artery pressure is borderline elevated with the RVSP at 31.5 mmHg. Systemic Veins: The inferior vena cava appears  to be of normal size.  CONCLUSIONS:  1. Left ventricular systolic function is normal with a 55-60% estimated ejection fraction.  2. No definite valvular vegetations were visualized.  3. Spectral Doppler shows an impaired relaxation pattern of left ventricular diastolic filling.  4. There is trace-mild aortic, mitral, tricuspid and pulmonic regurgitation. QUANTITATIVE DATA SUMMARY: 2D MEASUREMENTS:                          Normal Ranges: IVSd:          1.31 cm   (0.6-1.1cm) LVPWd:         1.05 cm   (0.6-1.1cm) LVIDd:         4.34 cm   (3.9-5.9cm) LVIDs:         2.90 cm LV Mass Index: 94.1 g/m2 LV % FS        33.2 % LA VOLUME:                               Normal Ranges: LA Vol A4C:        24.0 ml    (22+/-6mL/m2) LA Vol A2C:        31.0 ml LA Vol BP:         27.8 ml LA Vol Index A4C:  12.3ml/m2 LA Vol Index A2C:  16.0 ml/m2 LA Vol Index BP:   14.3 ml/m2 LA Area A4C:       11.3 cm2 LA Area A2C:       13.1 cm2 LA Major Axis A4C: 4.5 cm LA Major Axis A2C: 4.7 cm LA Volume Index:   11.9 ml/m2 LA Vol A4C:        23.1 ml LA Vol A2C:        29.9 ml RA VOLUME BY A/L METHOD:                      Normal Ranges: RA Area A4C: 9.6 cm2 AORTA MEASUREMENTS:                    Normal Ranges: Asc Ao, d: 3.80 cm (2.1-3.4cm) LV SYSTOLIC FUNCTION BY 2D PLANIMETRY (MOD):                     Normal Ranges: EF-A4C View: 61.9 % (>=55%) EF-A2C View: 47.8 % EF-Biplane:  55.0 % LV DIASTOLIC FUNCTION:                        Normal Ranges: MV Peak E:    0.62 m/s (0.7-1.2 m/s) MV Peak A:    0.77 m/s (0.42-0.7 m/s) E/A Ratio:    0.81     (1.0-2.2) MV e'         0.06 m/s (>8.0) MV lateral e' 0.12 m/s MV medial e'  0.06 m/s E/e' Ratio:   10.35    (<8.0) MITRAL VALVE:                 Normal Ranges: MV DT: 160 msec (150-240msec) AORTIC VALVE:                                   Normal Ranges: AoV Vmax:                1.41 m/s (<=1.7m/s) AoV Peak P.0 mmHg (<20mmHg) AoV Mean P.0 mmHg (1.7-11.5mmHg) LVOT Max Liu:             0.94 m/s (<=1.1m/s) AoV VTI:                 25.20 cm (18-25cm) LVOT VTI:                17.60 cm LVOT Diameter:           1.90 cm  (1.8-2.4cm) AoV Area, VTI:           1.98 cm2 (2.5-5.5cm2) AoV Area,Vmax:           1.89 cm2 (2.5-4.5cm2) AoV Dimensionless Index: 0.70  RIGHT VENTRICLE: RV Basal 2.75 cm RV Mid   2.27 cm RV Major 8.3 cm TAPSE:   13.8 mm RV s'    0.08 m/s TRICUSPID VALVE/RVSP:                             Normal Ranges: Peak TR Velocity: 2.32 m/s RV Syst Pressure: 31.5 mmHg (< 30mmHg) PULMONIC VALVE:                      Normal Ranges: PV Max Liu: 0.9 m/s  (0.6-0.9m/s) PV Max PG:  3.3 mmHg  [id number] Reynaldo Cardona MD Electronically signed on 1/9/2024 at 7:53:00 AM  ** Final **     CT chest wo IV contrast    Result Date: 1/8/2024  Interpreted By:  Vika Gonzalez, STUDY: CT CHEST WO IV CONTRAST;  1/8/2024 11:42 am   INDICATION: Signs/Symptoms:concern for pna.   COMPARISON: CT chest without contrast dated 09/26/2023   ACCESSION NUMBER(S): [id number]   ORDERING CLINICIAN: HÉCTOR FAGAN   TECHNIQUE: Helical data acquisition of the chest was obtained  without IV contrast material.  Images were reformatted in axial, coronal, and sagittal planes.   FINDINGS: LUNGS AND AIRWAYS: The trachea and central airways are patent. No endobronchial lesion.   Bilateral lungs demonstrate mild paraseptal emphysematous changes. There is interval resolution of previously noted peribronchovascular nodular airspace opacities in the right lung compared to immediate prior CT examination dated 09/26/2023. However there is persistent evidence of ill-defined very tiny peribronchovascular nodular airspace opacities in the right lung predominantly involving the right upper lobe and superior segment of right lower lobe. Left lung is relatively clear. No large pleural effusions or pneumothorax. Evaluation for small pulmonary nodules is limited due to background motion artifacts and background parenchymal changes. Within this limitation,  there is no large suspicious pulmonary nodule.   MEDIASTINUM AND KOBI, LOWER NECK AND AXILLA: The visualized thyroid gland is within normal limits.   There are mildly enlarged right perihilar lymph nodes. For example the largest right perihilar lymph node measures approximately 1 cm in short axis. This is similar compared to immediate prior CT examination. No evidence of new enlarged lymphadenopathy. No evidence of bilateral axillary lymphadenopathy.   There is suggestion of tiny hiatal hernia. There is diffuse circumferential thickening of the distal thoracic esophagus.   HEART AND VESSELS: Thoracic aorta is normal in course and caliber and demonstrates mild atherosclerotic calcifications in the arch and descending segments.   Main pulmonary artery and its branches are normal in caliber.   Mild atherosclerotic calcifications of the coronary arteries are noted. The study is not optimized for evaluation of coronary arteries.   The cardiac chambers are not enlarged.   No evidence of pericardial effusion.   UPPER ABDOMEN: Visualized subdiaphragmatic region demonstrates mild diffuse non nodular thickening of the left adrenal gland, similar compared to immediate prior CT examination. Otherwise rest of the visualized upper abdomen appears grossly unremarkable.   CHEST WALL AND OSSEOUS STRUCTURES: Postsurgical changes of median sternotomy are noted. No suspicious osseous lesions are noted. Mild discogenic degenerative changes of the thoracic spine are noted.       1.  Mild paraseptal emphysematous changes in bilateral lungs. Multiple scattered tiny peribronchovascular reticulonodular opacities in the right lung as described above. While this could be residual from prior resolving infection versus inflammatory changes, possibility of new early developing pneumonia can not be completely excluded in appropriate clinical setting. 2. Mildly enlarged right perihilar lymph node is similar compared to prior CT examination and is  likely favored to be reactive. 3. Suggestion of tiny hiatal hernia. Mild circumferential thickening of the distal thoracic esophagus is most likely related to reflux. Recommend correlation with patient's symptomatology. 4. Mild atherosclerotic vascular calcifications. 5. Stable thickening of the left adrenal gland.   MACRO: None   Signed by: Vika Gonzalez 1/8/2024 12:04 PM Dictation workstation:   [id number]    ECG 12 lead    Result Date: 1/8/2024  Normal sinus rhythm Cannot rule out Inferior infarct (cited on or before 03-JAN-2024) T wave abnormality, consider anterior ischemia Abnormal ECG When compared with ECG of 03-JAN-2024 18:50, Nonspecific T wave abnormality, worse in Inferior leads T wave inversion now evident in Anterior leads    XR chest 1 view    Result Date: 1/6/2024  Interpreted By:  Radames Chavez, STUDY: XR CHEST 1 VIEW;  1/6/2024 6:22 am   INDICATION: Signs/Symptoms:PNEUMONIA / SOB.   COMPARISON: 01/03/2024   ACCESSION NUMBER(S): [id number]   ORDERING CLINICIAN: JOSE C GRAF   FINDINGS:     Median sternotomy wires and mediastinal surgical clips. Normal heart size. No pleural effusion or pneumothorax. Similar patchy right lung airspace opacities. Upper abdomen is unremarkable. Elevation of the left distal clavicle relative to the acromion and absence of the left distal clavicle, likely secondary to prior acromioclavicular separation and possible distal clavicle resection. No acute osseous abnormality.       1. Similar patchy right lung airspace opacities. Consider pneumonia. Follow-up is recommended to confirm resolution.   Signed by: Radames Chavez 1/6/2024 6:49 AM Dictation workstation:   [id number]    Electrocardiogram, 12-lead    Result Date: 1/4/2024  Normal sinus rhythm Inferior infarct , age undetermined Abnormal ECG When compared with ECG of 28-SEP-2023 16:06, (unconfirmed) No significant change was found See ED provider note for full interpretation and clinical  correlation Confirmed by Royce Goddard ([id number]) on 1/4/2024 4:32:00 PM    XR chest 1 view    Result Date: 1/3/2024  STUDY: Chest Radiograph;  01/03/2023 6:09 pm INDICATION: Cough. COMPARISON: XR Chest 09/21/2023 and 08/06/2021 ACCESSION NUMBER(S): [id number] ORDERING CLINICIAN: MICA HANSEN TECHNIQUE:  Frontal chest was obtained at 1809 hours. FINDINGS: There has been previous anterior chest and heart surgery with coronary artery bypass and midline sternotomy. The heart is normal size There are minimal lung opacities especially in the right mid and lower lung: possible pneumonia. There are no detectable pleural effusions. There is no pneumothorax.    1. Previous anterior chest and heart surgery. 2. Minimal lung opacities especially in the right mid and lower lung: possible pneumonia. 3. Follow-up advised. Signed by Dano Carvalho MD    CT head wo IV contrast    Result Date: 1/3/2024  Interpreted By:  Jake Pappas, STUDY: CT HEAD WO IV CONTRAST;  1/3/2024 6:07 pm   INDICATION: Signs/Symptoms:ams.   COMPARISON: CT head 01/28/2022   ACCESSION NUMBER(S): [id number]   ORDERING CLINICIAN: MICA HANSEN   TECHNIQUE: Noncontrast axial CT images of head were obtained with coronal and sagittal reconstructed images.   FINDINGS: BRAIN PARENCHYMA: Gray-white differentiation is preserved. No mass-effect, midline shift or effacement of cerebral sulci. No significant white matter disease.   HEMORRHAGE: No acute intracranial hemorrhage.   VENTRICLES and EXTRA-AXIAL SPACES: The ventricles and sulci are within normal limits for brain volume. No abnormal extra-axial fluid collection.   ORBITS: The visualized orbits and globes are within normal limits.   EXTRACRANIAL SOFT TISSUES: Within normal limits.   PARANASAL SINUSES/MASTOIDS: Diffuse paranasal sinus mucosal thickening. Small right mastoid effusion.   CALVARIUM: No depressed skull fracture.         1. No acute intracranial abnormality identified. 2. Paranasal sinus mucosal  disease and small right mastoid effusion. Correlate for evidence of acute sinusitis/mastoiditis.       MACRO: None   Signed by: Jake Pappas 1/3/2024 6:34 PM Dictation workstation:   [id number]       Assessment/Plan   Polyneuropathy due to DM     Advised Acute rehab for gait and balance training    Avoid medicines that can prone to orthostatic hypotension or syncope e.g Seroquel    Keep a log of BP/P and if needed he will be advised to Tilt table test    Patient/Family Education: Extensive time was spent educating the patient on relevant anatomy, clinical findings and imaging, as well as discussing the potential diagnoses as discussed above.  Pharmacology: as above. Exercise: I discussed the importance of maintaining a daily exercise program, including stretching and strengthening. Preventative strategies were reviewed, specifically avoidance of any exercises that exacerbate pain.Return to online virtual visit/ clinic visit for follow-up with ONEIL José in 2-3 weeks or sooner as needed.The patient expressed understanding and agreement with the assessment and plan.  Patient encouraged to contact us should they have any questions, concerns, or any changes in symptoms. Thank you for allowing me to participate in the care of your patient.** This note is created using speech recognition transcription software. Despite proofreading, several typographical errors might be present that might affect the meaning of the content. Please call with any questions.**          Rigo Benedict MD

## 2024-01-10 NOTE — DISCHARGE INSTRUCTIONS
- The following recommendations are made for you at time of hospital discharge:   - Please take your home medications as instructed prior to hospitalization.  NEW MEDICATIONS  - Take HALF of a tablet (2.5 mg) of oxycodone as needed for severe pain BID for up to 2 days. *PATIENT MUST BE SUPERVISED WHILE BEING GIVEN AND TAKING OXYCODONE* HOLD your naltrexone until you are no longer taking oxycodone.  - Take one tablet (0.25 mg) of alprazolam twice a day as needed for anxiety. (Facility doctor to assess and prescribe further as needed.)  - Change your gabapentin to 300 mg three times daily.    - Please follow up with neurology for possible Parkinson's management.    - Please follow-up with your primary care provider within 5-7 days from time of discharge. Please call your PCP's office to schedule an appointment. Likely will need to bring photo ID and insurance card to your appointment.   - If you experience any worsening symptoms or any new acute concerns arise, please contact your primary care provider to discuss and possibly arrange an appointment. If you cannot get in touch with provider or severe symptoms are present, please return to nearest emergency room/urgent care for evaluation and treatment.

## 2024-01-10 NOTE — DISCHARGE SUMMARY
Discharge Diagnosis  Positive blood culture - Contamination  Laceration of Left upper extremity self induced  Anxiety    Issues Requiring Follow-Up  Patient recommended to followup with psychiatry and neurology as outpatient for further management.  Do not recommend continuing narcotic use (Oxycodone) after prescription ends. He is known to persistently ask for more pain medication.  Continue Bacitracin ointment to laceration site.     Discharge Meds     Your medication list        START taking these medications        Instructions Last Dose Given Next Dose Due   ALPRAZolam 0.25 mg tablet  Commonly known as: Xanax      Take 1 tablet (0.25 mg) by mouth 2 times a day as needed for anxiety or sleep for up to 2 days.       bacitracin 500 unit/gram ointment      Apply topically 2 times a day for 5 days. Apply to left bicep laceration.       gabapentin 300 mg capsule  Commonly known as: Neurontin  Replaces: gabapentin 600 mg tablet      Take 1 capsule (300 mg) by mouth 3 times a day.       oxyCODONE 5 mg immediate release tablet  Commonly known as: Roxicodone      Take 0.5 tablets (2.5 mg) by mouth every 12 hours if needed for severe pain (7 - 10) for up to 2 days. Must be given to patient with supervision. Patient must take with supervision.              CONTINUE taking these medications        Instructions Last Dose Given Next Dose Due   aspirin 81 mg chewable tablet           benztropine 1 mg tablet  Commonly known as: Cogentin           buPROPion  mg 24 hr tablet  Commonly known as: Wellbutrin XL           metFORMIN 500 mg tablet  Commonly known as: Glucophage      Take 1 tablet (500 mg) by mouth 2 times a day with meals.       multivitamin tablet           naltrexone 50 mg tablet  Commonly known as: Depade           pantoprazole 40 mg EC tablet  Commonly known as: ProtoNix           Perseris 90 mg suspension,extended rel syring subcutaneous injection  Generic drug: risperiDONE           QUEtiapine 50 mg  tablet  Commonly known as: SEROquel           QUEtiapine 200 mg tablet  Commonly known as: SEROquel      Take 1 tablet (200 mg) by mouth 2 times a day.              STOP taking these medications      gabapentin 600 mg tablet  Commonly known as: Neurontin  Replaced by: gabapentin 300 mg capsule                  Where to Get Your Medications        You can get these medications from any pharmacy    Bring a paper prescription for each of these medications  ALPRAZolam 0.25 mg tablet  oxyCODONE 5 mg immediate release tablet       Information about where to get these medications is not yet available    Ask your nurse or doctor about these medications  bacitracin 500 unit/gram ointment  gabapentin 300 mg capsule         Test Results Pending At Discharge  Pending Labs       No current pending labs.            Hospital Course  Gerardo Albright is a 63 y.o. male with parkinsons, chronic neuropathy, chronic pain, hx aspiration PNA, HTN, HLD, CAD, T2DM, schizoaffective disorder (depression), hx substance abuse who initially presented to the ED for SI related to his physical illnesses, and admitted to medicine for gram positive bacteremia. Pt is A&O x2 but provided hx, and additional hx obtained from prior charts.      He was hospitalized 9/2023 for respiratory failure due aspiration PNA, treated with IV vanc + zosyn then. CT chest then showed RLL PNA with no parapneumonic effusion. Had positive MRSA colonization screening back then.      Presented from [facility] to [place] ED 1/3/24 with SI due to, per ED notes, “feeling similar to when he has had pneumonia however the facility is not taking care of him and this made him want to attempt suicide with a razor blade.”      On 1/3/21, his WBC was 21.1, which went to 8.6 on 1/6/24s. 2x blood cultures from 1/4/23 grew positive for gram positive cocci in clusters coming back 1/7/24 morning.   Vitals stable on room air, afebrile. CXR showed signs of RLL PNA. CT head from 1/3/23  showed no bleed or stroke, though showed signs of sinusitis and small r mastoid effusion. Pt did get 1mg po Ativan 23 morning. Was started on IV Vanc and TTE ordered. Per psych note, will follow pt.      On interview 24, pt says he has had dyspnea since 1 week ago. Has chronic cough. Has had dry mouth since 5 days ago. Has some chest pain on deep breath.   Pt notes he “snorts” water up his nose to clean it free of organic debris, comparing it to how he used to “snort” recreational substances. He says he does this daily.   Pt says he is very anxious and asks for benzodiazepines repeatedly. He recalls multiple times the different types of benzodiazepines he has taken, saying he regularly gets it and has gotten it for years for anxiety. Last dose was 2 days ago per pt.   Denies dysuria.      Allergies - NKDA  PSH - cardiac cath, PCI, CABG “5 years ago”, “left collarbone surgery”   FH - unreliable  SocHx - Current everyday smoker of cigars and cigarettes ½ PPD for 40+ years. No alcohol. Denies hx of IV drug / needle use.  Legal guardian: Angel Holguin, [phone].    Floor Course:  Patient started on IV Vanc, Ceftriaxone, and Azithromycin with blood cultures positive for gram positive bacteria. Strep pneumo and Legionella urine Ag negative. MRSA nares negative. Patient with healing stitched laceration to left bicep that did not appear infected.    CT of the chest showed the followin.  Mild paraseptal emphysematous changes in bilateral lungs.  Multiple scattered tiny peribronchovascular reticulonodular opacities  in the right lung as described above. While this could be residual  from prior resolving infection versus inflammatory changes,  possibility of new early developing pneumonia can not be completely  excluded in appropriate clinical setting.  2. Mildly enlarged right perihilar lymph node is similar compared to  prior CT examination and is likely favored to be reactive.  3. Suggestion of tiny hiatal  hernia. Mild circumferential thickening  of the distal thoracic esophagus is most likely related to reflux.  Recommend correlation with patient's symptomatology.  4. Mild atherosclerotic vascular calcifications.  5. Stable thickening of the left adrenal gland.    Blood cultures further identified as Staph epidermidis, but repeat cultures remained negative. Patient discharged back to his locked behavioral health unit at [facility]. Patient sent with script for two more days of pain medication and two days of his xanax. Xanax could be continued by facility provider if warranted.  Further prescription of Xanax to be determined by facility physician/psychiatry. Psychiatry cleared patient and didn't require inpatient psychiatric admission. Pt to followup with  neurology outpatient for Parkinson evaluation and management if present.     Patient discharged today (1/10/24).    Pertinent Physical Exam At Time of Discharge  Physical Exam  HENT:      Mouth/Throat:      Mouth: Mucous membranes are moist.      Pharynx: Oropharynx is clear.   Eyes:      Extraocular Movements: Extraocular movements intact.      Conjunctiva/sclera: Conjunctivae normal.   Cardiovascular:      Rate and Rhythm: Normal rate and regular rhythm.      Pulses: Normal pulses.      Heart sounds: Normal heart sounds.   Pulmonary:      Effort: Pulmonary effort is normal.      Breath sounds: Normal breath sounds.   Abdominal:      Palpations: Abdomen is soft.      Tenderness: There is no abdominal tenderness.   Musculoskeletal:      Right lower leg: No edema.      Left lower leg: No edema.   Skin:     General: Skin is warm and dry.      Comments: Laceration across left bicep with intact stitches. Dry erythema around laceration.   Neurological:      Mental Status: He is alert.   Psychiatric:      Comments: Repeating questions about pain and anxiety medication despite receiving answers moments before.     Outpatient Follow-Up  No future  appointments.    Kamran Harris, DO  Internal Medicine PGY1

## 2024-01-10 NOTE — PROGRESS NOTES
Gerardo Albright is a 63 y.o. male on day 3 of admission presenting with Bacteremia.    Subjective   Interval History: no fever, no new complaints        Review of Systems    Objective   Range of Vitals (last 24 hours)  Heart Rate:  [59-79]   Temp:  [35.7 °C (96.3 °F)-36.7 °C (98.1 °F)]   Resp:  [18]   BP: (132-143)/(73-91)   SpO2:  [96 %-98 %]   Daily Weight  01/09/24 : 79.8 kg (175 lb 14.8 oz)    Body mass index is 25.24 kg/m².    Physical Exam  Constitutional:       Appearance: Normal appearance.   HENT:      Head: Normocephalic and atraumatic.      Mouth/Throat:      Mouth: Mucous membranes are moist.      Pharynx: Oropharynx is clear.   Eyes:      Pupils: Pupils are equal, round, and reactive to light.   Cardiovascular:      Rate and Rhythm: Normal rate and regular rhythm.      Heart sounds: Normal heart sounds.   Pulmonary:      Effort: Pulmonary effort is normal.      Breath sounds: Normal breath sounds.   Abdominal:      General: Abdomen is flat. Bowel sounds are normal.      Palpations: Abdomen is soft.   Musculoskeletal:      Cervical back: Normal range of motion.   Neurological:      Mental Status: He is alert.         Antibiotics  perflutren lipid microspheres (Definity) injection 0.5-10 mL of dilution  sulfur hexafluoride microsphr (Lumason) injection 24.28 mg  perflutren protein A microsphere (Optison) injection 0.5 mL  benztropine (Cogentin) tablet 1 mg  buPROPion XL (Wellbutrin XL) 24 hr tablet 300 mg  metFORMIN (Glucophage) tablet 500 mg  multivitamin with minerals 1 tablet  naltrexone (Depade) tablet 50 mg  pantoprazole (ProtoNix) EC tablet 40 mg  QUEtiapine (SEROquel) tablet 50 mg  QUEtiapine (SEROquel) tablet 100 mg  LORazepam (Ativan) tablet 0.5 mg  vancomycin (Vancocin) in dextrose 5% 250 mL IV 1,250 mg  dextrose 50 % injection 25 g  glucagon (Glucagen) injection 1 mg  dextrose 10 % in water (D10W) infusion  insulin lispro (HumaLOG) injection 0-10 Units  azithromycin (Zithromax) tablet 500  mg  cefTRIAXone (Rocephin) IVPB 1 g  oxygen (O2) therapy  ALPRAZolam (Xanax) tablet 0.25 mg  enoxaparin (Lovenox) syringe 40 mg  acetaminophen (Tylenol) tablet 650 mg  oxyCODONE (Roxicodone) immediate release tablet 2.5 mg  hydrOXYzine HCL (Atarax) tablet 10 mg  vancomycin (Vancocin) in dextrose 5 % water (D5W) 500 mL IV 1,500 mg  white petrolatum (Aquaphor) ointment  melatonin tablet 5 mg  aspirin chewable tablet 81 mg  gabapentin (Neurontin) capsule 300 mg      Relevant Results  Labs  Results from last 72 hours   Lab Units 01/10/24  0650 01/09/24  0707 01/08/24 0622 01/07/24  1809   WBC AUTO x10*3/uL 6.1 6.9 6.4 8.5   HEMOGLOBIN g/dL 13.6 13.0* 14.2 14.1   HEMATOCRIT % 41.8 39.1* 43.5 41.6   PLATELETS AUTO x10*3/uL 334 372 413 430   NEUTROS PCT AUTO %  --   --   --  67.4   LYMPHS PCT AUTO %  --   --   --  18.8   MONOS PCT AUTO %  --   --   --  10.3   EOS PCT AUTO %  --   --   --  2.4       Results from last 72 hours   Lab Units 01/10/24  0650 01/09/24  0707 01/08/24  0622   SODIUM mmol/L 130* 131* 132*   POTASSIUM mmol/L 3.7 4.0 4.0   CHLORIDE mmol/L 97* 97* 98   CO2 mmol/L 26 25 24   BUN mg/dL 7 10 11   CREATININE mg/dL 0.63 0.62 0.59   GLUCOSE mg/dL 116* 166* 102*   CALCIUM mg/dL 9.0 8.9 9.2   ANION GAP mmol/L 11 13 14   EGFR mL/min/1.73m*2 >90 >90 >90   PHOSPHORUS mg/dL 3.7 3.7 4.2       Results from last 72 hours   Lab Units 01/10/24  0650 01/09/24  0707 01/08/24 0622 01/07/24  1809   ALK PHOS U/L  --   --   --  67   BILIRUBIN TOTAL mg/dL  --   --   --  0.6   PROTEIN TOTAL g/dL  --   --   --  7.2   ALT U/L  --   --   --  11   AST U/L  --   --   --  10   ALBUMIN g/dL 3.7 3.5 3.7 4.0       Estimated Creatinine Clearance: 122.3 mL/min (by C-G formula based on SCr of 0.63 mg/dL).  CRP   Date Value Ref Range Status   04/03/2020 4.33 (A) mg/dL Final     Comment:     REF VALUE  < 1.00       Microbiology  Susceptibility data from last 14 days.  Collected Specimen Info Organism Clindamycin Erythromycin Oxacillin  Tetracycline Trimethoprim/Sulfamethoxazole Vancomycin   01/04/24 Blood culture from Peripheral Venipuncture Staphylococcus epidermidis         01/04/24 Blood culture from Peripheral Venipuncture Staphylococcus epidermidis I I R S S S       Imaging  Reviewed        Assessment/Plan   Bacteremia, CNS, the repeat BC is negative  Pneumonia     Recommendations :  Plan to stop the antibiotics if the BC remain negative  Discussed with the medical team     I spent minutes in the professional and overall care of this patient.      Francine Wilson MD

## 2024-01-11 VITALS
TEMPERATURE: 98.6 F | HEART RATE: 76 BPM | DIASTOLIC BLOOD PRESSURE: 96 MMHG | RESPIRATION RATE: 18 BRPM | OXYGEN SATURATION: 95 % | SYSTOLIC BLOOD PRESSURE: 163 MMHG | BODY MASS INDEX: 25.19 KG/M2 | HEIGHT: 70 IN | WEIGHT: 175.93 LBS

## 2024-01-11 LAB
BACTERIA BLD CULT: NORMAL
BACTERIA BLD CULT: NORMAL
GLUCOSE BLD MANUAL STRIP-MCNC: 110 MG/DL (ref 74–99)
GLUCOSE BLD MANUAL STRIP-MCNC: 140 MG/DL (ref 74–99)

## 2024-01-11 PROCEDURE — 99239 HOSP IP/OBS DSCHRG MGMT >30: CPT

## 2024-01-11 PROCEDURE — 2500000001 HC RX 250 WO HCPCS SELF ADMINISTERED DRUGS (ALT 637 FOR MEDICARE OP): Performed by: PSYCHIATRY & NEUROLOGY

## 2024-01-11 PROCEDURE — 2500000001 HC RX 250 WO HCPCS SELF ADMINISTERED DRUGS (ALT 637 FOR MEDICARE OP)

## 2024-01-11 PROCEDURE — 82947 ASSAY GLUCOSE BLOOD QUANT: CPT

## 2024-01-11 PROCEDURE — 94760 N-INVAS EAR/PLS OXIMETRY 1: CPT

## 2024-01-11 PROCEDURE — 2500000004 HC RX 250 GENERAL PHARMACY W/ HCPCS (ALT 636 FOR OP/ED): Performed by: PSYCHIATRY & NEUROLOGY

## 2024-01-11 RX ADMIN — ALPRAZOLAM 0.25 MG: 0.5 TABLET ORAL at 08:42

## 2024-01-11 RX ADMIN — PANTOPRAZOLE SODIUM 40 MG: 40 TABLET, DELAYED RELEASE ORAL at 08:43

## 2024-01-11 RX ADMIN — BENZTROPINE MESYLATE 1 MG: 1 TABLET ORAL at 08:42

## 2024-01-11 RX ADMIN — ASPIRIN 81 MG 81 MG: 81 TABLET ORAL at 08:43

## 2024-01-11 RX ADMIN — GABAPENTIN 300 MG: 300 CAPSULE ORAL at 08:43

## 2024-01-11 RX ADMIN — Medication 1 TABLET: at 08:42

## 2024-01-11 RX ADMIN — ACETAMINOPHEN 650 MG: 325 TABLET ORAL at 08:43

## 2024-01-11 RX ADMIN — PANTOPRAZOLE SODIUM 40 MG: 40 TABLET, DELAYED RELEASE ORAL at 16:56

## 2024-01-11 RX ADMIN — ACETAMINOPHEN 650 MG: 325 TABLET ORAL at 16:57

## 2024-01-11 RX ADMIN — OXYCODONE HYDROCHLORIDE 2.5 MG: 5 TABLET ORAL at 08:42

## 2024-01-11 RX ADMIN — GABAPENTIN 300 MG: 300 CAPSULE ORAL at 16:57

## 2024-01-11 RX ADMIN — QUETIAPINE FUMARATE 100 MG: 100 TABLET ORAL at 08:42

## 2024-01-11 RX ADMIN — NALTREXONE HYDROCHLORIDE 50 MG: 50 TABLET, FILM COATED ORAL at 08:43

## 2024-01-11 RX ADMIN — BUPROPION HYDROCHLORIDE 300 MG: 150 TABLET, FILM COATED, EXTENDED RELEASE ORAL at 08:43

## 2024-01-11 ASSESSMENT — PAIN SCALES - GENERAL: PAINLEVEL_OUTOF10: 7

## 2024-01-11 ASSESSMENT — COGNITIVE AND FUNCTIONAL STATUS - GENERAL
DRESSING REGULAR LOWER BODY CLOTHING: A LITTLE
HELP NEEDED FOR BATHING: A LITTLE
DRESSING REGULAR UPPER BODY CLOTHING: A LITTLE
STANDING UP FROM CHAIR USING ARMS: A LITTLE
MOBILITY SCORE: 19
MOVING TO AND FROM BED TO CHAIR: A LITTLE
CLIMB 3 TO 5 STEPS WITH RAILING: A LOT
DAILY ACTIVITIY SCORE: 19
WALKING IN HOSPITAL ROOM: A LITTLE
TOILETING: A LITTLE
PERSONAL GROOMING: A LITTLE

## 2024-01-11 ASSESSMENT — PAIN - FUNCTIONAL ASSESSMENT: PAIN_FUNCTIONAL_ASSESSMENT: 0-10

## 2024-01-11 NOTE — NURSING NOTE
1220: Called report to [facility]. Report given to [facility]. Pickup time of 1400 communicated. Questions answered.

## 2024-01-11 NOTE — DISCHARGE SUMMARY
Discharge Diagnosis  Positive blood culture - Contamination  Laceration of Left upper extremity self induced  Anxiety    Issues Requiring Follow-Up  Patient recommended to followup with psychiatry and neurology as outpatient for further management.  Do not recommend continuing narcotic use (Oxycodone) after prescription ends. He is known to persistently ask for more pain medication.  Continue Bacitracin ointment to laceration site.     Discharge Meds     Your medication list        START taking these medications        Instructions Last Dose Given Next Dose Due   ALPRAZolam 0.25 mg tablet  Commonly known as: Xanax      Take 1 tablet (0.25 mg) by mouth 2 times a day as needed for anxiety or sleep for up to 2 days.       bacitracin 500 unit/gram ointment      Apply topically 2 times a day for 5 days. Apply to left bicep laceration.       gabapentin 300 mg capsule  Commonly known as: Neurontin  Replaces: gabapentin 600 mg tablet      Take 1 capsule (300 mg) by mouth 3 times a day.       oxyCODONE 5 mg immediate release tablet  Commonly known as: Roxicodone      Take 0.5 tablets (2.5 mg) by mouth every 12 hours if needed for severe pain (7 - 10) for up to 2 days. Must be given to patient with supervision. Patient must take with supervision.              CONTINUE taking these medications        Instructions Last Dose Given Next Dose Due   aspirin 81 mg chewable tablet           benztropine 1 mg tablet  Commonly known as: Cogentin           buPROPion  mg 24 hr tablet  Commonly known as: Wellbutrin XL           metFORMIN 500 mg tablet  Commonly known as: Glucophage      Take 1 tablet (500 mg) by mouth 2 times a day with meals.       multivitamin tablet           naltrexone 50 mg tablet  Commonly known as: Depade           pantoprazole 40 mg EC tablet  Commonly known as: ProtoNix           Perseris 90 mg suspension,extended rel syring subcutaneous injection  Generic drug: risperiDONE           QUEtiapine 50 mg  tablet  Commonly known as: SEROquel           QUEtiapine 200 mg tablet  Commonly known as: SEROquel      Take 1 tablet (200 mg) by mouth 2 times a day.              STOP taking these medications      gabapentin 600 mg tablet  Commonly known as: Neurontin  Replaced by: gabapentin 300 mg capsule                  Where to Get Your Medications        You can get these medications from any pharmacy    Bring a paper prescription for each of these medications  ALPRAZolam 0.25 mg tablet  oxyCODONE 5 mg immediate release tablet       Information about where to get these medications is not yet available    Ask your nurse or doctor about these medications  bacitracin 500 unit/gram ointment  gabapentin 300 mg capsule         Test Results Pending At Discharge  Pending Labs       No current pending labs.            Hospital Course  Gerardo Albright is a 63 y.o. male with parkinsons, chronic neuropathy, chronic pain, hx aspiration PNA, HTN, HLD, CAD, T2DM, schizoaffective disorder (depression), hx substance abuse who initially presented to the ED for SI related to his physical illnesses, and admitted to medicine for gram positive bacteremia. Pt is A&O x2 but provided hx, and additional hx obtained from prior charts.      He was hospitalized 9/2023 for respiratory failure due aspiration PNA, treated with IV vanc + zosyn then. CT chest then showed RLL PNA with no parapneumonic effusion. Had positive MRSA colonization screening back then.      Presented from [facility] to [place] ED 1/3/24 with SI due to, per ED notes, “feeling similar to when he has had pneumonia however the facility is not taking care of him and this made him want to attempt suicide with a razor blade.”      On 1/3/21, his WBC was 21.1, which went to 8.6 on 1/6/24s. 2x blood cultures from 1/4/23 grew positive for gram positive cocci in clusters coming back 1/7/24 morning.   Vitals stable on room air, afebrile. CXR showed signs of RLL PNA. CT head from 1/3/23  showed no bleed or stroke, though showed signs of sinusitis and small r mastoid effusion. Pt did get 1mg po Ativan 23 morning. Was started on IV Vanc and TTE ordered. Per psych note, will follow pt.      On interview 24, pt says he has had dyspnea since 1 week ago. Has chronic cough. Has had dry mouth since 5 days ago. Has some chest pain on deep breath.   Pt notes he “snorts” water up his nose to clean it free of organic debris, comparing it to how he used to “snort” recreational substances. He says he does this daily.   Pt says he is very anxious and asks for benzodiazepines repeatedly. He recalls multiple times the different types of benzodiazepines he has taken, saying he regularly gets it and has gotten it for years for anxiety. Last dose was 2 days ago per pt.   Denies dysuria.      Allergies - NKDA  PSH - cardiac cath, PCI, CABG “5 years ago”, “left collarbone surgery”   FH - unreliable  SocHx - Current everyday smoker of cigars and cigarettes ½ PPD for 40+ years. No alcohol. Denies hx of IV drug / needle use.  Legal guardian: Angel Holguin, [phone].    Floor Course:  Patient started on IV Vanc, Ceftriaxone, and Azithromycin with blood cultures positive for gram positive bacteria. Strep pneumo and Legionella urine Ag negative. MRSA nares negative. Patient with healing stitched laceration to left bicep that did not appear infected.    CT of the chest showed the followin.  Mild paraseptal emphysematous changes in bilateral lungs.  Multiple scattered tiny peribronchovascular reticulonodular opacities  in the right lung as described above. While this could be residual  from prior resolving infection versus inflammatory changes,  possibility of new early developing pneumonia can not be completely  excluded in appropriate clinical setting.  2. Mildly enlarged right perihilar lymph node is similar compared to  prior CT examination and is likely favored to be reactive.  3. Suggestion of tiny hiatal  hernia. Mild circumferential thickening  of the distal thoracic esophagus is most likely related to reflux.  Recommend correlation with patient's symptomatology.  4. Mild atherosclerotic vascular calcifications.  5. Stable thickening of the left adrenal gland.    Blood cultures further identified as Staph epidermidis, but repeat cultures remained negative. Patient remained sitter free for 24 hrs. Patient discharged back to his locked behavioral health unit at [facility]. Patient sent with script for two more days of pain medication and two days of his xanax. Further prescription of Xanax to be determined by facility physician.     Pertinent Physical Exam At Time of Discharge  Physical Exam  HENT:      Mouth/Throat:      Mouth: Mucous membranes are moist.      Pharynx: Oropharynx is clear.   Eyes:      Extraocular Movements: Extraocular movements intact.      Conjunctiva/sclera: Conjunctivae normal.   Cardiovascular:      Rate and Rhythm: Normal rate and regular rhythm.      Pulses: Normal pulses.      Heart sounds: Normal heart sounds.   Pulmonary:      Effort: Pulmonary effort is normal.      Breath sounds: Normal breath sounds.   Abdominal:      Palpations: Abdomen is soft.      Tenderness: There is no abdominal tenderness.   Musculoskeletal:      Right lower leg: No edema.      Left lower leg: No edema.   Skin:     General: Skin is warm and dry.      Comments: Laceration across left bicep with intact stitches. Dry erythema around laceration.   Neurological:      Mental Status: He is alert.   Psychiatric:      Comments: Repeating questions about pain and anxiety medication despite receiving answers moments before.     Outpatient Follow-Up  No future appointments.    Kamran Harris, DO  Internal Medicine PGY1

## 2024-01-11 NOTE — PROGRESS NOTES
Gerardo Albright is a 63 y.o. male on day 4 of admission presenting with Bacteremia.    Subjective   Interval History: no fever, no new complaints        Review of Systems    Objective   Range of Vitals (last 24 hours)  Heart Rate:  [65-72]   Temp:  [36 °C (96.8 °F)-36.2 °C (97.2 °F)]   Resp:  [18]   BP: (136-141)/(75-78)   SpO2:  [97 %-99 %]   Daily Weight  01/09/24 : 79.8 kg (175 lb 14.8 oz)    Body mass index is 25.24 kg/m².    Physical Exam  Constitutional:       Appearance: Normal appearance.   HENT:      Head: Normocephalic and atraumatic.      Mouth/Throat:      Mouth: Mucous membranes are moist.      Pharynx: Oropharynx is clear.   Eyes:      Pupils: Pupils are equal, round, and reactive to light.   Cardiovascular:      Rate and Rhythm: Normal rate and regular rhythm.      Heart sounds: Normal heart sounds.   Pulmonary:      Effort: Pulmonary effort is normal.      Breath sounds: Normal breath sounds.   Abdominal:      General: Abdomen is flat. Bowel sounds are normal.      Palpations: Abdomen is soft.   Musculoskeletal:      Cervical back: Normal range of motion.   Neurological:      Mental Status: He is alert.         Antibiotics  perflutren lipid microspheres (Definity) injection 0.5-10 mL of dilution  sulfur hexafluoride microsphr (Lumason) injection 24.28 mg  perflutren protein A microsphere (Optison) injection 0.5 mL  benztropine (Cogentin) tablet 1 mg  buPROPion XL (Wellbutrin XL) 24 hr tablet 300 mg  metFORMIN (Glucophage) tablet 500 mg  multivitamin with minerals 1 tablet  naltrexone (Depade) tablet 50 mg  pantoprazole (ProtoNix) EC tablet 40 mg  QUEtiapine (SEROquel) tablet 50 mg  QUEtiapine (SEROquel) tablet 100 mg  LORazepam (Ativan) tablet 0.5 mg  vancomycin (Vancocin) in dextrose 5% 250 mL IV 1,250 mg  dextrose 50 % injection 25 g  glucagon (Glucagen) injection 1 mg  dextrose 10 % in water (D10W) infusion  insulin lispro (HumaLOG) injection 0-10 Units  azithromycin (Zithromax) tablet 500  mg  cefTRIAXone (Rocephin) IVPB 1 g  oxygen (O2) therapy  ALPRAZolam (Xanax) tablet 0.25 mg  enoxaparin (Lovenox) syringe 40 mg  acetaminophen (Tylenol) tablet 650 mg  oxyCODONE (Roxicodone) immediate release tablet 2.5 mg  hydrOXYzine HCL (Atarax) tablet 10 mg  vancomycin (Vancocin) in dextrose 5 % water (D5W) 500 mL IV 1,500 mg  white petrolatum (Aquaphor) ointment  melatonin tablet 5 mg  aspirin chewable tablet 81 mg  gabapentin (Neurontin) capsule 300 mg      Relevant Results  Labs  Results from last 72 hours   Lab Units 01/10/24  0650 01/09/24  0707   WBC AUTO x10*3/uL 6.1 6.9   HEMOGLOBIN g/dL 13.6 13.0*   HEMATOCRIT % 41.8 39.1*   PLATELETS AUTO x10*3/uL 334 372       Results from last 72 hours   Lab Units 01/10/24  0650 01/09/24  0707   SODIUM mmol/L 130* 131*   POTASSIUM mmol/L 3.7 4.0   CHLORIDE mmol/L 97* 97*   CO2 mmol/L 26 25   BUN mg/dL 7 10   CREATININE mg/dL 0.63 0.62   GLUCOSE mg/dL 116* 166*   CALCIUM mg/dL 9.0 8.9   ANION GAP mmol/L 11 13   EGFR mL/min/1.73m*2 >90 >90   PHOSPHORUS mg/dL 3.7 3.7       Results from last 72 hours   Lab Units 01/10/24  0650 01/09/24  0707   ALBUMIN g/dL 3.7 3.5       Estimated Creatinine Clearance: 122.3 mL/min (by C-G formula based on SCr of 0.63 mg/dL).  CRP   Date Value Ref Range Status   04/03/2020 4.33 (A) mg/dL Final     Comment:     REF VALUE  < 1.00       Microbiology  Susceptibility data from last 14 days.  Collected Specimen Info Organism Clindamycin Erythromycin Oxacillin Tetracycline Trimethoprim/Sulfamethoxazole Vancomycin   01/04/24 Blood culture from Peripheral Venipuncture Staphylococcus epidermidis         01/04/24 Blood culture from Peripheral Venipuncture Staphylococcus epidermidis I I R S S S       Imaging  Reviewed        Assessment/Plan   Bacteremia, CNS, the repeat BC is negative  Pneumonia     Recommendations :  Supportive care  Discussed with the medical team     I spent minutes in the professional and overall care of this patient.      Francine  MD Katie

## 2024-01-16 ENCOUNTER — NURSING HOME VISIT (OUTPATIENT)
Dept: POST ACUTE CARE | Facility: EXTERNAL LOCATION | Age: 64
End: 2024-01-16
Payer: COMMERCIAL

## 2024-01-16 DIAGNOSIS — K59.00 CONSTIPATION, UNSPECIFIED CONSTIPATION TYPE: ICD-10-CM

## 2024-01-16 DIAGNOSIS — I10 ESSENTIAL HYPERTENSION: ICD-10-CM

## 2024-01-16 DIAGNOSIS — G62.9 NEUROPATHY: ICD-10-CM

## 2024-01-16 DIAGNOSIS — G20.A1 PARKINSON'S DISEASE WITHOUT DYSKINESIA, UNSPECIFIED WHETHER MANIFESTATIONS FLUCTUATE (MULTI): Primary | ICD-10-CM

## 2024-01-16 DIAGNOSIS — M13.0 POLYARTHRITIS: ICD-10-CM

## 2024-01-16 DIAGNOSIS — S41.112D LACERATION OF LEFT UPPER EXTREMITY, SUBSEQUENT ENCOUNTER: ICD-10-CM

## 2024-01-16 DIAGNOSIS — G89.29 OTHER CHRONIC PAIN: ICD-10-CM

## 2024-01-16 DIAGNOSIS — E78.5 HYPERLIPIDEMIA, UNSPECIFIED HYPERLIPIDEMIA TYPE: ICD-10-CM

## 2024-01-16 DIAGNOSIS — E87.8 ELECTROLYTE DISORDER: ICD-10-CM

## 2024-01-16 DIAGNOSIS — E11.9 TYPE 2 DIABETES MELLITUS WITHOUT COMPLICATION, WITHOUT LONG-TERM CURRENT USE OF INSULIN (MULTI): ICD-10-CM

## 2024-01-16 DIAGNOSIS — K21.9 GASTROESOPHAGEAL REFLUX DISEASE, UNSPECIFIED WHETHER ESOPHAGITIS PRESENT: ICD-10-CM

## 2024-01-16 PROCEDURE — 99310 SBSQ NF CARE HIGH MDM 45: CPT | Performed by: NURSE PRACTITIONER

## 2024-01-21 NOTE — PROGRESS NOTES
*Provider Impression*  Patient is a 63 year y/o male who is seen today for management of multiple medical problems  #Parkinson's / Neuropathy / Chronic pain / OA - PT/OT, APAP 650mg q4h PRN, schedule f/u w/ neurology - movement disorder clinic, neurontin 600mg TID  #LUE laceration - sutured, wound care  #HTN / CAD - ASA 81mg daily  #T2DM - metformin 500mg BID  #Electrolyte disorder - NaCl 1gram daily  #GERD / Constipation - pantoprazole 40mg daily  #Schizoaffective / Depression - per psych - mirtazapine, bupropion, cogentin, quetiapine, depakote, zoloft, risperidone, naltrexone  #ACP - Full Code  Follow up as needed      *Chief Complaint*  multiple medical problems      *History of Present Illness*  Pt is a 64 y/o male LTC resident of [facility] w/ PMH as below who is seen today for follow up and management of multiple medical problems.     He was sent to the ED for SI and attempt. He was admitted to medicine for gram positive bacteremia and L arm laceration. On 1/3/24, his WBC was 21.1, which went to 8.6 on 1/6/24. 2x blood cultures from 1/4/23 grew positive for gram positive cocci in clusters on 1/7/24. Vitals were stable on room air, afebrile. CXR showed signs of RLL PNA. CT head from 1/3/23 showed no bleed or stroke, though showed signs of sinusitis and small r mastoid effusion. Pt did get 1mg po Ativan 1/7/23 morning. Was started on IV Vanc and TTE ordered and he was admitted to the floor where antibiotic regimen expanded to include Ceftriaxone, and Azithromycin with blood cultures positive for gram positive bacteria. Strep pneumo and Legionella urine Ag negative. MRSA nares negative. Patient with healing sutured laceration to left bicep that did not appear infected. Blood cultures further identified as Staph epidermidis, but repeat cultures remained negative. Patient remained sitter free for 24 hrs. He was discharged back to his locked behavioral health unit at [facility].     He is seen by the  nurses station. He denies any pain now, no more suicidal ideation or any other new c/o presently. He says he learned his lesson and admits that his self-induced laceration was done to get attention. He says he won't do it again.    Allergies - NKDA  PMH - ADHD, Schizoaffective, HTN, CAD, MDD  PSH - cardiac cath, PCI, CABG  FH - unreliable  SocHx - Current everyday smoker      *Review of Systems*  All other systems reviewed are negative except as noted in the HPI      *Vitals*  Date: 1/3/24 - T: 96.9 P: 102 R: 20 BP: 146/93 SpO2: % on RA ; 1/3/24 - Wt 182      *Results/Data*  CBC - Date: 1/10/24 WBC: 6.1 HGB: 13.6 HCT: 41.8 PLT: 334 ;   BMP - Date: 1/10/24 Na: 130 K: 3.7 Cl: 97 CO2: 26 BUN: 7 Cr: 0.63 Glu: 116 Ca: 9.0 Alb: 3.7 ;   LFT - Date: 24 AST: 10 ALT: 11 ALP: 67 Tbili: 0.6 ;   Coags - Date: INR: PT:  Other - Date: 9/3/20 - TC: 139 T HDL: 25 LDL: 93; 11/10/20 - CRP: 15.1 D-dimer: 0.46; 21 - TC: 199 T HDL: 36 LDL: 138 VPA: 86 TSH: 2.715 25-OH-D: 19.07 ; 21 - VPA: 49; 21 - 25-OH-D: 26.67; 22 - TSH: 2.218 ; 23  TSH: 2.888  25-OH-D: 31.67; 23  A1c: 6.5%    *Physical Exam*  Gen: (+) NAD, (+) well-appearing  HEENT: (+) normocephalic, (+) MMM  Neck: (+) supple  Lungs: (-) cough  Abd: (+) ND  Ext: (-) edema, (-) deformity  MSK: (-) joint swelling  Skin: (-) rash,  Neuro: (+) follows commands, (+) tremor, (+) alert

## 2024-01-31 ENCOUNTER — HOSPITAL ENCOUNTER (EMERGENCY)
Facility: HOSPITAL | Age: 64
Discharge: NURSING, REHAB OR LONG-TERM CARE | End: 2024-02-01
Attending: FAMILY MEDICINE
Payer: COMMERCIAL

## 2024-01-31 ENCOUNTER — APPOINTMENT (OUTPATIENT)
Dept: CARDIOLOGY | Facility: HOSPITAL | Age: 64
End: 2024-01-31
Payer: COMMERCIAL

## 2024-01-31 DIAGNOSIS — F69 BEHAVIOR CONCERN IN ADULT: Primary | ICD-10-CM

## 2024-01-31 DIAGNOSIS — I15.9 SECONDARY HYPERTENSION: ICD-10-CM

## 2024-01-31 LAB
ALBUMIN SERPL BCP-MCNC: 4.3 G/DL (ref 3.4–5)
ALP SERPL-CCNC: 51 U/L (ref 33–136)
ALT SERPL W P-5'-P-CCNC: 15 U/L (ref 10–52)
ANION GAP SERPL CALC-SCNC: 17 MMOL/L (ref 10–20)
AST SERPL W P-5'-P-CCNC: 32 U/L (ref 9–39)
BASOPHILS # BLD AUTO: 0.04 X10*3/UL (ref 0–0.1)
BASOPHILS NFR BLD AUTO: 0.5 %
BILIRUB SERPL-MCNC: 0.5 MG/DL (ref 0–1.2)
BUN SERPL-MCNC: 10 MG/DL (ref 6–23)
CALCIUM SERPL-MCNC: 9.2 MG/DL (ref 8.6–10.3)
CHLORIDE SERPL-SCNC: 96 MMOL/L (ref 98–107)
CO2 SERPL-SCNC: 22 MMOL/L (ref 21–32)
CREAT SERPL-MCNC: 0.76 MG/DL (ref 0.5–1.3)
EGFRCR SERPLBLD CKD-EPI 2021: >90 ML/MIN/1.73M*2
EOSINOPHIL # BLD AUTO: 0.1 X10*3/UL (ref 0–0.7)
EOSINOPHIL NFR BLD AUTO: 1.4 %
ERYTHROCYTE [DISTWIDTH] IN BLOOD BY AUTOMATED COUNT: 13.8 % (ref 11.5–14.5)
ETHANOL SERPL-MCNC: <10 MG/DL
GLUCOSE SERPL-MCNC: 163 MG/DL (ref 74–99)
HCT VFR BLD AUTO: 37.7 % (ref 41–52)
HGB BLD-MCNC: 12.9 G/DL (ref 13.5–17.5)
IMM GRANULOCYTES # BLD AUTO: 0.03 X10*3/UL (ref 0–0.7)
IMM GRANULOCYTES NFR BLD AUTO: 0.4 % (ref 0–0.9)
LACTATE SERPL-SCNC: 3.8 MMOL/L (ref 0.4–2)
LYMPHOCYTES # BLD AUTO: 1.41 X10*3/UL (ref 1.2–4.8)
LYMPHOCYTES NFR BLD AUTO: 19.2 %
MCH RBC QN AUTO: 30.2 PG (ref 26–34)
MCHC RBC AUTO-ENTMCNC: 34.2 G/DL (ref 32–36)
MCV RBC AUTO: 88 FL (ref 80–100)
MONOCYTES # BLD AUTO: 0.65 X10*3/UL (ref 0.1–1)
MONOCYTES NFR BLD AUTO: 8.8 %
NEUTROPHILS # BLD AUTO: 5.13 X10*3/UL (ref 1.2–7.7)
NEUTROPHILS NFR BLD AUTO: 69.7 %
NRBC BLD-RTO: 0 /100 WBCS (ref 0–0)
PLATELET # BLD AUTO: 273 X10*3/UL (ref 150–450)
POTASSIUM SERPL-SCNC: 4.6 MMOL/L (ref 3.5–5.3)
PROT SERPL-MCNC: 7.1 G/DL (ref 6.4–8.2)
RBC # BLD AUTO: 4.27 X10*6/UL (ref 4.5–5.9)
SODIUM SERPL-SCNC: 130 MMOL/L (ref 136–145)
WBC # BLD AUTO: 7.4 X10*3/UL (ref 4.4–11.3)

## 2024-01-31 PROCEDURE — 85025 COMPLETE CBC W/AUTO DIFF WBC: CPT | Performed by: FAMILY MEDICINE

## 2024-01-31 PROCEDURE — 87636 SARSCOV2 & INF A&B AMP PRB: CPT | Performed by: FAMILY MEDICINE

## 2024-01-31 PROCEDURE — 83605 ASSAY OF LACTIC ACID: CPT | Performed by: FAMILY MEDICINE

## 2024-01-31 PROCEDURE — 99285 EMERGENCY DEPT VISIT HI MDM: CPT | Performed by: FAMILY MEDICINE

## 2024-01-31 PROCEDURE — 80053 COMPREHEN METABOLIC PANEL: CPT | Performed by: FAMILY MEDICINE

## 2024-01-31 PROCEDURE — 96360 HYDRATION IV INFUSION INIT: CPT

## 2024-01-31 PROCEDURE — 81003 URINALYSIS AUTO W/O SCOPE: CPT | Performed by: FAMILY MEDICINE

## 2024-01-31 PROCEDURE — 36415 COLL VENOUS BLD VENIPUNCTURE: CPT | Performed by: FAMILY MEDICINE

## 2024-01-31 PROCEDURE — 93005 ELECTROCARDIOGRAM TRACING: CPT

## 2024-01-31 PROCEDURE — 80307 DRUG TEST PRSMV CHEM ANLYZR: CPT | Performed by: FAMILY MEDICINE

## 2024-01-31 PROCEDURE — 99284 EMERGENCY DEPT VISIT MOD MDM: CPT | Mod: 25

## 2024-01-31 PROCEDURE — 82077 ASSAY SPEC XCP UR&BREATH IA: CPT | Performed by: FAMILY MEDICINE

## 2024-02-01 ENCOUNTER — NURSING HOME VISIT (OUTPATIENT)
Dept: POST ACUTE CARE | Facility: EXTERNAL LOCATION | Age: 64
End: 2024-02-01
Payer: COMMERCIAL

## 2024-02-01 VITALS
HEIGHT: 71 IN | HEART RATE: 83 BPM | WEIGHT: 200 LBS | SYSTOLIC BLOOD PRESSURE: 162 MMHG | BODY MASS INDEX: 28 KG/M2 | RESPIRATION RATE: 17 BRPM | OXYGEN SATURATION: 98 % | TEMPERATURE: 96.8 F | DIASTOLIC BLOOD PRESSURE: 100 MMHG

## 2024-02-01 DIAGNOSIS — I10 ESSENTIAL HYPERTENSION: ICD-10-CM

## 2024-02-01 DIAGNOSIS — D64.9 ANEMIA, UNSPECIFIED TYPE: ICD-10-CM

## 2024-02-01 DIAGNOSIS — R53.1 WEAKNESS: ICD-10-CM

## 2024-02-01 DIAGNOSIS — G20.A1 PARKINSON'S DISEASE WITHOUT DYSKINESIA, UNSPECIFIED WHETHER MANIFESTATIONS FLUCTUATE (MULTI): Primary | ICD-10-CM

## 2024-02-01 DIAGNOSIS — Z91.81 AT RISK FOR FALLS: ICD-10-CM

## 2024-02-01 DIAGNOSIS — K21.9 GASTROESOPHAGEAL REFLUX DISEASE, UNSPECIFIED WHETHER ESOPHAGITIS PRESENT: ICD-10-CM

## 2024-02-01 DIAGNOSIS — F20.89 OTHER SCHIZOPHRENIA (MULTI): ICD-10-CM

## 2024-02-01 DIAGNOSIS — F03.90 DEMENTIA, UNSPECIFIED DEMENTIA SEVERITY, UNSPECIFIED DEMENTIA TYPE, UNSPECIFIED WHETHER BEHAVIORAL, PSYCHOTIC, OR MOOD DISTURBANCE OR ANXIETY (MULTI): ICD-10-CM

## 2024-02-01 DIAGNOSIS — I21.A9 OTHER TYPE OF ACUTE MYOCARDIAL INFARCTION (MULTI): ICD-10-CM

## 2024-02-01 DIAGNOSIS — G40.89 OTHER SEIZURES (MULTI): ICD-10-CM

## 2024-02-01 LAB
AMPHETAMINES UR QL SCN: NORMAL
APPEARANCE UR: CLEAR
ATRIAL RATE: 108 BPM
BARBITURATES UR QL SCN: NORMAL
BENZODIAZ UR QL SCN: NORMAL
BILIRUB UR STRIP.AUTO-MCNC: NEGATIVE MG/DL
BZE UR QL SCN: NORMAL
CANNABINOIDS UR QL SCN: NORMAL
COLOR UR: ABNORMAL
FENTANYL+NORFENTANYL UR QL SCN: NORMAL
FLUAV RNA RESP QL NAA+PROBE: NOT DETECTED
FLUBV RNA RESP QL NAA+PROBE: NOT DETECTED
GLUCOSE UR STRIP.AUTO-MCNC: NEGATIVE MG/DL
KETONES UR STRIP.AUTO-MCNC: NEGATIVE MG/DL
LACTATE SERPL-SCNC: 1.2 MMOL/L (ref 0.4–2)
LEUKOCYTE ESTERASE UR QL STRIP.AUTO: NEGATIVE
NITRITE UR QL STRIP.AUTO: NEGATIVE
OPIATES UR QL SCN: NORMAL
OXYCODONE+OXYMORPHONE UR QL SCN: NORMAL
P AXIS: 46 DEGREES
P OFFSET: 191 MS
P ONSET: 136 MS
PCP UR QL SCN: NORMAL
PH UR STRIP.AUTO: 6 [PH]
PR INTERVAL: 146 MS
PROT UR STRIP.AUTO-MCNC: NEGATIVE MG/DL
Q ONSET: 209 MS
QRS COUNT: 18 BEATS
QRS DURATION: 88 MS
QT INTERVAL: 346 MS
QTC CALCULATION(BAZETT): 463 MS
QTC FREDERICIA: 420 MS
R AXIS: -15 DEGREES
RBC # UR STRIP.AUTO: NEGATIVE /UL
SARS-COV-2 RNA RESP QL NAA+PROBE: NOT DETECTED
SP GR UR STRIP.AUTO: 1
T AXIS: 54 DEGREES
T OFFSET: 382 MS
UROBILINOGEN UR STRIP.AUTO-MCNC: <2 MG/DL
VENTRICULAR RATE: 108 BPM

## 2024-02-01 PROCEDURE — 2500000004 HC RX 250 GENERAL PHARMACY W/ HCPCS (ALT 636 FOR OP/ED): Performed by: FAMILY MEDICINE

## 2024-02-01 PROCEDURE — 36415 COLL VENOUS BLD VENIPUNCTURE: CPT | Performed by: FAMILY MEDICINE

## 2024-02-01 PROCEDURE — 83605 ASSAY OF LACTIC ACID: CPT | Performed by: FAMILY MEDICINE

## 2024-02-01 PROCEDURE — 99308 SBSQ NF CARE LOW MDM 20: CPT | Performed by: INTERNAL MEDICINE

## 2024-02-01 RX ADMIN — SODIUM CHLORIDE 1000 ML: 9 INJECTION, SOLUTION INTRAVENOUS at 01:06

## 2024-02-01 SDOH — HEALTH STABILITY: MENTAL HEALTH: SUICIDAL BEHAVIOR (LIFETIME): YES

## 2024-02-01 SDOH — HEALTH STABILITY: MENTAL HEALTH: ACTIVE SUICIDAL IDEATION WITH SPECIFIC PLAN AND INTENT (PAST 1 MONTH): NO

## 2024-02-01 SDOH — HEALTH STABILITY: MENTAL HEALTH: NON-SPECIFIC ACTIVE SUICIDAL THOUGHTS (PAST 1 MONTH): YES

## 2024-02-01 SDOH — ECONOMIC STABILITY: HOUSING INSECURITY: FEELS SAFE LIVING IN HOME: YES

## 2024-02-01 SDOH — HEALTH STABILITY: MENTAL HEALTH: DEPRESSION SYMPTOMS: NO PROBLEMS REPORTED OR OBSERVED.

## 2024-02-01 SDOH — HEALTH STABILITY: MENTAL HEALTH: WISH TO BE DEAD (PAST 1 MONTH): NO

## 2024-02-01 SDOH — HEALTH STABILITY: MENTAL HEALTH: ANXIETY SYMPTOMS: NO PROBLEMS REPORTED OR OBSERVED.

## 2024-02-01 SDOH — HEALTH STABILITY: MENTAL HEALTH: ACTIVE SUICIDAL IDEATION WITH SOME INTENT TO ACT, WITHOUT SPECIFIC PLAN (PAST 1 MONTH): NO

## 2024-02-01 ASSESSMENT — LIFESTYLE VARIABLES
PRESCIPTION_ABUSE_PAST_12_MONTHS: NO
SUBSTANCE_ABUSE_PAST_12_MONTHS: NO

## 2024-02-01 ASSESSMENT — PAIN SCALES - GENERAL: PAINLEVEL_OUTOF10: 0 - NO PAIN

## 2024-02-01 ASSESSMENT — COLUMBIA-SUICIDE SEVERITY RATING SCALE - C-SSRS
2. HAVE YOU ACTUALLY HAD ANY THOUGHTS OF KILLING YOURSELF?: NO
1. IN THE PAST MONTH, HAVE YOU WISHED YOU WERE DEAD OR WISHED YOU COULD GO TO SLEEP AND NOT WAKE UP?: NO
6. HAVE YOU EVER DONE ANYTHING, STARTED TO DO ANYTHING, OR PREPARED TO DO ANYTHING TO END YOUR LIFE?: NO

## 2024-02-01 ASSESSMENT — PAIN - FUNCTIONAL ASSESSMENT: PAIN_FUNCTIONAL_ASSESSMENT: 0-10

## 2024-02-01 ASSESSMENT — PAIN DESCRIPTION - PROGRESSION: CLINICAL_PROGRESSION: NOT CHANGED

## 2024-02-01 NOTE — PROGRESS NOTES
EPAT - Social Work Psychiatric Assessment    Arrival Details  Mode of Arrival: Ambulance  Admission Source: Nursing home  Admission Type: Voluntary  EPAT Assessment Start Date: 02/01/24  EPAT Assessment Start Time: 0015  Name of : EKTA Sanchez LSW    History of Present Illness  Admission Reason: Agitation  HPI:     Patient is a 64yo male presenting to the ED via EMS from [facility] with chief complaint of agitation. Reportedly, “pt was seen exposing self and masturbating in front of another resident at NH without other resident consent. Pt became very agitated when asked to stop. 7 staff members were required to restrain pt and he did receive Haldol at 2155 mirta”. Patient's chart and triage reviewed, provider note not available. Patient had received Haldol while still at his facility, did not require medications in the emergency department and maintained behavioral control. The patient has a psychiatric history of Schizoaffective D/O, Depression, Anxiety, Cluster B Personality, Mild Neurocognitive D/O, Parkinson's, and hx of Polysubstance involvement. Patient has a significant history of inpatient admissions, often in the context of SI/SA by cutting, and was recently admitted to [facility] 1/6/24 for cutting his wrist at his SNF. During this admission, the patient had to be transferred and admitted medically to address acute medical concerns with psychiatry following. Patient was discharged back to his locked  unit, [facility], on 1/11/24. C-SSRS not completed at triage, UTOX and BAL negative on arrival.     Patient is well-known to this writer, current services, and ED for frequent hospital and medication-seeking behaviors. Patient is known to report SI or SA in order to be psychiatrically admitted & receive medications. The patient is chronically endorsing SI with plan to cut himself at baseline. After being discharged on 1/11/24 for a supposed suicide attempt by cutting, patient's outpatient  documentation on 1/16/24 indicates, “He denies any pain now, no more suicidal ideation or any other new c/o presently. He says he learned his lesson and admits that his self-induced laceration was done to get attention. He says he won't do it again”. A review of patient's inpatient psychiatric documentation indicates patient expressing continued conditional suicidality, “if you discharge me I will jump in front of a car” and consistently benzo-seeking, “patient also presents manipulative by stating 'if I behave will you send me back to [facility] with Xanax?' Patient fixated on getting his Xanax when returning to [facility], asking over and over again and asking if this writer will call the supervisor at the facility and tell them he can have his Xanax”. It was also noted that, “Nurse reported that new policies are in place to prevent patient from being in shower alone and having access to razors”. The patient receives psychiatric services through [facility], resides on a locked behavioral unit, and endorses compliance with medications.     SW Readmission Information   Readmission within 30 Days: Yes  Previous ED Visit Date and Reason : this ED 1/3/24 for supposed suicide attempt  Previous Discharge Date and Location: admitted to [facility] then transferred to medical floor  Factors Contributing to  Readmission Inpatient/ED (Team Perspective): Cognitively Limited  Readmission Factors Team Comments: Frequent hospital-seeking and medication-seeking behaviors  Factors Contributing to Readmission (Patient/Family Perspective): Requesting inpatient admission and Ativan    Psychiatric Symptoms  Anxiety Symptoms: No problems reported or observed.  Depression Symptoms: No problems reported or observed.  Gay Symptoms: No problems reported or observed.    Psychosis Symptoms  Hallucination Type: Auditory  Delusion Type: No problems reported or observed.    Additional Symptoms - Adult  Generalized  Anxiety Disorder: No problems reported or observed.  Obsessive Compulsive Disorder: No problems reported or observed.  Panic Attack: No problems reported or observed.  Post Traumatic Stress Disorder: No problems reported or observed.  Delirium: No problems reported or observed.    Past Psychiatric History/Meds/Treatments  Past Psychiatric History: Psychiatric Diagnosis: Schizoaffective D/O, Anxiety, Depression, Cluster B // Current MH Center: [facility] // Previous Admissions: numerous, most recently  [facility] 1/3/24 then transferred to medical floor with psych consult - d/c 1/11/24  Past Psychiatric Meds/Treatments: see med list, patient is compliant  Past Violence/Victimization History: pt agitated at SNF earlier today, no behavioral outbursts in ED setting    Current Mental Health Contacts   Name/Phone Number: [facility]   Last Appointment Date: residence  Provider Name/Phone Number: [facility]  Provider Last Appointment Date: 1/16/24    Support System: Community    Living Arrangement: Lives with someone (SNF)    Home Safety  Feels Safe Living in Home: Yes    Income Information  Employment Status for: Patient  Employment Status: Disabled  Income Source: Disability    [employer] Service/Education History  Current or Previous  Service: None  Education Level: Less than high school  History of Learning Problems: Yes (per chart)  History of School Behavior Problems: No    Social/Cultural History  Social History: [place] Citizen: Yes // Payee: None // Guardian/POA: pt's guardian is Angel Chelsie ([phone])  Cultural Requests During Hospitalization: none  Spiritual Requests During Hospitalization: none  Important Activities: Social    Legal  Legal Considerations: Patient/ Family Capacity to Make Sound Judgments  Assistance with Managing/Advocating Healthcare Needs: Legal Guardian  Criminal Activity/ Legal Involvement Pertinent to Current Situation/ Hospitalization:  None  Legal Concerns: Denies    Drug Screening  Have you used any substances (canabis, cocaine, heroin, hallucinogens, inhalants, etc.) in the past 12 months?: No  Have you used any prescription drugs other than prescribed in the past 12 months?: No  Is a toxicology screen needed?: Yes    Stage of Change  Stage of Change: Maintenance  History of Treatment:  (Unknown)  Type of Treatment Offered: Individual  Treatment Offered: Resources/education provided  Duration of Substance Use: Unknown  Frequency of Substance Use: hx of polysubstance involvement, remains medication-seeking  Age of First Substance Use: unknown    Psychosocial  Psychosocial (WDL): Within Defined Limits    Orientation  Orientation Level: Disoriented to situation    General Appearance  Motor Activity: Unremarkable  Speech Pattern:  (Unremarkable)  General Attitude: Cooperative  Appearance/Hygiene: Unremarkable    Thought Process  Coherency: [place] thinking  Content: Unremarkable  Delusions:  (None)  Perception: Not altered  Hallucination: Auditory (Reports AH, does not appear to be internally preoccupied)  Judgment/Insight:  (Limited at baseline - has LG)  Confusion: None  Cognition: Appropriate attention/concentration, Follows commands, Appropriate safety awareness    Sleep Pattern  Sleep Pattern: Difficulty falling asleep    Risk Factors  Self Harm/Suicidal Ideation Plan: SI w/ thoughts to cut  Previous Self Harm/Suicidal Plans: hx of cutting  Risk Factors: Major mental illness, Male    Violence Risk Assessment  Assessment of Violence: None noted  Thoughts of Harm to Others: No    Ability to Assess Risk Screen  Risk Screen - Ability to Assess: Able to be screened  [place] Suicide Severity Rating Scale (Screener/Recent Self-Report)  1. Wish to be Dead (Past 1 Month): No  2. Non-Specific Active Suicidal Thoughts (Past 1 Month): Yes  3. Active Suicidal Ideation with any Methods (Not Plan) Without Intent to Act (Past 1 Month): No  4. Active Suicidal  Ideation with Some Intent to Act, Without Specific Plan (Past 1 Month): No  5. Active Suicidal Ideation with Specific Plan and Intent (Past 1 Month): No  6. Suicidal Behavior (Lifetime): Yes  Calculated C-SSRS Risk Score (Lifetime/Recent): Moderate Risk  Step 1: Risk Factors  Current & Past Psychiatric Dx: Psychotic disorder, Mood disorder, Alcohol/substance abuse disorders, Cluster B personality disorders or traits (i.e., borderline, antisocial, histrionic & narcissistic)  Change in Treatment: Recent inpatient discharge  Access to Lethal Methods : No  Step 2: Protective Factors   Protective Factors Internal: Fear of death or the actual act of killing self, Identifies reasons for living  Protective Factors External: Positive therapeutic relationships, Cultural, spiritual and/or moral attitudes against suicide  Step 3: Suicidal Ideation Intensity  Most Severe Suicidal Ideation Identified: passive SI with thoughts to cut  How Many Times Have You Had These Thoughts: Less than once a week  When You Have the Thoughts How Long do They Last : Fleeting - few seconds or minutes  Could/Can You Stop Thinking About Killing Yourself or Wanting to Die if You Want to: Easily able to control thoughts  Are There Things - Anyone or Anything - That Stopped You From Wanting to Die or Acting on: Deterrents definitely stopped you from attempting suicide  What Sort of Reasons Did You Have For Thinking About Wanting to Die or Killing Yourself: Completely to get attention, revenge, or a reaction from others  Total Score: 5  Step 5: Documentation  Risk Level: Moderate suicide risk, Low suicide risk (Patient chronically elevated risk given lifetime history, currently lowered risk. Discussed with Dr. Grant)    Psychiatric Impression and Plan of Care  Assessment and Plan:     Upon assessment the patient remained calm, cooperative, and was A&Ox3. Patient claimed he had no recollection of events leading up to today's encounter, however,  demonstrated no other deficit in short-term, working, or long-term memory throughout. When asked regarding SI, the patient responded “ummm yea now that you say that, I want to cut myself”. No specific plan or intent was elicited and patient denies access to sharps at his facility as a result of new policies since last admission. When asked regarding AH, patient responded similarly, “umm oh yea, there are people yelling” but denied it to be commanding in nature and did not appear to be responding to internal stimuli. The patient further denied HI/VH and no delusions were elicited. Patient remained concrete throughout interview, frequently asking “do you think I can go to a psych unit?”. He denied recent disturbances in sleep, appetite, or ADLs. Patient denies any conflicts at his SNF that could have prompted his outburst earlier today. He reports compliance with all treatment and care at his facility, including compliance with his medications. The patient reports “no changes that I'm aware of” since his recent admission two weeks ago. At completion of interview the patient again requested “can you put me in a psych unit for my anxiety? Can you ask the nurse to give me some Ativan?”. Of note, patient did not demonstrate any symptoms of anxiety or depression throughout. No symptoms of acute psychosis or allen were elicited.     This writer spoke with patient's RN at [facility], [facility]. He explained, “everything had been normal before this happened, he took his medications like he always does, had his first smoke break, and was just hanging out”. Pierce states that they then received report he was “dropping his pants in front of another female resident” and required significant redirection. The patient was then reportedly pushing staff and trying to get into the nursing station to get some popcorn. Patient had been secured and medication by facility staff and then “when EMS got here he just voluntarily got on the  "garciarkelly and immediately said he wasn't feeling well and asked for morphine, which is normal for him”. Pt's RN endorses significant history of narcotic and benzo-seeking behaviors. He reports the patient has been with their facility since 4/2017 and described his baseline as “repetitive, fixated on his medications, concrete in his responses”. He states that patient “did not seem decompensated at all prior to this. He's been at his baseline since he was discharged from the hospital”. Pt's RN states he is able to return to their facility once cleared and expressed understanding of the behavioral nature of this presentation.     Diagnostic Impression: Parkinson's (Mild Neurocognitive D/O), Polysubstance Involvement, Cluster B Personality; hx of Schizoaffective D/O, Anxiety, Depression    Psychiatric Impression and Plan for Care: Patient is presenting at his well-documented psychiatric baseline and resides in a locked behavioral health unit with 24/7 staffing. He chronically reports suicidality with thoughts to cut, however, resides at a secure facility that has taken steps to mitigate this risk. Patient is currently receiving the appropriate level of care at his facility and does not demonstrate an acute decompensation of his psychiatric illnesses. Although a chronically moderate risk given lifetime history, patient is not currently presenting as an acute or elevated risk to self or others, nor appears gravely disabled. Recommendation for discharged back to facility discussed with Dr. Grant who is in agreement.     Specific Resources Provided to Patient: discharge    Outcome/Disposition  Patient's Perception of Outcome Achieved: \"Can you give me Ativan\"  Assessment, Recommendations and Risk Level Reviewed with: Dr. Grant  Contact Name: [facility]  Contact Number(s): [phone]  Contact Relationship: [facility]  EPAT Assessment Completed Date: 02/01/24  EPAT Assessment Completed Time: 0204  Patient Disposition: " Home

## 2024-02-01 NOTE — LETTER
Patient: Gerardo Albright  : 1960    Encounter Date: 2024    PLACE OF SERVICE:  [facility]    This is a subsequent visit.    Subjective  Patient ID: Gerardo Albright is a 63 y.o. male who presents for Follow-up.    Mr. Gerardo Albright is a 63-year-old male with history of dementia with Parkinson's disease.  He suffers from schizophrenia.  He is unable to care for himself.  He requires supportive care.    Review of Systems   Constitutional:  Negative for chills and fever.   Cardiovascular:  Negative for chest pain.   All other systems reviewed and are negative.    Objective  /76   Pulse 74   Temp 36.5 °C (97.7 °F)   Resp 16     Physical Exam  Vitals reviewed.   Constitutional:       General: He is not in acute distress.     Comments: This is a well-developed, well-nourished male sitting in chair,   HENT:      Right Ear: Tympanic membrane, ear canal and external ear normal.      Left Ear: Tympanic membrane, ear canal and external ear normal.   Eyes:      General: No scleral icterus.     Pupils: Pupils are equal, round, and reactive to light.   Neck:      Vascular: No carotid bruit.   Cardiovascular:      Heart sounds: Normal heart sounds, S1 normal and S2 normal. No murmur heard.     No friction rub.   Pulmonary:      Effort: Pulmonary effort is normal.      Breath sounds: Normal breath sounds and air entry.   Abdominal:      Palpations: There is no hepatomegaly, splenomegaly or mass.   Musculoskeletal:         General: No swelling or deformity. Normal range of motion.      Cervical back: Neck supple.      Right lower leg: No edema.      Left lower leg: No edema.   Lymphadenopathy:      Cervical: No cervical adenopathy.      Upper Body:      Right upper body: No axillary adenopathy.      Left upper body: No axillary adenopathy.      Lower Body: No right inguinal adenopathy. No left inguinal adenopathy.   Neurological:      Mental Status: He is alert.      Cranial Nerves: Cranial nerves 2-12  28-Jul-2017 16:53 are intact. No cranial nerve deficit.      Sensory: No sensory deficit.      Motor: Motor function is intact. No weakness.      Gait: Gait is intact.      Deep Tendon Reflexes: Reflexes normal.      Comments: The patient is oriented x2.   Psychiatric:         Mood and Affect: Mood normal. Mood is not anxious or depressed. Affect is not angry.         Behavior: Behavior is not agitated.         Thought Content: Thought content normal.         Judgment: Judgment normal.     LAB WORK: Laboratory studies reviewed.    Assessment/Plan  Problem List Items Addressed This Visit             ICD-10-CM       Advance Directives and General Issues    At risk for falls Z91.81       Mental Health    Other schizophrenia (CMS/HCC) F20.89       Neuro    Other seizures (CMS/HCC) G40.89    Parkinson's disease - Primary G20.A1    Dementia (CMS/HCC) F03.90     Other Visit Diagnoses         Codes    Other type of acute myocardial infarction (CMS/HCC)     I21.A9    Weakness     R53.1    Anemia, unspecified type     D64.9    Essential hypertension     I10    Gastroesophageal reflux disease, unspecified whether esophagitis present     K21.9        1. Parkinson’s disease, on carbidopa and levodopa.  2. Dementia, unchanged.  3. Schizophrenia, on medication.  4. History of AMI, on aspirin.  5. Weakness, on PT/OT.  6. Fall risk, fall precaution.  7. Anemia, follow CBC.  8. Seizure disorder, on antiepileptic.  9. Hypertension, medically controlled.  10. Reflux disease, on PPI.    Scribe Attestation  By signing my name below, Merna SHANNON, Venu attest that this documentation has been prepared under the direction and in the presence of Amparo Galarza MD.       Electronically Signed By: Amparo Galarza MD   2/23/24  6:43 PM

## 2024-02-01 NOTE — ED PROVIDER NOTES
HPI   Chief Complaint   Patient presents with    Agitation     Pt was seen exposing self and masterbating in front of another resident at NH without other resident consent. Pt became very agitated when asked to stop. 7 staff members were required to restrain pt and he did receive Haldol at [address]       63-year-old male with history of catatonic schizophrenia, COPD, dementia, depression, GERD, hypertension, CAD, Parkinson's, and seizures brought to the ED by EMS after they were contacted by patient's nursing facility due to concern of patient exhibiting some brief bout of agitated state and exposing himself to other residents at the facility.  Patient upon arrival to ED was alert, cooperative, appeared comfortable, and in no distress.  Patient denied causing any ruckus or problem at the facility other than being agitated he stated after he was being accused of exposing himself.  Patient currently at this time denies any SI or HI or hallucinations or any physical complaints.      History provided by:  Patient, medical records, EMS personnel and nursing home   used: No                        No data recorded                Patient History   Past Medical History:   Diagnosis Date    At risk for falls     Catatonic schizophrenia (CMS/HCC)     COPD (chronic obstructive pulmonary disease) (CMS/HCC)     Dementia (CMS/HCC)     Depression     GERD (gastroesophageal reflux disease)     Hypertension     Insomnia     Myocardial infarction (CMS/HCC)     Parkinson's disease     Schizophrenia (CMS/HCC)     Seizures (CMS/HCC)     Weakness      History reviewed. No pertinent surgical history.  No family history on file.  Social History     Tobacco Use    Smoking status: Former     Packs/day: .5     Types: Cigarettes     Passive exposure: Past    Smokeless tobacco: Never   Vaping Use    Vaping Use: Never used   Substance Use Topics    Alcohol use: Not Currently    Drug use: Never       Physical Exam   ED Triage  Vitals [01/31/24 2229]   Temperature Heart Rate Respirations BP   37.5 °C (99.5 °F) (!) 113 18 (!) 159/95      Pulse Ox Temp src Heart Rate Source Patient Position   96 % -- -- --      BP Location FiO2 (%)     -- --       Physical Exam  Vitals and nursing note reviewed.   Constitutional:       General: He is not in acute distress.     Appearance: He is well-developed.   HENT:      Head: Normocephalic and atraumatic.   Eyes:      Conjunctiva/sclera: Conjunctivae normal.   Cardiovascular:      Rate and Rhythm: Normal rate and regular rhythm.      Heart sounds: No murmur heard.  Pulmonary:      Effort: Pulmonary effort is normal. No respiratory distress.      Breath sounds: Normal breath sounds.   Abdominal:      Palpations: Abdomen is soft.      Tenderness: There is no abdominal tenderness.   Musculoskeletal:         General: No swelling.      Cervical back: Neck supple.   Skin:     General: Skin is warm and dry.      Capillary Refill: Capillary refill takes less than 2 seconds.   Neurological:      General: No focal deficit present.      Mental Status: He is alert and oriented to person, place, and time.   Psychiatric:         Attention and Perception: Perception normal.         Mood and Affect: Mood is anxious. Affect is flat.         Speech: Speech is tangential.         Behavior: Behavior normal. Behavior is cooperative.         Thought Content: Thought content normal.         Cognition and Memory: Cognition normal.         Judgment: Judgment is impulsive. Judgment is not inappropriate.         ED Course & MDM   Diagnoses as of 02/01/24 0133   Behavior concern in adult   Secondary hypertension     Labs Reviewed   CBC WITH AUTO DIFFERENTIAL - Abnormal       Result Value    WBC 7.4      nRBC 0.0      RBC 4.27 (*)     Hemoglobin 12.9 (*)     Hematocrit 37.7 (*)     MCV 88      MCH 30.2      MCHC 34.2      RDW 13.8      Platelets 273      Neutrophils % 69.7      Immature Granulocytes %, Automated 0.4      Lymphocytes  % 19.2      Monocytes % 8.8      Eosinophils % 1.4      Basophils % 0.5      Neutrophils Absolute 5.13      Immature Granulocytes Absolute, Automated 0.03      Lymphocytes Absolute 1.41      Monocytes Absolute 0.65      Eosinophils Absolute 0.10      Basophils Absolute 0.04     COMPREHENSIVE METABOLIC PANEL - Abnormal    Glucose 163 (*)     Sodium 130 (*)     Potassium 4.6      Chloride 96 (*)     Bicarbonate 22      Anion Gap 17      Urea Nitrogen 10      Creatinine 0.76      eGFR >90      Calcium 9.2      Albumin 4.3      Alkaline Phosphatase 51      Total Protein 7.1      AST 32      Bilirubin, Total 0.5      ALT 15     LACTATE - Abnormal    Lactate 3.8 (*)     Narrative:     Venipuncture immediately after or during the administration of Metamizole may lead to falsely low results. Testing should be performed immediately  prior to Metamizole dosing.   URINALYSIS WITH REFLEX MICROSCOPIC - Abnormal    Color, Urine Straw      Appearance, Urine Clear      Specific Gravity, Urine 1.004 (*)     pH, Urine 6.0      Protein, Urine NEGATIVE      Glucose, Urine NEGATIVE      Blood, Urine NEGATIVE      Ketones, Urine NEGATIVE      Bilirubin, Urine NEGATIVE      Urobilinogen, Urine <2.0      Nitrite, Urine NEGATIVE      Leukocyte Esterase, Urine NEGATIVE     DRUG SCREEN,URINE - Normal    Amphetamine Screen, Urine Presumptive Negative      Barbiturate Screen, Urine Presumptive Negative      Benzodiazepines Screen, Urine Presumptive Negative      Cannabinoid Screen, Urine Presumptive Negative      Cocaine Metabolite Screen, Urine Presumptive Negative      Fentanyl Screen, Urine Presumptive Negative      Opiate Screen, Urine Presumptive Negative      Oxycodone Screen, Urine Presumptive Negative      PCP Screen, Urine Presumptive Negative      Narrative:     Drug screen results are presumptive and should not be used to assess   compliance with prescribed medication. Contact the performing [facility] laboratory   to add-on definitive  confirmatory testing if clinically indicated.    Toxicology screening results are reported qualitatively. The concentration must   be greater than or equal to the cutoff to be reported as positive. The concentration   at which the screening test can detect an individual drug or metabolite varies.   The absence of expected drug(s) and/or drug metabolite(s) may indicate non-compliance,   inappropriate timing of specimen collection relative to drug administration, poor drug   absorption, diluted/adulterated urine, or limitations of testing. For medical purposes   only; not valid for forensic use.    Interpretive questions should be directed to the laboratory medical directors.   ALCOHOL - Normal    Alcohol <10     SARS-COV-2 PCR, SYMPTOMATIC - Normal    Coronavirus 2019, PCR Not Detected      Narrative:     This assay has received FDA Emergency Use Authorization (EUA) and is only authorized for the duration of time that circumstances exist to justify the authorization of the emergency use of in vitro diagnostic tests for the detection of SARS-CoV-2 virus and/or diagnosis of COVID-19 infection under section 564(b)(1) of the Act, 21 U.S.C. 360bbb-3(b)(1). This assay is an in vitro diagnostic nucleic acid amplification test for the qualitative detection of SARS-CoV-2 from nasopharyngeal specimens and has been validated for use at [facility]. Negative results do not preclude COVID-19 infections and should not be used as the sole basis for diagnosis, treatment, or other management decisions.     INFLUENZA A AND B PCR - Normal    Flu A Result Not Detected      Flu B Result Not Detected      Narrative:     This assay is an in vitro diagnostic multiplex nucleic acid amplification test for the detection and discrimination of Influenza A & B from nasopharyngeal specimens, and has been validated for use at [facility]. Negative results do not preclude Influenza A/B infections, and  should not be used as the sole basis for diagnosis, treatment, or other management decisions. If Influenza A/B and RSV PCR results are negative, testing for Parainfluenza virus, Adenovirus and Metapneumovirus is routinely performed for [facility] inpatients, and is available on other patients by placing an add-on request.   LACTATE     2236 -- NSR rate 108.  Normal axis.  Normal intervals.  Nonspecific ST-T changes and signs of ischemia.  Normal EKG with no findings of STEMI. Unchanged compared to old EKG    Medical Decision Making  Patient upon arrival to the ED appeared to be anxious and easily agitated but easily redirected exhibiting no signs of distress with appropriate vital signs and mild hypertension.  Discussed with patient presenting complaints and clinical findings.  Reviewed patient's epic chart and at this time patient was placed under behavioral precautions.  Labs sent, EKG reviewed, and patient monitored.  Throughout stay in the ED easily redirectable exhibiting no signs of threat to himself or others.  Patient lab results were reviewed/discussed and patient was medically cleared.  [facility]AT was notified and patient was evaluated by on-call psychiatry and felt at this time after discussion with nursing home facility the patient was at his baseline and not exhibiting signs of threat to himself or others requiring admission to the hospital for treatment.  It was felt patient was appropriate to return back to the facility and can continue to follow-up with physician for any kind of concerns regarding his mental health.  At this time again patient is stable and discharged back to his facility.    Amount and/or Complexity of Data Reviewed  Independent Historian: EMS  External Data Reviewed: labs, radiology, ECG and notes.  Labs: ordered. Decision-making details documented in ED Course.  ECG/medicine tests: ordered and independent interpretation performed. Decision-making details  documented in ED Course.        Procedure  Procedures     Ender Grant MD  02/01/24 020

## 2024-02-12 VITALS
SYSTOLIC BLOOD PRESSURE: 130 MMHG | HEART RATE: 74 BPM | DIASTOLIC BLOOD PRESSURE: 76 MMHG | TEMPERATURE: 97.7 F | RESPIRATION RATE: 16 BRPM

## 2024-02-12 ASSESSMENT — ENCOUNTER SYMPTOMS
CHILLS: 0
FEVER: 0

## 2024-02-12 NOTE — PROGRESS NOTES
PLACE OF SERVICE:  [facility]    This is a subsequent visit.    Subjective   Patient ID: Gerardo Albright is a 63 y.o. male who presents for Follow-up.    Mr. Gerardo Albright is a 63-year-old male with history of dementia with Parkinson's disease.  He suffers from schizophrenia.  He is unable to care for himself.  He requires supportive care.    Review of Systems   Constitutional:  Negative for chills and fever.   Cardiovascular:  Negative for chest pain.   All other systems reviewed and are negative.    Objective   /76   Pulse 74   Temp 36.5 °C (97.7 °F)   Resp 16     Physical Exam  Vitals reviewed.   Constitutional:       General: He is not in acute distress.     Comments: This is a well-developed, well-nourished male sitting in chair,   HENT:      Right Ear: Tympanic membrane, ear canal and external ear normal.      Left Ear: Tympanic membrane, ear canal and external ear normal.   Eyes:      General: No scleral icterus.     Pupils: Pupils are equal, round, and reactive to light.   Neck:      Vascular: No carotid bruit.   Cardiovascular:      Heart sounds: Normal heart sounds, S1 normal and S2 normal. No murmur heard.     No friction rub.   Pulmonary:      Effort: Pulmonary effort is normal.      Breath sounds: Normal breath sounds and air entry.   Abdominal:      Palpations: There is no hepatomegaly, splenomegaly or mass.   Musculoskeletal:         General: No swelling or deformity. Normal range of motion.      Cervical back: Neck supple.      Right lower leg: No edema.      Left lower leg: No edema.   Lymphadenopathy:      Cervical: No cervical adenopathy.      Upper Body:      Right upper body: No axillary adenopathy.      Left upper body: No axillary adenopathy.      Lower Body: No right inguinal adenopathy. No left inguinal adenopathy.   Neurological:      Mental Status: He is alert.      Cranial Nerves: Cranial nerves 2-12 are intact. No cranial nerve deficit.      Sensory: No sensory  deficit.      Motor: Motor function is intact. No weakness.      Gait: Gait is intact.      Deep Tendon Reflexes: Reflexes normal.      Comments: The patient is oriented x2.   Psychiatric:         Mood and Affect: Mood normal. Mood is not anxious or depressed. Affect is not angry.         Behavior: Behavior is not agitated.         Thought Content: Thought content normal.         Judgment: Judgment normal.     LAB WORK: Laboratory studies reviewed.    Assessment/Plan   Problem List Items Addressed This Visit             ICD-10-CM       Advance Directives and General Issues    At risk for falls Z91.81       Mental Health    Other schizophrenia (CMS/HCC) F20.89       Neuro    Other seizures (CMS/HCC) G40.89    Parkinson's disease - Primary G20.A1    Dementia (CMS/HCC) F03.90     Other Visit Diagnoses         Codes    Other type of acute myocardial infarction (CMS/HCC)     I21.A9    Weakness     R53.1    Anemia, unspecified type     D64.9    Essential hypertension     I10    Gastroesophageal reflux disease, unspecified whether esophagitis present     K21.9        1. Parkinson’s disease, on carbidopa and levodopa.  2. Dementia, unchanged.  3. Schizophrenia, on medication.  4. History of AMI, on aspirin.  5. Weakness, on PT/OT.  6. Fall risk, fall precaution.  7. Anemia, follow CBC.  8. Seizure disorder, on antiepileptic.  9. Hypertension, medically controlled.  10. Reflux disease, on PPI.    Scribe Attestation  By signing my name below, Merna SHANNON Scribe attest that this documentation has been prepared under the direction and in the presence of Amparo Galarza MD.

## 2024-02-14 ENCOUNTER — APPOINTMENT (OUTPATIENT)
Dept: RADIOLOGY | Facility: HOSPITAL | Age: 64
End: 2024-02-14
Payer: COMMERCIAL

## 2024-02-14 ENCOUNTER — HOSPITAL ENCOUNTER (EMERGENCY)
Facility: HOSPITAL | Age: 64
Discharge: HOME | End: 2024-02-15
Attending: STUDENT IN AN ORGANIZED HEALTH CARE EDUCATION/TRAINING PROGRAM
Payer: COMMERCIAL

## 2024-02-14 ENCOUNTER — APPOINTMENT (OUTPATIENT)
Dept: CARDIOLOGY | Facility: HOSPITAL | Age: 64
End: 2024-02-14
Payer: COMMERCIAL

## 2024-02-14 DIAGNOSIS — R45.851 PASSIVE SUICIDAL IDEATIONS: Primary | ICD-10-CM

## 2024-02-14 LAB
ALBUMIN SERPL BCP-MCNC: 4.5 G/DL (ref 3.4–5)
ALP SERPL-CCNC: 68 U/L (ref 33–136)
ALT SERPL W P-5'-P-CCNC: 15 U/L (ref 10–52)
AMMONIA PLAS-SCNC: 24 UMOL/L (ref 16–53)
ANION GAP BLDV CALCULATED.4IONS-SCNC: 6 MMOL/L (ref 10–25)
ANION GAP SERPL CALC-SCNC: 12 MMOL/L (ref 10–20)
APPEARANCE UR: ABNORMAL
AST SERPL W P-5'-P-CCNC: 12 U/L (ref 9–39)
ATRIAL RATE: 88 BPM
BASE EXCESS BLDV CALC-SCNC: 5.5 MMOL/L (ref -2–3)
BASOPHILS # BLD AUTO: 0.03 X10*3/UL (ref 0–0.1)
BASOPHILS NFR BLD AUTO: 0.3 %
BILIRUB SERPL-MCNC: 0.7 MG/DL (ref 0–1.2)
BILIRUB UR STRIP.AUTO-MCNC: NEGATIVE MG/DL
BNP SERPL-MCNC: 12 PG/ML (ref 0–99)
BODY TEMPERATURE: ABNORMAL
BUN SERPL-MCNC: 13 MG/DL (ref 6–23)
CA-I BLDV-SCNC: 1.08 MMOL/L (ref 1.1–1.33)
CALCIUM SERPL-MCNC: 9.6 MG/DL (ref 8.6–10.3)
CARDIAC TROPONIN I PNL SERPL HS: 3 NG/L (ref 0–20)
CHLORIDE BLDV-SCNC: 102 MMOL/L (ref 98–107)
CHLORIDE SERPL-SCNC: 101 MMOL/L (ref 98–107)
CO2 SERPL-SCNC: 27 MMOL/L (ref 21–32)
COLOR UR: YELLOW
CREAT SERPL-MCNC: 0.71 MG/DL (ref 0.5–1.3)
EGFRCR SERPLBLD CKD-EPI 2021: >90 ML/MIN/1.73M*2
EOSINOPHIL # BLD AUTO: 0.03 X10*3/UL (ref 0–0.7)
EOSINOPHIL NFR BLD AUTO: 0.3 %
ERYTHROCYTE [DISTWIDTH] IN BLOOD BY AUTOMATED COUNT: 14 % (ref 11.5–14.5)
FLUAV RNA RESP QL NAA+PROBE: NOT DETECTED
FLUBV RNA RESP QL NAA+PROBE: NOT DETECTED
GLUCOSE BLD MANUAL STRIP-MCNC: 149 MG/DL (ref 74–99)
GLUCOSE BLDV-MCNC: 143 MG/DL (ref 74–99)
GLUCOSE SERPL-MCNC: 142 MG/DL (ref 74–99)
GLUCOSE UR STRIP.AUTO-MCNC: NEGATIVE MG/DL
HCO3 BLDV-SCNC: 28.9 MMOL/L (ref 22–26)
HCT VFR BLD AUTO: 44 % (ref 41–52)
HCT VFR BLD EST: 46 % (ref 41–52)
HGB BLD-MCNC: 14.8 G/DL (ref 13.5–17.5)
HGB BLDV-MCNC: 15.3 G/DL (ref 13.5–17.5)
IMM GRANULOCYTES # BLD AUTO: 0.03 X10*3/UL (ref 0–0.7)
IMM GRANULOCYTES NFR BLD AUTO: 0.3 % (ref 0–0.9)
INHALED O2 CONCENTRATION: 21 %
KETONES UR STRIP.AUTO-MCNC: NEGATIVE MG/DL
LACTATE BLDV-SCNC: 1.2 MMOL/L (ref 0.4–2)
LEUKOCYTE ESTERASE UR QL STRIP.AUTO: NEGATIVE
LYMPHOCYTES # BLD AUTO: 0.84 X10*3/UL (ref 1.2–4.8)
LYMPHOCYTES NFR BLD AUTO: 7.9 %
MAGNESIUM SERPL-MCNC: 2.17 MG/DL (ref 1.6–2.4)
MCH RBC QN AUTO: 29.9 PG (ref 26–34)
MCHC RBC AUTO-ENTMCNC: 33.6 G/DL (ref 32–36)
MCV RBC AUTO: 89 FL (ref 80–100)
MONOCYTES # BLD AUTO: 0.57 X10*3/UL (ref 0.1–1)
MONOCYTES NFR BLD AUTO: 5.3 %
NEUTROPHILS # BLD AUTO: 9.16 X10*3/UL (ref 1.2–7.7)
NEUTROPHILS NFR BLD AUTO: 85.9 %
NITRITE UR QL STRIP.AUTO: NEGATIVE
NRBC BLD-RTO: 0 /100 WBCS (ref 0–0)
OXYHGB MFR BLDV: 89.4 % (ref 45–75)
P AXIS: 67 DEGREES
P OFFSET: 205 MS
P ONSET: 154 MS
PCO2 BLDV: 37 MM HG (ref 41–51)
PH BLDV: 7.5 PH (ref 7.33–7.43)
PH UR STRIP.AUTO: 7 [PH]
PHOSPHATE SERPL-MCNC: 2.8 MG/DL (ref 2.5–4.9)
PLATELET # BLD AUTO: 313 X10*3/UL (ref 150–450)
PO2 BLDV: 81 MM HG (ref 35–45)
POTASSIUM BLDV-SCNC: 4.3 MMOL/L (ref 3.5–5.3)
POTASSIUM SERPL-SCNC: 4 MMOL/L (ref 3.5–5.3)
PR INTERVAL: 144 MS
PROT SERPL-MCNC: 7.3 G/DL (ref 6.4–8.2)
PROT UR STRIP.AUTO-MCNC: NEGATIVE MG/DL
Q ONSET: 226 MS
QRS COUNT: 15 BEATS
QRS DURATION: 92 MS
QT INTERVAL: 378 MS
QTC CALCULATION(BAZETT): 457 MS
QTC FREDERICIA: 429 MS
R AXIS: -5 DEGREES
RBC # BLD AUTO: 4.95 X10*6/UL (ref 4.5–5.9)
RBC # UR STRIP.AUTO: NEGATIVE /UL
SAO2 % BLDV: 98 % (ref 45–75)
SARS-COV-2 RNA RESP QL NAA+PROBE: NOT DETECTED
SODIUM BLDV-SCNC: 133 MMOL/L (ref 136–145)
SODIUM SERPL-SCNC: 136 MMOL/L (ref 136–145)
SP GR UR STRIP.AUTO: 1.01
T AXIS: 60 DEGREES
T OFFSET: 415 MS
UROBILINOGEN UR STRIP.AUTO-MCNC: 2 MG/DL
VENTRICULAR RATE: 88 BPM
WBC # BLD AUTO: 10.7 X10*3/UL (ref 4.4–11.3)

## 2024-02-14 PROCEDURE — 81003 URINALYSIS AUTO W/O SCOPE: CPT | Performed by: STUDENT IN AN ORGANIZED HEALTH CARE EDUCATION/TRAINING PROGRAM

## 2024-02-14 PROCEDURE — 84100 ASSAY OF PHOSPHORUS: CPT | Performed by: STUDENT IN AN ORGANIZED HEALTH CARE EDUCATION/TRAINING PROGRAM

## 2024-02-14 PROCEDURE — 71045 X-RAY EXAM CHEST 1 VIEW: CPT | Mod: FOREIGN READ | Performed by: RADIOLOGY

## 2024-02-14 PROCEDURE — 87636 SARSCOV2 & INF A&B AMP PRB: CPT | Performed by: STUDENT IN AN ORGANIZED HEALTH CARE EDUCATION/TRAINING PROGRAM

## 2024-02-14 PROCEDURE — 36415 COLL VENOUS BLD VENIPUNCTURE: CPT | Performed by: STUDENT IN AN ORGANIZED HEALTH CARE EDUCATION/TRAINING PROGRAM

## 2024-02-14 PROCEDURE — 83735 ASSAY OF MAGNESIUM: CPT | Performed by: STUDENT IN AN ORGANIZED HEALTH CARE EDUCATION/TRAINING PROGRAM

## 2024-02-14 PROCEDURE — 82435 ASSAY OF BLOOD CHLORIDE: CPT | Mod: 59 | Performed by: STUDENT IN AN ORGANIZED HEALTH CARE EDUCATION/TRAINING PROGRAM

## 2024-02-14 PROCEDURE — 71045 X-RAY EXAM CHEST 1 VIEW: CPT

## 2024-02-14 PROCEDURE — 93005 ELECTROCARDIOGRAM TRACING: CPT

## 2024-02-14 PROCEDURE — 82140 ASSAY OF AMMONIA: CPT | Performed by: STUDENT IN AN ORGANIZED HEALTH CARE EDUCATION/TRAINING PROGRAM

## 2024-02-14 PROCEDURE — 70450 CT HEAD/BRAIN W/O DYE: CPT | Performed by: RADIOLOGY

## 2024-02-14 PROCEDURE — 84132 ASSAY OF SERUM POTASSIUM: CPT | Mod: 59 | Performed by: STUDENT IN AN ORGANIZED HEALTH CARE EDUCATION/TRAINING PROGRAM

## 2024-02-14 PROCEDURE — 70450 CT HEAD/BRAIN W/O DYE: CPT

## 2024-02-14 PROCEDURE — 83880 ASSAY OF NATRIURETIC PEPTIDE: CPT | Performed by: STUDENT IN AN ORGANIZED HEALTH CARE EDUCATION/TRAINING PROGRAM

## 2024-02-14 PROCEDURE — 99284 EMERGENCY DEPT VISIT MOD MDM: CPT | Mod: 25 | Performed by: STUDENT IN AN ORGANIZED HEALTH CARE EDUCATION/TRAINING PROGRAM

## 2024-02-14 PROCEDURE — 85025 COMPLETE CBC W/AUTO DIFF WBC: CPT | Performed by: STUDENT IN AN ORGANIZED HEALTH CARE EDUCATION/TRAINING PROGRAM

## 2024-02-14 PROCEDURE — 84484 ASSAY OF TROPONIN QUANT: CPT | Performed by: STUDENT IN AN ORGANIZED HEALTH CARE EDUCATION/TRAINING PROGRAM

## 2024-02-14 PROCEDURE — 99285 EMERGENCY DEPT VISIT HI MDM: CPT | Mod: 25

## 2024-02-14 PROCEDURE — 82947 ASSAY GLUCOSE BLOOD QUANT: CPT | Mod: 59

## 2024-02-14 ASSESSMENT — PAIN - FUNCTIONAL ASSESSMENT: PAIN_FUNCTIONAL_ASSESSMENT: UNABLE TO SELF-REPORT

## 2024-02-14 NOTE — ED NOTES
Spoke to Angelica, the nurse at [facility].  Informed that this patient will be returning to their facility, no  time scheduled yet.     Sada Eduardo RN  02/14/24 8011

## 2024-02-15 VITALS
DIASTOLIC BLOOD PRESSURE: 83 MMHG | BODY MASS INDEX: 24.16 KG/M2 | WEIGHT: 163.14 LBS | HEART RATE: 75 BPM | SYSTOLIC BLOOD PRESSURE: 158 MMHG | TEMPERATURE: 98 F | RESPIRATION RATE: 16 BRPM | HEIGHT: 69 IN | OXYGEN SATURATION: 98 %

## 2024-02-15 LAB — HOLD SPECIMEN: NORMAL

## 2024-02-15 NOTE — ED NOTES
[facility] called and patients new ETA is 0100 per Deidre.     0120: [facility] called for new ETA which is 0500 per Erum.     Clara Kovacs RN  02/15/24 0120

## 2024-02-16 ENCOUNTER — APPOINTMENT (OUTPATIENT)
Dept: CARDIOLOGY | Facility: HOSPITAL | Age: 64
End: 2024-02-16
Payer: COMMERCIAL

## 2024-02-16 ENCOUNTER — HOSPITAL ENCOUNTER (EMERGENCY)
Facility: HOSPITAL | Age: 64
Discharge: OTHER INSTITUTION | End: 2024-02-19
Attending: EMERGENCY MEDICINE
Payer: COMMERCIAL

## 2024-02-16 DIAGNOSIS — T14.91XA SUICIDAL BEHAVIOR WITH ATTEMPTED SELF-INJURY (MULTI): Primary | ICD-10-CM

## 2024-02-16 LAB
ALBUMIN SERPL BCP-MCNC: 4.4 G/DL (ref 3.4–5)
ALP SERPL-CCNC: 75 U/L (ref 33–136)
ALT SERPL W P-5'-P-CCNC: 17 U/L (ref 10–52)
AMPHETAMINES UR QL SCN: NORMAL
ANION GAP SERPL CALC-SCNC: 20 MMOL/L (ref 10–20)
APAP SERPL-MCNC: <10 UG/ML
AST SERPL W P-5'-P-CCNC: 27 U/L (ref 9–39)
BARBITURATES UR QL SCN: NORMAL
BASOPHILS # BLD AUTO: 0.02 X10*3/UL (ref 0–0.1)
BASOPHILS NFR BLD AUTO: 0.2 %
BENZODIAZ UR QL SCN: NORMAL
BILIRUB SERPL-MCNC: 1 MG/DL (ref 0–1.2)
BUN SERPL-MCNC: 22 MG/DL (ref 6–23)
BZE UR QL SCN: NORMAL
CALCIUM SERPL-MCNC: 9.8 MG/DL (ref 8.6–10.3)
CANNABINOIDS UR QL SCN: NORMAL
CHLORIDE SERPL-SCNC: 102 MMOL/L (ref 98–107)
CO2 SERPL-SCNC: 19 MMOL/L (ref 21–32)
CREAT SERPL-MCNC: 0.92 MG/DL (ref 0.5–1.3)
EGFRCR SERPLBLD CKD-EPI 2021: >90 ML/MIN/1.73M*2
EOSINOPHIL # BLD AUTO: 0.03 X10*3/UL (ref 0–0.7)
EOSINOPHIL NFR BLD AUTO: 0.3 %
ERYTHROCYTE [DISTWIDTH] IN BLOOD BY AUTOMATED COUNT: 14 % (ref 11.5–14.5)
ETHANOL SERPL-MCNC: <10 MG/DL
FENTANYL+NORFENTANYL UR QL SCN: NORMAL
FLUAV RNA RESP QL NAA+PROBE: NOT DETECTED
FLUBV RNA RESP QL NAA+PROBE: NOT DETECTED
GLUCOSE SERPL-MCNC: 180 MG/DL (ref 74–99)
HCT VFR BLD AUTO: 48.8 % (ref 41–52)
HGB BLD-MCNC: 16.2 G/DL (ref 13.5–17.5)
IMM GRANULOCYTES # BLD AUTO: 0.03 X10*3/UL (ref 0–0.7)
IMM GRANULOCYTES NFR BLD AUTO: 0.3 % (ref 0–0.9)
LYMPHOCYTES # BLD AUTO: 1.24 X10*3/UL (ref 1.2–4.8)
LYMPHOCYTES NFR BLD AUTO: 12.1 %
MCH RBC QN AUTO: 30.1 PG (ref 26–34)
MCHC RBC AUTO-ENTMCNC: 33.2 G/DL (ref 32–36)
MCV RBC AUTO: 91 FL (ref 80–100)
MONOCYTES # BLD AUTO: 0.69 X10*3/UL (ref 0.1–1)
MONOCYTES NFR BLD AUTO: 6.7 %
NEUTROPHILS # BLD AUTO: 8.25 X10*3/UL (ref 1.2–7.7)
NEUTROPHILS NFR BLD AUTO: 80.4 %
NRBC BLD-RTO: 0 /100 WBCS (ref 0–0)
OPIATES UR QL SCN: NORMAL
OVALOCYTES BLD QL SMEAR: NORMAL
OXYCODONE+OXYMORPHONE UR QL SCN: NORMAL
PCP UR QL SCN: NORMAL
PLATELET # BLD AUTO: 225 X10*3/UL (ref 150–450)
POTASSIUM SERPL-SCNC: 3.7 MMOL/L (ref 3.5–5.3)
PROT SERPL-MCNC: 7.7 G/DL (ref 6.4–8.2)
RBC # BLD AUTO: 5.38 X10*6/UL (ref 4.5–5.9)
RBC MORPH BLD: NORMAL
SALICYLATES SERPL-MCNC: <3 MG/DL
SARS-COV-2 RNA RESP QL NAA+PROBE: NOT DETECTED
SODIUM SERPL-SCNC: 137 MMOL/L (ref 136–145)
WBC # BLD AUTO: 10.3 X10*3/UL (ref 4.4–11.3)

## 2024-02-16 PROCEDURE — 36415 COLL VENOUS BLD VENIPUNCTURE: CPT

## 2024-02-16 PROCEDURE — 85025 COMPLETE CBC W/AUTO DIFF WBC: CPT

## 2024-02-16 PROCEDURE — 84075 ASSAY ALKALINE PHOSPHATASE: CPT

## 2024-02-16 PROCEDURE — 99285 EMERGENCY DEPT VISIT HI MDM: CPT | Mod: 25

## 2024-02-16 PROCEDURE — 93005 ELECTROCARDIOGRAM TRACING: CPT

## 2024-02-16 PROCEDURE — 80307 DRUG TEST PRSMV CHEM ANLYZR: CPT

## 2024-02-16 PROCEDURE — 80320 DRUG SCREEN QUANTALCOHOLS: CPT

## 2024-02-16 PROCEDURE — 87636 SARSCOV2 & INF A&B AMP PRB: CPT

## 2024-02-16 SDOH — HEALTH STABILITY: MENTAL HEALTH: ANXIETY SYMPTOMS: GENERALIZED

## 2024-02-16 SDOH — HEALTH STABILITY: MENTAL HEALTH: IN THE PAST FEW WEEKS, HAVE YOU WISHED YOU WERE DEAD?: YES

## 2024-02-16 SDOH — HEALTH STABILITY: MENTAL HEALTH
DEPRESSION SYMPTOMS: FEELINGS OF WORTHLESSNESS;IMPAIRED CONCENTRATION;ISOLATIVE;INCREASED IRRITABILITY;LOSS OF INTEREST;FEELINGS OF HELPLESSNESS;FEELINGS OF HOPELESSESS;SLEEP DISTURBANCE

## 2024-02-16 SDOH — HEALTH STABILITY: MENTAL HEALTH: HAVE YOU EVER TRIED TO KILL YOURSELF?: YES

## 2024-02-16 SDOH — HEALTH STABILITY: MENTAL HEALTH: ARE YOU HAVING THOUGHTS OF KILLING YOURSELF RIGHT NOW?: YES

## 2024-02-16 SDOH — HEALTH STABILITY: MENTAL HEALTH: IN THE PAST WEEK, HAVE YOU BEEN HAVING THOUGHTS ABOUT KILLING YOURSELF?: YES

## 2024-02-16 SDOH — HEALTH STABILITY: MENTAL HEALTH: WHEN DID YOU TRY TO KILL YOURSELF?: WITHIN THE PAST YEAR

## 2024-02-16 SDOH — ECONOMIC STABILITY: HOUSING INSECURITY: FEELS SAFE LIVING IN HOME: YES

## 2024-02-16 SDOH — HEALTH STABILITY: MENTAL HEALTH: HOW DID YOU TRY TO KILL YOURSELF?: RAZOR

## 2024-02-16 SDOH — HEALTH STABILITY: MENTAL HEALTH: IN THE PAST FEW WEEKS, HAVE YOU FELT THAT YOU OR YOUR FAMILY WOULD BE BETTER OFF IF YOU WERE DEAD?: YES

## 2024-02-16 SDOH — HEALTH STABILITY: MENTAL HEALTH: DESCRIBE YOUR THOUGHTS OF KILLING YOURSELF RIGHT NOW:: THOUGHTS WITHOUT BEING ABLE TO THINK OF A PLAN

## 2024-02-16 ASSESSMENT — COLUMBIA-SUICIDE SEVERITY RATING SCALE - C-SSRS
6. HAVE YOU EVER DONE ANYTHING, STARTED TO DO ANYTHING, OR PREPARED TO DO ANYTHING TO END YOUR LIFE?: YES
2. HAVE YOU ACTUALLY HAD ANY THOUGHTS OF KILLING YOURSELF?: NO
6. HAVE YOU EVER DONE ANYTHING, STARTED TO DO ANYTHING, OR PREPARED TO DO ANYTHING TO END YOUR LIFE?: YES
1. IN THE PAST MONTH, HAVE YOU WISHED YOU WERE DEAD OR WISHED YOU COULD GO TO SLEEP AND NOT WAKE UP?: YES

## 2024-02-16 ASSESSMENT — LIFESTYLE VARIABLES
SUBSTANCE_ABUSE_PAST_12_MONTHS: NO
PRESCIPTION_ABUSE_PAST_12_MONTHS: NO

## 2024-02-16 ASSESSMENT — PAIN SCALES - GENERAL
PAINLEVEL_OUTOF10: 0 - NO PAIN
PAINLEVEL_OUTOF10: 0 - NO PAIN

## 2024-02-16 ASSESSMENT — PAIN - FUNCTIONAL ASSESSMENT: PAIN_FUNCTIONAL_ASSESSMENT: 0-10

## 2024-02-17 LAB
ATRIAL RATE: 76 BPM
P OFFSET: 198 MS
P ONSET: 151 MS
PR INTERVAL: 146 MS
Q ONSET: 224 MS
QRS COUNT: 13 BEATS
QRS DURATION: 98 MS
QT INTERVAL: 382 MS
QTC CALCULATION(BAZETT): 429 MS
QTC FREDERICIA: 413 MS
R AXIS: -8 DEGREES
T AXIS: 1 DEGREES
T OFFSET: 415 MS
VENTRICULAR RATE: 76 BPM

## 2024-02-17 PROCEDURE — 2500000001 HC RX 250 WO HCPCS SELF ADMINISTERED DRUGS (ALT 637 FOR MEDICARE OP): Performed by: EMERGENCY MEDICINE

## 2024-02-17 PROCEDURE — 2500000002 HC RX 250 W HCPCS SELF ADMINISTERED DRUGS (ALT 637 FOR MEDICARE OP, ALT 636 FOR OP/ED): Performed by: EMERGENCY MEDICINE

## 2024-02-17 RX ORDER — BISMUTH SUBSALICYLATE 262 MG
1 TABLET,CHEWABLE ORAL DAILY
Status: DISCONTINUED | OUTPATIENT
Start: 2024-02-18 | End: 2024-02-20 | Stop reason: HOSPADM

## 2024-02-17 RX ORDER — QUETIAPINE FUMARATE 100 MG/1
200 TABLET, FILM COATED ORAL ONCE
Status: DISCONTINUED | OUTPATIENT
Start: 2024-02-17 | End: 2024-02-20 | Stop reason: HOSPADM

## 2024-02-17 RX ORDER — GABAPENTIN 300 MG/1
300 CAPSULE ORAL EVERY 8 HOURS SCHEDULED
Status: DISCONTINUED | OUTPATIENT
Start: 2024-02-17 | End: 2024-02-20 | Stop reason: HOSPADM

## 2024-02-17 RX ORDER — QUETIAPINE FUMARATE 100 MG/1
200 TABLET, FILM COATED ORAL 2 TIMES DAILY
Status: DISCONTINUED | OUTPATIENT
Start: 2024-02-18 | End: 2024-02-20 | Stop reason: HOSPADM

## 2024-02-17 RX ORDER — PANTOPRAZOLE SODIUM 40 MG/1
40 TABLET, DELAYED RELEASE ORAL
Status: DISCONTINUED | OUTPATIENT
Start: 2024-02-18 | End: 2024-02-20 | Stop reason: HOSPADM

## 2024-02-17 RX ORDER — BENZTROPINE MESYLATE 1 MG/1
1 TABLET ORAL 2 TIMES DAILY
Status: DISCONTINUED | OUTPATIENT
Start: 2024-02-17 | End: 2024-02-20 | Stop reason: HOSPADM

## 2024-02-17 RX ORDER — BUPROPION HYDROCHLORIDE 150 MG/1
300 TABLET ORAL DAILY
Status: DISCONTINUED | OUTPATIENT
Start: 2024-02-17 | End: 2024-02-20 | Stop reason: HOSPADM

## 2024-02-17 RX ORDER — QUETIAPINE FUMARATE 25 MG/1
50 TABLET, FILM COATED ORAL NIGHTLY
Status: DISCONTINUED | OUTPATIENT
Start: 2024-02-17 | End: 2024-02-20 | Stop reason: HOSPADM

## 2024-02-17 SDOH — HEALTH STABILITY: MENTAL HEALTH: BEHAVIORS/MOOD: FLAT AFFECT;CALM;WITHDRAWN

## 2024-02-17 SDOH — HEALTH STABILITY: MENTAL HEALTH: BEHAVIORS/MOOD: CALM;FLAT AFFECT;NOT INTERACTIVE;WITHDRAWN

## 2024-02-17 ASSESSMENT — COLUMBIA-SUICIDE SEVERITY RATING SCALE - C-SSRS
1. SINCE LAST CONTACT, HAVE YOU WISHED YOU WERE DEAD OR WISHED YOU COULD GO TO SLEEP AND NOT WAKE UP?: NO
2. HAVE YOU ACTUALLY HAD ANY THOUGHTS OF KILLING YOURSELF?: NO
6. HAVE YOU EVER DONE ANYTHING, STARTED TO DO ANYTHING, OR PREPARED TO DO ANYTHING TO END YOUR LIFE?: NO

## 2024-02-17 ASSESSMENT — PAIN SCALES - GENERAL: PAINLEVEL_OUTOF10: 0 - NO PAIN

## 2024-02-17 NOTE — ED PROVIDER NOTES
"Limitations to history: None  Independent Historians: Family  External Records Reviewed: HIE, OARRS, outpatient notes, inpatient notes, paper charts if needed    History of Present Illness:  Patient is a 63-year-old male presents to ED via EMS for complaints of suicidal ideations.  Patient arrives to ED from [facility].  Reports that he lives on the lockdown behavioral unit.  Patient reports that he was feeling depressed, so he attempted to \"cut his wrists \".  Patient denies any complaints of pain, recent illness.  Patient denies any homicidal ideations.  Patient is alert and oriented x 3 upon examination, in no acute distress.      Denies HA, C/P, SOB, ABD pain, Nausea, Vomiting, Diarrhea, Weakness, Dizziness, Fever, Chills.    PMFSH:   As per HPI, otherwise nurses notes reviewed in EMR    Physical Exam:  Appearance: Alert, oriented x3, supine on exam table with head elevated, cooperative, in no acute distress. Well nourished & well hydrated.      Skin: Intact, dry skin, no lesions, rash, petechiae or purpura.     Eyes: PERRLA, EOMs intact, Conjunctiva pink with no redness or exudates. No scleral icterus.     Ears: Hearing grossly intact.      Nose: Nares patent, no epistaxis.     Mouth: Dentition without concerning abnormalities. no obstruction of posterior pharynx.     Neck: Supple, without meningismus. Trachea at midline.     Pulmonary: Clear bilaterally with good chest wall excursion. No rales, rhonchi or wheezing. No accessory muscle use or stridor. Talking in full sentences.     Cardiac: Normal S1, S2 without murmur, rub, gallop or extrasystole.     Abdomen: Soft, nontender to light and deep palpation to all quadrants, normoactive bowel sounds.  No palpable organomegaly.  No rebound or guarding.     Genitourinary: Physical exam deferred.     Musculoskeletal: Normal gait. Full range of motion to all extremities. Rest of the exam reveals no pain on palpation, instability, or deformity. Pulses full and " equal. No cyanosis or clubbing. capillary refill <2 seconds to all examined digits.     Neurological:  Cranial nerves II through XII are grossly intact, normal sensation, no weakness, no focal findings identified.      Psychiatric: Flat affect.  Appropriate mood.      Labs Reviewed   CBC WITH AUTO DIFFERENTIAL - Abnormal       Result Value    WBC 10.3      nRBC 0.0      RBC 5.38      Hemoglobin 16.2      Hematocrit 48.8      MCV 91      MCH 30.1      MCHC 33.2      RDW 14.0      Platelets 225      Neutrophils % 80.4      Immature Granulocytes %, Automated 0.3      Lymphocytes % 12.1      Monocytes % 6.7      Eosinophils % 0.3      Basophils % 0.2      Neutrophils Absolute 8.25 (*)     Immature Granulocytes Absolute, Automated 0.03      Lymphocytes Absolute 1.24      Monocytes Absolute 0.69      Eosinophils Absolute 0.03      Basophils Absolute 0.02     COMPREHENSIVE METABOLIC PANEL - Abnormal    Glucose 180 (*)     Sodium 137      Potassium 3.7      Chloride 102      Bicarbonate 19 (*)     Anion Gap 20      Urea Nitrogen 22      Creatinine 0.92      eGFR >90      Calcium 9.8      Albumin 4.4      Alkaline Phosphatase 75      Total Protein 7.7      AST 27      Bilirubin, Total 1.0      ALT 17     DRUG SCREEN,URINE - Normal    Amphetamine Screen, Urine Presumptive Negative      Barbiturate Screen, Urine Presumptive Negative      Benzodiazepines Screen, Urine Presumptive Negative      Cannabinoid Screen, Urine Presumptive Negative      Cocaine Metabolite Screen, Urine Presumptive Negative      Fentanyl Screen, Urine Presumptive Negative      Opiate Screen, Urine Presumptive Negative      Oxycodone Screen, Urine Presumptive Negative      PCP Screen, Urine Presumptive Negative      Narrative:     Drug screen results are presumptive and should not be used to assess   compliance with prescribed medication. Contact the performing [facility] laboratory   to add-on definitive confirmatory testing if clinically  indicated.    Toxicology screening results are reported qualitatively. The concentration must   be greater than or equal to the cutoff to be reported as positive. The concentration   at which the screening test can detect an individual drug or metabolite varies.   The absence of expected drug(s) and/or drug metabolite(s) may indicate non-compliance,   inappropriate timing of specimen collection relative to drug administration, poor drug   absorption, diluted/adulterated urine, or limitations of testing. For medical purposes   only; not valid for forensic use.    Interpretive questions should be directed to the laboratory medical directors.   ACUTE TOXICOLOGY PANEL, BLOOD - Normal    Acetaminophen <10.0      Salicylate  <3      Alcohol <10     SARS-COV-2 AND INFLUENZA A/B PCR - Normal    Flu A Result Not Detected      Flu B Result Not Detected      Coronavirus 2019, PCR Not Detected      Narrative:     This assay has received FDA Emergency Use Authorization (EUA) and  is only authorized for the duration of time that circumstances exist to justify the authorization of the emergency use of in vitro diagnostic tests for the detection of SARS-CoV-2 virus and/or diagnosis of COVID-19 infection under section 564(b)(1) of the Act, 21 U.S.C. 360bbb-3(b)(1). Testing for SARS-CoV-2 is only recommended for patients who meet current clinical and/or epidemiological criteria as defined by federal, state, or local public health directives. This assay is an in vitro diagnostic nucleic acid amplification test for the qualitative detection of SARS-CoV-2, Influenza A, and Influenza B from nasopharyngeal specimens and has been validated for use at [facility]. Negative results do not preclude COVID-19 infections or Influenza A/B infections, and should not be used as the sole basis for diagnosis, treatment, or other management decisions. If Influenza A/B and RSV PCR results are negative, testing for Parainfluenza  virus, Adenovirus and Metapneumovirus is routinely performed for [facility] pediatric oncology and intensive care inpatients, and is available on other patients by placing an add-on request.    MORPHOLOGY    RBC Morphology See Below      Ovalocytes Few        No orders to display        Repeat Evaluation below    Summary:  Medical Decision Making:   Patient presented as described in HPI. Patient case including ROS, PE, and treatment and plan discussed with ED attending if attached as cosigner. Due to patients presentation orders completed include as documented.  Patient evaluated for complaints of suicidal ideations.  Patient is from [facility].  Patient was calm and cooperative with staff while in ED.  Patient reports suicidal ideations, reports he wanted to cut his wrist earlier due to depression.  Patient was medically cleared and EPAT evaluated patient.  EPAT reports that they recommend placement at this time due to patient's bizarre behavior and suicidal ideations.  All Case findings also discussed with ED attending Dr. Hopkins.      Tests/Medications/Escalations of Care considered but not given:    Patient care discussed with: N/A  Social Determinants affecting care: N/A    Final diagnosis and disposition as documented in impression         Disposition:       Hospitalize to _ floor under DrJoceline _ per their orders. Discussed findings and treatment done here in ED with admitting physician. It would be a risk to discharge the patient in their condition due to possibility of deterioration in their condition and the need for urgent interventions.    This note has been transcribed using voice recognition and may contain grammatical errors, misplaced words, incorrect words, incorrect phrases or other errors.     VIVIANE Mishra-DANA  02/16/24 9078       VIVIANE Mishra-DANA  02/16/24 2069

## 2024-02-17 NOTE — ED PROVIDER NOTES
HPI   Chief Complaint   Patient presents with    Suicidal     Pt. Brought in by [facility] for trying to strangle himself at the nursing home.  Per patient, he states he doesn't feel good so he wanted to kill himself.  Pt. C/o Headache, sore throat, N/V       When I signed out this morning the patient was still waiting for EPAT to find an inpatient location for this gentleman.  He is still here this evening waiting for placement.                        Ren Coma Scale Score: 14                     Patient History   Past Medical History:   Diagnosis Date    At risk for falls     Catatonic schizophrenia (CMS/HCC)     COPD (chronic obstructive pulmonary disease) (CMS/HCC)     Dementia (CMS/HCC)     Depression     GERD (gastroesophageal reflux disease)     Hypertension     Insomnia     Myocardial infarction (CMS/HCC)     Parkinson's disease     Schizophrenia (CMS/HCC)     Seizures (CMS/[facility])     Weakness      History reviewed. No pertinent surgical history.  No family history on file.  Social History     Tobacco Use    Smoking status: Former     Packs/day: .5     Types: Cigarettes     Passive exposure: Past    Smokeless tobacco: Never   Vaping Use    Vaping Use: Never used   Substance Use Topics    Alcohol use: Not Currently    Drug use: Never       Physical Exam   ED Triage Vitals [02/16/24 1755]   Temperature Heart Rate Respirations BP   37 °C (98.6 °F) (!) 122 18 137/89      Pulse Ox Temp Source Heart Rate Source Patient Position   94 % Temporal Monitor Sitting      BP Location FiO2 (%)     Left arm --       Physical Exam    ED Course & MDM   Diagnoses as of 02/17/24 1851   Suicidal behavior with attempted self-injury (CMS/[facility])       Medical Decision Making      Procedure  Procedures     Mihir Suero MD  02/16/24 2715       Mihir Suero MD  02/17/24 1851
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
## 2024-02-18 LAB
ATRIAL RATE: 116 BPM
P AXIS: 50 DEGREES
P OFFSET: 195 MS
P ONSET: 146 MS
PR INTERVAL: 136 MS
Q ONSET: 214 MS
QRS COUNT: 19 BEATS
QRS DURATION: 88 MS
QT INTERVAL: 356 MS
QTC CALCULATION(BAZETT): 494 MS
QTC FREDERICIA: 443 MS
R AXIS: -24 DEGREES
T AXIS: 57 DEGREES
T OFFSET: 392 MS
VENTRICULAR RATE: 116 BPM

## 2024-02-18 SDOH — HEALTH STABILITY: MENTAL HEALTH: BEHAVIORS/MOOD: FLAT AFFECT;CALM

## 2024-02-18 SDOH — SOCIAL STABILITY: SOCIAL INSECURITY: FAMILY BEHAVIORS: FLAT AFFECT;NON-COMPLIANT

## 2024-02-18 SDOH — HEALTH STABILITY: MENTAL HEALTH: BEHAVIORS/MOOD: CALM;FLAT AFFECT

## 2024-02-18 SDOH — HEALTH STABILITY: MENTAL HEALTH: BEHAVIORS/MOOD: CALM;WITHDRAWN;FLAT AFFECT

## 2024-02-18 ASSESSMENT — PAIN SCALES - GENERAL: PAINLEVEL_OUTOF10: 0 - NO PAIN

## 2024-02-19 VITALS
HEART RATE: 75 BPM | DIASTOLIC BLOOD PRESSURE: 90 MMHG | HEIGHT: 69 IN | WEIGHT: 164.24 LBS | TEMPERATURE: 98 F | RESPIRATION RATE: 16 BRPM | OXYGEN SATURATION: 100 % | SYSTOLIC BLOOD PRESSURE: 156 MMHG | BODY MASS INDEX: 24.33 KG/M2

## 2024-02-19 NOTE — ED NOTES
Pt awake at this time. RN at bedside to assess. Aox 2. Pt suddenly stopped answering questions, refusing medications, vital signs, and assessment. Breakfast at bedside, pt refusing to eat. Drank milk. Physician aware.     Estefany Ambrocio RN  02/19/24 0901

## 2024-02-19 NOTE — ED NOTES
[facility] called for an update on the patient, there is still no bed at this time.      Clara Kovacs RN  02/19/24 0211

## 2024-02-19 NOTE — ED NOTES
[facility] called for an update on bed, they state that we are still waiting on a bed and they will call back with any updates.      Clara Kovacs RN  02/18/24 5499

## 2024-02-20 NOTE — SIGNIFICANT EVENT
Application for Emergency Admission      Ready for Transfer?  Is the patient medically cleared for transfer to inpatient psychiatry: Yes  Has the patient been accepted to an inpatient psychiatric hospital: Yes    Application for Emergency Admission  IN ACCORDANCE WITH SECTION 5122.10 [place]  The Chief Clinical Officer of: [facility]  2/19/2024 .8:12 PM  Dr Bledsoe accepting to the Behavioral Health at [facility]    Reason for Hospitalization  The undersigned has reason to believe that: Gerardo Albright Is a mentally ill person subject to hospitalization by court order under division B Section 5122.01 of the Revised Code, i.e., this person:    1.Yes  Represents a substantial risk of physical harm to self as manifested by evidence of threats of, or attempts at, suicide or serious self-inflicted bodily harm    2.Yes Represents a substantial risk of physical harm to others as manifested by evidence of recent homicidal or other violent behavior, evidence of recent threats that place another in reasonable fear of violent behavior and serious physical harm, or other evidence of present dangerousness    3.Yes Represents a substantial and immediate risk of serious physical impairment or injury to self as manifested by  evidence that the person is unable to provide for and is not providing for the person's basic physical needs because of the person's mental illness and that appropriate provision for those needs cannot be made  immediately available in the community    4.Yes Would benefit from treatment in a hospital for his mental illness and is in need of such treatment as manifested by evidence of behavior that creates a grave and imminent risk to substantial rights of others or  himself.    5.Yes Would benefit from treatment as manifested by evidence of behavior that indicates all of the following:       (a) The person is unlikely to survive safely in the community without supervision, based on a clinical  determination.       (b) The person has a history of lack of compliance with treatment for mental illness and one of the following applies:      (i) At least twice within the thirty-six months prior to the filing of an affidavit seeking court-ordered treatment of the person under section 5122.111 of the Revised Code, the lack of compliance has been a significant factor in necessitating hospitalization in a hospital or receipt of services in a forensic or other mental health unit of a correctional facility, provided that the thirty-six-month period shall be extended by the length of any hospitalization or incarceration of the person that occurred within the thirty-six-month period.      (ii) Within the forty-eight months prior to the filing of an affidavit seeking court-ordered treatment of the person under section 5122.111 of the Revised Code, the lack of compliance resulted in one or more acts of serious violent behavior toward self or others or threats of, or attempts at, serious physical harm to self or others, provided that the forty-eight-month period shall be extended by the length of any hospitalization or incarceration of the person that occurred within the forty-eight-month period.      (c) The person, as a result of mental illness, is unlikely to voluntarily participate in necessary treatment.       (d) In view of the person's treatment history and current behavior, the person is in need of treatment in order to prevent a relapse or deterioration that would be likely to result in substantial risk of serious harm to the person or others.    (e) Represents a substantial risk of physical harm to self or others if allowed to remain at liberty pending examination.    Therefore, it is requested that said person be admitted to the above named facility.    STATEMENT OF BELIEF    Must be filled out by one of the following: a psychiatrist, licensed physician, licensed clinical psychologist, health or ,   or .  (Statement shall include the circumstances under which the individual was taken into custody and the reason for the person's belief that hospitalization is necessary. The statement shall also include a reference to efforts made to secure the individual's property at his residence if he was taken into custody there. Every reasonable and appropriate effort should be made to take this person into custody in the least conspicuous manner possible.)    Assessment and Plan: DIAGNOSTIC IMPRESSION based upon previous and current evaluative information: 1) Schizoaffective Disorder, Depressive type 2) Generalized Anxiety Disorder 3) Mild Neurocognitive Disorder 4) Cluster B Personality Traits 5) Tobacco Use Disorder  ASSESSMENT NOTES: The collateral interview with his nurse Pierce assisted with description of the patient's decompensation by describing the recent differences in presentation that indicate lower than baseline presentations. Examples:. He said the patient's interactive baselne until about 2 weeks ago would normally be to follow the nursing staff around the [facility] and ask repetetiver questions, but has been quiet over the past couple weeks and talks to no one. Refusing medication over the past week is very unusual because smoking priviliges are taken for patients that refuse prescripbed medication and this patient normally smokes 1/2 pack daily but did not care. This incident of choking self today is unprecidented. His impulsivity of attempting to go over the glass a week ago, as gissel. SI references have increased. Besides missing some oral meds, he missed a prescriberd monthly anti-psychotic due 5 days ago d/t unavailability.  AGITATION Assessment: Is patient presenting as agitated? No.   Patient would benefit from urgent hospitalization and stabilization of his psychiatric disorder.     Gabino Osorio, DO 2/19/2024     _____________________________________________________________    Place of Employment: [facility]    STATEMENT OF OBSERVATION BY PSYCHIATRIST, LICENSED PHYSICIAN, OR LICENSED CLINICAL PSYCHOLOGIST, IF APPLICABLE    Place of Observation (e.g., community mental health center, general hospital, office, emergency facility)  (If applicable, please complete)    Gabino Osorio,  2/19/2024    _____________________________________________________________

## 2024-02-20 NOTE — PROGRESS NOTES
Emergency Medicine Transition of Care Note.    I received Gerardo Albright in signout from Dr. KARLA Vital.  Please see the previous ED provider note for all HPI, PE and MDM up to the time of signout at 1500. This is in addition to the primary record.    In brief Gerardo Albright is an 63 y.o. male presenting for   Chief Complaint   Patient presents with    Suicidal     Pt. Brought in by [facility] for trying to strangle himself at the nursing home.  Per patient, he states he doesn't feel good so he wanted to kill himself.  Pt. C/o Headache, sore throat, N/V     At the time of signout we were awaiting: PSYCHIATRIC ACCEPTANCE/PLACEMENT AT [facility]    Patient has been calm and cooperative here.    Diagnoses as of 02/19/24 2016   Suicidal behavior with attempted self-injury (CMS/HCC)       Medical Decision Making  Patient has been cooperative here this afternoon.  Patient has been accepted in transfer to [facility] to their behavioral health unit.          Final diagnoses:   [T14.91XA] Suicidal behavior with attempted self-injury (CMS/HCC)           Procedure  Procedures    Gabino Osorio DO

## 2024-02-26 NOTE — ED PROVIDER NOTES
Chief Complaint: Suicidal ideation   HPI: This is a 63-year-old male, past medical history significant for catatonic schizophrenia, COPD, dementia, depression, hypertension, presenting to the emergency department by EMS from his nursing home, which is a lockdown psych facility, for evaluation of suicidal ideation which he told the psych nurse practitioner today during evaluation. Further history is limited as the patient is not responding to any of my questions.    Past Medical History:   Diagnosis Date    At risk for falls     Catatonic schizophrenia (CMS/[facility])     COPD (chronic obstructive pulmonary disease) (CMS/[facility])     Dementia (CMS/[facility])     Depression     GERD (gastroesophageal reflux disease)     Hypertension     Insomnia     Myocardial infarction (CMS/HCC)     Parkinson's disease     Schizophrenia (CMS/HCC)     Seizures (CMS/[facility])     Weakness       No past surgical history on file.    Physical Exam  Vitals and nursing note reviewed.   Constitutional:       Appearance: Normal appearance.      Comments: Eyes are open, and moving freely, however the patient is not following commands, and has not verbally responding   HENT:      Head: Normocephalic and atraumatic.      Mouth/Throat:      Mouth: Mucous membranes are moist.   Eyes:      Extraocular Movements: Extraocular movements intact.   Cardiovascular:      Rate and Rhythm: Normal rate.   Pulmonary:      Effort: Pulmonary effort is normal.   Abdominal:      General: Abdomen is flat.      Palpations: Abdomen is soft.   Musculoskeletal:         General: Normal range of motion.   Skin:     General: Skin is warm.   Neurological:      General: No focal deficit present.      Mental Status: He is alert.   Psychiatric:         Mood and Affect: Mood normal.            ED Course/MDM  Diagnoses as of 02/26/24 1350   Passive suicidal ideations     EKG interpreted by myself (ED attending physician): Normal sinus rhythm, rate of 88, left axis deviation, normal intervals, no  ST segment changes, nonischemic EKG.    This is a 63 y.o. male presenting to the ED for evaluation of suicidal ideation which she told the psych nurse practitioner today during his evaluation.  [facility]  On arrival, the patient has eyes open and his eyes are moving, however he is not responding to verbal or painful stimuli.  I did reach out to the nursing home, who states that the patient does have episodes of this secondary to his known catatonic schizophrenia.  Further initial history is limited secondary to patient mental status.  On physical exam, the patient again does not respond to verbal or painful stimuli, however eyes are open and he appears to be in no acute distress.  Heart is regular rate and rhythm, lungs are clear to auscultation bilaterally.  Given the patient's poor responsiveness, lab work was obtained and was concerning for a mild respiratory alkalosis, however was otherwise grossly unremarkable.  CT head was also obtained and was nonconcerning for any acute intracranial pathology.  Chest x-ray is nonconcerning for any acute cardiopulmonary pathology.  On reevaluation, the patient is awake, alert, and answering questions.  He states that he does have some thoughts of harming himself, however does not have any plans.  As the patient is already on a lockdown psych unit, I do feel that the patient is safe and stable for discharge home for continued care at the facility.  He he was advised that he can return at any point to the emergency department for any worsening symptoms and was ultimately discharged home in stable condition.    Final Impression  1.  Passive SI  Disposition/Plan: Discharge home  Condition at disposition: Stable.     Mikala Wesley DO  Emergency Medicine Physician     Mikala Wesley,   02/26/24 0497

## 2024-03-04 ENCOUNTER — NURSING HOME VISIT (OUTPATIENT)
Dept: POST ACUTE CARE | Facility: EXTERNAL LOCATION | Age: 64
End: 2024-03-04
Payer: COMMERCIAL

## 2024-03-04 DIAGNOSIS — F25.9 SCHIZOAFFECTIVE DISORDER, UNSPECIFIED TYPE (MULTI): ICD-10-CM

## 2024-03-04 DIAGNOSIS — F20.89 OTHER SCHIZOPHRENIA (MULTI): ICD-10-CM

## 2024-03-04 DIAGNOSIS — Z91.81 AT RISK FOR FALLS: ICD-10-CM

## 2024-03-04 DIAGNOSIS — G20.A1 PARKINSON'S DISEASE WITHOUT DYSKINESIA, UNSPECIFIED WHETHER MANIFESTATIONS FLUCTUATE (MULTI): Primary | ICD-10-CM

## 2024-03-04 DIAGNOSIS — R53.1 WEAKNESS: ICD-10-CM

## 2024-03-04 DIAGNOSIS — J44.9 CHRONIC OBSTRUCTIVE PULMONARY DISEASE, UNSPECIFIED COPD TYPE (MULTI): ICD-10-CM

## 2024-03-04 DIAGNOSIS — I10 ESSENTIAL HYPERTENSION: ICD-10-CM

## 2024-03-04 DIAGNOSIS — F32.89 OTHER DEPRESSION: ICD-10-CM

## 2024-03-04 DIAGNOSIS — I21.9 ACUTE MYOCARDIAL INFARCTION, UNSPECIFIED MI TYPE, UNSPECIFIED ARTERY (MULTI): ICD-10-CM

## 2024-03-04 DIAGNOSIS — G40.89 OTHER SEIZURES (MULTI): ICD-10-CM

## 2024-03-04 PROCEDURE — 99308 SBSQ NF CARE LOW MDM 20: CPT | Performed by: INTERNAL MEDICINE

## 2024-03-04 NOTE — LETTER
Patient: Gerardo Albright  : 1960    Encounter Date: 2024    PLACE OF SERVICE:  [facility] [place].    This is a subsequent visit.    Subjective  Patient ID: Gerardo Albright is a 63 y.o. male who presents for Follow-up.    Mr. Gerardo Albright is a 63-year-old male with history of Parkinson disease with seizures.  He suffers from schizophrenia.  He is unable to care for himself and requires supportive care.    Review of Systems   Constitutional:  Negative for chills and fever.   Cardiovascular:  Negative for chest pain.   All other systems reviewed and are negative.    Objective  /82   Pulse 78   Temp 36.7 °C (98.1 °F)   Resp 18     Physical Exam  Vitals reviewed.   Constitutional:       General: He is not in acute distress.     Comments: This is a well-developed, well-nourished male, sitting in a chair.   HENT:      Right Ear: Tympanic membrane, ear canal and external ear normal.      Left Ear: Tympanic membrane, ear canal and external ear normal.   Eyes:      General: No scleral icterus.     Pupils: Pupils are equal, round, and reactive to light.   Neck:      Vascular: No carotid bruit.   Cardiovascular:      Heart sounds: Normal heart sounds, S1 normal and S2 normal. No murmur heard.     No friction rub.   Pulmonary:      Effort: Pulmonary effort is normal.      Breath sounds: Wheezing (rare scattered) present.   Abdominal:      Palpations: There is no hepatomegaly, splenomegaly or mass.   Musculoskeletal:         General: No swelling or deformity. Normal range of motion.      Cervical back: Neck supple.      Right lower leg: No edema.      Left lower leg: No edema.   Lymphadenopathy:      Cervical: No cervical adenopathy.      Upper Body:      Right upper body: No axillary adenopathy.      Left upper body: No axillary adenopathy.      Lower Body: No right inguinal adenopathy. No left inguinal adenopathy.   Neurological:      Mental Status: He is oriented to person, place, and time.       Cranial Nerves: Cranial nerves 2-12 are intact. No cranial nerve deficit.      Sensory: No sensory deficit.      Motor: Motor function is intact. No weakness.      Gait: Gait is intact.      Deep Tendon Reflexes: Reflexes normal.   Psychiatric:         Mood and Affect: Mood normal. Mood is not anxious or depressed. Affect is not angry.         Behavior: Behavior is not agitated.         Thought Content: Thought content normal.         Judgment: Judgment normal.     LAB WORK: Laboratory studies were reviewed.    Assessment/Plan  Problem List Items Addressed This Visit             ICD-10-CM       Advance Directives and General Issues    At risk for falls Z91.81       Mental Health    Other schizophrenia (CMS/HCC) F20.89    Depression F32.A       Neuro    Other seizures (CMS/HCC) G40.89    Parkinson's disease - Primary G20.A1       Pulmonary and Pneumonias    Chronic obstructive pulmonary disease (CMS/HCC) J44.9     Other Visit Diagnoses         Codes    Acute myocardial infarction, unspecified MI type, unspecified artery (CMS/HCC)     I21.9    Weakness     R53.1    Schizoaffective disorder, unspecified type (CMS/HCC)     F25.9    Essential hypertension     I10        1. Parkinson’s disease, on carbidopa and levodopa.  2. Schizophrenia, on medication.  3. COPD, on bronchodilator therapy.  4. History of AMI, on aspirin.  5. Weakness, on PT/OT.  6. Fall risk, on fall precaution.  7. Schizoeffective disorder, on medication.  8. Seizure disorder, on antiepileptic.  9. Hypertension, med controlled  10. Depression, on medication.    Scribe Attestation  By signing my name below, I, Venu Garza attest that this documentation has been prepared under the direction and in the presence of Amparo Galarza MD.       All medical record entries made by the scribe were personally dictated by me I have reviewed the chart and agree the record accurately reflects my personal performance of his history physical examination and  management      Electronically Signed By: Amparo Galarza MD   3/12/24 11:13 PM

## 2024-03-06 NOTE — CONSULTS
Referral Information:  Consult requested by (Attending Name): Cedric Coello MD   Reason: SI, psychosis     History of Present Illness:   Admission Reason: J18.9, A41.9, J96.91, J44.1   HPI:    See assessment       Past Psychiatric History:    Inpatient and outpatient   Past Psychiatric Meds/Treatments/ECT: See chart         Social History:   Smoking Status: unable to assess  (1)   Alcohol Use: history of abuse (1)   Drug Use: denies  (1)   Occupation: Currently unemployed (1)   Social History:    Pt is   Wife Briana Albright lives at [facility] somewhere around [place], [place]  Identify wife as the main support  No children as per chart review  Mother is   and father abandoned him when was young        (1)           Allergies:  ·  No Known Allergies :     Medications Prior to Admission:     Multiple Vitamins with Minerals oral tablet: 1 tab(s) orally once a day for vitamin supplimentation.   gabapentin 600 mg oral tablet: 1 tab(s) orally 3 times a day  Remeron 30 mg oral tablet: 1 tab(s) orally once a day (at bedtime)  Norvasc 10 mg oral tablet: 1 tab(s) orally once a day  naltrexone 50 mg oral tablet: 1 tab(s) orally once a day  Perseris 90 mg subcutaneous injection, extended release: 1 milligram(s) subcutaneous once a month  Sodium Chloride 1 g oral tablet: 1 tab(s) orally once a day  Zoloft 100 mg oral tablet: 1 tab(s) orally once a day  Depakote  mg oral tablet, extended release: 1 tab(s) orally 2 times a day  pantoprazole 40 mg oral delayed release tablet: 1 tab(s) orally 2 times a day  QUEtiapine 50 mg oral tablet: 1 tab(s) orally once a day (at bedtime)  QUEtiapine 200 mg oral tablet: 1 tab(s) orally 2 times a day  buPROPion 300 mg/24 hours (XL) oral tablet, extended release: 1 tab(s) orally every 24 hours  benztropine 1 mg oral tablet: 1 tab(s) orally every 12 hours for EPS prophylaxis (muscle stiffness).   Ranexa 500 mg oral tablet, extended release: 1 tab(s) orally 2 times a day  for chronic angina.  aspirin 81 mg oral tablet, chewable: 1 tab(s) orally once a day for atherosclerosis.  acetaminophen 325 mg oral tablet: 2 tab(s) orally every 4 hours, As needed, Pain - Mild (1-7) or fever.    OARRS Review:  OARRS checked: N/A     Objective:     Objective Information:    See chart    Functional Estimates:  Estimate of Intelligence: average   Estimate of Capacity for Activities of Daily Living:  requires assistance     Assessment/Recommendations:   Psychiatric Risk Assessment:  Violence Risk Assessment: command hallucinations,  homicidal ideation, lower socioeconomic class, major mental illness, male, past history of violence, persecutory delusions, substance abuse, victim of physical or sexual abuse   Acute Risk of Harm to Others is Considered: moderate   Suicide Risk Assessment: , chronic medical  illness, chronic pain, command hallucinations, current psychiatric illness, feelings of hopelessness, history of trauma or abuse, life crisis (shame/despair), male, panic attacks, prior suicide attempt, severe anxiety, substance abuse, suicidal behaviors,  suicidal ideations, plans, behaviors   Protective Factors against Suicide: hopefulness /  future orientation, marriage / partnership, positive family relationships, Mosque affiliation / spirituality, sense of responsibility toward family   Acute Risk of Harm to Self is Considered: moderate     Assessment:    A&Ox1-2, cooperative, sitting in bed. Affect flat, eye contact fair. Limited range of emotions Speech slowed, slurred, disorganized, with word salad at times. Thoughts  nonlinear and tangential. Poor STM, LTM, attention, concentration. Poor insight, judgement, decisions. Evidences delusional thinking and command hallucinations telling him to hurt himself and others. Later retracts this statement. States he is also suffering  from Parkinson's symptoms. Alludes to suicidal plans but later retracts this statement. Staff report multiple  "lacerations on his arms. Has had several prior suicide attempts. Acknowledges audio-visual hallucinations and worsening depression as a result  of these. Denies any substance abuse history, though previous records reveal an alcohol abuse history.    Born and raised in [place] and was homeless/runaway, as well as incarcerated as a child for sexual offending. [facility] he never had any formal schooling. [facility] he has a long history of mental health problems and suffered severe trauma as a  child. [facility] he has been  for about 12 years and that his wife resides somewhere in [place]. He also has a grandmother down [address] whom he is in contact with. [facility] he would like to leave his behavioral health nursing home and go to a group home  where he lived previously. [facility] he is willing to have an extended hospital stay to reduce his symptoms.       Diagnostic Impressions and Recommendations:    F25.1 Schizoaffective disorder, depressive type    F41.9 Unspecified anxiety disorder    F10.99 Unspecified alcohol-related disorder    R41.9 Unspecified neurocognitive disorder    R45.851 Suicidal ideation     Z91.5 Personal history of self-harm    Recommend inpatient psychiatric transfer for rapid stabilization and assessment, with outpatient mental health follow-up at [facility] facility.       Electronic Signatures:  Marisol Hurt (PhD)  (Signed 22-Sep-2023 14:15)   Authored: Referral Information, History of Present Illness,  Past Psychiatric History, Social History, Allergies, Medications Prior to Admission, Objective, Assessment/Recommendations, Note Completion      Last Updated: 22-Sep-2023 14:15 by Marisol Hurt (PhD)    References:  1.  Data Referenced From \"History and Physical\" 21-Sep-2023 20:34   " impairments found
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
## 2024-03-08 ENCOUNTER — APPOINTMENT (OUTPATIENT)
Dept: CARDIOLOGY | Facility: HOSPITAL | Age: 64
End: 2024-03-08
Payer: COMMERCIAL

## 2024-03-08 ENCOUNTER — HOSPITAL ENCOUNTER (EMERGENCY)
Facility: HOSPITAL | Age: 64
Discharge: ANOTHER HOSPITAL | End: 2024-03-11
Payer: COMMERCIAL

## 2024-03-08 DIAGNOSIS — T14.91XA SUICIDAL BEHAVIOR WITH ATTEMPTED SELF-INJURY (MULTI): ICD-10-CM

## 2024-03-08 DIAGNOSIS — S51.812A LACERATION OF LEFT FOREARM, INITIAL ENCOUNTER: Primary | ICD-10-CM

## 2024-03-08 LAB
ALBUMIN SERPL BCP-MCNC: 3.6 G/DL (ref 3.4–5)
ALP SERPL-CCNC: 62 U/L (ref 33–136)
ALT SERPL W P-5'-P-CCNC: 17 U/L (ref 10–52)
AMPHETAMINES UR QL SCN: NORMAL
ANION GAP SERPL CALC-SCNC: 11 MMOL/L (ref 10–20)
APAP SERPL-MCNC: <10 UG/ML
APPEARANCE UR: CLEAR
AST SERPL W P-5'-P-CCNC: 12 U/L (ref 9–39)
BARBITURATES UR QL SCN: NORMAL
BASOPHILS # BLD AUTO: 0.04 X10*3/UL (ref 0–0.1)
BASOPHILS NFR BLD AUTO: 0.6 %
BENZODIAZ UR QL SCN: NORMAL
BILIRUB SERPL-MCNC: 0.2 MG/DL (ref 0–1.2)
BILIRUB UR STRIP.AUTO-MCNC: NEGATIVE MG/DL
BNP SERPL-MCNC: 33 PG/ML (ref 0–99)
BUN SERPL-MCNC: 15 MG/DL (ref 6–23)
BZE UR QL SCN: NORMAL
CALCIUM SERPL-MCNC: 9.1 MG/DL (ref 8.6–10.3)
CANNABINOIDS UR QL SCN: NORMAL
CARDIAC TROPONIN I PNL SERPL HS: 6 NG/L (ref 0–20)
CHLORIDE SERPL-SCNC: 104 MMOL/L (ref 98–107)
CO2 SERPL-SCNC: 27 MMOL/L (ref 21–32)
COLOR UR: NORMAL
CREAT SERPL-MCNC: 0.59 MG/DL (ref 0.5–1.3)
EGFRCR SERPLBLD CKD-EPI 2021: >90 ML/MIN/1.73M*2
EOSINOPHIL # BLD AUTO: 0.11 X10*3/UL (ref 0–0.7)
EOSINOPHIL NFR BLD AUTO: 1.6 %
ERYTHROCYTE [DISTWIDTH] IN BLOOD BY AUTOMATED COUNT: 13.6 % (ref 11.5–14.5)
ETHANOL SERPL-MCNC: <10 MG/DL
FENTANYL+NORFENTANYL UR QL SCN: NORMAL
FLUAV RNA RESP QL NAA+PROBE: NOT DETECTED
FLUBV RNA RESP QL NAA+PROBE: NOT DETECTED
GLUCOSE SERPL-MCNC: 118 MG/DL (ref 74–99)
GLUCOSE UR STRIP.AUTO-MCNC: NEGATIVE MG/DL
HCT VFR BLD AUTO: 39.1 % (ref 41–52)
HGB BLD-MCNC: 13 G/DL (ref 13.5–17.5)
IMM GRANULOCYTES # BLD AUTO: 0.02 X10*3/UL (ref 0–0.7)
IMM GRANULOCYTES NFR BLD AUTO: 0.3 % (ref 0–0.9)
KETONES UR STRIP.AUTO-MCNC: NEGATIVE MG/DL
LEUKOCYTE ESTERASE UR QL STRIP.AUTO: NEGATIVE
LYMPHOCYTES # BLD AUTO: 1.48 X10*3/UL (ref 1.2–4.8)
LYMPHOCYTES NFR BLD AUTO: 22.2 %
MCH RBC QN AUTO: 30 PG (ref 26–34)
MCHC RBC AUTO-ENTMCNC: 33.2 G/DL (ref 32–36)
MCV RBC AUTO: 90 FL (ref 80–100)
METHADONE UR QL SCN: NORMAL
MONOCYTES # BLD AUTO: 0.6 X10*3/UL (ref 0.1–1)
MONOCYTES NFR BLD AUTO: 9 %
NEUTROPHILS # BLD AUTO: 4.43 X10*3/UL (ref 1.2–7.7)
NEUTROPHILS NFR BLD AUTO: 66.3 %
NITRITE UR QL STRIP.AUTO: NEGATIVE
NRBC BLD-RTO: 0 /100 WBCS (ref 0–0)
OPIATES UR QL SCN: NORMAL
OVALOCYTES BLD QL SMEAR: NORMAL
OXYCODONE+OXYMORPHONE UR QL SCN: NORMAL
PCP UR QL SCN: NORMAL
PH UR STRIP.AUTO: 6 [PH]
PLATELET # BLD AUTO: 350 X10*3/UL (ref 150–450)
POTASSIUM SERPL-SCNC: 3.9 MMOL/L (ref 3.5–5.3)
PROT SERPL-MCNC: 6.4 G/DL (ref 6.4–8.2)
PROT UR STRIP.AUTO-MCNC: NEGATIVE MG/DL
RBC # BLD AUTO: 4.33 X10*6/UL (ref 4.5–5.9)
RBC # UR STRIP.AUTO: NEGATIVE /UL
RBC MORPH BLD: NORMAL
SALICYLATES SERPL-MCNC: <3 MG/DL
SARS-COV-2 RNA RESP QL NAA+PROBE: NOT DETECTED
SODIUM SERPL-SCNC: 138 MMOL/L (ref 136–145)
SP GR UR STRIP.AUTO: 1.01
UROBILINOGEN UR STRIP.AUTO-MCNC: <2 MG/DL
WBC # BLD AUTO: 6.7 X10*3/UL (ref 4.4–11.3)

## 2024-03-08 PROCEDURE — 2500000001 HC RX 250 WO HCPCS SELF ADMINISTERED DRUGS (ALT 637 FOR MEDICARE OP)

## 2024-03-08 PROCEDURE — 85025 COMPLETE CBC W/AUTO DIFF WBC: CPT | Performed by: PHYSICIAN ASSISTANT

## 2024-03-08 PROCEDURE — 99285 EMERGENCY DEPT VISIT HI MDM: CPT | Mod: 25

## 2024-03-08 PROCEDURE — 84484 ASSAY OF TROPONIN QUANT: CPT | Performed by: PHYSICIAN ASSISTANT

## 2024-03-08 PROCEDURE — 80179 DRUG ASSAY SALICYLATE: CPT | Performed by: PHYSICIAN ASSISTANT

## 2024-03-08 PROCEDURE — 84075 ASSAY ALKALINE PHOSPHATASE: CPT | Performed by: PHYSICIAN ASSISTANT

## 2024-03-08 PROCEDURE — 90715 TDAP VACCINE 7 YRS/> IM: CPT | Performed by: PHYSICIAN ASSISTANT

## 2024-03-08 PROCEDURE — 80307 DRUG TEST PRSMV CHEM ANLYZR: CPT | Performed by: PHYSICIAN ASSISTANT

## 2024-03-08 PROCEDURE — 80143 DRUG ASSAY ACETAMINOPHEN: CPT | Performed by: PHYSICIAN ASSISTANT

## 2024-03-08 PROCEDURE — 83880 ASSAY OF NATRIURETIC PEPTIDE: CPT | Performed by: PHYSICIAN ASSISTANT

## 2024-03-08 PROCEDURE — 87636 SARSCOV2 & INF A&B AMP PRB: CPT | Performed by: PHYSICIAN ASSISTANT

## 2024-03-08 PROCEDURE — 93005 ELECTROCARDIOGRAM TRACING: CPT | Mod: 59

## 2024-03-08 PROCEDURE — 36415 COLL VENOUS BLD VENIPUNCTURE: CPT | Performed by: PHYSICIAN ASSISTANT

## 2024-03-08 PROCEDURE — 2500000004 HC RX 250 GENERAL PHARMACY W/ HCPCS (ALT 636 FOR OP/ED): Performed by: PHYSICIAN ASSISTANT

## 2024-03-08 PROCEDURE — 82077 ASSAY SPEC XCP UR&BREATH IA: CPT | Performed by: FAMILY MEDICINE

## 2024-03-08 PROCEDURE — 81003 URINALYSIS AUTO W/O SCOPE: CPT | Mod: 59 | Performed by: PHYSICIAN ASSISTANT

## 2024-03-08 PROCEDURE — 90471 IMMUNIZATION ADMIN: CPT | Performed by: PHYSICIAN ASSISTANT

## 2024-03-08 PROCEDURE — 12002 RPR S/N/AX/GEN/TRNK2.6-7.5CM: CPT | Performed by: PHYSICIAN ASSISTANT

## 2024-03-08 RX ORDER — LORAZEPAM 0.5 MG/1
1 TABLET ORAL ONCE
Status: COMPLETED | OUTPATIENT
Start: 2024-03-08 | End: 2024-03-08

## 2024-03-08 RX ORDER — ACETAMINOPHEN 325 MG/1
975 TABLET ORAL ONCE
Status: COMPLETED | OUTPATIENT
Start: 2024-03-08 | End: 2024-03-08

## 2024-03-08 RX ORDER — LORAZEPAM 0.5 MG/1
TABLET ORAL
Status: COMPLETED
Start: 2024-03-08 | End: 2024-03-08

## 2024-03-08 RX ADMIN — ACETAMINOPHEN 975 MG: 325 TABLET ORAL at 20:20

## 2024-03-08 RX ADMIN — LORAZEPAM 1 MG: 0.5 TABLET ORAL at 20:28

## 2024-03-08 RX ADMIN — TETANUS TOXOID, REDUCED DIPHTHERIA TOXOID AND ACELLULAR PERTUSSIS VACCINE, ADSORBED 0.5 ML: 5; 2.5; 8; 8; 2.5 SUSPENSION INTRAMUSCULAR at 20:21

## 2024-03-08 SDOH — HEALTH STABILITY: MENTAL HEALTH: SUICIDAL BEHAVIOR (LIFETIME): YES

## 2024-03-08 SDOH — HEALTH STABILITY: MENTAL HEALTH: HAVE YOU EVER DONE ANYTHING, STARTED TO DO ANYTHING, OR PREPARED TO DO ANYTHING TO END YOUR LIFE?: YES

## 2024-03-08 SDOH — HEALTH STABILITY: MENTAL HEALTH: DEPRESSION SYMPTOMS: CHANGE IN ENERGY LEVEL;LOSS OF INTEREST;SLEEP DISTURBANCE

## 2024-03-08 SDOH — HEALTH STABILITY: MENTAL HEALTH: ARE YOU HAVING THOUGHTS OF KILLING YOURSELF RIGHT NOW?: YES

## 2024-03-08 SDOH — HEALTH STABILITY: MENTAL HEALTH: CONTENT: UNREMARKABLE

## 2024-03-08 SDOH — HEALTH STABILITY: MENTAL HEALTH: DELUSIONS: CONTROLLED

## 2024-03-08 SDOH — HEALTH STABILITY: MENTAL HEALTH: IN THE PAST FEW WEEKS, HAVE YOU WISHED YOU WERE DEAD?: YES

## 2024-03-08 SDOH — HEALTH STABILITY: MENTAL HEALTH: SUICIDAL BEHAVIOR (DESCRIPTION): SA VIA CUTS ON THE WRISTS

## 2024-03-08 SDOH — HEALTH STABILITY: MENTAL HEALTH: WAS THIS WITHIN THE PAST THREE MONTHS?: NO

## 2024-03-08 SDOH — HEALTH STABILITY: MENTAL HEALTH: HAVE YOU BEEN THINKING ABOUT HOW YOU MIGHT DO THIS?: YES

## 2024-03-08 SDOH — HEALTH STABILITY: MENTAL HEALTH: DESCRIBE YOUR THOUGHTS OF KILLING YOURSELF RIGHT NOW:: CUTTING

## 2024-03-08 SDOH — HEALTH STABILITY: MENTAL HEALTH: HALLUCINATION: AUDITORY

## 2024-03-08 SDOH — HEALTH STABILITY: MENTAL HEALTH: BEHAVIORS/MOOD: COOPERATIVE;PARANOID

## 2024-03-08 SDOH — HEALTH STABILITY: MENTAL HEALTH: HOW DID YOU TRY TO KILL YOURSELF?: SA VIA CUTS ON THE WRISTS

## 2024-03-08 SDOH — HEALTH STABILITY: MENTAL HEALTH: IN THE PAST FEW WEEKS, HAVE YOU FELT THAT YOU OR YOUR FAMILY WOULD BE BETTER OFF IF YOU WERE DEAD?: YES

## 2024-03-08 SDOH — HEALTH STABILITY: MENTAL HEALTH: WISH TO BE DEAD (PAST 1 MONTH): YES

## 2024-03-08 SDOH — HEALTH STABILITY: MENTAL HEALTH: SUICIDE ASSESSMENT: ADULT (C-SSRS)

## 2024-03-08 SDOH — HEALTH STABILITY: MENTAL HEALTH: SUICIDAL BEHAVIOR (3 MONTHS): YES

## 2024-03-08 SDOH — HEALTH STABILITY: MENTAL HEALTH: HAVE YOU HAD THESE THOUGHTS AND HAD SOME INTENTION OF ACTING ON THEM?: YES

## 2024-03-08 SDOH — HEALTH STABILITY: MENTAL HEALTH: ACTIVE SUICIDAL IDEATION WITH SPECIFIC PLAN AND INTENT (PAST 1 MONTH): YES

## 2024-03-08 SDOH — HEALTH STABILITY: MENTAL HEALTH: WHEN DID YOU TRY TO KILL YOURSELF?: TODAY

## 2024-03-08 SDOH — HEALTH STABILITY: MENTAL HEALTH: NON-SPECIFIC ACTIVE SUICIDAL THOUGHTS (PAST 1 MONTH): YES

## 2024-03-08 SDOH — HEALTH STABILITY: MENTAL HEALTH: SLEEP PATTERN: DISTURBED/INTERRUPTED SLEEP

## 2024-03-08 SDOH — ECONOMIC STABILITY: HOUSING INSECURITY: FEELS SAFE LIVING IN HOME: YES

## 2024-03-08 SDOH — HEALTH STABILITY: MENTAL HEALTH: IN THE PAST WEEK, HAVE YOU BEEN HAVING THOUGHTS ABOUT KILLING YOURSELF?: YES

## 2024-03-08 SDOH — HEALTH STABILITY: MENTAL HEALTH: HAVE YOU EVER TRIED TO KILL YOURSELF?: YES

## 2024-03-08 SDOH — HEALTH STABILITY: MENTAL HEALTH: HAVE YOU ACTUALLY HAD ANY THOUGHTS OF KILLING YOURSELF?: YES

## 2024-03-08 SDOH — HEALTH STABILITY: MENTAL HEALTH: HAVE YOU WISHED YOU WERE DEAD OR WISHED YOU COULD GO TO SLEEP AND NOT WAKE UP?: YES

## 2024-03-08 SDOH — HEALTH STABILITY: MENTAL HEALTH: ACTIVE SUICIDAL IDEATION WITH SOME INTENT TO ACT, WITHOUT SPECIFIC PLAN (PAST 1 MONTH): YES

## 2024-03-08 SDOH — HEALTH STABILITY: MENTAL HEALTH: ANXIETY SYMPTOMS: NO PROBLEMS REPORTED OR OBSERVED.

## 2024-03-08 ASSESSMENT — COLUMBIA-SUICIDE SEVERITY RATING SCALE - C-SSRS
5. HAVE YOU STARTED TO WORK OUT OR WORKED OUT THE DETAILS OF HOW TO KILL YOURSELF? DO YOU INTEND TO CARRY OUT THIS PLAN?: YES
6. HAVE YOU EVER DONE ANYTHING, STARTED TO DO ANYTHING, OR PREPARED TO DO ANYTHING TO END YOUR LIFE?: YES
2. HAVE YOU ACTUALLY HAD ANY THOUGHTS OF KILLING YOURSELF?: YES
1. IN THE PAST MONTH, HAVE YOU WISHED YOU WERE DEAD OR WISHED YOU COULD GO TO SLEEP AND NOT WAKE UP?: YES
4. HAVE YOU HAD THESE THOUGHTS AND HAD SOME INTENTION OF ACTING ON THEM?: YES
6. HAVE YOU EVER DONE ANYTHING, STARTED TO DO ANYTHING, OR PREPARED TO DO ANYTHING TO END YOUR LIFE?: YES

## 2024-03-08 ASSESSMENT — PAIN SCALES - GENERAL: PAINLEVEL_OUTOF10: 6

## 2024-03-08 ASSESSMENT — PAIN - FUNCTIONAL ASSESSMENT: PAIN_FUNCTIONAL_ASSESSMENT: 0-10

## 2024-03-08 ASSESSMENT — PAIN DESCRIPTION - LOCATION: LOCATION: ARM

## 2024-03-08 ASSESSMENT — LIFESTYLE VARIABLES
PRESCIPTION_ABUSE_PAST_12_MONTHS: NO
SUBSTANCE_ABUSE_PAST_12_MONTHS: NO

## 2024-03-08 ASSESSMENT — PAIN DESCRIPTION - ORIENTATION: ORIENTATION: LEFT

## 2024-03-09 ENCOUNTER — APPOINTMENT (OUTPATIENT)
Dept: CARDIOLOGY | Facility: HOSPITAL | Age: 64
End: 2024-03-09
Payer: COMMERCIAL

## 2024-03-09 VITALS
RESPIRATION RATE: 18 BRPM | TEMPERATURE: 98.1 F | DIASTOLIC BLOOD PRESSURE: 82 MMHG | HEART RATE: 78 BPM | SYSTOLIC BLOOD PRESSURE: 130 MMHG

## 2024-03-09 PROCEDURE — 2500000001 HC RX 250 WO HCPCS SELF ADMINISTERED DRUGS (ALT 637 FOR MEDICARE OP): Performed by: FAMILY MEDICINE

## 2024-03-09 PROCEDURE — 2500000004 HC RX 250 GENERAL PHARMACY W/ HCPCS (ALT 636 FOR OP/ED)

## 2024-03-09 PROCEDURE — 96372 THER/PROPH/DIAG INJ SC/IM: CPT

## 2024-03-09 PROCEDURE — 2500000001 HC RX 250 WO HCPCS SELF ADMINISTERED DRUGS (ALT 637 FOR MEDICARE OP): Performed by: STUDENT IN AN ORGANIZED HEALTH CARE EDUCATION/TRAINING PROGRAM

## 2024-03-09 PROCEDURE — 93005 ELECTROCARDIOGRAM TRACING: CPT

## 2024-03-09 PROCEDURE — 2500000002 HC RX 250 W HCPCS SELF ADMINISTERED DRUGS (ALT 637 FOR MEDICARE OP, ALT 636 FOR OP/ED): Performed by: STUDENT IN AN ORGANIZED HEALTH CARE EDUCATION/TRAINING PROGRAM

## 2024-03-09 RX ORDER — GABAPENTIN 600 MG/1
600 TABLET ORAL 3 TIMES DAILY
COMMUNITY
End: 2024-04-24 | Stop reason: HOSPADM

## 2024-03-09 RX ORDER — GABAPENTIN 300 MG/1
600 CAPSULE ORAL EVERY 8 HOURS SCHEDULED
Status: DISCONTINUED | OUTPATIENT
Start: 2024-03-09 | End: 2024-03-11 | Stop reason: HOSPADM

## 2024-03-09 RX ORDER — LORAZEPAM 1 MG/1
1 TABLET ORAL 2 TIMES DAILY
COMMUNITY
End: 2024-04-24 | Stop reason: HOSPADM

## 2024-03-09 RX ORDER — HYDROXYZINE HYDROCHLORIDE 25 MG/1
25 TABLET, FILM COATED ORAL 3 TIMES DAILY
Status: DISCONTINUED | OUTPATIENT
Start: 2024-03-09 | End: 2024-03-11 | Stop reason: HOSPADM

## 2024-03-09 RX ORDER — NALTREXONE HYDROCHLORIDE 50 MG/1
50 TABLET, FILM COATED ORAL DAILY
Status: DISCONTINUED | OUTPATIENT
Start: 2024-03-09 | End: 2024-03-11 | Stop reason: HOSPADM

## 2024-03-09 RX ORDER — ACETAMINOPHEN 325 MG/1
975 TABLET ORAL ONCE
Status: COMPLETED | OUTPATIENT
Start: 2024-03-09 | End: 2024-03-09

## 2024-03-09 RX ORDER — ACETAMINOPHEN 500 MG
5 TABLET ORAL NIGHTLY
Status: DISCONTINUED | OUTPATIENT
Start: 2024-03-09 | End: 2024-03-11 | Stop reason: HOSPADM

## 2024-03-09 RX ORDER — KETOROLAC TROMETHAMINE 15 MG/ML
INJECTION, SOLUTION INTRAMUSCULAR; INTRAVENOUS
Status: COMPLETED
Start: 2024-03-09 | End: 2024-03-09

## 2024-03-09 RX ORDER — KETOROLAC TROMETHAMINE 15 MG/ML
15 INJECTION, SOLUTION INTRAMUSCULAR; INTRAVENOUS ONCE
Status: COMPLETED | OUTPATIENT
Start: 2024-03-09 | End: 2024-03-09

## 2024-03-09 RX ORDER — ALUMINUM HYDROXIDE, MAGNESIUM HYDROXIDE, AND SIMETHICONE 1200; 120; 1200 MG/30ML; MG/30ML; MG/30ML
5 SUSPENSION ORAL ONCE
Status: COMPLETED | OUTPATIENT
Start: 2024-03-09 | End: 2024-03-09

## 2024-03-09 RX ORDER — LORAZEPAM 0.5 MG/1
1 TABLET ORAL ONCE
Status: COMPLETED | OUTPATIENT
Start: 2024-03-09 | End: 2024-03-09

## 2024-03-09 RX ORDER — LORAZEPAM 0.5 MG/1
1 TABLET ORAL 2 TIMES DAILY
Status: DISCONTINUED | OUTPATIENT
Start: 2024-03-09 | End: 2024-03-11 | Stop reason: HOSPADM

## 2024-03-09 RX ORDER — NAPROXEN SODIUM 220 MG/1
81 TABLET, FILM COATED ORAL DAILY
Status: DISCONTINUED | OUTPATIENT
Start: 2024-03-09 | End: 2024-03-11 | Stop reason: HOSPADM

## 2024-03-09 RX ORDER — QUETIAPINE FUMARATE 100 MG/1
250 TABLET, FILM COATED ORAL NIGHTLY
Status: DISCONTINUED | OUTPATIENT
Start: 2024-03-09 | End: 2024-03-11 | Stop reason: HOSPADM

## 2024-03-09 RX ORDER — HYDROXYZINE HYDROCHLORIDE 25 MG/1
25 TABLET, FILM COATED ORAL 3 TIMES DAILY
COMMUNITY
End: 2024-04-24 | Stop reason: HOSPADM

## 2024-03-09 RX ADMIN — Medication 5 MG: at 22:22

## 2024-03-09 RX ADMIN — GABAPENTIN 600 MG: 300 CAPSULE ORAL at 11:36

## 2024-03-09 RX ADMIN — ALUMINUM HYDROXIDE, MAGNESIUM HYDROXIDE, AND SIMETHICONE 5 ML: 200; 200; 20 SUSPENSION ORAL at 01:41

## 2024-03-09 RX ADMIN — QUETIAPINE FUMARATE 250 MG: 100 TABLET ORAL at 22:22

## 2024-03-09 RX ADMIN — HYDROXYZINE HYDROCHLORIDE 25 MG: 25 TABLET, FILM COATED ORAL at 09:30

## 2024-03-09 RX ADMIN — LORAZEPAM 1 MG: 0.5 TABLET ORAL at 22:21

## 2024-03-09 RX ADMIN — LORAZEPAM 1 MG: 0.5 TABLET ORAL at 15:41

## 2024-03-09 RX ADMIN — GABAPENTIN 600 MG: 300 CAPSULE ORAL at 22:23

## 2024-03-09 RX ADMIN — HYDROXYZINE HYDROCHLORIDE 25 MG: 25 TABLET, FILM COATED ORAL at 15:41

## 2024-03-09 RX ADMIN — HYDROXYZINE HYDROCHLORIDE 25 MG: 25 TABLET, FILM COATED ORAL at 22:23

## 2024-03-09 RX ADMIN — GABAPENTIN 600 MG: 300 CAPSULE ORAL at 15:41

## 2024-03-09 RX ADMIN — KETOROLAC TROMETHAMINE 15 MG: 15 INJECTION INTRAMUSCULAR; INTRAVENOUS at 16:20

## 2024-03-09 RX ADMIN — ASPIRIN 81 MG CHEWABLE TABLET 81 MG: 81 TABLET CHEWABLE at 09:30

## 2024-03-09 RX ADMIN — LORAZEPAM 1 MG: 0.5 TABLET ORAL at 09:30

## 2024-03-09 RX ADMIN — ACETAMINOPHEN 975 MG: 325 TABLET ORAL at 22:22

## 2024-03-09 RX ADMIN — KETOROLAC TROMETHAMINE 15 MG: 15 INJECTION, SOLUTION INTRAMUSCULAR; INTRAVENOUS at 16:20

## 2024-03-09 RX ADMIN — NALTREXONE HYDROCHLORIDE 50 MG: 50 TABLET, FILM COATED ORAL at 11:36

## 2024-03-09 SDOH — HEALTH STABILITY: MENTAL HEALTH: HAVE YOU WISHED YOU WERE DEAD OR WISHED YOU COULD GO TO SLEEP AND NOT WAKE UP?: YES

## 2024-03-09 SDOH — HEALTH STABILITY: MENTAL HEALTH: NEEDS EXPRESSED: DENIES

## 2024-03-09 SDOH — HEALTH STABILITY: MENTAL HEALTH: BEHAVIORS/MOOD: SLEEPING

## 2024-03-09 SDOH — HEALTH STABILITY: MENTAL HEALTH: BEHAVIORS/MOOD: COOPERATIVE;APPROPRIATE FOR SITUATION

## 2024-03-09 SDOH — HEALTH STABILITY: MENTAL HEALTH: DELUSIONS: CONTROLLED

## 2024-03-09 SDOH — HEALTH STABILITY: MENTAL HEALTH: HAVE YOU EVER DONE ANYTHING, STARTED TO DO ANYTHING, OR PREPARED TO DO ANYTHING TO END YOUR LIFE?: YES

## 2024-03-09 SDOH — HEALTH STABILITY: MENTAL HEALTH: BEHAVIORS/MOOD: COOPERATIVE;CALM

## 2024-03-09 SDOH — HEALTH STABILITY: MENTAL HEALTH: SUICIDE ASSESSMENT: ADULT (C-SSRS)

## 2024-03-09 SDOH — HEALTH STABILITY: MENTAL HEALTH: HAVE YOU HAD THESE THOUGHTS AND HAD SOME INTENTION OF ACTING ON THEM?: YES

## 2024-03-09 SDOH — SOCIAL STABILITY: SOCIAL NETWORK: EMOTIONAL SUPPORT GIVEN: REASSURE

## 2024-03-09 SDOH — HEALTH STABILITY: MENTAL HEALTH: BEHAVIORS/MOOD: CALM;COOPERATIVE

## 2024-03-09 SDOH — HEALTH STABILITY: MENTAL HEALTH: RISK OF SUICIDE: HIGH RISK

## 2024-03-09 SDOH — HEALTH STABILITY: MENTAL HEALTH: SLEEP PATTERN: DISTURBED/INTERRUPTED SLEEP

## 2024-03-09 SDOH — HEALTH STABILITY: MENTAL HEALTH: CONTENT: UNREMARKABLE

## 2024-03-09 SDOH — HEALTH STABILITY: MENTAL HEALTH: HAVE YOU BEEN THINKING ABOUT HOW YOU MIGHT DO THIS?: YES

## 2024-03-09 SDOH — HEALTH STABILITY: MENTAL HEALTH: WAS THIS WITHIN THE PAST THREE MONTHS?: NO

## 2024-03-09 SDOH — HEALTH STABILITY: MENTAL HEALTH: HALLUCINATION: AUDITORY

## 2024-03-09 SDOH — HEALTH STABILITY: MENTAL HEALTH: HAVE YOU ACTUALLY HAD ANY THOUGHTS OF KILLING YOURSELF?: YES

## 2024-03-09 ASSESSMENT — ENCOUNTER SYMPTOMS
FEVER: 0
CHILLS: 0

## 2024-03-09 ASSESSMENT — PAIN SCALES - GENERAL
PAINLEVEL_OUTOF10: 8
PAINLEVEL_OUTOF10: 5 - MODERATE PAIN
PAINLEVEL_OUTOF10: 5 - MODERATE PAIN

## 2024-03-09 ASSESSMENT — PAIN - FUNCTIONAL ASSESSMENT
PAIN_FUNCTIONAL_ASSESSMENT: 0-10
PAIN_FUNCTIONAL_ASSESSMENT: 0-10

## 2024-03-09 ASSESSMENT — PAIN DESCRIPTION - LOCATION: LOCATION: ARM

## 2024-03-09 ASSESSMENT — COLUMBIA-SUICIDE SEVERITY RATING SCALE - C-SSRS
6. HAVE YOU EVER DONE ANYTHING, STARTED TO DO ANYTHING, OR PREPARED TO DO ANYTHING TO END YOUR LIFE?: NO
1. SINCE LAST CONTACT, HAVE YOU WISHED YOU WERE DEAD OR WISHED YOU COULD GO TO SLEEP AND NOT WAKE UP?: NO
2. HAVE YOU ACTUALLY HAD ANY THOUGHTS OF KILLING YOURSELF?: NO

## 2024-03-09 ASSESSMENT — PAIN DESCRIPTION - ORIENTATION: ORIENTATION: LEFT

## 2024-03-09 NOTE — PROGRESS NOTES
EPAT - Social Work Psychiatric Assessment    Arrival Details  Mode of Arrival: Ambulance  Admission Source: Nursing home  Admission Type: Voluntary  EPAT Assessment Start Date: 03/08/24  EPAT Assessment Start Time: 2115  Name of : Tracy Robin. LPC    History of Present Illness  Admission Reason: SA via cuts on wrists  HPI: Pt is 63 years old  AA male who presented to the ED after SA via cuts on wrists.  Pt has a history of depression with suicidal ideation, Parkinson's disease, catatonic schizophrenia, schizophrenia, and Alzheimer's dementia. Pt has a history of numerous inpatient psychiatric admissions, with the most recent one at [facility] on 02/20/24 and [facility] on 01/07/24 for SI/SA. Pt is staying at [facility]. Pt’s chart, ED provider, and nursing notes were reviewed, which indicates that pt “feeling depressed and hearing voices, pt has cuts to L forearm.” Pt’s BAL and UDS were negative.    SW Readmission Information   Readmission within 30 Days: Yes  Previous ED Visit Date and Reason : 2/20/24, SI  Previous Discharge Date and Location: 2/23/24, [facility]  Factors Contributing to  Readmission Inpatient/ED (Team Perspective): Discharge Plan Did Not Meet Patient Needs    Psychiatric Symptoms  Anxiety Symptoms: No problems reported or observed.  Depression Symptoms: Change in energy level, Loss of interest, Sleep disturbance  Gay Symptoms: No problems reported or observed.    Psychosis Symptoms  Hallucination Type: Auditory, Command, Visual  Delusion Type: No problems reported or observed.    Additional Symptoms - Adult  Generalized Anxiety Disorder: No problems reported or observed.  Obsessive Compulsive Disorder: No problems reported or observed.  Panic Attack: No problems reported or observed.  Post Traumatic Stress Disorder: No problems reported or observed.  Delirium: No problems reported or observed.    Past Psychiatric History/Meds/Treatments  Past Psychiatric History: depression with suicidal  ideation, Parkinson's disease, catatonic schizophrenia, schizophrenia, and Alzheimer's dementia  Past Psychiatric Meds/Treatments: numerous, 2/20/24 [facility], 1/07/24 [facility], 5/8/24     Current Mental Health Contacts   Name/Phone Number: [facility], [phone]  Provider Name/Phone Number: [facility], [phone]    Support System: Immediate family, Friends    Living Arrangement: Assisted living ([facility], [phone])    Home Safety  Feels Safe Living in Home: Yes    Income Information  Employment Status for: Patient  Employment Status: Disabled  Income Source: Disability    [employer] Service/Education History  Current or Previous  Service: None         Legal  Legal Considerations: Other (Comment) (pt has LG)  Assistance with Managing/Advocating Healthcare Needs: Legal Guardian (Angel Holguin, [phone])  Criminal Activity/ Legal Involvement Pertinent to Current Situation/ Hospitalization: none reported  Legal Concerns: none reported    Drug Screening  Have you used any substances (canabis, cocaine, heroin, hallucinogens, inhalants, etc.) in the past 12 months?: No  Have you used any prescription drugs other than prescribed in the past 12 months?: No  Is a toxicology screen needed?: Yes         Psychosocial  Psychosocial (WDL): Exceptions to WDL  Behaviors/Mood: Appropriate for situation, Calm, Cooperative, Flat affect  Affect: Appropriate to circumstances    Orientation  Orientation Level: Disoriented to time    General Appearance  Motor Activity: Unremarkable  Speech Pattern: Excessively soft  General Attitude: Cooperative  Appearance/Hygiene: Unremarkable    Thought Process  Content: Unremarkable  Delusions: Controlled  Perception: Hallucinations  Hallucination: Auditory, Command, Visual  Judgment/Insight: Impaired  Confusion: Mild  Cognition: Follows commands, Poor judgement, Poor safety awareness, Poor attention/concentration    Sleep Pattern  Sleep Pattern: Disturbed/interrupted  sleep, Difficulty falling asleep    Risk Factors  Self Harm/Suicidal Ideation Plan: SA via cuts on the wrists  Previous Self Harm/Suicidal Plans: numerous  Risk Factors: Age < 19 years old, Command hallucinations, Major mental illness, Male    Violence Risk Assessment  Assessment of Violence: None noted  Thoughts of Harm to Others: No    Ability to Assess Risk Screen  Risk Screen - Ability to Assess: Able to be screened  Ask Suicide-Screening Questions  1. In the past few weeks, have you wished you were dead?: Yes  2. In the past few weeks, have you felt that you or your family would be better off if you were dead?: Yes  3. In the past week, have you been having thoughts about killing yourself?: Yes  4. Have you ever tried to kill yourself?: Yes  How did you try to kill yourself?: SA via cuts on the wrists  When did you try to kill yourself?: today  5. Are you having thoughts of killing yourself right now?: Yes  Describe your thoughts of killing yourself right now:: cutting  Calculated Risk Score: Imminent Risk  [place] Suicide Severity Rating Scale (Screener/Recent Self-Report)  1. Wish to be Dead (Past 1 Month): Yes  2. Non-Specific Active Suicidal Thoughts (Past 1 Month): Yes  3. Active Suicidal Ideation with any Methods (Not Plan) Without Intent to Act (Past 1 Month): Yes  4. Active Suicidal Ideation with Some Intent to Act, Without Specific Plan (Past 1 Month): Yes  5. Active Suicidal Ideation with Specific Plan and Intent (Past 1 Month): Yes  6. Suicidal Behavior (Lifetime): Yes  6. Suicidal Behavior (3 Months): Yes  6. Suicidal Behavior (Description): SA via cuts on the wrists  Calculated C-SSRS Risk Score (Lifetime/Recent): High Risk  Step 1: Risk Factors  Current & Past Psychiatric Dx: Mood disorder, Psychotic disorder  Presenting Symptoms: Impulsivity, Hopelessness or despair, Command hallucinations  Precipitants/Stressors: Other (Comment) (command )  Change in Treatment: Recent inpatient discharge,  "Change in provider or treament (i.e., medications, psychotherapy, milieu)  Access to Lethal Methods : Yes  Step 2: Protective Factors   Protective Factors Internal: Sikhism beliefs  Protective Factors External: Positive therapeutic relationships  Step 3: Suicidal Ideation Intensity  Most Severe Suicidal Ideation Identified: SA this morning  How Many Times Have You Had These Thoughts: Daily or almost daily  When You Have the Thoughts How Long do They Last : Less than 1 hour/some of the time  Could/Can You Stop Thinking About Killing Yourself or Wanting to Die if You Want to: Unable to control thoughts  Are There Things - Anyone or Anything - That Stopped You From Wanting to Die or Acting on: Deterrents most definitely did not stop you  What Sort of Reasons Did You Have For Thinking About Wanting to Die or Killing Yourself: Does not apply  Total Score: 16  Step 5: Documentation  Risk Level: High suicide risk    Psychiatric Impression and Plan of Care  Assessment and Plan: Pt is 63 years old  AA male with a history of depression with suicidal ideation, Parkinson's disease, catatonic schizophrenia, and Alzheimer's dementia, was brought to the ED after SA via cuts. During the assessment, pt was lying in bed, dressed in hospital attire. Pt was calm, had a flat affect and was cooperative. Pt stated that he felt “suicidal today.\" Pt reported [facility] “aliens” and AH “voices that telling him “to commit suicide.\" Pt is high-risk on the [place] SSRS. Pt is endorsing SI. Pt has a history of multiple SA. Pt was able to identify a support system: “some friends and family.\" Pt had limited protective factors: spiritual beliefs against suicide and a positive therapeutic relationship. Pt denied access to firearms. Pt denied HI. Pt wanted to be admitted “for medication change and group therapy.”      [facility] [phone] was contacted for further collateral information. This writer spoke to the nurse in charge. She " stated that “ever since he came back from his last admission, he was just not mentally himself.” She reported that pt is acting on his SI more often and decompensating. Pt is able to walk and do his own ADLs. She did not feel safe for him to come back at this point.      An attempt to contact pt’s legal guardian, Angel Holguin, was made, left voicemail. He did not return the call prior to note completion.        DX: depression with suicidal ideation; schizophrenia.      At this time, pt meets the criteria for inpatient psychiatric admission for further evaluation, stability, treatment, and safety. Reviewed with Dr. Mann, who is in agreement.    Specific Resources Provided to Patient: inpatient  CM Notified: SNF notified  PHP/IOP Recommended: none    Outcome/Disposition  Patient's Perception of Outcome Achieved: agreeable  Assessment, Recommendations and Risk Level Reviewed with: Dr. Mann  Contact Name: Angel Holguin  Contact Number(s): [phone]  Contact Relationship: Legal guardian  EPAT Assessment Completed Date: 03/08/24  EPAT Assessment Completed Time: 2130      Social Work Note

## 2024-03-09 NOTE — ED PROCEDURE NOTE
Procedure  Laceration Repair    Performed by: Sagar Mann PA-C  Authorized by: Sagar Mann PA-C    Consent:     Consent obtained:  Verbal    Consent given by:  Patient    Risks, benefits, and alternatives were discussed: yes    Universal protocol:     Patient identity confirmed:  Verbally with patient, arm band and provided demographic data  Anesthesia:     Anesthesia method:  Local infiltration    Local anesthetic:  Lidocaine 1% w/o epi  Laceration details:     Location:  Shoulder/arm    Shoulder/arm location:  L lower arm    Length (cm):  5    Depth (mm):  4  Exploration:     Limited defect created (wound extended): no      Contaminated: no    Treatment:     Area cleansed with:  Chlorhexidine    Debridement:  None    Undermining:  None  Skin repair:     Repair method:  Sutures    Suture size:  3-0    Suture material:  Nylon    Suture technique:  Simple interrupted    Number of sutures:  7  Approximation:     Approximation:  Close  Repair type:     Repair type:  Simple  Post-procedure details:     Dressing:  Bulky dressing    Procedure completion:  Tolerated               Sagar Mann PA-C  03/08/24 2006

## 2024-03-09 NOTE — PROGRESS NOTES
PLACE OF SERVICE:  [facility].    This is a subsequent visit.    Subjective   Patient ID: Gerardo Albright is a 63 y.o. male who presents for Follow-up.    Mr. Gerardo Albright is a 63-year-old male with history of Parkinson disease with seizures.  He suffers from schizophrenia.  He is unable to care for himself and requires supportive care.    Review of Systems   Constitutional:  Negative for chills and fever.   Cardiovascular:  Negative for chest pain.   All other systems reviewed and are negative.    Objective   /82   Pulse 78   Temp 36.7 °C (98.1 °F)   Resp 18     Physical Exam  Vitals reviewed.   Constitutional:       General: He is not in acute distress.     Comments: This is a well-developed, well-nourished male, sitting in a chair.   HENT:      Right Ear: Tympanic membrane, ear canal and external ear normal.      Left Ear: Tympanic membrane, ear canal and external ear normal.   Eyes:      General: No scleral icterus.     Pupils: Pupils are equal, round, and reactive to light.   Neck:      Vascular: No carotid bruit.   Cardiovascular:      Heart sounds: Normal heart sounds, S1 normal and S2 normal. No murmur heard.     No friction rub.   Pulmonary:      Effort: Pulmonary effort is normal.      Breath sounds: Wheezing (rare scattered) present.   Abdominal:      Palpations: There is no hepatomegaly, splenomegaly or mass.   Musculoskeletal:         General: No swelling or deformity. Normal range of motion.      Cervical back: Neck supple.      Right lower leg: No edema.      Left lower leg: No edema.   Lymphadenopathy:      Cervical: No cervical adenopathy.      Upper Body:      Right upper body: No axillary adenopathy.      Left upper body: No axillary adenopathy.      Lower Body: No right inguinal adenopathy. No left inguinal adenopathy.   Neurological:      Mental Status: He is oriented to person, place, and time.      Cranial Nerves: Cranial nerves 2-12 are intact. No cranial nerve deficit.       Sensory: No sensory deficit.      Motor: Motor function is intact. No weakness.      Gait: Gait is intact.      Deep Tendon Reflexes: Reflexes normal.   Psychiatric:         Mood and Affect: Mood normal. Mood is not anxious or depressed. Affect is not angry.         Behavior: Behavior is not agitated.         Thought Content: Thought content normal.         Judgment: Judgment normal.     LAB WORK: Laboratory studies were reviewed.    Assessment/Plan   Problem List Items Addressed This Visit             ICD-10-CM       Advance Directives and General Issues    At risk for falls Z91.81       Mental Health    Other schizophrenia (CMS/HCC) F20.89    Depression F32.A       Neuro    Other seizures (CMS/HCC) G40.89    Parkinson's disease - Primary G20.A1       Pulmonary and Pneumonias    Chronic obstructive pulmonary disease (CMS/HCC) J44.9     Other Visit Diagnoses         Codes    Acute myocardial infarction, unspecified MI type, unspecified artery (CMS/HCC)     I21.9    Weakness     R53.1    Schizoaffective disorder, unspecified type (CMS/HCC)     F25.9    Essential hypertension     I10        1. Parkinson’s disease, on carbidopa and levodopa.  2. Schizophrenia, on medication.  3. COPD, on bronchodilator therapy.  4. History of AMI, on aspirin.  5. Weakness, on PT/OT.  6. Fall risk, on fall precaution.  7. Schizoeffective disorder, on medication.  8. Seizure disorder, on antiepileptic.  9. Hypertension, med controlled  10. Depression, on medication.    Scribe Attestation  By signing my name below, IMarva Scribe attest that this documentation has been prepared under the direction and in the presence of Amparo Galarza MD.       All medical record entries made by the scribe were personally dictated by me I have reviewed the chart and agree the record accurately reflects my personal performance of his history physical examination and management

## 2024-03-09 NOTE — ED PROVIDER NOTES
HPI   Chief Complaint   Patient presents with    Suicidal    Laceration     Pt from [facility] for SI, states feeling depressed and hearing voices, pt has cuts to L forearm       History of present illness:  63-year-old male presents the emergency room for complaints of suicidal attempt.  The patient states that he has longstanding history of auditory hallucinations telling him to harm himself and he states unfortunately acted on that today.  He states that he stays at a long-term skilled nursing facility and per the charting system the patient appears to stay at a long-term skilled behavior lockdown unit of [facility].  It appears he also has a history of COPD catatonic schizophrenia dementia depression acid reflux hypertension insomnia CAD and Parkinson disease and seizures.    Social history: Negative for alcohol and drug use.    Review of systems:   Gen.: No weight loss, fatigue, anorexia, insomnia, fever.   Eyes: No vision loss, double vision  ENT: No pharyngitis, neck pain, headache  Cardiac: No chest pain, palpitations, syncope  Pulmonary: No shortness of breath, cough, hemoptysis.   Heme/lymph: No swollen glands, fever, bleeding.   GI: No abdominal pain, change in bowel habits, melena, hematemesis, hematochezia, nausea, vomiting, diarrhea.   : No discharge, dysuria, frequency, urgency, hematuria.   Musculoskeletal: No limb pain, joint pain, joint swelling.   Skin: No rashes.   Review of systems is otherwise negative unless stated above or in history of present illness.        Physical exam:  General: Vitals noted, no distress. Afebrile.   EENT: No lymphadenopathy appreciated  Cardiac: Regular, rate, rhythm, no murmur.   Pulmonary: Lungs clear bilaterally with good aeration. No adventitious breath sounds.   Abdomen: Soft, nonsurgical. Nontender. No peritoneal signs. Normoactive bowel sounds.   Extremities: No peripheral edema.  On the left forearm there is bandaging in place and after this  was taken off there appears that there is multiple shallow lacerations some that appear to be more scratches than anything else but there is 1 that is most proximal on the forearm that is gaping at this time and is 5 cm in length, does not appear to be actively bleeding at this time,  Skin: No rash.   Neuro: No focal neurologic deficits      Medical decision making:   Testing: CBC CMP Tylenol aspirin level urinalysis urine drug screen: No acute findings patient is cleared to be seen by psychiatry and is medically cleared  Treatment: 7 simple interrupted suture were placed at this time to close the wound on the forearm.,  Tetanus shot was updated  Reevaluation:   EKG taken on March 8, 2024 at 1806 shows normal sinus rhythm at 68, no STEMI present, T wave inversion in V2, normal axis as interpreted myself      Plan: 63-year-old male presents the emergency room for complaints of suicidal attempt.  The patient states that he has longstanding history of auditory hallucinations telling him to harm himself and he states unfortunately acted on that today.  He states that he stays at a long-term skilled nursing facility and per the charting system the patient appears to stay at a long-term skilled behavior lockdown unit of [facility].  It appears he also has a history of COPD catatonic schizophrenia dementia depression acid reflux hypertension insomnia CAD and Parkinson disease and seizures. Extremities: No peripheral edema.  On the left forearm there is bandaging in place and after this was taken off there appears that there is multiple shallow lacerations some that appear to be more scratches than anything else but there is 1 that is most proximal on the forearm that is gaping at this time and is 5 cm in length, does not appear to be actively bleeding at this time.  Bleeding was well-controlled after the sutures were placed and the patient was medically cleared to be seen by emergency psychiatric assessment  team.  Impression:   1.  Forearm laceration  2.  Suicide attempt          History provided by:  Patient   used: No                        Ren Coma Scale Score: 15                     Patient History   Past Medical History:   Diagnosis Date    At risk for falls     Catatonic schizophrenia (CMS/HCC)     COPD (chronic obstructive pulmonary disease) (CMS/HCC)     Dementia (CMS/HCC)     Depression     GERD (gastroesophageal reflux disease)     Hypertension     Insomnia     Myocardial infarction (CMS/HCC)     Parkinson's disease     Schizophrenia (CMS/HCC)     Seizures (CMS/[facility])     Weakness      History reviewed. No pertinent surgical history.  No family history on file.  Social History     Tobacco Use    Smoking status: Former     Packs/day: .5     Types: Cigarettes     Passive exposure: Past    Smokeless tobacco: Never   Vaping Use    Vaping Use: Never used   Substance Use Topics    Alcohol use: Not Currently    Drug use: Never       Physical Exam   ED Triage Vitals [03/08/24 1645]   Temperature Heart Rate Respirations BP   37.2 °C (99 °F) 83 16 138/89      Pulse Ox Temp Source Heart Rate Source Patient Position   97 % Temporal -- --      BP Location FiO2 (%)     -- --       Physical Exam    ED Course & MDM   Diagnoses as of 03/08/24 2005   Laceration of left forearm, initial encounter   Suicidal behavior with attempted self-injury (CMS/[facility])       Medical Decision Making    The patient was seen by the advanced practice provider.  I have personally performed a substantive portion of the encounter.  I have seen and examined the patient; agree with the workup, evaluation, MDM, management and diagnosis.  The care plan has been discussed with the PA-C/CNP; I have reviewed the PA-C/CNP’s note and agree with the documented findings with the exception/addition of information listed below.  All notation in this Addendum supersedes information presented by the resident or MICHELE as listed above.     HPI   63-year-old male with hx of COPD, catatonic schizophrenia, dementia, depression, acid reflux, hypertension, insomnia ,CAD, Parkinson disease, and seizures comes to the ED with c/o suicidal attempt.  The patient states that he has longstanding history of auditory hallucinations telling him to harm himself and he states unfortunately he acted on that today by cutting on his left forearm.  Pt reports he was helped by staff at his facility and EMS was called and pt brought to the ED.   EXAM benign except for noted large laceration to left forearm and suicidal state  MDM after assuming care from the physician assistant his plan of care was reviewed and course of action in the ED.  Plan of care was approved and patient had labs sent and EK, patient placed under fall precautions, and wound was clean/dressed.  Laceration was repaired by physician assistant and patient tolerated it well.  Patient upon reevaluation to have no change in behavior/thought process and final results were reviewed/discussed with patient.  At this time he is medically cleared and presented to EPAT who evaluate the patient presented to psych.  At this time advised patient to be admitted for continued psychiatric care.  Patient this time is waiting for final bed approval and disposition for transfer and admission to psychiatric facility.  Care was transferred over to oncoming provider Dr. Quintero.      Ender Grant MD  Emergency Medicine    Procedure  Procedures     Ender Grant MD  03/09/24 0048       Sagar Mann PA-C  04/28/24 0392

## 2024-03-10 PROCEDURE — 2500000002 HC RX 250 W HCPCS SELF ADMINISTERED DRUGS (ALT 637 FOR MEDICARE OP, ALT 636 FOR OP/ED): Performed by: STUDENT IN AN ORGANIZED HEALTH CARE EDUCATION/TRAINING PROGRAM

## 2024-03-10 PROCEDURE — 96372 THER/PROPH/DIAG INJ SC/IM: CPT

## 2024-03-10 PROCEDURE — 2500000001 HC RX 250 WO HCPCS SELF ADMINISTERED DRUGS (ALT 637 FOR MEDICARE OP): Performed by: STUDENT IN AN ORGANIZED HEALTH CARE EDUCATION/TRAINING PROGRAM

## 2024-03-10 PROCEDURE — 2500000004 HC RX 250 GENERAL PHARMACY W/ HCPCS (ALT 636 FOR OP/ED): Performed by: EMERGENCY MEDICINE

## 2024-03-10 PROCEDURE — 2500000001 HC RX 250 WO HCPCS SELF ADMINISTERED DRUGS (ALT 637 FOR MEDICARE OP): Performed by: EMERGENCY MEDICINE

## 2024-03-10 RX ORDER — LORAZEPAM 0.5 MG/1
1 TABLET ORAL ONCE
Status: COMPLETED | OUTPATIENT
Start: 2024-03-10 | End: 2024-03-10

## 2024-03-10 RX ORDER — KETOROLAC TROMETHAMINE 30 MG/ML
30 INJECTION, SOLUTION INTRAMUSCULAR; INTRAVENOUS ONCE
Status: COMPLETED | OUTPATIENT
Start: 2024-03-10 | End: 2024-03-10

## 2024-03-10 RX ADMIN — HYDROXYZINE HYDROCHLORIDE 25 MG: 25 TABLET, FILM COATED ORAL at 21:59

## 2024-03-10 RX ADMIN — ASPIRIN 81 MG CHEWABLE TABLET 81 MG: 81 TABLET CHEWABLE at 11:02

## 2024-03-10 RX ADMIN — HYDROXYZINE HYDROCHLORIDE 25 MG: 25 TABLET, FILM COATED ORAL at 11:02

## 2024-03-10 RX ADMIN — Medication 5 MG: at 21:59

## 2024-03-10 RX ADMIN — LORAZEPAM 1 MG: 0.5 TABLET ORAL at 21:58

## 2024-03-10 RX ADMIN — HYDROXYZINE HYDROCHLORIDE 25 MG: 25 TABLET, FILM COATED ORAL at 15:33

## 2024-03-10 RX ADMIN — GABAPENTIN 600 MG: 300 CAPSULE ORAL at 11:02

## 2024-03-10 RX ADMIN — NALTREXONE HYDROCHLORIDE 50 MG: 50 TABLET, FILM COATED ORAL at 11:08

## 2024-03-10 RX ADMIN — GABAPENTIN 600 MG: 300 CAPSULE ORAL at 13:12

## 2024-03-10 RX ADMIN — GABAPENTIN 600 MG: 300 CAPSULE ORAL at 21:59

## 2024-03-10 RX ADMIN — LORAZEPAM 1 MG: 0.5 TABLET ORAL at 11:02

## 2024-03-10 RX ADMIN — LORAZEPAM 1 MG: 0.5 TABLET ORAL at 15:55

## 2024-03-10 RX ADMIN — KETOROLAC TROMETHAMINE 30 MG: 30 INJECTION, SOLUTION INTRAMUSCULAR; INTRAVENOUS at 13:12

## 2024-03-10 RX ADMIN — QUETIAPINE FUMARATE 200 MG: 100 TABLET ORAL at 21:58

## 2024-03-10 SDOH — HEALTH STABILITY: MENTAL HEALTH: BEHAVIORS/MOOD: SLEEPING

## 2024-03-10 ASSESSMENT — PAIN DESCRIPTION - ORIENTATION
ORIENTATION: LEFT
ORIENTATION: LEFT

## 2024-03-10 ASSESSMENT — PAIN SCALES - GENERAL
PAINLEVEL_OUTOF10: 5 - MODERATE PAIN
PAINLEVEL_OUTOF10: 0 - NO PAIN
PAINLEVEL_OUTOF10: 10 - WORST POSSIBLE PAIN

## 2024-03-10 ASSESSMENT — PAIN DESCRIPTION - LOCATION
LOCATION: ARM
LOCATION: ARM

## 2024-03-10 ASSESSMENT — PAIN - FUNCTIONAL ASSESSMENT
PAIN_FUNCTIONAL_ASSESSMENT: 0-10
PAIN_FUNCTIONAL_ASSESSMENT: 0-10

## 2024-03-11 ENCOUNTER — DOCUMENTATION (OUTPATIENT)
Dept: BEHAVIORAL HEALTH | Facility: HOSPITAL | Age: 64
End: 2024-03-11
Payer: COMMERCIAL

## 2024-03-11 VITALS
TEMPERATURE: 97.2 F | HEART RATE: 95 BPM | OXYGEN SATURATION: 96 % | HEIGHT: 72 IN | DIASTOLIC BLOOD PRESSURE: 86 MMHG | RESPIRATION RATE: 16 BRPM | SYSTOLIC BLOOD PRESSURE: 136 MMHG | BODY MASS INDEX: 20.32 KG/M2 | WEIGHT: 150 LBS

## 2024-03-11 PROCEDURE — 2500000001 HC RX 250 WO HCPCS SELF ADMINISTERED DRUGS (ALT 637 FOR MEDICARE OP)

## 2024-03-11 PROCEDURE — 2500000002 HC RX 250 W HCPCS SELF ADMINISTERED DRUGS (ALT 637 FOR MEDICARE OP, ALT 636 FOR OP/ED): Performed by: STUDENT IN AN ORGANIZED HEALTH CARE EDUCATION/TRAINING PROGRAM

## 2024-03-11 PROCEDURE — 2500000001 HC RX 250 WO HCPCS SELF ADMINISTERED DRUGS (ALT 637 FOR MEDICARE OP): Performed by: EMERGENCY MEDICINE

## 2024-03-11 PROCEDURE — 2500000001 HC RX 250 WO HCPCS SELF ADMINISTERED DRUGS (ALT 637 FOR MEDICARE OP): Performed by: STUDENT IN AN ORGANIZED HEALTH CARE EDUCATION/TRAINING PROGRAM

## 2024-03-11 PROCEDURE — 2500000004 HC RX 250 GENERAL PHARMACY W/ HCPCS (ALT 636 FOR OP/ED)

## 2024-03-11 PROCEDURE — 96372 THER/PROPH/DIAG INJ SC/IM: CPT

## 2024-03-11 RX ORDER — LORAZEPAM 0.5 MG/1
1 TABLET ORAL ONCE
Status: COMPLETED | OUTPATIENT
Start: 2024-03-11 | End: 2024-03-11

## 2024-03-11 RX ORDER — ALUMINUM HYDROXIDE, MAGNESIUM HYDROXIDE, AND SIMETHICONE 1200; 120; 1200 MG/30ML; MG/30ML; MG/30ML
30 SUSPENSION ORAL ONCE
Status: COMPLETED | OUTPATIENT
Start: 2024-03-11 | End: 2024-03-11

## 2024-03-11 RX ORDER — KETOROLAC TROMETHAMINE 30 MG/ML
30 INJECTION, SOLUTION INTRAMUSCULAR; INTRAVENOUS ONCE
Status: COMPLETED | OUTPATIENT
Start: 2024-03-11 | End: 2024-03-11

## 2024-03-11 RX ORDER — ALUMINUM HYDROXIDE, MAGNESIUM HYDROXIDE, AND SIMETHICONE 1200; 120; 1200 MG/30ML; MG/30ML; MG/30ML
SUSPENSION ORAL
Status: COMPLETED
Start: 2024-03-11 | End: 2024-03-11

## 2024-03-11 RX ORDER — KETOROLAC TROMETHAMINE 30 MG/ML
INJECTION, SOLUTION INTRAMUSCULAR; INTRAVENOUS
Status: COMPLETED
Start: 2024-03-11 | End: 2024-03-11

## 2024-03-11 RX ADMIN — LORAZEPAM 1 MG: 0.5 TABLET ORAL at 16:35

## 2024-03-11 RX ADMIN — GABAPENTIN 600 MG: 300 CAPSULE ORAL at 15:01

## 2024-03-11 RX ADMIN — LORAZEPAM 1 MG: 0.5 TABLET ORAL at 08:35

## 2024-03-11 RX ADMIN — ALUMINUM HYDROXIDE, MAGNESIUM HYDROXIDE, AND SIMETHICONE 30 ML: 1200; 120; 1200 SUSPENSION ORAL at 01:26

## 2024-03-11 RX ADMIN — KETOROLAC TROMETHAMINE 30 MG: 30 INJECTION, SOLUTION INTRAMUSCULAR; INTRAVENOUS at 16:35

## 2024-03-11 RX ADMIN — ALUMINUM HYDROXIDE, MAGNESIUM HYDROXIDE, AND SIMETHICONE 30 ML: 200; 200; 20 SUSPENSION ORAL at 01:26

## 2024-03-11 RX ADMIN — ASPIRIN 81 MG CHEWABLE TABLET 81 MG: 81 TABLET CHEWABLE at 08:35

## 2024-03-11 RX ADMIN — QUETIAPINE FUMARATE 250 MG: 100 TABLET ORAL at 20:48

## 2024-03-11 RX ADMIN — HYDROXYZINE HYDROCHLORIDE 25 MG: 25 TABLET, FILM COATED ORAL at 08:35

## 2024-03-11 RX ADMIN — HYDROXYZINE HYDROCHLORIDE 25 MG: 25 TABLET, FILM COATED ORAL at 15:01

## 2024-03-11 RX ADMIN — HYDROXYZINE HYDROCHLORIDE 25 MG: 25 TABLET, FILM COATED ORAL at 20:42

## 2024-03-11 RX ADMIN — NALTREXONE HYDROCHLORIDE 50 MG: 50 TABLET, FILM COATED ORAL at 08:57

## 2024-03-11 RX ADMIN — Medication 5 MG: at 20:48

## 2024-03-11 RX ADMIN — LORAZEPAM 1 MG: 0.5 TABLET ORAL at 20:42

## 2024-03-11 SDOH — HEALTH STABILITY: MENTAL HEALTH: BEHAVIORS/MOOD: CALM;COOPERATIVE

## 2024-03-11 SDOH — HEALTH STABILITY: MENTAL HEALTH: HAVE YOU EVER DONE ANYTHING, STARTED TO DO ANYTHING, OR PREPARED TO DO ANYTHING TO END YOUR LIFE?: YES

## 2024-03-11 SDOH — HEALTH STABILITY: MENTAL HEALTH: BEHAVIORS/MOOD: CALM;SLEEPING

## 2024-03-11 SDOH — HEALTH STABILITY: MENTAL HEALTH: HAVE YOU WISHED YOU WERE DEAD OR WISHED YOU COULD GO TO SLEEP AND NOT WAKE UP?: YES

## 2024-03-11 SDOH — HEALTH STABILITY: MENTAL HEALTH: HAVE YOU ACTUALLY HAD ANY THOUGHTS OF KILLING YOURSELF?: YES

## 2024-03-11 SDOH — HEALTH STABILITY: MENTAL HEALTH: HAVE YOU HAD THESE THOUGHTS AND HAD SOME INTENTION OF ACTING ON THEM?: YES

## 2024-03-11 SDOH — HEALTH STABILITY: MENTAL HEALTH: BEHAVIORS/MOOD: COOPERATIVE;CALM

## 2024-03-11 SDOH — HEALTH STABILITY: MENTAL HEALTH: HAVE YOU BEEN THINKING ABOUT HOW YOU MIGHT DO THIS?: YES

## 2024-03-11 SDOH — HEALTH STABILITY: MENTAL HEALTH: WAS THIS WITHIN THE PAST THREE MONTHS?: YES

## 2024-03-11 SDOH — HEALTH STABILITY: MENTAL HEALTH: RISK OF SUICIDE: HIGH RISK

## 2024-03-11 SDOH — HEALTH STABILITY: MENTAL HEALTH: HALLUCINATION: AUDITORY

## 2024-03-11 SDOH — HEALTH STABILITY: MENTAL HEALTH: BEHAVIORS/MOOD: ANXIOUS;COOPERATIVE

## 2024-03-11 ASSESSMENT — PAIN SCALES - GENERAL: PAINLEVEL_OUTOF10: 0 - NO PAIN

## 2024-03-11 NOTE — PROGRESS NOTES
"Gerardo Albright is a 63 y.o. male with a history of schizoaffective disorder, catatonia, mild neurocognitive impairment, possible Parkinson's Disease (although this diagnosis was unclear) and seizure disorder who was brought in from where he resides at [facility] assisted living for suicide attempt by cutting his arms, secondary to auditory hallucinations and visual hallucinations of \"aliens\" telling him to do this. He was seen by me and my resident by telehealth interview, after he consented to do so. Chart reviewed, including EPAT SW note from this admission, and discharge summary from last psychiatric hospitalization in 2/2024. Please see EPAT SW note for more details.    [facility]: [phone]  Guardian: Angel Holguin: [phone]    On interview today, patient states that he is still suicidal and depressed. He continues to hear AH telling him to harm himself, but does not have an urge to do so; he is hopeful that a psychiatric hospitalization resulting in medication adjustments will be helpful. He is not homicidal. He is still experiencing visual hallucinations as well. He is oriented to place but not date, day, or year.    Of note, his discharge medications from his most recent hospitalizations are:  Lorazepam 1 mg BID  Risperidone subcutaneous ER 90 mg monthly  Gabapentin 600 mg TID  Seroquel 250 mg at bedtime  Naltrexone 50 mg every day    Discontinued during his last hospitalizations:  Wellbutrin  mg daily  Carbadopa/levidopa (apparently was told by a neurologist that he does not have Parkinson  S Disease after all.      Objective     Last Recorded Vitals  There were no vitals taken for this visit.    Review of Systems    Psychiatric ROS - Adult  Anxiety: Negative  Depression: sleep decreased  and suicidal thoughts  Delirium: negative  Psychosis: auditory hallucinations and visual hallucinations  Gay: negative  Safety Issues: suicidal ideation      Physical Exam      Mental Status " "Exam  General: appears stated age  Appearance: calm  Attitude: cooperative  Behavior: good eye contact  Motor Activity: no tics/TD/PMA/PMR. Gait not evaluated  Speech: normal rate and volume  Mood: \"depressed\"  Affect: flat  Thought Process: logical, linear  Thought Content: +SI, no HI, +delusions of aliens  Thought Perception: +A/VH  Cognition: fair - not oriented to time or date  Insight: fair  Judgement: poor      Assessment:  - schizoaffective disorder, chronic, with acute exacerbation  - history of catatonia    Recommendations:  - Patient DOES still meet criteria for inpatient psychiatric hospitalization, due to acute risk to self.  - Accepted to [facility]; awaiting bed availability  - patient may NOT leave [place], as his guardian has consented to psychiatric hospitalization. If he does try, please call code mckenna  - would continue current psychiatric medications at current doses  - EPAT will continue to follow while patient is in ER.    Kaelyn Rogers MD  Psychiatry, EPAT  [facility]        "

## 2024-03-11 NOTE — PROGRESS NOTES
"Emergency Medicine Transition of Care Note.    I received Gerardo Albright in signout from Dr. YUKI Arreola.  Please see the previous ED provider note for all HPI, PE and MDM up to the time of signout at 1900. This is in addition to the primary record.    In brief Gerardo Albright is an 63 y.o. male presenting for   Chief Complaint   Patient presents with    Suicidal    Laceration     Pt from [facility] for SI, states feeling depressed and hearing voices, pt has cuts to L forearm     At the time of signout we were awaiting: Psychiatric placement    Psychiatric Impression and Plan of Care  Assessment and Plan: Pt is 63 years old  AA male with a history of depression with suicidal ideation, Parkinson's disease, catatonic schizophrenia, and Alzheimer's dementia, was brought to the ED after SA via cuts. During the assessment, pt was lying in bed, dressed in hospital attire. Pt was calm, had a flat affect and was cooperative. Pt stated that he felt “suicidal today.\" Pt reported [facility] “aliens” and AH “voices that telling him “to commit suicide.\" Pt is high-risk on the [place] SSRS. Pt is endorsing SI. Pt has a history of multiple SA. Pt was able to identify a support system: “some friends and family.\" Pt had limited protective factors: spiritual beliefs against suicide and a positive therapeutic relationship. Pt denied access to firearms. Pt denied HI. Pt wanted to be admitted “for medication change and group therapy.”       [facility] [phone] was contacted for further collateral information. This writer spoke to the nurse in charge. She stated that “ever since he came back from his last admission, he was just not mentally himself.” She reported that pt is acting on his SI more often and decompensating. Pt is able to walk and do his own ADLs. She did not feel safe for him to come back at this point.       An attempt to contact pt’s legal guardian, Angel Holguin, was made, left voicemail. He did not " return the call prior to note completion.         DX: depression with suicidal ideation; schizophrenia.       At this time, pt meets the criteria for inpatient psychiatric admission for further evaluation, stability, treatment, and safety. Reviewed with Dr. Mann, who is in agreement.      Diagnoses as of 03/10/24 2021   Laceration of left forearm, initial encounter   Suicidal behavior with attempted self-injury (CMS/[facility])       Medical Decision Making  Patient has remained cooperative throughout the day today.  The patient is MEDICALLY CLEARED For psychiatric Admission.  Will continue current care waiting placement for psychiatric hospitalization regarding suicidal ideation and recent suicidal attempt.  At this time the patient was excepted to [facility] psychiatric.  However he is awaiting an available bed.  We will continue with current care.  See nursing notes.          Final diagnoses:   [S51.812A] Laceration of left forearm, initial encounter   [T14.91XA] Suicidal behavior with attempted self-injury (CMS/[facility])           Procedure  Procedures    Gabino Osorio, DO

## 2024-03-11 NOTE — SIGNIFICANT EVENT
Application for Emergency Admission      Ready for Transfer?  Is the patient medically cleared for transfer to inpatient psychiatry: Yes  Has the patient been accepted to an inpatient psychiatric hospital: Yes    Application for Emergency Admission  IN ACCORDANCE WITH [place] 5122[place]10 [place]  The Chief Clinical Officer of: [facility] 3/11/2024 .4:24 PM    Reason for Hospitalization  The undersigned has reason to believe that: Gerardo Albright Is a mentally ill person subject to hospitalization by court order under division B Section 5122.01 of the Revised Code, i.e., this person:    1.Yes  Represents a substantial risk of physical harm to self as manifested by evidence of threats of, or attempts at, suicide or serious self-inflicted bodily harm    2.No Represents a substantial risk of physical harm to others as manifested by evidence of recent homicidal or other violent behavior, evidence of recent threats that place another in reasonable fear of violent behavior and serious physical harm, or other evidence of present dangerousness    3.No Represents a substantial and immediate risk of serious physical impairment or injury to self as manifested by  evidence that the person is unable to provide for and is not providing for the person's basic physical needs because of the person's mental illness and that appropriate provision for those needs cannot be made  immediately available in the community    4.Yes Would benefit from treatment in a hospital for his mental illness and is in need of such treatment as manifested by evidence of behavior that creates a grave and imminent risk to substantial rights of others or  himself.    5.No Would benefit from treatment as manifested by evidence of behavior that indicates all of the following:       (a) The person is unlikely to survive safely in the community without supervision, based on a clinical determination.       (b) The person has a history of lack of  compliance with treatment for mental illness and one of the following applies:      (i) At least twice within the thirty-six months prior to the filing of an affidavit seeking court-ordered treatment of the person under section 5122.111 of the Revised Code, the lack of compliance has been a significant factor in necessitating hospitalization in a hospital or receipt of services in a forensic or other mental health unit of a correctional facility, provided that the thirty-six-month period shall be extended by the length of any hospitalization or incarceration of the person that occurred within the thirty-six-month period.      (ii) Within the forty-eight months prior to the filing of an affidavit seeking court-ordered treatment of the person under section 5122.111 of the Revised Code, the lack of compliance resulted in one or more acts of serious violent behavior toward self or others or threats of, or attempts at, serious physical harm to self or others, provided that the forty-eight-month period shall be extended by the length of any hospitalization or incarceration of the person that occurred within the forty-eight-month period.      (c) The person, as a result of mental illness, is unlikely to voluntarily participate in necessary treatment.       (d) In view of the person's treatment history and current behavior, the person is in need of treatment in order to prevent a relapse or deterioration that would be likely to result in substantial risk of serious harm to the person or others.    (e) Represents a substantial risk of physical harm to self or others if allowed to remain at liberty pending examination.    Therefore, it is requested that said person be admitted to the above named facility.    STATEMENT OF BELIEF    Must be filled out by one of the following: a psychiatrist, licensed physician, licensed clinical psychologist, health or ,  or .  (Statement shall include the  circumstances under which the individual was taken into custody and the reason for the person's belief that hospitalization is necessary. The statement shall also include a reference to efforts made to secure the individual's property at his residence if he was taken into custody there. Every reasonable and appropriate effort should be made to take this person into custody in the least conspicuous manner possible.)    Patient has had suicidal ideation with plan. Patient cut himself in an effort to kill himself.    Jason Arreola MD 3/11/2024     _____________________________________________________________   Place of Employment: [facility]    STATEMENT OF OBSERVATION BY PSYCHIATRIST, LICENSED PHYSICIAN, OR LICENSED CLINICAL PSYCHOLOGIST, IF APPLICABLE    Place of Observation (e.g., [facility] mental health center, general hospital, office, emergency facility)  (If applicable, please complete)    Jason Arreola MD 3/11/2024    _____________________________________________________________

## 2024-03-11 NOTE — ED PROVIDER NOTES
The patient's case was signed out to me by the outgoing physician.  The patient was given anxiolytics in the emergency department per his request.      I was informed by psychiatric services that the patient had been accepted at [facility].  I updated the patient's pink slip to reflect this.  I also filled out appropriate paperwork for the patient's transfer.  The patient will be transported by S ambulance.  I expect the patient will be transferred in otherwise stable condition     Jason Arreola MD  03/16/24 8729

## 2024-03-12 LAB
ATRIAL RATE: 68 BPM
P AXIS: 36 DEGREES
P OFFSET: 205 MS
P ONSET: 150 MS
PR INTERVAL: 150 MS
Q ONSET: 225 MS
QRS COUNT: 11 BEATS
QRS DURATION: 90 MS
QT INTERVAL: 398 MS
QTC CALCULATION(BAZETT): 423 MS
QTC FREDERICIA: 415 MS
R AXIS: -23 DEGREES
T AXIS: 25 DEGREES
T OFFSET: 424 MS
VENTRICULAR RATE: 68 BPM

## 2024-03-17 ENCOUNTER — NURSING HOME VISIT (OUTPATIENT)
Dept: POST ACUTE CARE | Facility: EXTERNAL LOCATION | Age: 64
End: 2024-03-17
Payer: COMMERCIAL

## 2024-03-17 DIAGNOSIS — F32.89 OTHER DEPRESSION: ICD-10-CM

## 2024-03-17 DIAGNOSIS — G20.A1 PARKINSON'S DISEASE WITHOUT DYSKINESIA, UNSPECIFIED WHETHER MANIFESTATIONS FLUCTUATE (MULTI): ICD-10-CM

## 2024-03-17 DIAGNOSIS — F03.90 DEMENTIA, UNSPECIFIED DEMENTIA SEVERITY, UNSPECIFIED DEMENTIA TYPE, UNSPECIFIED WHETHER BEHAVIORAL, PSYCHOTIC, OR MOOD DISTURBANCE OR ANXIETY (MULTI): ICD-10-CM

## 2024-03-17 DIAGNOSIS — R53.1 WEAKNESS: ICD-10-CM

## 2024-03-17 DIAGNOSIS — K21.9 GASTROESOPHAGEAL REFLUX DISEASE, UNSPECIFIED WHETHER ESOPHAGITIS PRESENT: ICD-10-CM

## 2024-03-17 DIAGNOSIS — G40.89 OTHER SEIZURES (MULTI): ICD-10-CM

## 2024-03-17 DIAGNOSIS — I10 ESSENTIAL HYPERTENSION: ICD-10-CM

## 2024-03-17 DIAGNOSIS — Z91.81 AT RISK FOR FALLS: ICD-10-CM

## 2024-03-17 DIAGNOSIS — J44.9 CHRONIC OBSTRUCTIVE PULMONARY DISEASE, UNSPECIFIED COPD TYPE (MULTI): Primary | ICD-10-CM

## 2024-03-17 DIAGNOSIS — F20.89 OTHER SCHIZOPHRENIA (MULTI): ICD-10-CM

## 2024-03-17 PROCEDURE — 99309 SBSQ NF CARE MODERATE MDM 30: CPT | Performed by: INTERNAL MEDICINE

## 2024-03-17 NOTE — LETTER
Patient: Gerardo Albright  : 1960    Encounter Date: 2024    PLACE OF SERVICE:  [facility].    This is a subsequent visit.    Subjective  Patient ID: Gerardo Albright is a 63 y.o. male who presents for Follow-up.    Mr. Gerardo Albright is a 63-year-old male with history of Parkinson's disease.  He suffers from schizophrenia.  He has several medical issues and is unable to care for himself.  He requires supportive care.    Review of Systems   Constitutional:  Negative for chills and fever.   Cardiovascular:  Negative for chest pain.   All other systems reviewed and are negative.    Objective  /74   Pulse 78   Temp 36.6 °C (97.9 °F)   Resp 18     Physical Exam  Vitals reviewed.   Constitutional:       General: He is not in acute distress.     Comments: This is a well-developed, well-nourished male, sitting in a chair.   HENT:      Right Ear: Tympanic membrane, ear canal and external ear normal.      Left Ear: Tympanic membrane, ear canal and external ear normal.   Eyes:      General: No scleral icterus.     Pupils: Pupils are equal, round, and reactive to light.   Neck:      Vascular: No carotid bruit.   Cardiovascular:      Heart sounds: Normal heart sounds, S1 normal and S2 normal. No murmur heard.     No friction rub.   Pulmonary:      Effort: Pulmonary effort is normal.      Breath sounds: Wheezing (rare scattered) present.   Abdominal:      Palpations: There is no hepatomegaly, splenomegaly or mass.   Musculoskeletal:         General: No swelling or deformity. Normal range of motion.      Cervical back: Neck supple.      Right lower leg: No edema.      Left lower leg: No edema.   Lymphadenopathy:      Cervical: No cervical adenopathy.      Upper Body:      Right upper body: No axillary adenopathy.      Left upper body: No axillary adenopathy.      Lower Body: No right inguinal adenopathy. No left inguinal adenopathy.   Neurological:      Mental Status: He is alert.      Cranial Nerves:  Cranial nerves 2-12 are intact. No cranial nerve deficit.      Sensory: No sensory deficit.      Motor: Motor function is intact. No weakness.      Gait: Gait is intact.      Deep Tendon Reflexes: Reflexes normal.      Comments: The patient is oriented x2.   Psychiatric:         Mood and Affect: Mood normal. Mood is not anxious or depressed. Affect is not angry.         Behavior: Behavior is not agitated.         Thought Content: Thought content normal.         Judgment: Judgment normal.     LAB WORK:  Laboratory studies were reviewed.    Assessment/Plan  Problem List Items Addressed This Visit             ICD-10-CM       Advance Directives and General Issues    At risk for falls Z91.81       Mental Health    Other schizophrenia (CMS/HCC) F20.89    Depression F32.A       Neuro    Other seizures (CMS/HCC) G40.89    Parkinson's disease G20.A1    Dementia (CMS/HCC) F03.90       Pulmonary and Pneumonias    Chronic obstructive pulmonary disease (CMS/HCC) - Primary J44.9     Other Visit Diagnoses         Codes    Weakness     R53.1    Essential hypertension     I10    Gastroesophageal reflux disease, unspecified whether esophagitis present     K21.9        1. COPD, on bronchodilator therapy.  2. Parkinson’s disease, on carbidopa and levodopa.  3. Dementia, unchanged.  4. Schizophrenia, on medication.  5. Weakness, on PT/OT.  6. Fall risk, on fall precautions.  7. Hypertension, medically controlled.  8. Seizure disorder, on antiepileptic.  9. Reflux disease, on PPI.  10. Depression, on medication.    Scribe Attestation  By signing my name below, IMarva Scribe attest that this documentation has been prepared under the direction and in the presence of Amparo Galarza MD.     All medical record entries made by the scribe were personally dictated by me I have reviewed the chart and agree the record accurately reflects my personal performance of his history physical examination and management      Electronically Signed  By: Amparo Galarza MD   3/24/24 11:23 PM

## 2024-03-20 VITALS
RESPIRATION RATE: 18 BRPM | DIASTOLIC BLOOD PRESSURE: 74 MMHG | SYSTOLIC BLOOD PRESSURE: 124 MMHG | TEMPERATURE: 97.9 F | HEART RATE: 78 BPM

## 2024-03-20 ASSESSMENT — ENCOUNTER SYMPTOMS
FEVER: 0
CHILLS: 0

## 2024-03-20 NOTE — PROGRESS NOTES
PLACE OF SERVICE:  [facility].    This is a subsequent visit.    Subjective   Patient ID: Gerardo Albright is a 63 y.o. male who presents for Follow-up.    Mr. Gerardo Albright is a 63-year-old male with history of Parkinson's disease.  He suffers from schizophrenia.  He has several medical issues and is unable to care for himself.  He requires supportive care.    Review of Systems   Constitutional:  Negative for chills and fever.   Cardiovascular:  Negative for chest pain.   All other systems reviewed and are negative.    Objective   /74   Pulse 78   Temp 36.6 °C (97.9 °F)   Resp 18     Physical Exam  Vitals reviewed.   Constitutional:       General: He is not in acute distress.     Comments: This is a well-developed, well-nourished male, sitting in a chair.   HENT:      Right Ear: Tympanic membrane, ear canal and external ear normal.      Left Ear: Tympanic membrane, ear canal and external ear normal.   Eyes:      General: No scleral icterus.     Pupils: Pupils are equal, round, and reactive to light.   Neck:      Vascular: No carotid bruit.   Cardiovascular:      Heart sounds: Normal heart sounds, S1 normal and S2 normal. No murmur heard.     No friction rub.   Pulmonary:      Effort: Pulmonary effort is normal.      Breath sounds: Wheezing (rare scattered) present.   Abdominal:      Palpations: There is no hepatomegaly, splenomegaly or mass.   Musculoskeletal:         General: No swelling or deformity. Normal range of motion.      Cervical back: Neck supple.      Right lower leg: No edema.      Left lower leg: No edema.   Lymphadenopathy:      Cervical: No cervical adenopathy.      Upper Body:      Right upper body: No axillary adenopathy.      Left upper body: No axillary adenopathy.      Lower Body: No right inguinal adenopathy. No left inguinal adenopathy.   Neurological:      Mental Status: He is alert.      Cranial Nerves: Cranial nerves 2-12 are intact. No cranial nerve deficit.       Sensory: No sensory deficit.      Motor: Motor function is intact. No weakness.      Gait: Gait is intact.      Deep Tendon Reflexes: Reflexes normal.      Comments: The patient is oriented x2.   Psychiatric:         Mood and Affect: Mood normal. Mood is not anxious or depressed. Affect is not angry.         Behavior: Behavior is not agitated.         Thought Content: Thought content normal.         Judgment: Judgment normal.     LAB WORK:  Laboratory studies were reviewed.    Assessment/Plan   Problem List Items Addressed This Visit             ICD-10-CM       Advance Directives and General Issues    At risk for falls Z91.81       Mental Health    Other schizophrenia (CMS/HCC) F20.89    Depression F32.A       Neuro    Other seizures (CMS/HCC) G40.89    Parkinson's disease G20.A1    Dementia (CMS/HCC) F03.90       Pulmonary and Pneumonias    Chronic obstructive pulmonary disease (CMS/HCC) - Primary J44.9     Other Visit Diagnoses         Codes    Weakness     R53.1    Essential hypertension     I10    Gastroesophageal reflux disease, unspecified whether esophagitis present     K21.9        1. COPD, on bronchodilator therapy.  2. Parkinson’s disease, on carbidopa and levodopa.  3. Dementia, unchanged.  4. Schizophrenia, on medication.  5. Weakness, on PT/OT.  6. Fall risk, on fall precautions.  7. Hypertension, medically controlled.  8. Seizure disorder, on antiepileptic.  9. Reflux disease, on PPI.  10. Depression, on medication.    Scribe Attestation  By signing my name below, IMarva Scribe attest that this documentation has been prepared under the direction and in the presence of Amparo Galarza MD.     All medical record entries made by the scribe were personally dictated by me I have reviewed the chart and agree the record accurately reflects my personal performance of his history physical examination and management

## 2024-03-29 ENCOUNTER — NURSING HOME VISIT (OUTPATIENT)
Dept: POST ACUTE CARE | Facility: EXTERNAL LOCATION | Age: 64
End: 2024-03-29
Payer: COMMERCIAL

## 2024-03-29 DIAGNOSIS — K21.9 GASTROESOPHAGEAL REFLUX DISEASE, UNSPECIFIED WHETHER ESOPHAGITIS PRESENT: ICD-10-CM

## 2024-03-29 DIAGNOSIS — Z91.81 AT RISK FOR FALLS: ICD-10-CM

## 2024-03-29 DIAGNOSIS — I10 ESSENTIAL HYPERTENSION: ICD-10-CM

## 2024-03-29 DIAGNOSIS — R53.1 WEAKNESS: ICD-10-CM

## 2024-03-29 DIAGNOSIS — J44.9 CHRONIC OBSTRUCTIVE PULMONARY DISEASE, UNSPECIFIED COPD TYPE (MULTI): Primary | ICD-10-CM

## 2024-03-29 DIAGNOSIS — F32.89 OTHER DEPRESSION: ICD-10-CM

## 2024-03-29 DIAGNOSIS — G40.89 OTHER SEIZURES (MULTI): ICD-10-CM

## 2024-03-29 DIAGNOSIS — F20.89 OTHER SCHIZOPHRENIA (MULTI): ICD-10-CM

## 2024-03-29 DIAGNOSIS — F03.90 DEMENTIA, UNSPECIFIED DEMENTIA SEVERITY, UNSPECIFIED DEMENTIA TYPE, UNSPECIFIED WHETHER BEHAVIORAL, PSYCHOTIC, OR MOOD DISTURBANCE OR ANXIETY (MULTI): ICD-10-CM

## 2024-03-29 DIAGNOSIS — G47.09 OTHER INSOMNIA: ICD-10-CM

## 2024-03-29 DIAGNOSIS — G20.A1 PARKINSON'S DISEASE WITHOUT DYSKINESIA, UNSPECIFIED WHETHER MANIFESTATIONS FLUCTUATE (MULTI): ICD-10-CM

## 2024-03-29 PROCEDURE — 99309 SBSQ NF CARE MODERATE MDM 30: CPT | Performed by: INTERNAL MEDICINE

## 2024-03-29 NOTE — LETTER
Patient: Gerardo Albright  : 1960    Encounter Date: 2024    PLACE OF SERVICE:  [facility]    This is a subsequent visit.    Subjective  Patient ID: Gerardo Albright is a 63 y.o. male who presents for Follow-up.    Mr. Gerardo Albright is a 63-year-old male with history of dementia with Parkinson's disease.  He suffers from COPD and schizophrenia.  He is unable to care for himself and requires supportive care.    Review of Systems   Constitutional:  Negative for chills and fever.   Cardiovascular:  Negative for chest pain.   All other systems reviewed and are negative.    Objective  /76   Pulse 72   Temp 36.6 °C (97.9 °F)   Resp 18     Physical Exam  Vitals reviewed.   Constitutional:       General: He is not in acute distress.     Comments: This is a well-developed, well-nourished male, sitting in a chair   HENT:      Right Ear: Tympanic membrane, ear canal and external ear normal.      Left Ear: Tympanic membrane, ear canal and external ear normal.   Eyes:      General: No scleral icterus.     Pupils: Pupils are equal, round, and reactive to light.   Neck:      Vascular: No carotid bruit.   Cardiovascular:      Heart sounds: Normal heart sounds, S1 normal and S2 normal. No murmur heard.     No friction rub.   Pulmonary:      Breath sounds: Decreased breath sounds (throughout) present.   Abdominal:      Palpations: There is no hepatomegaly, splenomegaly or mass.   Musculoskeletal:         General: No swelling or deformity. Normal range of motion.      Cervical back: Neck supple.      Right lower leg: No edema.      Left lower leg: No edema.   Lymphadenopathy:      Cervical: No cervical adenopathy.      Upper Body:      Right upper body: No axillary adenopathy.      Left upper body: No axillary adenopathy.      Lower Body: No right inguinal adenopathy. No left inguinal adenopathy.   Neurological:      Mental Status: He is oriented to person, place, and time.      Cranial Nerves: Cranial  nerves 2-12 are intact. No cranial nerve deficit.      Sensory: No sensory deficit.      Motor: Motor function is intact. No weakness.      Gait: Gait is intact.      Deep Tendon Reflexes: Reflexes normal.   Psychiatric:         Mood and Affect: Mood normal. Mood is not anxious or depressed. Affect is not angry.         Behavior: Behavior is not agitated.         Thought Content: Thought content normal.         Judgment: Judgment normal.     LAB WORK:  Laboratory studies reviewed.    Assessment/Plan  Problem List Items Addressed This Visit             ICD-10-CM       Advance Directives and General Issues    At risk for falls Z91.81       Mental Health    Other schizophrenia (CMS/HCC) F20.89    Depression F32.A       Neuro    Other seizures (CMS/HCC) G40.89    Parkinson's disease (CMS/HCC) G20.A1    Dementia (CMS/HCC) F03.90       Pulmonary and Pneumonias    Chronic obstructive pulmonary disease (CMS/HCC) - Primary J44.9       Sleep    Other insomnia G47.09     Other Visit Diagnoses         Codes    Essential hypertension     I10    Gastroesophageal reflux disease, unspecified whether esophagitis present     K21.9    Weakness     R53.1        1. COPD, on bronchodilator therapy  2. Parkinson’s disease, on carbidopa and levodopa.  3. Dementia, unchanged.  4. Schizophrenia, on medication.  5. Hypertension, med controlled.  6. Seizure disorder, on antiepileptic.  7. Depression, on medication.  8. Reflux disease, on PPI.  9. Weakness, on PT/OT.  10. Fall risk, on fall precautions.  11. Insomnia, on melatonin.    Scribe Attestation  By signing my name below, IMerna Scribe attest that this documentation has been prepared under the direction and in the presence of Amparo Galarza MD.     All medical record entries made by the scribe were personally dictated by me I have reviewed the chart and agree the record accurately reflects my personal performance of his history physical examination and  management      Electronically Signed By: Amparo Galarza MD   4/7/24 11:36 PM

## 2024-04-02 VITALS
RESPIRATION RATE: 18 BRPM | DIASTOLIC BLOOD PRESSURE: 76 MMHG | TEMPERATURE: 97.9 F | SYSTOLIC BLOOD PRESSURE: 124 MMHG | HEART RATE: 72 BPM

## 2024-04-02 ASSESSMENT — ENCOUNTER SYMPTOMS
CHILLS: 0
FEVER: 0

## 2024-04-02 NOTE — PROGRESS NOTES
PLACE OF SERVICE:  [facility]    This is a subsequent visit.    Subjective   Patient ID: Gerardo Albright is a 63 y.o. male who presents for Follow-up.    Mr. Gerardo Albright is a 63-year-old male with history of dementia with Parkinson's disease.  He suffers from COPD and schizophrenia.  He is unable to care for himself and requires supportive care.    Review of Systems   Constitutional:  Negative for chills and fever.   Cardiovascular:  Negative for chest pain.   All other systems reviewed and are negative.    Objective   /76   Pulse 72   Temp 36.6 °C (97.9 °F)   Resp 18     Physical Exam  Vitals reviewed.   Constitutional:       General: He is not in acute distress.     Comments: This is a well-developed, well-nourished male, sitting in a chair   HENT:      Right Ear: Tympanic membrane, ear canal and external ear normal.      Left Ear: Tympanic membrane, ear canal and external ear normal.   Eyes:      General: No scleral icterus.     Pupils: Pupils are equal, round, and reactive to light.   Neck:      Vascular: No carotid bruit.   Cardiovascular:      Heart sounds: Normal heart sounds, S1 normal and S2 normal. No murmur heard.     No friction rub.   Pulmonary:      Breath sounds: Decreased breath sounds (throughout) present.   Abdominal:      Palpations: There is no hepatomegaly, splenomegaly or mass.   Musculoskeletal:         General: No swelling or deformity. Normal range of motion.      Cervical back: Neck supple.      Right lower leg: No edema.      Left lower leg: No edema.   Lymphadenopathy:      Cervical: No cervical adenopathy.      Upper Body:      Right upper body: No axillary adenopathy.      Left upper body: No axillary adenopathy.      Lower Body: No right inguinal adenopathy. No left inguinal adenopathy.   Neurological:      Mental Status: He is oriented to person, place, and time.      Cranial Nerves: Cranial nerves 2-12 are intact. No cranial nerve deficit.      Sensory: No  sensory deficit.      Motor: Motor function is intact. No weakness.      Gait: Gait is intact.      Deep Tendon Reflexes: Reflexes normal.   Psychiatric:         Mood and Affect: Mood normal. Mood is not anxious or depressed. Affect is not angry.         Behavior: Behavior is not agitated.         Thought Content: Thought content normal.         Judgment: Judgment normal.     LAB WORK:  Laboratory studies reviewed.    Assessment/Plan   Problem List Items Addressed This Visit             ICD-10-CM       Advance Directives and General Issues    At risk for falls Z91.81       Mental Health    Other schizophrenia (CMS/HCC) F20.89    Depression F32.A       Neuro    Other seizures (CMS/HCC) G40.89    Parkinson's disease (CMS/HCC) G20.A1    Dementia (CMS/HCC) F03.90       Pulmonary and Pneumonias    Chronic obstructive pulmonary disease (CMS/HCC) - Primary J44.9       Sleep    Other insomnia G47.09     Other Visit Diagnoses         Codes    Essential hypertension     I10    Gastroesophageal reflux disease, unspecified whether esophagitis present     K21.9    Weakness     R53.1        1. COPD, on bronchodilator therapy  2. Parkinson’s disease, on carbidopa and levodopa.  3. Dementia, unchanged.  4. Schizophrenia, on medication.  5. Hypertension, med controlled.  6. Seizure disorder, on antiepileptic.  7. Depression, on medication.  8. Reflux disease, on PPI.  9. Weakness, on PT/OT.  10. Fall risk, on fall precautions.  11. Insomnia, on melatonin.    Scribe Attestation  By signing my name below, IMerna Scribe attest that this documentation has been prepared under the direction and in the presence of Amparo Galarza MD.     All medical record entries made by the scribe were personally dictated by me I have reviewed the chart and agree the record accurately reflects my personal performance of his history physical examination and management

## 2024-04-09 ENCOUNTER — APPOINTMENT (OUTPATIENT)
Dept: CARDIOLOGY | Facility: HOSPITAL | Age: 64
DRG: 885 | End: 2024-04-09
Payer: COMMERCIAL

## 2024-04-09 ENCOUNTER — APPOINTMENT (OUTPATIENT)
Dept: RADIOLOGY | Facility: HOSPITAL | Age: 64
DRG: 885 | End: 2024-04-09
Payer: COMMERCIAL

## 2024-04-09 ENCOUNTER — HOSPITAL ENCOUNTER (EMERGENCY)
Facility: HOSPITAL | Age: 64
Discharge: OTHER INSTITUTION | DRG: 885 | End: 2024-04-11
Attending: STUDENT IN AN ORGANIZED HEALTH CARE EDUCATION/TRAINING PROGRAM
Payer: COMMERCIAL

## 2024-04-09 DIAGNOSIS — R45.851 SUICIDAL IDEATIONS: Primary | ICD-10-CM

## 2024-04-09 LAB
ALBUMIN SERPL-MCNC: 4.3 G/DL (ref 3.5–5)
ALP BLD-CCNC: 74 U/L (ref 35–125)
ALT SERPL-CCNC: 15 U/L (ref 5–40)
AMPHETAMINES UR QL SCN>1000 NG/ML: NEGATIVE
ANION GAP SERPL CALC-SCNC: 18 MMOL/L
APAP SERPL-MCNC: <5 UG/ML
APPEARANCE UR: CLEAR
AST SERPL-CCNC: 22 U/L (ref 5–40)
BARBITURATES UR QL SCN>300 NG/ML: NEGATIVE
BASOPHILS # BLD AUTO: 0.02 X10*3/UL (ref 0–0.1)
BASOPHILS NFR BLD AUTO: 0.2 %
BENZODIAZ UR QL SCN>300 NG/ML: NEGATIVE
BILIRUB SERPL-MCNC: 0.8 MG/DL (ref 0.1–1.2)
BILIRUB UR STRIP.AUTO-MCNC: NEGATIVE MG/DL
BUN SERPL-MCNC: 34 MG/DL (ref 8–25)
BZE UR QL SCN>300 NG/ML: NEGATIVE
CALCIUM SERPL-MCNC: 9.8 MG/DL (ref 8.5–10.4)
CANNABINOIDS UR QL SCN>50 NG/ML: NEGATIVE
CHLORIDE SERPL-SCNC: 101 MMOL/L (ref 97–107)
CO2 SERPL-SCNC: 20 MMOL/L (ref 24–31)
COLOR UR: YELLOW
CREAT SERPL-MCNC: 0.6 MG/DL (ref 0.4–1.6)
EGFRCR SERPLBLD CKD-EPI 2021: >90 ML/MIN/1.73M*2
EOSINOPHIL # BLD AUTO: 0 X10*3/UL (ref 0–0.7)
EOSINOPHIL NFR BLD AUTO: 0 %
ERYTHROCYTE [DISTWIDTH] IN BLOOD BY AUTOMATED COUNT: 12.7 % (ref 11.5–14.5)
ETHANOL SERPL-MCNC: <0.01 G/DL
FENTANYL+NORFENTANYL UR QL SCN: NEGATIVE
FLUAV RNA RESP QL NAA+PROBE: NOT DETECTED
FLUBV RNA RESP QL NAA+PROBE: NOT DETECTED
GLUCOSE BLD MANUAL STRIP-MCNC: 125 MG/DL (ref 74–99)
GLUCOSE SERPL-MCNC: 119 MG/DL (ref 65–99)
GLUCOSE UR STRIP.AUTO-MCNC: NORMAL MG/DL
HCT VFR BLD AUTO: 51.2 % (ref 41–52)
HGB BLD-MCNC: 17.4 G/DL (ref 13.5–17.5)
IMM GRANULOCYTES # BLD AUTO: 0.04 X10*3/UL (ref 0–0.7)
IMM GRANULOCYTES NFR BLD AUTO: 0.3 % (ref 0–0.9)
KETONES UR STRIP.AUTO-MCNC: ABNORMAL MG/DL
LEUKOCYTE ESTERASE UR QL STRIP.AUTO: NEGATIVE
LYMPHOCYTES # BLD AUTO: 0.74 X10*3/UL (ref 1.2–4.8)
LYMPHOCYTES NFR BLD AUTO: 6.3 %
MCH RBC QN AUTO: 30.3 PG (ref 26–34)
MCHC RBC AUTO-ENTMCNC: 34 G/DL (ref 32–36)
MCV RBC AUTO: 89 FL (ref 80–100)
METHADONE UR QL SCN>300 NG/ML: NEGATIVE
MONOCYTES # BLD AUTO: 0.67 X10*3/UL (ref 0.1–1)
MONOCYTES NFR BLD AUTO: 5.7 %
MUCOUS THREADS #/AREA URNS AUTO: NORMAL /LPF
NEUTROPHILS # BLD AUTO: 10.36 X10*3/UL (ref 1.2–7.7)
NEUTROPHILS NFR BLD AUTO: 87.5 %
NITRITE UR QL STRIP.AUTO: NEGATIVE
NRBC BLD-RTO: 0 /100 WBCS (ref 0–0)
OPIATES UR QL SCN>300 NG/ML: NEGATIVE
OXYCODONE UR QL: NEGATIVE
PCP UR QL SCN>25 NG/ML: NEGATIVE
PH UR STRIP.AUTO: 6 [PH]
PLATELET # BLD AUTO: 311 X10*3/UL (ref 150–450)
POTASSIUM SERPL-SCNC: 4.7 MMOL/L (ref 3.4–5.1)
PROT SERPL-MCNC: 7.5 G/DL (ref 5.9–7.9)
PROT UR STRIP.AUTO-MCNC: ABNORMAL MG/DL
RBC # BLD AUTO: 5.75 X10*6/UL (ref 4.5–5.9)
RBC # UR STRIP.AUTO: NEGATIVE /UL
RBC #/AREA URNS AUTO: NORMAL /HPF
SALICYLATES SERPL-MCNC: <0 MG/DL
SARS-COV-2 RNA RESP QL NAA+PROBE: NOT DETECTED
SODIUM SERPL-SCNC: 139 MMOL/L (ref 133–145)
SP GR UR STRIP.AUTO: 1.03
UROBILINOGEN UR STRIP.AUTO-MCNC: NORMAL MG/DL
WBC # BLD AUTO: 11.8 X10*3/UL (ref 4.4–11.3)
WBC #/AREA URNS AUTO: NORMAL /HPF

## 2024-04-09 PROCEDURE — 70450 CT HEAD/BRAIN W/O DYE: CPT | Performed by: RADIOLOGY

## 2024-04-09 PROCEDURE — 82947 ASSAY GLUCOSE BLOOD QUANT: CPT | Mod: 59

## 2024-04-09 PROCEDURE — 82947 ASSAY GLUCOSE BLOOD QUANT: CPT

## 2024-04-09 PROCEDURE — 80143 DRUG ASSAY ACETAMINOPHEN: CPT | Performed by: STUDENT IN AN ORGANIZED HEALTH CARE EDUCATION/TRAINING PROGRAM

## 2024-04-09 PROCEDURE — 80053 COMPREHEN METABOLIC PANEL: CPT | Performed by: STUDENT IN AN ORGANIZED HEALTH CARE EDUCATION/TRAINING PROGRAM

## 2024-04-09 PROCEDURE — 71045 X-RAY EXAM CHEST 1 VIEW: CPT

## 2024-04-09 PROCEDURE — 70450 CT HEAD/BRAIN W/O DYE: CPT

## 2024-04-09 PROCEDURE — 87636 SARSCOV2 & INF A&B AMP PRB: CPT | Performed by: STUDENT IN AN ORGANIZED HEALTH CARE EDUCATION/TRAINING PROGRAM

## 2024-04-09 PROCEDURE — 93005 ELECTROCARDIOGRAM TRACING: CPT

## 2024-04-09 PROCEDURE — 99285 EMERGENCY DEPT VISIT HI MDM: CPT | Mod: 25

## 2024-04-09 PROCEDURE — 71045 X-RAY EXAM CHEST 1 VIEW: CPT | Performed by: RADIOLOGY

## 2024-04-09 PROCEDURE — 85025 COMPLETE CBC W/AUTO DIFF WBC: CPT | Performed by: STUDENT IN AN ORGANIZED HEALTH CARE EDUCATION/TRAINING PROGRAM

## 2024-04-09 PROCEDURE — 80307 DRUG TEST PRSMV CHEM ANLYZR: CPT | Performed by: STUDENT IN AN ORGANIZED HEALTH CARE EDUCATION/TRAINING PROGRAM

## 2024-04-09 PROCEDURE — 81001 URINALYSIS AUTO W/SCOPE: CPT | Mod: 59 | Performed by: STUDENT IN AN ORGANIZED HEALTH CARE EDUCATION/TRAINING PROGRAM

## 2024-04-09 PROCEDURE — 36415 COLL VENOUS BLD VENIPUNCTURE: CPT | Performed by: STUDENT IN AN ORGANIZED HEALTH CARE EDUCATION/TRAINING PROGRAM

## 2024-04-09 SDOH — HEALTH STABILITY: MENTAL HEALTH: SUICIDAL BEHAVIOR (LIFETIME): YES

## 2024-04-09 SDOH — HEALTH STABILITY: MENTAL HEALTH: IN THE PAST FEW WEEKS, HAVE YOU WISHED YOU WERE DEAD?: YES

## 2024-04-09 SDOH — HEALTH STABILITY: MENTAL HEALTH: HAVE YOU EVER TRIED TO KILL YOURSELF?: YES

## 2024-04-09 SDOH — HEALTH STABILITY: MENTAL HEALTH: ARE YOU HAVING THOUGHTS OF KILLING YOURSELF RIGHT NOW?: YES

## 2024-04-09 SDOH — HEALTH STABILITY: MENTAL HEALTH: HOW DID YOU TRY TO KILL YOURSELF?: CUT ARMS AND WRISTS

## 2024-04-09 SDOH — HEALTH STABILITY: MENTAL HEALTH: DEPRESSION SYMPTOMS: FEELINGS OF HELPLESSNESS;FEELINGS OF HOPELESSESS;FEELINGS OF WORTHLESSNESS;CHANGE IN ENERGY LEVEL

## 2024-04-09 SDOH — HEALTH STABILITY: MENTAL HEALTH: ACTIVE SUICIDAL IDEATION WITH SPECIFIC PLAN AND INTENT (PAST 1 MONTH): YES

## 2024-04-09 SDOH — HEALTH STABILITY: MENTAL HEALTH: ACTIVE SUICIDAL IDEATION WITH SOME INTENT TO ACT, WITHOUT SPECIFIC PLAN (PAST 1 MONTH): YES

## 2024-04-09 SDOH — HEALTH STABILITY: MENTAL HEALTH: NON-SPECIFIC ACTIVE SUICIDAL THOUGHTS (PAST 1 MONTH): YES

## 2024-04-09 SDOH — HEALTH STABILITY: MENTAL HEALTH: IN THE PAST FEW WEEKS, HAVE YOU FELT THAT YOU OR YOUR FAMILY WOULD BE BETTER OFF IF YOU WERE DEAD?: YES

## 2024-04-09 SDOH — HEALTH STABILITY: MENTAL HEALTH: ANXIETY SYMPTOMS: GENERALIZED

## 2024-04-09 SDOH — HEALTH STABILITY: MENTAL HEALTH: WHEN DID YOU TRY TO KILL YOURSELF?: 3/2024

## 2024-04-09 SDOH — HEALTH STABILITY: MENTAL HEALTH: WISH TO BE DEAD (PAST 1 MONTH): YES

## 2024-04-09 SDOH — HEALTH STABILITY: MENTAL HEALTH: SUICIDAL BEHAVIOR (3 MONTHS): YES

## 2024-04-09 SDOH — ECONOMIC STABILITY: HOUSING INSECURITY: FEELS SAFE LIVING IN HOME: NO

## 2024-04-09 SDOH — HEALTH STABILITY: MENTAL HEALTH: IN THE PAST WEEK, HAVE YOU BEEN HAVING THOUGHTS ABOUT KILLING YOURSELF?: YES

## 2024-04-09 SDOH — ECONOMIC STABILITY: GENERAL

## 2024-04-09 SDOH — HEALTH STABILITY: MENTAL HEALTH: DESCRIBE YOUR THOUGHTS OF KILLING YOURSELF RIGHT NOW:: WANT TO CUT WRISTS.

## 2024-04-09 ASSESSMENT — PAIN - FUNCTIONAL ASSESSMENT: PAIN_FUNCTIONAL_ASSESSMENT: 0-10

## 2024-04-09 ASSESSMENT — LIFESTYLE VARIABLES
PRESCIPTION_ABUSE_PAST_12_MONTHS: NO
SUBSTANCE_ABUSE_PAST_12_MONTHS: NO

## 2024-04-09 ASSESSMENT — PAIN SCALES - GENERAL
PAINLEVEL_OUTOF10: 0 - NO PAIN
PAINLEVEL_OUTOF10: 6

## 2024-04-09 NOTE — ED PROVIDER NOTES
HPI   Chief Complaint   Patient presents with    Altered Mental Status     Pt was sent in from nursing home due to being more confused than usual since last Wednesday.        This is a 63-year-old male who presents to the emergency department for concern of altered mental status.  Patient resides at nursing home facility and it was noted the patient has been more confused recently over the last week.  Patient tells me today that he is suicidal currently.  He states that he has plans to cut his wrist.  He does not have any homicidal ideations.  He feels that he has been more confused recently but cannot elaborate.  He states that he has not had any recent illnesses.  He does not have any pain currently.        Please see HPI for pertinent positive and negative ROS.                   Bow Coma Scale Score: 14         NIH Stroke Scale: 1             Patient History   Past Medical History:   Diagnosis Date    ADHD (attention deficit hyperactivity disorder)     AMI (acute myocardial infarction) (CMS/[facility])     At risk for falls     Catatonic schizophrenia (CMS/[facility])     Cognitive decline     COPD (chronic obstructive pulmonary disease) (CMS/[facility])     Dementia (CMS/[facility])     Depression     GERD (gastroesophageal reflux disease)     Hypertension     Insomnia     Myocardial infarction (CMS/[facility])     Parkinson's disease (CMS/[facility])     Schizoaffective disorder (CMS/[facility])     Schizophrenia (CMS/[facility])     Seizures (CMS/[facility])     STEMI (ST elevation myocardial infarction) (CMS/HCC) 04/17/2017    Weakness      History reviewed. No pertinent surgical history.  Family History   Problem Relation Name Age of Onset    Hypertension Other       Social History     Tobacco Use    Smoking status: Former     Packs/day: .5     Types: Cigarettes     Passive exposure: Past    Smokeless tobacco: Never   Vaping Use    Vaping Use: Never used   Substance Use Topics    Alcohol use: Not Currently    Drug use: Never       Physical Exam   ED Triage Vitals  [04/09/24 1458]   Temperature Heart Rate Respirations BP   36.9 °C (98.4 °F) 93 15 (!) 151/121      Pulse Ox Temp Source Heart Rate Source Patient Position   (!) 93 % Oral Monitor Lying      BP Location FiO2 (%)     Left arm --       Physical Exam  GENERAL APPEARANCE: Awake and alert. No acute distress.   VITAL SIGNS: As per the nurses' triage record.  HEENT: Normocephalic, atraumatic. Extraocular muscles are intact. Conjunctiva are pink. Negative scleral icterus. Mucous membranes are moist. Tongue in the midline. Oropharynx clear, uvula midline.   NECK: Soft, nontender and supple, full gross ROM, no meningeal signs.  CHEST: Nontender to palpation. Clear to auscultation bilaterally. No rales, rhonchi, or wheezing. Symmetric rise and fall of chest wall.   HEART: Clear S1 and S2. Regular rate and rhythm. No murmurs appreciated on auscultation.  Strong and equal pulses in the extremities.  ABDOMEN: Soft, nontender, nondistended, positive bowel sounds, no palpable masses.  MUSCULOSKELETAL: The calves are nontender to palpation. Full gross active range of motion. Ambulating on own with no acute difficulties  NEUROLOGICAL: Awake, alert and oriented x 2. Motor power intact in the upper and lower extremities. Sensation is intact to light touch in the upper and lower extremities. Patient answering questions appropriately.  NIH stroke scale 1 due to patient not answering both month and age correctly.  IMMUNOLOGICAL: No lymphatic streaking noted  DERMATOLOGIC: Warm and dry without petechiae, rashes, or ecchymosis noted on visible skin.   PYSCH: Cooperative with appropriate mood and affect.    ED Course & MDM   Diagnoses as of 04/09/24 2040   Suicidal ideations       Medical Decision Making  Parts of this chart have been completed using voice recognition software. Please excuse any errors of transcription.  My thought process and reason for plan has been formulated from the time that I saw the patient until the time of  disposition and is not specific to one specific moment during their visit and furthermore my MDM encompasses this entire chart and not only this text box.      HPI: Detailed above.    Exam: A medically appropriate exam performed, outlined above, given the known history and presentation.    History obtained from: Patient    EKG: See my supervising physician's EKG interpretation    Medications given during visit:  Medications - No data to display     Diagnostic/tests  Labs Reviewed   CBC WITH AUTO DIFFERENTIAL - Abnormal       Result Value    WBC 11.8 (*)     nRBC 0.0      RBC 5.75      Hemoglobin 17.4      Hematocrit 51.2      MCV 89      MCH 30.3      MCHC 34.0      RDW 12.7      Platelets 311      Neutrophils % 87.5      Immature Granulocytes %, Automated 0.3      Lymphocytes % 6.3      Monocytes % 5.7      Eosinophils % 0.0      Basophils % 0.2      Neutrophils Absolute 10.36 (*)     Immature Granulocytes Absolute, Automated 0.04      Lymphocytes Absolute 0.74 (*)     Monocytes Absolute 0.67      Eosinophils Absolute 0.00      Basophils Absolute 0.02     COMPREHENSIVE METABOLIC PANEL - Abnormal    Glucose 119 (*)     Sodium 139      Potassium 4.7      Chloride 101      Bicarbonate 20 (*)     Urea Nitrogen 34 (*)     Creatinine 0.60      eGFR >90      Calcium 9.8      Albumin 4.3      Alkaline Phosphatase 74      Total Protein 7.5      AST 22      Bilirubin, Total 0.8      ALT 15      Anion Gap 18     URINALYSIS WITH REFLEX CULTURE AND MICROSCOPIC - Abnormal    Color, Urine Yellow      Appearance, Urine Clear      Specific Gravity, Urine 1.033      pH, Urine 6.0      Protein, Urine 30 (1+) (*)     Glucose, Urine Normal      Blood, Urine NEGATIVE      Ketones, Urine 150 (4+) (*)     Bilirubin, Urine NEGATIVE      Urobilinogen, Urine Normal      Nitrite, Urine NEGATIVE      Leukocyte Esterase, Urine NEGATIVE     POCT GLUCOSE - Abnormal    POCT Glucose 125 (*)    DRUG SCREEN,URINE - Normal    Amphetamine Screen, Urine  Negative      Barbiturate Screen, Urine Negative      Benzodiazepines Screen, Urine Negative      Cannabinoid Screen, Urine Negative      Cocaine Metabolite Screen, Urine Negative      Fentanyl Screen, Urine Negative      Methadone Screen, Urine Negative      Opiate Screen, Urine Negative      Oxycodone Screen, Urine Negative      PCP Screen, Urine Negative      Narrative:     These toxicological screening tests provide unconfirmed qualitative measurements to aid in treatment and diagnosis in cases of drug use or overdose. This test is used only for medical purposes. A positive result does not indicate or measure intoxication. For specific test performance or pathologist consultation, please contact the Laboratory.    The following threshold concentrations are used for these analyses.Values at or above the threshold concentration are reported as positive. Values below the threshold are reported as negative.    Drug /Screening Threshold                                                                                                 THC/CANNABINOIDS................50 ng/ml  METHADONE......................300 ng/ml  COCAINE METABOLITES............300 ng/ml  BENZODIAZEPINE.................300 ng/ml  PCP.............................25 ng/ml  OPIATE.........................300 ng/ml  AMPHETAMINE/ECSTASY...........1000 ng/ml  BARBITURATE....................200 ng/ml  OXYCODONE......................100 ng/ml  FENTANYL.........................5 ng/ml       ACUTE TOXICOLOGY PANEL, BLOOD - Normal    Acetaminophen <5.0      Salicylate  <0      Alcohol <0.010     SARS-COV-2 AND INFLUENZA A/B PCR - Normal    Flu A Result Not Detected      Flu B Result Not Detected      Coronavirus 2019, PCR Not Detected      Narrative:     This assay has received FDA Emergency Use Authorization (EUA) and  is only authorized for the duration of time that circumstances exist to justify the authorization of the emergency use of in vitro diagnostic  tests for the detection of SARS-CoV-2 virus and/or diagnosis of COVID-19 infection under section 564(b)(1) of the Act, 21 U.S.C. 360bbb-3(b)(1). Testing for SARS-CoV-2 is only recommended for patients who meet current clinical and/or epidemiological criteria as defined by federal, state, or local public health directives. This assay is an in vitro diagnostic nucleic acid amplification test for the qualitative detection of SARS-CoV-2, Influenza A, and Influenza B from nasopharyngeal specimens and has been validated for use at [facility]. Negative results do not preclude COVID-19 infections or Influenza A/B infections, and should not be used as the sole basis for diagnosis, treatment, or other management decisions. If Influenza A/B and RSV PCR results are negative, testing for Parainfluenza virus, Adenovirus and Metapneumovirus is routinely performed for [facility] and intensive care inpatients, and is available on other patients by placing an add-on request.    URINALYSIS WITH REFLEX CULTURE AND MICROSCOPIC    Narrative:     The following orders were created for panel order Urinalysis with Reflex Culture and Microscopic.  Procedure                               Abnormality         Status                     ---------                               -----------         ------                     Urinalysis with Reflex C...[[id number]]  Abnormal            Final result               Extra Urine Gray Tube[[id number]]                                                         Please view results for these tests on the individual orders.   EXTRA URINE GRAY TUBE   POCT GLUCOSE METER   URINALYSIS MICROSCOPIC WITH REFLEX CULTURE    WBC, Urine 1-5      RBC, Urine 1-2      Mucus, Urine FEW        XR chest 1 view   Final Result   No acute pathologic findings are identified.  There is no interval   change when compared to the previous examination.        MACRO:   none        Signed by: Trey Montero  4/9/2024 4:25 PM   Dictation workstation:   [id number]      CT head wo IV contrast   Final Result   No acute intracranial pathologic findings are identified.             MACRO:   none        Signed by: Trey Montero 4/9/2024 4:24 PM   Dictation workstation:   [id number]           Considerations/further MDM:  Patient was seen in conjucntion with my supervising physician,  Dr. Fong. Please refer to his note.    Patient has mild diastolic hypertension, afebrile, no tachycardia, no tachypnea, 97% on room air.    Differential diagnosis includes but is not limited to electrolyte disturbance versus viral syndrome versus intracranial mass versus pneumonia     Laboratory evaluation unremarkable.  Patient does have 4+ ketones and 1+ protein on urinalysis.  CBC shows mild leukocytosis but no anemia.  CMP shows no significant electrolyte disturbance or acute kidney injury.  Glucose is 119.  Negative viral swabs.  Negative acute tox screen it.  Chest x-ray shows no acute pathologic findings.  CT head without IV contrast shows no acute intracranial findings.      did come to evaluate patient.  Due to current suicidal ideations patient was pink slipped.     At the end of my shift, the patient remains in the emergency department pending placement for inpatient psychiatric evaluation.  Please refer to my supervising physician for further management and disposition of this patient while he remains in the emergency department.      Procedure  Procedures     Kaelyn Louis PA-C  04/09/24 2040

## 2024-04-09 NOTE — PROGRESS NOTES
EPAT - Social Work Psychiatric Assessment    Arrival Details  Mode of Arrival: Ambulance (From [facility])  Admission Source: Home  Admission Type: Involuntary (Pink slipped by ED attending DO. Jairo)  EPAT Assessment Start Date: 04/09/24  EPAT Assessment Start Time: 1730  Name of : Tommy DASH RAJANNASIR    History of Present Illness  Admission Reason: Psychiatric Evaluation  HPI: Pt is a 64 yo male that  presents to [facility] ED with complaint of  suicidal ideation with a plan to cut his wrists along with AMS .   reviewed the patient's chart and medical records which indicate a history of  Schizoaffective DO, ANGELA. Pt has a hx of multiple hospitalizations in the last year due to increased depression and lack of will to live. Last suicide attempt was 3/8/2024 where he cut his arm with a razor, he was transferred to [facility] for Inpatient tx.The triage risk assessment was reviewed and the patient was indicated to be  high  risk during triage. Pt reports limited supports. Pt is currently living at [facility].  EPAT consulted for [facility] Readmission Information   Readmission within 30 Days: Yes  Previous ED Visit Date and Reason : 3/8/2024 [facility].  Previous Discharge Date and Location: 3/12/2024  Factors Contributing to  Readmission Inpatient/ED (Team Perspective): Other (Comments)    Psychiatric Symptoms  Anxiety Symptoms: Generalized  Depression Symptoms: Feelings of helplessness, Feelings of hopelessess, Feelings of worthlessness, Change in energy level  Gay Symptoms: No problems reported or observed.    Psychosis Symptoms  Hallucination Type: Auditory  Delusion Type: No problems reported or observed.    Additional Symptoms - Adult  Generalized Anxiety Disorder: Excessive anxiety/worry  Obsessive Compulsive Disorder: Repetitive behaviors, Repetitive thoughts, Intrusive thoughts  Post Traumatic Stress Disorder: No problems reported or observed.  Delirium:  No problems reported or observed.    Past Psychiatric History/Meds/Treatments  Past Psychiatric History: PT has a hx dx of Schizoaffective DO, ANGELA, Seizure DO.  Past Psychiatric Meds/Treatments: Pt reports that he is compliant on medications however they are not working.  Past Violence/Victimization History: none    Current Mental Health Contacts   Name/Phone Number: [facility] has in house psychiatry.   Last Appointment Date: na  Provider Name/Phone Number: [facility]  Provider Last Appointment Date: na    Support System: Other (Comment)    Living Arrangement: Other (Comment)    Home Safety  Feels Safe Living in Home: No    Income Information  Employment Status for: Patient  Employment Status: Retired  Income Source: Social Security  Financial Concerns: None    [place] Service/Education History  Current or Previous  Service: None  Education Level: High school  History of Learning Problems: No  History of School Behavior Problems: No    Social/Cultural History  Social History: Pt is a 64 yo  male.  Cultural Requests During Hospitalization: none  Spiritual Requests During Hospitalization: none  Important Activities: Hobbies    Legal  Legal Considerations: Patient/  for Healthcare Needs  Assistance with Managing/Advocating Healthcare Needs: Legal Guardian  Criminal Activity/ Legal Involvement Pertinent to Current Situation/ Hospitalization: none  Legal Concerns: Guardian Angel Holguin [phone]  Legal Comments: none    Drug Screening  Have you used any substances (canabis, cocaine, heroin, hallucinogens, inhalants, etc.) in the past 12 months?: No  Have you used any prescription drugs other than prescribed in the past 12 months?: No  Is a toxicology screen needed?: Yes    Stage of Change  Stage of Change: Precontemplation  History of Treatment: Inpatient  Type of Treatment Offered: Inpatient  Treatment Offered: Accepted  Duration of Substance  "Use: na  Frequency of Substance Use: na  Age of First Substance Use: na    Psychosocial  Psychosocial (WDL): Within Defined Limits    Orientation  Orientation Level: Oriented X4, Appropriate for developmental age    General Appearance  Motor Activity: Mannerisms  Speech Pattern: Excessively soft  General Attitude: Cooperative, Guarded, Withdrawn  Appearance/Hygiene: Disheveled, Mutilated hair    Thought Process  Coherency: [place] thinking  Content: Blaming self  Perception: Hallucinations  Hallucination: Auditory  Judgment/Insight: Limited  Confusion: Mild  Cognition: Follows commands    Sleep Pattern  Sleep Pattern: Disturbed/interrupted sleep    Risk Factors  Self Harm/Suicidal Ideation Plan: Pt reports suicidal ideation with a plan to cut wrists.  Previous Self Harm/Suicidal Plans: Pt has had multiple SA and SIB. last SA 3/2024 .  Risk Factors: Command hallucinations, Major mental illness, Male  Description of Thoughts/Ideas Leaving Unit Now: Wants to go \" psych slater\".    Violence Risk Assessment  Assessment of Violence: None noted  Thoughts of Harm to Others: No    Ability to Assess Risk Screen  Risk Screen - Ability to Assess: Able to be screened  Ask Suicide-Screening Questions  1. In the past few weeks, have you wished you were dead?: Yes  2. In the past few weeks, have you felt that you or your family would be better off if you were dead?: Yes  3. In the past week, have you been having thoughts about killing yourself?: Yes  4. Have you ever tried to kill yourself?: Yes  How did you try to kill yourself?: Cut arms and wrists  When did you try to kill yourself?: 3/2024  5. Are you having thoughts of killing yourself right now?: Yes  Describe your thoughts of killing yourself right now:: Want to cut wrists.  Calculated Risk Score: Imminent Risk  [place] Suicide Severity Rating Scale (Screener/Recent Self-Report)  1. Wish to be Dead (Past 1 Month): Yes  2. Non-Specific Active Suicidal Thoughts (Past 1 Month): " Yes  3. Active Suicidal Ideation with any Methods (Not Plan) Without Intent to Act (Past 1 Month): Yes  4. Active Suicidal Ideation with Some Intent to Act, Without Specific Plan (Past 1 Month): Yes  5. Active Suicidal Ideation with Specific Plan and Intent (Past 1 Month): Yes  6. Suicidal Behavior (Lifetime): Yes  6. Suicidal Behavior (3 Months): Yes  Calculated C-SSRS Risk Score (Lifetime/Recent): High Risk  Step 1: Risk Factors  Current & Past Psychiatric Dx: Mood disorder, Psychotic disorder  Presenting Symptoms: Impulsivity, Hopelessness or despair, Anxiety and/or panic  Family History: Suicide, Axis I psychiatric diagnosis requiring hospitalization  Precipitants/Stressors: Triggering events leading to humiliation, shame, and/or despair (e.g. loss of relationship, financial or health status) (real or anticipated), Inadequate social supports, Social isolation, Perceived burden on others  Change in Treatment: Hopeless or dissatisfied with provider or treatment  Access to Lethal Methods : Yes  Step 3: Suicidal Ideation Intensity  Most Severe Suicidal Ideation Identified: Pt reports he has thoughts of cutting his wrists.  How Many Times Have You Had These Thoughts: Daily or almost daily  When You Have the Thoughts How Long do They Last : 4-8 hours/most of the day  Could/Can You Stop Thinking About Killing Yourself or Wanting to Die if You Want to: Can control thoughts with a lot of difficulty  Are There Things - Anyone or Anything - That Stopped You From Wanting to Die or Acting on: Deterrents most likely did not stop you  What Sort of Reasons Did You Have For Thinking About Wanting to Die or Killing Yourself: Mostly to end or stop the pain (you couldn't go on living with the pain or how you were feeling)  Total Score: 20  Step 5: Documentation  Risk Level: High suicide risk    Psychiatric Impression and Plan of Care  Assessment and Plan: Upon assessment, Pt presents as sad, hopeless and helpless with minimal eye  "contact and withdrawn. Pt is a 62 yo male that presents to the [facility] ED for AMS, suicidal ideation with a plan to cut his wrist. Pt reports increased depression and anxiety the last few months and reports auditory hallucinations that tell him \" to kill himself\". Pt was hospitalized at [facility] March 2024 for an attempt and has been hospitalized multiple times in the last year. Staff reports pt has not been eating or getting out of bed and is refusing his medications. Pt has been pacing , anxious and agitatted at his residence at [facility]. Pt has been calm and cooperative in the ED. Pt reports \" i dont feel safe, i need the psych slater\". Pt denies HI, VH. Pt requires inpatient admission for safety and stabilization.  Specific Resources Provided to Patient: Peer support services not available at this location  CM Notified: na  PHP/IOP Recommended: na  Specific Information Provided for PHP/IOP: na  Plan Comments: Inpatient admission.    Outcome/Disposition  Patient's Perception of Outcome Achieved: Pt wants Inpatient admission.  Assessment, Recommendations and Risk Level Reviewed with: Dr Bryant  Contact Name: Angel Holguin  Contact Number(s): [phone]  Contact Relationship: Guardian.  EPAT Assessment Completed Date: 04/09/24  EPAT Assessment Completed Time: 1750  Patient Disposition:  Adult Inpatient Psych      "

## 2024-04-10 LAB
ALBUMIN SERPL-MCNC: 4.3 G/DL (ref 3.5–5)
ALP BLD-CCNC: 71 U/L (ref 35–125)
ALT SERPL-CCNC: 11 U/L (ref 5–40)
ANION GAP SERPL CALC-SCNC: 14 MMOL/L
AST SERPL-CCNC: 13 U/L (ref 5–40)
ATRIAL RATE: 85 BPM
BASOPHILS # BLD AUTO: 0.03 X10*3/UL (ref 0–0.1)
BASOPHILS NFR BLD AUTO: 0.2 %
BILIRUB SERPL-MCNC: 1 MG/DL (ref 0.1–1.2)
BUN SERPL-MCNC: 40 MG/DL (ref 8–25)
CALCIUM SERPL-MCNC: 9.8 MG/DL (ref 8.5–10.4)
CHLORIDE SERPL-SCNC: 103 MMOL/L (ref 97–107)
CO2 SERPL-SCNC: 24 MMOL/L (ref 24–31)
CREAT SERPL-MCNC: 0.6 MG/DL (ref 0.4–1.6)
EGFRCR SERPLBLD CKD-EPI 2021: >90 ML/MIN/1.73M*2
EOSINOPHIL # BLD AUTO: 0.02 X10*3/UL (ref 0–0.7)
EOSINOPHIL NFR BLD AUTO: 0.2 %
ERYTHROCYTE [DISTWIDTH] IN BLOOD BY AUTOMATED COUNT: 12.6 % (ref 11.5–14.5)
GLUCOSE BLD MANUAL STRIP-MCNC: 132 MG/DL (ref 74–99)
GLUCOSE SERPL-MCNC: 123 MG/DL (ref 65–99)
HCT VFR BLD AUTO: 47 % (ref 41–52)
HGB BLD-MCNC: 16.2 G/DL (ref 13.5–17.5)
IMM GRANULOCYTES # BLD AUTO: 0.04 X10*3/UL (ref 0–0.7)
IMM GRANULOCYTES NFR BLD AUTO: 0.3 % (ref 0–0.9)
LYMPHOCYTES # BLD AUTO: 1.57 X10*3/UL (ref 1.2–4.8)
LYMPHOCYTES NFR BLD AUTO: 12.8 %
MCH RBC QN AUTO: 30.5 PG (ref 26–34)
MCHC RBC AUTO-ENTMCNC: 34.5 G/DL (ref 32–36)
MCV RBC AUTO: 88 FL (ref 80–100)
MONOCYTES # BLD AUTO: 1.12 X10*3/UL (ref 0.1–1)
MONOCYTES NFR BLD AUTO: 9.1 %
NEUTROPHILS # BLD AUTO: 9.5 X10*3/UL (ref 1.2–7.7)
NEUTROPHILS NFR BLD AUTO: 77.4 %
NRBC BLD-RTO: 0 /100 WBCS (ref 0–0)
P AXIS: 64 DEGREES
P OFFSET: 196 MS
P ONSET: 142 MS
PLATELET # BLD AUTO: 339 X10*3/UL (ref 150–450)
POTASSIUM SERPL-SCNC: 4.3 MMOL/L (ref 3.4–5.1)
PR INTERVAL: 140 MS
PROT SERPL-MCNC: 7.2 G/DL (ref 5.9–7.9)
Q ONSET: 212 MS
QRS COUNT: 14 BEATS
QRS DURATION: 74 MS
QT INTERVAL: 382 MS
QTC CALCULATION(BAZETT): 454 MS
QTC FREDERICIA: 429 MS
R AXIS: 12 DEGREES
RBC # BLD AUTO: 5.32 X10*6/UL (ref 4.5–5.9)
SODIUM SERPL-SCNC: 141 MMOL/L (ref 133–145)
T AXIS: 77 DEGREES
T OFFSET: 403 MS
VENTRICULAR RATE: 85 BPM
WBC # BLD AUTO: 12.3 X10*3/UL (ref 4.4–11.3)

## 2024-04-10 PROCEDURE — 83036 HEMOGLOBIN GLYCOSYLATED A1C: CPT | Performed by: PSYCHIATRY & NEUROLOGY

## 2024-04-10 PROCEDURE — 85025 COMPLETE CBC W/AUTO DIFF WBC: CPT | Performed by: STUDENT IN AN ORGANIZED HEALTH CARE EDUCATION/TRAINING PROGRAM

## 2024-04-10 PROCEDURE — 2500000004 HC RX 250 GENERAL PHARMACY W/ HCPCS (ALT 636 FOR OP/ED): Performed by: STUDENT IN AN ORGANIZED HEALTH CARE EDUCATION/TRAINING PROGRAM

## 2024-04-10 PROCEDURE — 36415 COLL VENOUS BLD VENIPUNCTURE: CPT | Performed by: STUDENT IN AN ORGANIZED HEALTH CARE EDUCATION/TRAINING PROGRAM

## 2024-04-10 PROCEDURE — 96360 HYDRATION IV INFUSION INIT: CPT

## 2024-04-10 PROCEDURE — 82947 ASSAY GLUCOSE BLOOD QUANT: CPT | Mod: 59

## 2024-04-10 PROCEDURE — 80053 COMPREHEN METABOLIC PANEL: CPT | Performed by: STUDENT IN AN ORGANIZED HEALTH CARE EDUCATION/TRAINING PROGRAM

## 2024-04-10 PROCEDURE — 96361 HYDRATE IV INFUSION ADD-ON: CPT

## 2024-04-10 RX ORDER — SODIUM CHLORIDE 9 MG/ML
100 INJECTION, SOLUTION INTRAVENOUS CONTINUOUS
Status: DISCONTINUED | OUTPATIENT
Start: 2024-04-10 | End: 2024-04-11 | Stop reason: HOSPADM

## 2024-04-10 RX ADMIN — SODIUM CHLORIDE 100 ML/HR: 900 INJECTION, SOLUTION INTRAVENOUS at 20:52

## 2024-04-10 ASSESSMENT — COGNITIVE AND FUNCTIONAL STATUS - GENERAL
HELP NEEDED FOR BATHING: A LITTLE
EATING MEALS: A LITTLE
PERSONAL GROOMING: A LITTLE
DAILY ACTIVITIY SCORE: 18
DRESSING REGULAR LOWER BODY CLOTHING: A LITTLE
TOILETING: A LITTLE
DRESSING REGULAR UPPER BODY CLOTHING: A LITTLE

## 2024-04-10 ASSESSMENT — PAIN SCALES - GENERAL
PAINLEVEL_OUTOF10: 0 - NO PAIN
PAINLEVEL_OUTOF10: 0 - NO PAIN

## 2024-04-10 NOTE — PROGRESS NOTES
Patient was received in signout at 0600.     IN BRIEF   63-year-old male with a history of confusion and suicidal ideation.  He resides at a nursing facility and has cut his wrist in the past.  At the time of handoff patient is MEDICALLY CLEARED for psychiatric evaluation and treatment.       ED COURSE   Patient remained cooperative throughout shift.  Was notified by nursing about concerns of decreased oral intake as the patient had not eaten or drank anything since his presentation here.  I evaluated the patient who is calm and pleasant.  He does appear significantly withdrawn I suspect his lack of ingestion is secondary to his severe depression.  He is encouraged to drink fluids. Pink slip reviewed, would add that the patient is likely unable to complete his ADLs or care for himself given severe depression.  He continues to benefit from inpatient consultation and treatment.    Sign out to Dr Silva at 1415.    FINAL IMPRESSION      1. Suicidal ideations          DISPOSITION    Transfer To Another Facility 04/10/2024 12:24:18 AM

## 2024-04-10 NOTE — PROGRESS NOTES
Gerardo Albright is a 63 y.o. male .    Subjective   63-year-old male who presents to the emergency department for concern of altered mental status. Patient resides at nursing home facility and it was noted the patient has been more confused recently over the last week. Patient tells me today that he is suicidal currently. He states that he has plans to cut his wrist. He does not have any homicidal ideations. He feels that he has been more confused recently but cannot elaborate. He states that he has not had any recent illnesses. He does not have any pain currently.        Objective     Physical Exam  Vitals and nursing note reviewed.   Constitutional:       General: He is not in acute distress.     Appearance: He is well-developed.   HENT:      Head: Normocephalic and atraumatic.   Eyes:      Conjunctiva/sclera: Conjunctivae normal.   Cardiovascular:      Rate and Rhythm: Normal rate and regular rhythm.      Heart sounds: No murmur heard.  Pulmonary:      Effort: Pulmonary effort is normal. No respiratory distress.      Breath sounds: Normal breath sounds.   Abdominal:      Palpations: Abdomen is soft.      Tenderness: There is no abdominal tenderness.   Musculoskeletal:         General: No swelling.      Cervical back: Neck supple.   Skin:     General: Skin is warm and dry.      Capillary Refill: Capillary refill takes less than 2 seconds.   Neurological:      Mental Status: He is alert.   Psychiatric:         Mood and Affect: Mood normal.         Last Recorded Vitals  Blood pressure (!) 139/91, pulse 97, temperature 37.2 °C (99 °F), temperature source Oral, resp. rate 20, height 1.829 m (6'), weight 67.6 kg (149 lb 0.5 oz), SpO2 97 %.  Assessment/Plan   63-year-old male medically cleared for transfer to a psychiatric institution.  Joanie Bocanegra MD

## 2024-04-11 ENCOUNTER — HOSPITAL ENCOUNTER (INPATIENT)
Facility: HOSPITAL | Age: 64
LOS: 13 days | Discharge: NURSING, REHAB OR LONG-TERM CARE | DRG: 885 | End: 2024-04-24
Attending: PSYCHIATRY & NEUROLOGY | Admitting: PSYCHIATRY & NEUROLOGY
Payer: COMMERCIAL

## 2024-04-11 VITALS
SYSTOLIC BLOOD PRESSURE: 155 MMHG | TEMPERATURE: 97.7 F | RESPIRATION RATE: 18 BRPM | HEIGHT: 72 IN | BODY MASS INDEX: 20.19 KG/M2 | DIASTOLIC BLOOD PRESSURE: 103 MMHG | WEIGHT: 149.03 LBS | HEART RATE: 86 BPM | OXYGEN SATURATION: 96 %

## 2024-04-11 DIAGNOSIS — R33.9 URINARY RETENTION: ICD-10-CM

## 2024-04-11 DIAGNOSIS — F32.A DEPRESSION, UNSPECIFIED DEPRESSION TYPE: ICD-10-CM

## 2024-04-11 PROBLEM — F20.0 PARANOID SCHIZOPHRENIA, CHRONIC CONDITION (MULTI): Status: ACTIVE | Noted: 2024-04-11

## 2024-04-11 PROCEDURE — 1140000001 HC PRIVATE PSYCH ROOM DAILY

## 2024-04-11 PROCEDURE — 99223 1ST HOSP IP/OBS HIGH 75: CPT

## 2024-04-11 PROCEDURE — 2500000004 HC RX 250 GENERAL PHARMACY W/ HCPCS (ALT 636 FOR OP/ED): Performed by: STUDENT IN AN ORGANIZED HEALTH CARE EDUCATION/TRAINING PROGRAM

## 2024-04-11 PROCEDURE — 99222 1ST HOSP IP/OBS MODERATE 55: CPT | Performed by: INTERNAL MEDICINE

## 2024-04-11 PROCEDURE — 99418 PROLNG IP/OBS E/M EA 15 MIN: CPT

## 2024-04-11 RX ORDER — ACETAMINOPHEN 325 MG/1
650 TABLET ORAL EVERY 4 HOURS PRN
Status: DISCONTINUED | OUTPATIENT
Start: 2024-04-11 | End: 2024-04-13

## 2024-04-11 RX ORDER — QUETIAPINE FUMARATE 25 MG/1
12.5 TABLET, FILM COATED ORAL EVERY 4 HOURS PRN
Status: DISCONTINUED | OUTPATIENT
Start: 2024-04-11 | End: 2024-04-13

## 2024-04-11 RX ORDER — MAGNESIUM HYDROXIDE 2400 MG/10ML
10 SUSPENSION ORAL DAILY PRN
Status: DISCONTINUED | OUTPATIENT
Start: 2024-04-11 | End: 2024-04-13

## 2024-04-11 RX ORDER — OLANZAPINE 10 MG/2ML
5 INJECTION, POWDER, FOR SOLUTION INTRAMUSCULAR EVERY 6 HOURS PRN
Status: DISCONTINUED | OUTPATIENT
Start: 2024-04-11 | End: 2024-04-13

## 2024-04-11 RX ORDER — ALUMINUM HYDROXIDE, MAGNESIUM HYDROXIDE, AND SIMETHICONE 2400; 240; 2400 MG/30ML; MG/30ML; MG/30ML
30 SUSPENSION ORAL EVERY 6 HOURS PRN
Status: DISCONTINUED | OUTPATIENT
Start: 2024-04-11 | End: 2024-04-13

## 2024-04-11 RX ADMIN — SODIUM CHLORIDE 100 ML/HR: 900 INJECTION, SOLUTION INTRAVENOUS at 05:34

## 2024-04-11 SDOH — ECONOMIC STABILITY: FOOD INSECURITY
WITHIN THE PAST 12 MONTHS, THE FOOD YOU BOUGHT JUST DIDN'T LAST AND YOU DIDN'T HAVE MONEY TO GET MORE.: PATIENT UNABLE TO ANSWER

## 2024-04-11 SDOH — SOCIAL STABILITY: SOCIAL INSECURITY
WITHIN THE LAST YEAR, HAVE YOU BEEN KICKED, HIT, SLAPPED, OR OTHERWISE PHYSICALLY HURT BY YOUR PARTNER OR EX-PARTNER?: PATIENT UNABLE TO ANSWER

## 2024-04-11 SDOH — SOCIAL STABILITY: SOCIAL INSECURITY: WITHIN THE LAST YEAR, HAVE YOU BEEN AFRAID OF YOUR PARTNER OR EX-PARTNER?: PATIENT UNABLE TO ANSWER

## 2024-04-11 SDOH — HEALTH STABILITY: MENTAL HEALTH: HOW OFTEN DO YOU HAVE 6 OR MORE DRINKS ON ONE OCCASION?: PATIENT DECLINED

## 2024-04-11 SDOH — HEALTH STABILITY: MENTAL HEALTH: HOW OFTEN DO YOU HAVE A DRINK CONTAINING ALCOHOL?: PATIENT DECLINED

## 2024-04-11 SDOH — HEALTH STABILITY: PHYSICAL HEALTH: ON AVERAGE, HOW MANY MINUTES DO YOU ENGAGE IN EXERCISE AT THIS LEVEL?: PATIENT UNABLE TO ANSWER

## 2024-04-11 SDOH — ECONOMIC STABILITY: TRANSPORTATION INSECURITY
IN THE PAST 12 MONTHS, HAS THE LACK OF TRANSPORTATION KEPT YOU FROM MEDICAL APPOINTMENTS OR FROM GETTING MEDICATIONS?: PATIENT UNABLE TO ANSWER

## 2024-04-11 SDOH — SOCIAL STABILITY: SOCIAL INSECURITY
WITHIN THE LAST YEAR, HAVE YOU BEEN HUMILIATED OR EMOTIONALLY ABUSED IN OTHER WAYS BY YOUR PARTNER OR EX-PARTNER?: PATIENT UNABLE TO ANSWER

## 2024-04-11 SDOH — ECONOMIC STABILITY: FOOD INSECURITY
WITHIN THE PAST 12 MONTHS, YOU WORRIED THAT YOUR FOOD WOULD RUN OUT BEFORE YOU GOT MONEY TO BUY MORE.: PATIENT UNABLE TO ANSWER

## 2024-04-11 SDOH — SOCIAL STABILITY: SOCIAL NETWORK: HOW OFTEN DO YOU GET TOGETHER WITH FRIENDS OR RELATIVES?: PATIENT UNABLE TO ANSWER

## 2024-04-11 SDOH — SOCIAL STABILITY: SOCIAL NETWORK
DO YOU BELONG TO ANY CLUBS OR ORGANIZATIONS SUCH AS CHURCH GROUPS UNIONS, FRATERNAL OR ATHLETIC GROUPS, OR SCHOOL GROUPS?: PATIENT UNABLE TO ANSWER

## 2024-04-11 SDOH — ECONOMIC STABILITY: INCOME INSECURITY
IN THE PAST 12 MONTHS, HAS THE ELECTRIC, GAS, OIL, OR WATER COMPANY THREATENED TO SHUT OFF SERVICE IN YOUR HOME?: PATIENT UNABLE TO ANSWER

## 2024-04-11 SDOH — SOCIAL STABILITY: SOCIAL NETWORK: ARE YOU MARRIED, WIDOWED, DIVORCED, SEPARATED, NEVER MARRIED, OR LIVING WITH A PARTNER?: PATIENT UNABLE TO ANSWER

## 2024-04-11 SDOH — ECONOMIC STABILITY: HOUSING INSECURITY: IN THE LAST 12 MONTHS, HOW MANY PLACES HAVE YOU LIVED?: 1

## 2024-04-11 SDOH — HEALTH STABILITY: PHYSICAL HEALTH
ON AVERAGE, HOW MANY DAYS PER WEEK DO YOU ENGAGE IN MODERATE TO STRENUOUS EXERCISE (LIKE A BRISK WALK)?: PATIENT UNABLE TO ANSWER

## 2024-04-11 SDOH — SOCIAL STABILITY: SOCIAL INSECURITY
WITHIN THE LAST YEAR, HAVE TO BEEN RAPED OR FORCED TO HAVE ANY KIND OF SEXUAL ACTIVITY BY YOUR PARTNER OR EX-PARTNER?: PATIENT UNABLE TO ANSWER

## 2024-04-11 SDOH — SOCIAL STABILITY: SOCIAL NETWORK: HOW OFTEN DO YOU ATTENT MEETINGS OF THE CLUB OR ORGANIZATION YOU BELONG TO?: PATIENT UNABLE TO ANSWER

## 2024-04-11 SDOH — SOCIAL STABILITY: SOCIAL NETWORK: HOW OFTEN DO YOU ATTEND CHURCH OR RELIGIOUS SERVICES?: PATIENT UNABLE TO ANSWER

## 2024-04-11 SDOH — ECONOMIC STABILITY: HOUSING INSECURITY
IN THE LAST 12 MONTHS, WAS THERE A TIME WHEN YOU DID NOT HAVE A STEADY PLACE TO SLEEP OR SLEPT IN A SHELTER (INCLUDING NOW)?: PATIENT UNABLE TO ANSWER

## 2024-04-11 SDOH — HEALTH STABILITY: MENTAL HEALTH
HOW OFTEN DO YOU NEED TO HAVE SOMEONE HELP YOU WHEN YOU READ INSTRUCTIONS, PAMPHLETS, OR OTHER WRITTEN MATERIAL FROM YOUR DOCTOR OR PHARMACY?: PATIENT DECLINES TO RESPOND

## 2024-04-11 SDOH — ECONOMIC STABILITY: INCOME INSECURITY
IN THE LAST 12 MONTHS, WAS THERE A TIME WHEN YOU WERE NOT ABLE TO PAY THE MORTGAGE OR RENT ON TIME?: PATIENT UNABLE TO ANSWER

## 2024-04-11 SDOH — ECONOMIC STABILITY: INCOME INSECURITY
HOW HARD IS IT FOR YOU TO PAY FOR THE VERY BASICS LIKE FOOD, HOUSING, MEDICAL CARE, AND HEATING?: PATIENT UNABLE TO ANSWER

## 2024-04-11 SDOH — SOCIAL STABILITY: SOCIAL INSECURITY: WERE YOU ABLE TO COMPLETE ALL THE BEHAVIORAL HEALTH SCREENINGS?: NO

## 2024-04-11 SDOH — HEALTH STABILITY: MENTAL HEALTH: HOW MANY STANDARD DRINKS CONTAINING ALCOHOL DO YOU HAVE ON A TYPICAL DAY?: PATIENT DECLINED

## 2024-04-11 SDOH — SOCIAL STABILITY: SOCIAL NETWORK: IN A TYPICAL WEEK, HOW MANY TIMES DO YOU TALK ON THE PHONE WITH FAMILY, FRIENDS, OR NEIGHBORS?: PATIENT UNABLE TO ANSWER

## 2024-04-11 SDOH — HEALTH STABILITY: MENTAL HEALTH
STRESS IS WHEN SOMEONE FEELS TENSE, NERVOUS, ANXIOUS, OR CAN'T SLEEP AT NIGHT BECAUSE THEIR MIND IS TROUBLED. HOW STRESSED ARE YOU?: PATIENT UNABLE TO ANSWER

## 2024-04-11 SDOH — ECONOMIC STABILITY: TRANSPORTATION INSECURITY
IN THE PAST 12 MONTHS, HAS LACK OF TRANSPORTATION KEPT YOU FROM MEETINGS, WORK, OR FROM GETTING THINGS NEEDED FOR DAILY LIVING?: PATIENT UNABLE TO ANSWER

## 2024-04-11 ASSESSMENT — ACTIVITIES OF DAILY LIVING (ADL)
JUDGMENT_ADEQUATE_SAFELY_COMPLETE_DAILY_ACTIVITIES: NO
LACK_OF_TRANSPORTATION: PATIENT UNABLE TO ANSWER
PATIENT'S MEMORY ADEQUATE TO SAFELY COMPLETE DAILY ACTIVITIES?: YES
BATHING: NEEDS ASSISTANCE
FEEDING YOURSELF: INDEPENDENT
DRESSING YOURSELF: NEEDS ASSISTANCE
HEARING - LEFT EAR: FUNCTIONAL
WALKS IN HOME: INDEPENDENT
TOILETING: NEEDS ASSISTANCE
GROOMING: NEEDS ASSISTANCE
ADEQUATE_TO_COMPLETE_ADL: YES
HEARING - RIGHT EAR: FUNCTIONAL

## 2024-04-11 ASSESSMENT — LIFESTYLE VARIABLES
CIWA TOTAL SCORE: 6
TOTAL_SCORE: 2
ANXIETY: MILDLY ANXIOUS
SUBSTANCE_ABUSE_PAST_12_MONTHS: NO
HEADACHE, FULLNESS IN HEAD: NOT PRESENT
PAROXYSMAL SWEATS: BARELY PERCEPTIBLE SWEATING, PALMS MOIST
AUDITORY DISTURBANCES: NOT PRESENT
ORIENTATION AND CLOUDING OF SENSORIUM: DISORIENTED FOR PLACE OR PERSON
AGITATION: NORMAL ACTIVITY
AUDIT-C TOTAL SCORE: -1
NAUSEA AND VOMITING: NO NAUSEA AND NO VOMITING
PRESCIPTION_ABUSE_PAST_12_MONTHS: NO
VISUAL DISTURBANCES: NOT PRESENT
TREMOR: NO TREMOR
SKIP TO QUESTIONS 9-10: 0

## 2024-04-11 ASSESSMENT — PAIN - FUNCTIONAL ASSESSMENT: PAIN_FUNCTIONAL_ASSESSMENT: 0-10

## 2024-04-11 ASSESSMENT — PAIN SCALES - GENERAL: PAINLEVEL_OUTOF10: 0 - NO PAIN

## 2024-04-11 NOTE — CONSULTS
"Inpatient consult to Psychiatry  Consult performed by: Lilo Plunkett, APRN-CNP  Consult ordered by: Estefany Silva MD  Reason for consult: \"si, refusing to eat or drink\"    PSYCHIATRY CONSULT NOTE    Visit type: Face to face evaluation     HISTORY OF PRESENT ILLNESS:     Gerardo Albright is a 63 y.o. male with a past history of schizoaffective disorder, dementia (MOCA 13/30), seizures, parkinson's disease, past history of ?catatonic schizophrenia COPD, gerd who presented to [facility] from the nursing home in which he resides, [facility].  Patient with multiple previous psychiatric admissions, most recent [facility] 3/11--3/15 and 2/20-2/23/24.      On interview, pt is asleep on cot.  He is unable or unwilling to interact with this provider.  Attempted to engage pt with loud verbal stimuli, nailbed pressure, and sternal rub.  Eyelids briefly flutter, but do not open.  In discussion with nursing staff pt has not interacted in 2 days, except for briefly on arrival.  Vital signs are unremarkable, lab work not concerning for medical cause, CT head negative.  On second attempt to interact with patient, with significant attempt, pt very weakly squeezes hands to command, mutters a few words.  He states \"hospital\" when asked where he is and that he is here to \"get some help.\"  He states he \"I haven't been feeling well\" however doesn't reply when asked if he means mentally or physically.  He then stops interacting with this provider.  Consideration given to catatonia vs behavioral/avolitional causes of decreased interactions.  Some cog wheel rigidity appreciated on bilateral upper arms, r>l.  Lower extremities with minimal resistance to examination.  He scores 11 on BF catatonia scale: 3 pts for extreme hypoactivity and minimal responsiveness to stimuli, 2 pts for mutism, 3 pts for little or no visual scanning of environment, and 3 points for no po intake, withdrawal, no eye contact.  Consideration given " "to administering ativan, however upon re-assessment approx. 90 min later patient is awake, drinking lemonade, much more interactive.  He reports continued suicidality, stating he wants to cut himself. He reports feeling increasingly depressed over past week.  He has multiple scars from previous self-inflicted lacerations and what appears to be remaining stitches from suicide attempt last month.     Contacted RN Angelica at [facility].  She states she has taken care of Gerardo for the past 7 years that he has been at [facility].  She reports that his typical behavior one of two ways.  He is either despondent, in bed, refusing medications and food at times for days or he is following staff around, asking about medications and what he is receiving.  She states that there is concern for him throwing up medications and he must remain in the cafeteria for half an hour after meals to ensure he does not immediately throw up his meds.  She states that he started having decreased activity last Wednesday, laying in bed, withdrawn.  She was off until Tuesday and when she came back he was in bed and minimally interactive. Nurse manager Maria De Jesus states that patient seems to have \"given up on life\" recently.      Past Psychiatric History  Extensive, see HPI  [facility]:  Seroquel 400mg daily, naltrexone 50mg gabapentin 600mg TID, Perseris 90mg monthly (set to be increased to 120 mg on 4/18)    Substance Abuse History  Tobacco use history:  Former smoker  Alcohol use history: Denies  Cannabis use history: Denies  Illicit Drug Use History: unknown    Social History  Household: lives at [facility], long term nursing facility    OARRS REVIEW  OARRS checked: No data recorded     Past Medical History  He has a past medical history of ADHD (attention deficit hyperactivity disorder), AMI (acute myocardial infarction) (CMS/[facility]), At risk for falls, Catatonic schizophrenia (CMS/[facility]), Cognitive decline, COPD " "(chronic obstructive pulmonary disease) (CMS/[facility]), Dementia (CMS/[facility]), Depression, GERD (gastroesophageal reflux disease), Hypertension, Insomnia, Myocardial infarction (CMS/[facility]), Parkinson's disease (CMS/[facility]), Schizoaffective disorder (CMS/[facility]), Schizophrenia (CMS/[facility]), Seizures (CMS/[facility]), STEMI (ST elevation myocardial infarction) (CMS/[facility]) (04/17/2017), and Weakness.      ALLERGIES  Patient has no known allergies.    Surgical History  He has no past surgical history on file.    FAMILY HISTORY  Family History   Problem Relation Name Age of Onset    Hypertension Other        PSYCHIATRIC REVIEW OF SYSTEMS  Difficult to assess subjective information.  Per report from NH staff patient increasingly withdrawn over the past week, intermittently refusing medications, decreased po.  Pt reports feeling depressed and suicidal.     OBJECTIVE:     VITALS      4/9/2024     9:25 PM 4/10/2024     6:37 AM 4/10/2024    10:35 AM 4/10/2024     3:03 PM 4/11/2024     6:24 AM 4/11/2024     9:43 AM 4/11/2024    11:52 AM   Vitals   Systolic  153 157 150 140 151 142   Diastolic  96 115 100 89 92 82   Heart Rate 97 90 99 87 86 84 84   Temp  36.9 °C (98.4 °F)   36.6 °C (97.9 °F) 36.2 °C (97.2 °F) 36.2 °C (97.2 °F)   Resp 20 16 18 22 18 18 18      Body mass index is 20.21 kg/m².  Facility age limit for growth %jeanne is 20 years.  Wt Readings from Last 4 Encounters:   04/09/24 67.6 kg (149 lb 0.5 oz)   03/08/24 68 kg (150 lb)   02/16/24 74.5 kg (164 lb 3.9 oz)   02/14/24 74 kg (163 lb 2.3 oz)     Mental Status Exam  General: NAD 63 lying comfortably in his assigned hospital bed during interview.  Appearance: Appeared as age stated; dressed in paper pants and shirt.   Attitude:  calm, withdrawn  Behavior: Fair EC; overall responding appropriately  Motor Activity: Right hand tremor noted at rest  Speech:  slow, increased response latency, low volume , minimal spontaneous speech  Mood: \"bad\"  Affect:  blunted  Thought Process:  difficult to " "assess, sparse   Thought Content: Endorsed suicidal ideation by cutting. Denying HI. Not voicing/endorsing delusions.  Thought Perception: Did not appear to be responding to internal stimuli. Not endorsing AVH  Cognition:  person, place \"hospital\"  Insight: Poor  Judgement: Poor    HOME MEDICATIONS  Medication Documentation Review Audit       Reviewed by Antoinette Prado RN (Registered Nurse) on 24 at 0824      Medication Order Taking? Sig Documenting Provider Last Dose Status   ALPRAZolam (Xanax) 0.25 mg tablet [id number]  Take 1 tablet (0.25 mg) by mouth 2 times a day as needed for anxiety or sleep for up to 2 days. Kamran Harris, DO   24 2939   aspirin 81 mg chewable tablet [id number] No Chew 1 tablet (81 mg) once daily. Arcelia Sidhu MD 3/8/2024 Active   benztropine (Cogentin) 1 mg tablet [id number]  Take 1 tablet (1 mg) by mouth 2 times a day. Historical Provider, MD  Active   buPROPion XL (Wellbutrin XL) 300 mg 24 hr tablet [id number] No Take 1 tablet (300 mg) by mouth once daily in the morning. Do not crush, chew, or split. Historical Provider, MD Unknown Active   gabapentin (Neurontin) 300 mg capsule [id number] No Take 1 capsule (300 mg) by mouth 3 times a day. Kamran Harris, DO Unknown Active   gabapentin (Neurontin) 600 mg tablet [id number] No Take 1 tablet (600 mg) by mouth 3 times a day. Arcelia Sidhu MD 3/8/2024 Active   hydrOXYzine HCL (Atarax) 25 mg tablet [id number] No Take 1 tablet (25 mg) by mouth 3 times a day. Arcelia Sidhu MD 3/8/2024 Active   LORazepam (Ativan) 1 mg tablet [id number] No Take 1 tablet (1 mg) by mouth 2 times a day. Arcelia Sidhu MD 3/8/2024 Active   metFORMIN (Glucophage) 500 mg tablet [id number] No Take 1 tablet (500 mg) by mouth 2 times a day with meals. Monster Portillo, DO Unknown Active   multivitamin tablet [id number] No Take 1 tablet by mouth once daily. Historical Provider, MD Unknown Active   naltrexone (Depade) 50 mg tablet " [id number] No Take 1 tablet (50 mg) by mouth once daily. Historical Provider, MD 3/8/2024 Active   pantoprazole (ProtoNix) 40 mg EC tablet [id number] No Take 1 tablet (40 mg) by mouth 2 times a day before meals. Do not crush, chew, or split. Historical Provider, MD Unknown Active   QUEtiapine (SEROquel) 200 mg tablet [id number] No Take 1 tablet (200 mg) by mouth 2 times a day. Monster Portillo, DO Past Week Active   QUEtiapine (SEROquel) 50 mg tablet [id number] No Take 1 tablet (50 mg) by mouth once daily at bedtime. Take in addition to 200 mg BID to total 250 mg at bedtime. Historical Provider, MD Past Week Active   risperiDONE (Perseris) 90 mg suspension,extended rel syring subcutaneous injection [id number]  Inject 90 mg under the skin every 30 (thirty) days. Historical Provider, MD  Active                   CURRENT MEDICATIONS  Scheduled medications     Continuous medications  sodium chloride 0.9%, 100 mL/hr, Last Rate: 100 mL/hr (04/11/24 0534)      PRN medications     LABS  Admission on 04/09/2024   Component Date Value Ref Range Status    Ventricular Rate 04/09/2024 85  BPM Final    Atrial Rate 04/09/2024 85  BPM Final    TX Interval 04/09/2024 140  ms Final    QRS Duration 04/09/2024 74  ms Final    QT Interval 04/09/2024 382  ms Final    QTC Calculation(Bazett) 04/09/2024 454  ms Final    P Axis 04/09/2024 64  degrees Final    R Axis 04/09/2024 12  degrees Final    T [place] 04/09/2024 77  degrees Final    QRS Count 04/09/2024 14  beats Final    Q Onset 04/09/2024 212  ms Final    P Onset 04/09/2024 142  ms Final    P Offset 04/09/2024 196  ms Final    T Offset 04/09/2024 403  ms Final    QTC Fredericia 04/09/2024 429  ms Final    WBC 04/09/2024 11.8 (H)  4.4 - 11.3 x10*3/uL Final    nRBC 04/09/2024 0.0  0.0 - 0.0 /100 WBCs Final    RBC 04/09/2024 5.75  4.50 - 5.90 x10*6/uL Final    Hemoglobin 04/09/2024 17.4  13.5 - 17.5 g/dL Final    Hematocrit 04/09/2024 51.2  41.0 - 52.0 % Final    MCV 04/09/2024 89  80 - 100  fL Final    MCH 04/09/2024 30.3  26.0 - 34.0 pg Final    MCHC 04/09/2024 34.0  32.0 - 36.0 g/dL Final    RDW 04/09/2024 12.7  11.5 - 14.5 % Final    Platelets 04/09/2024 311  150 - 450 x10*3/uL Final    Neutrophils % 04/09/2024 87.5  40.0 - 80.0 % Final    Immature Granulocytes %, Automated 04/09/2024 0.3  0.0 - 0.9 % Final    Immature Granulocyte Count (IG) includes promyelocytes, myelocytes and metamyelocytes but does not include bands. Percent differential counts (%) should be interpreted in the context of the absolute cell counts (cells/UL).    Lymphocytes % 04/09/2024 6.3  13.0 - 44.0 % Final    Monocytes % 04/09/2024 5.7  2.0 - 10.0 % Final    Eosinophils % 04/09/2024 0.0  0.0 - 6.0 % Final    Basophils % 04/09/2024 0.2  0.0 - 2.0 % Final    Neutrophils Absolute 04/09/2024 10.36 (H)  1.20 - 7.70 x10*3/uL Final    Percent differential counts (%) should be interpreted in the context of the absolute cell counts (cells/uL).    Immature Granulocytes Absolute, Au* 04/09/2024 0.04  0.00 - 0.70 x10*3/uL Final    Lymphocytes Absolute 04/09/2024 0.74 (L)  1.20 - 4.80 x10*3/uL Final    Monocytes Absolute 04/09/2024 0.67  0.10 - 1.00 x10*3/uL Final    Eosinophils Absolute 04/09/2024 0.00  0.00 - 0.70 x10*3/uL Final    Basophils Absolute 04/09/2024 0.02  0.00 - 0.10 x10*3/uL Final    Glucose 04/09/2024 119 (H)  65 - 99 mg/dL Final    Sodium 04/09/2024 139  133 - 145 mmol/L Final    Potassium 04/09/2024 4.7  3.4 - 5.1 mmol/L Final    Chloride 04/09/2024 101  97 - 107 mmol/L Final    Bicarbonate 04/09/2024 20 (L)  24 - 31 mmol/L Final    Urea Nitrogen 04/09/2024 34 (H)  8 - 25 mg/dL Final    Creatinine 04/09/2024 0.60  0.40 - 1.60 mg/dL Final    eGFR 04/09/2024 >90  >60 mL/min/1.73m*2 Final    Calculations of estimated GFR are performed using the 2021 CKD-EPI Study Refit equation without the race variable for the IDMS-Traceable creatinine methods.  https://jasn.asnjournals.org/content/early/2021/09/22/ASN.1367708755     Calcium 04/09/2024 9.8  8.5 - 10.4 mg/dL Final    Albumin 04/09/2024 4.3  3.5 - 5.0 g/dL Final    Alkaline Phosphatase 04/09/2024 74  35 - 125 U/L Final    Total Protein 04/09/2024 7.5  5.9 - 7.9 g/dL Final    AST 04/09/2024 22  5 - 40 U/L Final    Hemolysis    Bilirubin, Total 04/09/2024 0.8  0.1 - 1.2 mg/dL Final    ALT 04/09/2024 15  5 - 40 U/L Final    Anion Gap 04/09/2024 18  <=19 mmol/L Final    Color, Urine 04/09/2024 Yellow  Light-Yellow, Yellow, Dark-Yellow Final    Appearance, Urine 04/09/2024 Clear  Clear Final    Specific Gravity, Urine 04/09/2024 1.033  1.005 - 1.035 Final    pH, Urine 04/09/2024 6.0  5.0, 5.5, 6.0, 6.5, 7.0, 7.5, 8.0 Final    Protein, Urine 04/09/2024 30 (1+) (A)  NEGATIVE, 10 (TRACE), 20 (TRACE) mg/dL Final    Glucose, Urine 04/09/2024 Normal  Normal mg/dL Final    Blood, Urine 04/09/2024 NEGATIVE  NEGATIVE Final    Ketones, Urine 04/09/2024 150 (4+) (A)  NEGATIVE mg/dL Final    Bilirubin, Urine 04/09/2024 NEGATIVE  NEGATIVE Final    Urobilinogen, Urine 04/09/2024 Normal  Normal mg/dL Final    Nitrite, Urine 04/09/2024 NEGATIVE  NEGATIVE Final    Leukocyte Esterase, Urine 04/09/2024 NEGATIVE  NEGATIVE Final    WBC, Urine 04/09/2024 1-5  1-5, NONE /HPF Final    RBC, Urine 04/09/2024 1-2  NONE, 1-2, 3-5 /HPF Final    Mucus, Urine 04/09/2024 FEW  Reference range not established. /LPF Final    POCT Glucose 04/09/2024 125 (H)  74 - 99 mg/dL Final    Amphetamine Screen, Urine 04/09/2024 Negative    Final    Barbiturate Screen, Urine 04/09/2024 Negative    Final    Benzodiazepines Screen, Urine 04/09/2024 Negative    Final    Cannabinoid Screen, Urine 04/09/2024 Negative    Final    Cocaine Metabolite Screen, Urine 04/09/2024 Negative    Final    Fentanyl Screen, Urine 04/09/2024 Negative     Final    Methadone Screen, Urine 04/09/2024 Negative    Final    Opiate Screen, Urine 04/09/2024 Negative    Final    Oxycodone Screen, Urine 04/09/2024 Negative    Final    PCP Screen, Urine 04/09/2024  Negative    Final    Acetaminophen 04/09/2024 <5.0  15.0 - 30.0 ug/mL Final    Salicylate  04/09/2024 <0  3 - 25 mg/dL Final    Alcohol 04/09/2024 <0.010  0.000 - 0.010 g/dL Final    Flu A Result 04/09/2024 Not Detected  Not Detected Final    Flu B Result 04/09/2024 Not Detected  Not Detected Final    Coronavirus 2019, PCR 04/09/2024 Not Detected  Not Detected Final    POCT Glucose 04/10/2024 132 (H)  74 - 99 mg/dL Final    WBC 04/10/2024 12.3 (H)  4.4 - 11.3 x10*3/uL Final    nRBC 04/10/2024 0.0  0.0 - 0.0 /100 WBCs Final    RBC 04/10/2024 5.32  4.50 - 5.90 x10*6/uL Final    Hemoglobin 04/10/2024 16.2  13.5 - 17.5 g/dL Final    Hematocrit 04/10/2024 47.0  41.0 - 52.0 % Final    MCV 04/10/2024 88  80 - 100 fL Final    MCH 04/10/2024 30.5  26.0 - 34.0 pg Final    MCHC 04/10/2024 34.5  32.0 - 36.0 g/dL Final    RDW 04/10/2024 12.6  11.5 - 14.5 % Final    Platelets 04/10/2024 339  150 - 450 x10*3/uL Final    Neutrophils % 04/10/2024 77.4  40.0 - 80.0 % Final    Immature Granulocytes %, Automated 04/10/2024 0.3  0.0 - 0.9 % Final    Immature Granulocyte Count (IG) includes promyelocytes, myelocytes and metamyelocytes but does not include bands. Percent differential counts (%) should be interpreted in the context of the absolute cell counts (cells/UL).    Lymphocytes % 04/10/2024 12.8  13.0 - 44.0 % Final    Monocytes % 04/10/2024 9.1  2.0 - 10.0 % Final    Eosinophils % 04/10/2024 0.2  0.0 - 6.0 % Final    Basophils % 04/10/2024 0.2  0.0 - 2.0 % Final    Neutrophils Absolute 04/10/2024 9.50 (H)  1.20 - 7.70 x10*3/uL Final    Percent differential counts (%) should be interpreted in the context of the absolute cell counts (cells/uL).    Immature Granulocytes Absolute, Au* 04/10/2024 0.04  0.00 - 0.70 x10*3/uL Final    Lymphocytes Absolute 04/10/2024 1.57  1.20 - 4.80 x10*3/uL Final    Monocytes Absolute 04/10/2024 1.12 (H)  0.10 - 1.00 x10*3/uL Final    Eosinophils Absolute 04/10/2024 0.02  0.00 - 0.70 x10*3/uL Final     Basophils Absolute 04/10/2024 0.03  0.00 - 0.10 x10*3/uL Final    Glucose 04/10/2024 123 (H)  65 - 99 mg/dL Final    Sodium 04/10/2024 141  133 - 145 mmol/L Final    Potassium 04/10/2024 4.3  3.4 - 5.1 mmol/L Final    Chloride 04/10/2024 103  97 - 107 mmol/L Final    Bicarbonate 04/10/2024 24  24 - 31 mmol/L Final    Urea Nitrogen 04/10/2024 40 (H)  8 - 25 mg/dL Final    Creatinine 04/10/2024 0.60  0.40 - 1.60 mg/dL Final    eGFR 04/10/2024 >90  >60 mL/min/1.73m*2 Final    Calculations of estimated GFR are performed using the 2021 CKD-EPI Study Refit equation without the race variable for the IDMS-Traceable creatinine methods.  https://jasn.asnjournals.org/content/early/2021/09/22/ASN.7188020703    Calcium 04/10/2024 9.8  8.5 - 10.4 mg/dL Final    Albumin 04/10/2024 4.3  3.5 - 5.0 g/dL Final    Alkaline Phosphatase 04/10/2024 71  35 - 125 U/L Final    Total Protein 04/10/2024 7.2  5.9 - 7.9 g/dL Final    AST 04/10/2024 13  5 - 40 U/L Final    Bilirubin, Total 04/10/2024 1.0  0.1 - 1.2 mg/dL Final    ALT 04/10/2024 11  5 - 40 U/L Final    Anion Gap 04/10/2024 14  <=19 mmol/L Final     IMAGING  ECG 12 lead    Result Date: 4/10/2024  Normal sinus rhythm with sinus arrhythmia Inferior infarct (cited on or before 03-JAN-2024) Abnormal ECG When compared with ECG of 08-MAR-2024 18:06,  Poor data quality, interpretation may be adversely affected Confirmed by Yandel Adams ([id number]) on 4/10/2024 12:05:16 PM    XR chest 1 view    Result Date: 4/9/2024  Interpreted By:  Trey Montero, STUDY: XR CHEST 1 VIEW; 4/9/2024 4:13 pm   INDICATION: CLINICAL INFORMATION: Signs/Symptoms:altered mental status.   COMPARISON: 02/14/2020   ACCESSION NUMBER(S): [id number]   ORDERING CLINICIAN: BENITA HERNANDEZ   TECHNIQUE: Portable chest one view.   FINDINGS: The cardiac size is indeterminate in view of the AP projection. Sternal fixations devices and mediastinal clips are present.   No infiltrates or effusions are identified.       No  acute pathologic findings are identified.  There is no interval change when compared to the previous examination.   MACRO: none   Signed by: Trey Montero 4/9/2024 4:25 PM Dictation workstation:   [id number]    CT head wo IV contrast    Result Date: 4/9/2024  Interpreted By:  Trey Montero, STUDY: CT HEAD WO IV CONTRAST; 4/9/2024 4:09 pm   INDICATION: Signs/Symptoms:AMS.   COMPARISON: 02/14/2020   ACCESSION NUMBER(S): [id number]   ORDERING CLINICIAN: BENITA HERNANDEZ   TECHNIQUE: A helical acquisition data was obtained.   One or more of the following dose reduction techniques were used: Automated exposure control Adjustment of the mA and/or kV according to patient size, and/or use of iterative reconstruction technique.   FINDINGS: Intracranial findings: There is no evidence for intracranial hemorrhage or mass effect. Mild age-related atrophy is present. No calvarial fractures are identified.   Paranasal sinuses and temporal bone findings:  The visualized portions of the paranasal sinuses, mastoid air cells, and middle ear cavities are clear.   Orbital findings: The visualized portions of the orbits are unremarkable.       No acute intracranial pathologic findings are identified.     MACRO: none   Signed by: Trey Montero 4/9/2024 4:24 PM Dictation workstation:   [id number]       ASSESSMENT:     PSYCHIATRIC RISK ASSESSMENT  Violence Risk Factors:  male, current psychiatric illness, personality disorder (antisocial, borderline), and lack of insight  Acute Risk of Harm to Others is Considered: Low  Suicide Risk Factors: male, ; /[place] native, prior suicide attempts , recent suicide attempt, personality disorder (antisocial/borderline), current psychiatric illness, feelings of hopelessness, and lack of treatment access, discontinuities in treatment, or recent discharge from hospital  Protective Factors:  living arrangements  Acute Risk of Harm to Self is Considered:  Moderate    DIAGNOSIS  Schizoaffective Disorder, unspecified  Suicidal thoughts  Cluster B traits, by history  Dementia  Parkinson's Disease vs drug induced parkinsonism      PLAN/ RECOMMENDATIONS:   SAFETY    -Patient has a guardian    - Patient does currently meet criteria for inpatient psychiatric admission. Once patient is deemed medically cleared, please document in note that patient is MEDICALLY CLEARED and contact [facility] for referral at [phone], pager [phone]. Issue Application for Emergency Admission (pink slip) only after patient is accepted to an inpatient psychiatric unit and is ready to be discharged. Search “Application for Emergency Admission” under SmartText.”    - Patient lacks the capacity to leave AMA at this time and thus cannot leave AMA. Call [facility] if patient attempts to leave AMA.    WORKUP  Would benefit from ID recommendations for + RPR + syphilis total on 2/20/24    MEDICATIONS  -Naltrexone 50mg daily (for self mutilation behaviors)  -Continue seroquel 100 mg am and 300 mg pm   -If unable to administer perseris injection on 4/18/24, consider risperidone 4mg daily at bedtime (equivalent oral dose to 120 mg injection)  -Cogentin 1mg BID  -Gabapentin 600 mg TID for seizures  -Vistaril 25mg TID PRN (recently started at nursing home for anxiety)    -Thank you for allowing us to participate in the care of this patient.   - Psychiatry will continue to follow    I personally spent 90 minutes today, exclusive of procedures, providing care for this patient, including preparation, face to face time, documentation and other services such as review of medical records, diagnostic result, patient education, counseling, coordination of care as specified in the encounter.

## 2024-04-11 NOTE — PROGRESS NOTES
Patient was received in signout at 1400.     IN BRIEF   63-year-old male presents with concerns for SI and confusion.  Patient reportedly has plans to cut his wrist and is refusing to eat and drink.    Patient was previously evaluated by prior ED providers and medically cleared for psychiatric placement.  At the time of signout patient is pending placement.       ED COURSE   Patient currently has no acceptances to various psychiatric facilities.  Social work has been consulted to assist with placement.  Repeat labs ordered as the patient has been in the emergency department for greater than 24 hours.  Patient has been started on maintenance fluids to prevent dehydration.  Plan to have social work reassess.    FINAL IMPRESSION      1. Suicidal ideations          DISPOSITION    Transfer To Another Facility 04/10/2024 12:24:18 AM

## 2024-04-11 NOTE — PROGRESS NOTES
Social Work Note    Pt declined from [facility] and [facility]. Per Dr Allen with [facility], pt could be accepted to their unit, however, verbal consent and signed agreement from the guardian is required. Guardian has not returned multiple voicemails since pt's arrival on 4/9. Referral sent to [facility] for review as well.     0920 [facility] and inability to get consent from guardian. Awaiting call back from [facility] for next steps.     1000 TCF Magistrate Smith stating he left a message on guardian's personal cell phone and provided  office number to call.  encouraged to call [facility] if guardian does not reach out by end of day.     1438 TCF pt guardian. Verbal consent received and signed by witnesses. Copy faxed to guardian to sign and fax back for placement.     1600 Pt accepted to [facility] by Dr Allen w/ transport after 1800.

## 2024-04-11 NOTE — PROGRESS NOTES
Patient was signed out to me at change of shift by the previous ED team.  Please see previous provider's note for complete history and physical exam.    Briefly, this is a 63 year old male presenting to the ED for SI with plan and self inflicted lacerations.  At time of sign out the patient was accepted for admission by [facility] and was pending placement.  On reevaluation, the patient was HDS, in no acute distress.  They are still pending placement       Impression: SI  Disposition: transfer  Condition: stable     Mikala Wesley DO  Emergency Medicine

## 2024-04-12 LAB
EST. AVERAGE GLUCOSE BLD GHB EST-MCNC: 114 MG/DL
HBA1C MFR BLD: 5.6 %

## 2024-04-12 PROCEDURE — 99222 1ST HOSP IP/OBS MODERATE 55: CPT | Performed by: REGISTERED NURSE

## 2024-04-12 PROCEDURE — 2500000002 HC RX 250 W HCPCS SELF ADMINISTERED DRUGS (ALT 637 FOR MEDICARE OP, ALT 636 FOR OP/ED): Performed by: INTERNAL MEDICINE

## 2024-04-12 PROCEDURE — 1140000001 HC PRIVATE PSYCH ROOM DAILY

## 2024-04-12 PROCEDURE — 2500000001 HC RX 250 WO HCPCS SELF ADMINISTERED DRUGS (ALT 637 FOR MEDICARE OP): Performed by: REGISTERED NURSE

## 2024-04-12 PROCEDURE — 97162 PT EVAL MOD COMPLEX 30 MIN: CPT | Mod: GP

## 2024-04-12 RX ORDER — TAMSULOSIN HYDROCHLORIDE 0.4 MG/1
0.4 CAPSULE ORAL DAILY
Status: DISCONTINUED | OUTPATIENT
Start: 2024-04-12 | End: 2024-04-13

## 2024-04-12 RX ORDER — RISPERIDONE 1 MG/1
1 TABLET ORAL 2 TIMES DAILY
Status: DISCONTINUED | OUTPATIENT
Start: 2024-04-12 | End: 2024-04-12

## 2024-04-12 RX ORDER — RISPERIDONE 1 MG/1
1 TABLET, ORALLY DISINTEGRATING ORAL 2 TIMES DAILY
Status: DISCONTINUED | OUTPATIENT
Start: 2024-04-12 | End: 2024-04-13

## 2024-04-12 RX ADMIN — RISPERIDONE 1 MG: 1 TABLET, ORALLY DISINTEGRATING ORAL at 21:03

## 2024-04-12 RX ADMIN — TAMSULOSIN HYDROCHLORIDE 0.4 MG: 0.4 CAPSULE ORAL at 21:02

## 2024-04-12 RX ADMIN — RISPERIDONE 1 MG: 1 TABLET, ORALLY DISINTEGRATING ORAL at 16:04

## 2024-04-12 SDOH — HEALTH STABILITY: MENTAL HEALTH: HAVE YOU EVER TRIED TO KILL YOURSELF?: YES

## 2024-04-12 SDOH — ECONOMIC STABILITY: HOUSING INSECURITY: POTENTIALLY UNSAFE HOUSING CONDITIONS: UNABLE TO ASSESS

## 2024-04-12 SDOH — HEALTH STABILITY: MENTAL HEALTH: SUBSTANCE USE: UNABLE TO ASSESS - PT REFUSED TO SPEAK WITH LISW-S

## 2024-04-12 SDOH — HEALTH STABILITY: MENTAL HEALTH: NON-SPECIFIC ACTIVE SUICIDAL THOUGHTS (PAST 1 MONTH): YES

## 2024-04-12 SDOH — HEALTH STABILITY: MENTAL HEALTH: IN THE PAST FEW WEEKS, HAVE YOU WISHED YOU WERE DEAD?: YES

## 2024-04-12 SDOH — SOCIAL STABILITY: SOCIAL INSECURITY: WITHIN THE LAST YEAR, HAVE YOU BEEN AFRAID OF YOUR PARTNER OR EX-PARTNER?: PATIENT UNABLE TO ANSWER

## 2024-04-12 SDOH — ECONOMIC STABILITY: HOUSING INSECURITY: HOME SAFETY: PT LIVES IN A NURSING HOME

## 2024-04-12 SDOH — HEALTH STABILITY: MENTAL HEALTH: SUICIDAL BEHAVIOR (LIFETIME): YES

## 2024-04-12 SDOH — SOCIAL STABILITY: SOCIAL INSECURITY
WITHIN THE LAST YEAR, HAVE YOU BEEN RAPED OR FORCED TO HAVE ANY KIND OF SEXUAL ACTIVITY BY YOUR PARTNER OR EX-PARTNER?: PATIENT UNABLE TO ANSWER

## 2024-04-12 SDOH — HEALTH STABILITY: MENTAL HEALTH: FAMILY HISTORY SUBSTANCE USE: OTHER (COMMENT)

## 2024-04-12 SDOH — HEALTH STABILITY: MENTAL HEALTH: WISH TO BE DEAD (PAST 1 MONTH): YES

## 2024-04-12 SDOH — HEALTH STABILITY: MENTAL HEALTH: ARE YOU HAVING THOUGHTS OF KILLING YOURSELF RIGHT NOW?: NO

## 2024-04-12 SDOH — HEALTH STABILITY: MENTAL HEALTH: IN THE PAST FEW WEEKS, HAVE YOU FELT THAT YOU OR YOUR FAMILY WOULD BE BETTER OFF IF YOU WERE DEAD?: YES

## 2024-04-12 SDOH — HEALTH STABILITY: MENTAL HEALTH: ACTIVE SUICIDAL IDEATION WITH SPECIFIC PLAN AND INTENT (PAST 1 MONTH): YES

## 2024-04-12 SDOH — HEALTH STABILITY: MENTAL HEALTH: WHEN DID YOU TRY TO KILL YOURSELF?: 3/2024

## 2024-04-12 SDOH — HEALTH STABILITY: MENTAL HEALTH: SUICIDAL BEHAVIOR (3 MONTHS): YES

## 2024-04-12 SDOH — SOCIAL STABILITY: SOCIAL INSECURITY

## 2024-04-12 SDOH — ECONOMIC STABILITY: GENERAL

## 2024-04-12 SDOH — HEALTH STABILITY: MENTAL HEALTH: HARK TOTAL SCORE: 0

## 2024-04-12 SDOH — HEALTH STABILITY: MENTAL HEALTH: ACTIVE SUICIDAL IDEATION WITH SOME INTENT TO ACT, WITHOUT SPECIFIC PLAN (PAST 1 MONTH): YES

## 2024-04-12 SDOH — HEALTH STABILITY: MENTAL HEALTH: IN THE PAST WEEK, HAVE YOU BEEN HAVING THOUGHTS ABOUT KILLING YOURSELF?: YES

## 2024-04-12 SDOH — SOCIAL STABILITY: SOCIAL INSECURITY: FEELS SAFE LIVING IN HOME: NO

## 2024-04-12 SDOH — HEALTH STABILITY: MENTAL HEALTH: SUICIDAL BEHAVIOR (DESCRIPTION): SELF CUTTING BEHAVIORS

## 2024-04-12 SDOH — HEALTH STABILITY: MENTAL HEALTH: DESCRIBE YOUR THOUGHTS OF KILLING YOURSELF RIGHT NOW:: NO ANSWER

## 2024-04-12 SDOH — HEALTH STABILITY: MENTAL HEALTH: HOW DID YOU TRY TO KILL YOURSELF?: CUTTING WITH RAZOR

## 2024-04-12 ASSESSMENT — PAIN SCALES - GENERAL
PAINLEVEL_OUTOF10: 0 - NO PAIN
PAINLEVEL_OUTOF10: 3
PAINLEVEL_OUTOF10: 0 - NO PAIN
PAINLEVEL_OUTOF10: 0 - NO PAIN

## 2024-04-12 ASSESSMENT — COGNITIVE AND FUNCTIONAL STATUS - GENERAL
CLIMB 3 TO 5 STEPS WITH RAILING: A LOT
MOVING FROM LYING ON BACK TO SITTING ON SIDE OF FLAT BED WITH BEDRAILS: A LITTLE
WALKING IN HOSPITAL ROOM: A LITTLE
TURNING FROM BACK TO SIDE WHILE IN FLAT BAD: A LITTLE
STANDING UP FROM CHAIR USING ARMS: A LITTLE
MOVING TO AND FROM BED TO CHAIR: A LITTLE
MOBILITY SCORE: 17

## 2024-04-12 ASSESSMENT — PAIN - FUNCTIONAL ASSESSMENT
PAIN_FUNCTIONAL_ASSESSMENT: 0-10

## 2024-04-12 ASSESSMENT — COLUMBIA-SUICIDE SEVERITY RATING SCALE - C-SSRS
5. HAVE YOU STARTED TO WORK OUT OR WORKED OUT THE DETAILS OF HOW TO KILL YOURSELF? DO YOU INTEND TO CARRY OUT THIS PLAN?: NO
2. HAVE YOU ACTUALLY HAD ANY THOUGHTS OF KILLING YOURSELF?: NO
1. SINCE LAST CONTACT, HAVE YOU WISHED YOU WERE DEAD OR WISHED YOU COULD GO TO SLEEP AND NOT WAKE UP?: NO
1. SINCE LAST CONTACT, HAVE YOU WISHED YOU WERE DEAD OR WISHED YOU COULD GO TO SLEEP AND NOT WAKE UP?: NO
6. HAVE YOU EVER DONE ANYTHING, STARTED TO DO ANYTHING, OR PREPARED TO DO ANYTHING TO END YOUR LIFE?: NO
6. HAVE YOU EVER DONE ANYTHING, STARTED TO DO ANYTHING, OR PREPARED TO DO ANYTHING TO END YOUR LIFE?: NO
2. HAVE YOU ACTUALLY HAD ANY THOUGHTS OF KILLING YOURSELF?: NO
5. HAVE YOU STARTED TO WORK OUT OR WORKED OUT THE DETAILS OF HOW TO KILL YOURSELF? DO YOU INTEND TO CARRY OUT THIS PLAN?: NO

## 2024-04-12 ASSESSMENT — PATIENT HEALTH QUESTIONNAIRE - PHQ9
1. LITTLE INTEREST OR PLEASURE IN DOING THINGS: MORE THAN HALF THE DAYS
2. FEELING DOWN, DEPRESSED OR HOPELESS: NEARLY EVERY DAY
SUM OF ALL RESPONSES TO PHQ9 QUESTIONS 1 AND 2: 5

## 2024-04-12 ASSESSMENT — ACTIVITIES OF DAILY LIVING (ADL): ADL_ASSISTANCE: INDEPENDENT

## 2024-04-12 NOTE — CARE PLAN
Problem: PT Problem  Goal: BED MOBILITY Patient will transfer supine to sit and sit to supine with independent assist to facilitate mobility.   Outcome: Progressing  Goal: TRANSFER Patient will transfer bed to chair and chair to bed with independent assist to facilitate mobility.   Outcome: Progressing  Goal: AMBULATION Patient will amb 150 feet+with rolling walker device including two turns on even surface with independent assist to facilitate safe mobility.   Outcome: Progressing  Goal: STRENGTH Patient will increase BLE  strength to 4+/5 to improve functional mobility.     Outcome: Progressing

## 2024-04-12 NOTE — GROUP NOTE
Group Topic: Reminiscence   Group Date: 4/12/2024  Start Time: 1345  End Time: 1430  Facilitators: DRU Tapia   Department: [facility]    Number of Participants: 8   Group Focus: other Storywise Cards  Treatment Modality: Other: Recreation Therapy  Interventions utilized were mental fitness, reminiscence, and story telling  Purpose: other: fun, elevate mood, increase socialization, enhance self esteem    Name: Gerardo Albright YOB: 1960   MR: [id number]      Facilitator: Recreational Therapist  Level of Participation: did not attend  Quality of Participation:  did not attend  Interactions with others:  did not attend  Mood/Affect:  did not attend  Triggers (if applicable): n/a  Cognition:  did not attend  Progress: None  Comments: pt problem is depressed mood.  Pt continues to sleep in his room and refuse to attend group at this time.  No handout to offer.  Plan: continue with services

## 2024-04-12 NOTE — CONSULTS
Nutrition Assessement Note    Nutrition Assessment    Reason for Assessment: Admission nursing screening (MST 3)    Reason for Hospital Admission:  Gerardo Albright is a 63 y.o. male who is admitted from NH after SA - cut LUE with razor blade. Refused to speak with RD at this time.    Nutrition History:  Food and Nutrient History: noted to be eating very little for the past few weeks    Anthropometrics:  Ht: 182.9 cm (6'), Wt: 65.3 kg (143 lb 15.4 oz), BMI: 19.52    Weight Change:  Daily Weight  04/11/24 : 65.3 kg (143 lb 15.4 oz)  04/09/24 : 67.6 kg (149 lb 0.5 oz)  03/08/24 : 68 kg (150 lb)  02/16/24 : 74.5 kg (164 lb 3.9 oz)  02/14/24 : 74 kg (163 lb 2.3 oz)  01/31/24 : 90.7 kg (200 lb)  01/09/24 : 79.8 kg (175 lb 14.8 oz)  01/06/24 : 78.5 kg (173 lb 1 oz)  01/03/24 : 81.6 kg (180 lb)  09/28/23 : 87.1 kg (192 lb 0.3 oz)     Weight History / % Weight Change: gradual wt loss noted in wt hx over the past 3 months  Significant Weight Loss: Yes  Interpretation of Weight Loss:  (32# (18.2%) wt loss over ~3 months (1/9-4/11))    Nutrition Focused Physical Exam Findings: deferred - not appropriate     Nutrition Significant Labs:  Lab Results   Component Value Date    WBC 12.3 (H) 04/10/2024    HGB 16.2 04/10/2024    HCT 47.0 04/10/2024     04/10/2024    CHOL 161 01/07/2024    TRIG 146 01/07/2024    HDL 43.9 01/07/2024    ALT 11 04/10/2024    AST 13 04/10/2024     04/10/2024    K 4.3 04/10/2024     04/10/2024    CREATININE 0.60 04/10/2024    BUN 40 (H) 04/10/2024    CO2 24 04/10/2024    TSH 2.54 05/09/2023    INR 1.2 (H) 05/30/2020    GLUF 126 (H) 01/07/2024    HGBA1C 5.6 04/10/2024     Nutrition Specific Medications:  risperiDONE, 1 mg, oral, BID      Dietary Orders (From admission, onward)       Start     Ordered    04/11/24 2113  Adult diet Regular; Easy to chew  Diet effective now        Question Answer Comment   Diet type Regular    Texture Easy to chew        04/11/24 2118                   Estimated Needs:   Estimated Energy Needs  Total Energy Estimated Needs (kCal):  (7907-0999)  Total Estimated Energy Need per Day (kCal/kg):  (25-30)  Method for Estimating Needs: actual wt    Estimated Protein Needs  Total Protein Estimated Needs (g):  (65-78)  Total Protein Estimated Needs (g/kg):  (1-1.2)  Method for Estimating Needs: actual wt    Estimated Fluid Needs  Method for Estimating Needs: 1 ml/kcal        Nutrition Diagnosis   Nutrition Diagnosis:  Malnutrition Diagnosis  Patient has Malnutrition Diagnosis: Yes  Diagnosis Status: New  Malnutrition Diagnosis: Moderate malnutrition related to acute disease or injury  As Evidenced by: 32# (18.2%) wt loss over ~3 months (1/9-4/11), po intake < 75% estimated needs for > 7 days       Nutrition Interventions/Recommendations   Nutrition Interventions and Recommendations:    Nutrition Prescription:  Individualized Nutrition Prescription Provided for : 1604-9725 kcals and 65-78g protein to be provided via diet and supplements    Nutrition Interventions:   Food and/or Nutrient Delivery Interventions  Interventions: Meals and snacks, Medical food supplement  Meals and Snacks: Texture-modified diet  Goal: provide as ordered  Medical Food Supplement: Modified beverage  Goal: mighty shake TID to provide 200 kcals and 7g protein each    Education Documentation  No documentation found.           Nutrition Monitoring and Evaluation   Monitoring/Evaluation:   Food/Nutrient Related History Monitoring  Monitoring and Evaluation Plan: Energy intake  Energy Intake: Estimated energy intake  Criteria: pt to consume >/= 75% estimated needs    Body Composition/Growth/Weight History  Monitoring and Evaluation Plan: Weight  Weight: Measured weight  Criteria: pt will maintain wt at this time       Time Spent/Follow-up:   Follow Up  Time Spent (min): 25 minutes  Last Date of Nutrition Visit: 04/12/24  Nutrition Follow-Up Needed?: 5-7 days  Follow up Comment: 4/17/24

## 2024-04-12 NOTE — PROGRESS NOTES
Social Work Note    FAN called guardian - left a VM sharing that pt was hospitalized and provided best contact information for call back.

## 2024-04-12 NOTE — NURSING NOTE
Spoke with guardian via phone.  Consents obtained verbally with second RN witness.  Notified guardian that these would all be faxed to him tomorrow to obtain signatures and guardian agrees to do so.

## 2024-04-12 NOTE — PROGRESS NOTES
Patient was received in signout at 1400.     IN BRIEF   63-year-old male presents with concerns for SI and confusion.  Patient reportedly has plans to cut his wrist and is refusing to eat and drink.     Patient was previously evaluated by prior ED providers and medically cleared for psychiatric placement.  At the time of signout patient is pending placement.    ED COURSE   Patient accepted for placement at [facility].  Patient without acute events overnight.  Patient stable for transfer at this time.      FINAL IMPRESSION      1. Suicidal ideations          DISPOSITION    Transfer To Another Facility 04/10/2024 12:24:18 AM

## 2024-04-12 NOTE — NURSING NOTE
BIRP NOTE     Problem:  Depression     Behavior:  Patient is alert and oriented x 1.  Patient is dressed in hospital attire and appears unkempt. Patient is isolative and withdrawn to self. Patient 1 to 1 was discontinued. Patient denies having SI/HI, denies having AH/VH. Patient ate 100% of all meals in dining room. Patient has a flat affect. Patient refused to attend any groups. Patient did not void or have a bowel movement today. Patient c/o burning while urinating. Bladder scan completed and it was 297 ml of urine. Patient also c/o tenderness in lower abdomen. Patient has healed lacerations on L forearm, black stitches intact but has dry skin over site. Patient started taking Risperdal 1mg disintegrating tablet, mouth check done, no side effects or adverse reactions reported. Patient refused shower but was given a bed bath by nurse and gown and pants changed. Patient hyper focused on lying in bed all day and going straight back to bed after each meal. Patient has minimal engagement and interaction with staff and patients.  Group Participation: No  Appetite/Meals: 100/100/100       Interventions:  Nurse administered medications as prescribed    Response:  Patient med compliant     Plan:  Will continue to monitor q 15 mins

## 2024-04-12 NOTE — PROGRESS NOTES
Physical Therapy Evaluation        04/12/24 [id number]   PT  Visit   PT Received On 04/12/24   General   Reason for Referral Referral from Dr QUEEN.  Pt declined ambulation over last month at [facility].   Referred By Dr Allen   Past Medical History Relevant to Rehab Other schizophrenia (Multi) 7/27/2023     Other seizures (Multi) 7/27/2023    Anxiety 7/27/2023    Parkinson's disease (Multi) 7/27/2023    Dementia (Multi) 7/27/2023    Other insomnia 7/27/2023    Gastroesophageal reflux disease without esophagitis 7/27/2023    Asthenia 7/27/2023    Chronic obstructive pulmonary disease (Multi) 7/27/2023    Benign hypertension 7/27/2023    Depression 7/27/2023    ASHD (arteriosclerotic heart disease) 7/27/2023    At risk for falls 7/30/2023    Suicide attempt (Multi) 1/6/2024    Laceration of left upper extremity 1/10/2024    Paranoid schizophrenia, chronic condition (Multi) 4/11/2024   General Comment Pt resistive to OOB.  Needed max encouragment for OOB, then cooperative.  No Combative Behaviors. Flat Affect.   Precautions   Precautions Comment Risk for falls   Pain Assessment   Pain Assessment 0-10   Pain Score 0 - No pain   Cognition   Orientation Level Disoriented to person   Processing Speed Delayed   Home Living   Type of Home Skilled Nursing facility  ([facility])   Prior Function Per Pt/Caregiver Report   Level of [place] Independent with ADLs and functional transfers   ADL Assistance Independent   Homemaking Assistance Independent   Ambulatory Assistance Independent  (Staff nsg reports decline in past month.  Pt prefers in bed all day.)   Sensation   Sensation Comment Denies Numbness Tingling BUE/LE   Strength   Strength Comments Grossly 3+/5 BLE or better based on mobility sklls   Dynamic Standing Balance   Dynamic Standing-Balance Support No upper extremity supported  (360 turns, picking up object from floor CGx1 No AD)   Dynamic Standing-Comments CGx1 without AD   Bed Mobility   Bed Mobility Yes    Bed Mobility 1   Bed Mobility 1 Supine to sitting;Sitting to supine;Rolling right;Rolling left   Level of Assistance 1 Contact guard;Minimum assistance   Bed Mobility Comments 1 Behaviors limiting motivation.  needed tactile cues for LEs off mattress.   Transfer 1   Transfer From 1 Bed to   Transfer to 1 Stand   Technique 1 Sit to stand;Stand to sit   Transfer Level of Assistance 1 Contact guard   Trials/Comments 1 Pt grabs for RW, cues to push up.  Sits with poor Eccentric Control.   Transfers 2   Transfer From 2 Bed to   Transfer to 2 Wheelchair   Technique 2 Stand pivot   Transfer Level of Assistance 2 Contact guard   Trials/Comments 2 SAfety cues   Ambulation/Gait Training   Ambulation/Gait Training Performed Yes   Ambulation/Gait Training 1   Surface 1 Level tile   Device 1 Rolling walker   Assistance 1 Contact guard   Quality of Gait 1 Diminished heel strike;Forward flexed posture   Comments/Distance (ft) 1 100 feet RW Cgx1   Ambulation/Gait Training 2   Surface 2 Level tile   Device 2 No device   Assistance 2 Contact guard   Quality of Gait 2 Forward flexed posture;Decreased step length   Comments/Distance (ft) 2 25 feet pt prefers using RW.   Wheelchair Activities   Propulsion Type 1 Manual   Level 1 Level tile   Method 1 Right upper extremity;Left upper extremity;Right lower extremity;Left lower extremity  (LLE windswept)   Level of Assistance 1 Distant supervision   Description/Details 1 Effortful pace 35 feet with 1 turn   PT Assessment   PT Assessment Results Decreased strength;Decreased endurance;Decreased mobility;Decreased cognition;Impaired judgement;Decreased safety awareness   Rehab Prognosis Good   Barriers to Discharge Behaviors   Barriers to Participation Coping skills;Insight into problems   End of Session Patient Position Up in chair   IP OR SWING BED PT PLAN   Inpatient or Swing Bed Inpatient   PT Plan   Treatment/Interventions Bed mobility;Transfer training;Gait training;Stair  training;Balance training;Neuromuscular re-education;Strengthening;Therapeutic exercise   PT Recommended Transfer Status Contact guard   PT - OK to Discharge Yes   PT 4x/week to address decline of mobility skills, weakness, impaired balance, activity intolerance.    04/12/24 1515   [facility] Basic Mobility   Turning from your back to your side while in a flat bed without using bedrails 3   Moving from lying on your back to sitting on the side of a flat bed without using bedrails 3   Moving to and from bed to chair (including a wheelchair) 3   Standing up from a chair using your arms (e.g. wheelchair or bedside chair) 3   To walk in hospital room 3   Climbing 3-5 steps with railing 2   Basic Mobility - Total Score 17   PT Treatment 2x5 STS from chair without arm rests       Problem: PT Problem  Goal: BED MOBILITY Patient will transfer supine to sit and sit to supine with independent assist to facilitate mobility.   Outcome: Progressing  Goal: TRANSFER Patient will transfer bed to chair and chair to bed with independent assist to facilitate mobility.   Outcome: Progressing  Goal: AMBULATION Patient will amb 150 feet+with rolling walker device including two turns on even surface with independent assist to facilitate safe mobility.   Outcome: Progressing  Goal: STRENGTH Patient will increase BLE  strength to 4+/5 to improve functional mobility.     Outcome: Progressing

## 2024-04-12 NOTE — PROGRESS NOTES
Social Work Note       04/12/24 0900   Referral Data   Referral Source Physician   Referral Name Dr. Allen   Referral Reason Psychosocial assessment   County Information   [facility] Res[facility]   Patient Information   Primary Caregiver Self   Provides Primary Care For No One   Accompanied by/Relationship No One   Support System Other (Comment)  (Thomas)   Lives With Lives in an nursing home   Roman [employer]/Cultural Factors Unknown   SW Readmission Information    Readmission within 30 Days Yes   Previous ED Visit Date and Reason  3/8/2024   Previous Discharge Date and Location 3/12/2024   Factors Contributing to  Readmission Inpatient/ED (Team Perspective) Med Compliance/Difficulty Obtaining   Home Safety   Feels Safe Living in Home No   Potentially Unsafe Housing Conditions Unable to Assess   Home Safety  Pt lives in a nursing home   Family Member Substance Use   Family History Substance Use Other (Comment)  (Unknown)   Substance Use Unable to assess - pt refused to speak with LISW-S   Activities of Daily Living   Functional Status Other (Comment)  (Unable to assess due to pt refusal to work with [employer])   Income Information   Employment Status for Patient   Employment Status Retired   Income Source Social Security   Financial Concerns None   Emotional/ Psychological   Person Assessed Patient   Affect No Deficits Noted   Behaviors/Mood Uncooperative   Verbal Skills Other (Comment)  (refused to speak)   [place] Suicide Severity Rating Scale (Screener/Recent Self-Report)   1. Wish to be Dead (Past 1 Month) Y   2. Non-Specific Active Suicidal Thoughts (Past 1 Month) Y   3. Active Suicidal Ideation with any Methods (Not Plan) Without Intent to Act (Past 1 Month) Y   4. Active Suicidal Ideation with Some Intent to Act, Without Specific Plan (Past 1 Month) Y   5. Active Suicidal Ideation with Specific Plan and Intent (Past 1 Month) Y   6. Suicidal Behavior (Lifetime) Y   6. Suicidal Behavior (3 Months) Y   6.  Suicidal Behavior (Description) self cutting behaviors   Ask Suicide-Screening Questions   1. In the past few weeks, have you wished you were dead? Y   2. In the past few weeks, have you felt that you or your family would be better off if you were dead? Y   3. In the past week, have you been having thoughts about killing yourself? Y   4. Have you ever tried to kill yourself? Y   How did you try to kill yourself? cutting with razor   When did you try to kill yourself? 3/2024   5. Are you having thoughts of killing yourself right now? N   Describe your thoughts of killing yourself right now: no answer   Calculated Risk Score Potential Risk   Over the past 2 weeks, how often have you been bothered by any of the following problems?   Little interest or pleasure in doing things Over half   Feeling down, depressed, or hopeless Almost all   Patient Health Questionnaire-2 Score 5   HARK Assessment   Within the last year, have you been afraid of your partner or ex-partner? Pt Unable   Within the last year, have you been humiliated or emotionally abused in other ways by your partner or ex-partner? Pt Unable   Within the last year, have you been kicked, hit, slapped, or otherwise physically hurt by your partner or ex-partner? Pt Unable   Within the last year, have you been raped or forced to have any kind of sexual activity by your partner or ex-partner? Pt Unable   HARK Total Score 0   Violence Risk Assessment   Assessment of Violence None noted   Thoughts of Harm to Others No   Legal   Legal Comments Pt has legal Gerardo alvarezias is a 63 y.o. male presenting with suicidal attempt.  Patient has history of schizoaffective disorder Parkinson's disease COPD current smoker who resides at [facility] had multiple previous psychiatric admissions presents again from a suicidal attempt trying to cut his left forearm.  He had sutures placed in the ER.  He is also not eating and drinking too well for past few  weeks.  He states he has not been feeling good.  He was in the emergency room and did not get his medications and asking for Ativan.  Because of Parkinson's he is not ambulatory at the facility

## 2024-04-12 NOTE — H&P
"History Of Present Illness  Gerardo Albright is a 63 y.o. male presenting with Gerardo Albright is a 63 y.o. male with a past history of schizoaffective disorder, dementia (MOCA 13/30), seizures, parkinson's disease, past history of ?catatonic schizophrenia COPD, gerd who presented to [facility] from the nursing home in which he resides, [facility].  Patient with multiple previous psychiatric admissions, most recent [facility] 3/11--3/15 and 2/20-2/23/24.       On interview, pt is asleep on cot.  He is unable or unwilling to interact with this provider.  Attempted to engage pt with loud verbal stimuli, nailbed pressure, and sternal rub.  Eyelids briefly flutter, but do not open.  In discussion with nursing staff pt has not interacted in 2 days, except for briefly on arrival.  Vital signs are unremarkable, lab work not concerning for medical cause, CT head negative.  On second attempt to interact with patient, with significant attempt, pt very weakly squeezes hands to command, mutters a few words.  He states \"hospital\" when asked where he is and that he is here to \"get some help.\"  He states he \"I haven't been feeling well\" however doesn't reply when asked if he means mentally or physically.  He then stops interacting with this provider.  Consideration given to catatonia vs behavioral/avolitional causes of decreased interactions.  Some cog wheel rigidity appreciated on bilateral upper arms, r>l.  Lower extremities with minimal resistance to examination.  He scores 11 on BF catatonia scale: 3 pts for extreme hypoactivity and minimal responsiveness to stimuli, 2 pts for mutism, 3 pts for little or no visual scanning of environment, and 3 points for no po intake, withdrawal, no eye contact.  Consideration given to administering ativan, however upon re-assessment approx. 90 min later patient is awake, drinking lemonade, much more interactive.  He reports continued suicidality, stating he wants to cut himself. He " "reports feeling increasingly depressed over past week.  He has multiple scars from previous self-inflicted lacerations and what appears to be remaining stitches from suicide attempt last month.      Contacted RN Angelica at [facility].  She states she has taken care of Gerardo for the past 7 years that he has been at [facility].  She reports that his typical behavior one of two ways.  He is either despondent, in bed, refusing medications and food at times for days or he is following staff around, asking about medications and what he is receiving.  She states that there is concern for him throwing up medications and he must remain in the cafeteria for half an hour after meals to ensure he does not immediately throw up his meds.  She states that he started having decreased activity last Wednesday, laying in bed, withdrawn.  She was off until Tuesday and when she came back he was in bed and minimally interactive. Nurse manager Maria De Jesus states that patient seems to have \"given up on life\" recently.  .    Patient declined interview other than a few questions.  He committed to not hurt himself on unit(discontinued sitter).  He admitted tolow level voices telling him tohurt himself but said that they were not intrusive and he did'nt need to listen to them(encouraged him  to talk tostaff if voices become stronger).  He asked for ativan foe anxiety- will be offered seroquel for anxious psychosis prn.     Past Medical History  He has a past medical history of ADHD (attention deficit hyperactivity disorder), AMI (acute myocardial infarction) (Multi), At risk for falls, Catatonic schizophrenia (Multi), Cognitive decline, COPD (chronic obstructive pulmonary disease) (Multi), Dementia (Multi), Depression, GERD (gastroesophageal reflux disease), Hypertension, Insomnia, Myocardial infarction (Multi), Parkinson's disease (Multi), Schizoaffective disorder (Multi), Schizophrenia (Multi), Seizures (Multi), STEMI (ST elevation " myocardial infarction) (Multi) (04/17/2017), and Weakness.    Past Psychiatric History:   Previous therapy: yes  Previous psychiatric treatment and medication trials: yes - multiple  Previous psychiatric hospitalizations: yes - multiple  Previous diagnoses: yes - schizophrenia  Previous suicide attempts: yes - multiple  History of violence: no  Currently in treatment with [facility].  Depression screening was performed with standardized tool: Yes - Depression    Surgical History  He has no past surgical history on file.     Social History  He reports that he has quit smoking. His smoking use included cigarettes. He has been exposed to tobacco smoke. He has never used smokeless tobacco. He reports that he does not currently use alcohol. He reports that he does not use drugs.     Allergies  Patient has no known allergies.    Review of Systems    Psychiatric ROS - Adult  Anxiety: Panic AttackPanic Attack Behaviors: says has panic attacks but does not detail sx  Depression: anhedonia, interest, suicidal thoughts, and withdrawn  Delirium: negative  Psychosis: auditory hallucinations  Gay: negative  Safety Issues: suicidal ideation  Psychiatric ROS Comment: Poverty of thought    Physical Exam      Mental Status Exam  General: lying quietly in hospital bed  Appearance: hospital attire  Attitude: calm  Behavior: withdrawn  Motor Activity: lying quietly under the covers  Speech: low volume slow rate  Mood: anxious  Affect: constricted  Thought Process: difficult to assess  Thought Content: committed tosafety on the unit, endorses fleeting SI but no intent, impulse or plan to hurt himself  Thought Perception: no delusions elicited in short exam, denies paranoia- none noted, fleeting SI, denies HI, says mild AH telling to hurt self but able to let them go  Cognition: alert and oreinted x3  Insight: limited  Judgement: limited- has guardian who nursing reports giv4es permission for any medications necessary t o help  stabilize patient    Psychiatric Risk Assessment  Violence Risk Assessment: command hallucinations, major mental illness, and male  Acute Risk of Harm to Others is Considered: low   Suicide Risk Assessment: , chronic medical illness, current psychiatric illness, male, panic attacks, prior suicide attempt, recent suicide attempt, and suicidal behaviors  Protective Factors against Suicide: other agrees to treatment, contracts for safety on unit  Acute Risk of Harm to Self is Considered: moderate    Last Recorded Vitals  Blood pressure 144/80, pulse 70, temperature 36.5 °C (97.7 °F), temperature source Temporal, resp. rate 18, height 1.829 m (6'), weight 65.3 kg (143 lb 15.4 oz), SpO2 95%.    Relevant Results        Scheduled medications  risperiDONE, 1 mg, oral, BID      Continuous medications     PRN medications  PRN medications: acetaminophen, alum-mag hydroxide-simeth, magnesium hydroxide, QUEtiapine **OR** OLANZapine      Assessment/Plan   Principal Problem:    Other schizophrenia (Multi)  Active Problems:    Parkinson's disease (Multi)    Depression    Suicide attempt (Multi)    Paranoid schizophrenia, chronic condition (Multi)      Patient is dx with schizophrenia.  He has been hospilatized many times. [facility] reports he has taken no medications in the last month.  We are ascertaining when his last Peseris injection was given. For now, will start risperdal Mtabs 1mg bid and titrate- he may have seroqule prn for psychotic anxiety/agitation. Plan to start MCDONALD-probably invega sustenna. Encouraged patient to attend groups and take meds as ordered. He was up for lunch.  Will cont to assess prior med rec.       I spent 65 minutes in the professional and overall care of this patient.      Medication Consent  Medication Consent: risks, benefits, side effects reviewed for all ordered meds    Tayla Martin, APRN-CNS

## 2024-04-12 NOTE — CARE PLAN
The patient's goals for the shift include not stated    The clinical goals for the shift include promote rest    Over the shift, the patient made progress toward the following goals. Barriers to progression include not taking medications as prescribed. Recommendations to address these barriers include taking medications as prescribed.

## 2024-04-12 NOTE — GROUP NOTE
Group Topic: Self-Care/Wellness   Group Date: 4/12/2024  Start Time: 1100  End Time: 1150  Facilitators: DRU Tapia   Department: [facility]    Number of Participants: 8   Group Focus: other Tickling Your Funnybone  Treatment Modality: Other: Recreation Therapy  Interventions utilized were exploration, group exercise, patient education, problem solving, and support  Purpose: coping skills, maladaptive thinking, feelings, irrational fears, communication skills, insight or knowledge, self-worth, self-care, relapse prevention strategies, and trigger / craving management    Name: Gerardo Albright YOB: 1960   MR: [id number]      Facilitator: Recreational Therapist  Level of Participation: did not attend  Quality of Participation:  did not attend  Interactions with others:  did not attend  Mood/Affect:  did not attend  Triggers (if applicable): n/a  Cognition:  did not attend  Progress: None  Comments: pt problem is depressed mood.  Pt is resting in his bed, refuses to attend group at this time.  Handout offered after group.  Plan: continue with services

## 2024-04-12 NOTE — NURSING NOTE
Nurse Admission Note    Reason for admission in patient's own words:  I cut myself with razor blades    Patient living arrangements prior to admission:  Nursing home    Legal status:  involuntarily, voluntary signed by the guardian    Medical consult notification to:  Dr. TATI Galarza    Admission folder given to:   Pt    Problems/treatment plans:  anxiety    Patient requests family to be notified of admission?    N/a  Person notified:  Guardian Hipolito Holguin    Strengths:  Unable to determine/identify    Liabilities:  impaired cognition, low self esteem, noncompliant with medication, poor physical health, self-care deficit    Previous mental health treatment:  Past psychiatric history of schizophrenia and Inpatient treatment history [facility] in March and February 2024    Preliminary discharge planning needs:  TBA

## 2024-04-13 ENCOUNTER — HOSPITAL ENCOUNTER (EMERGENCY)
Facility: HOSPITAL | Age: 64
Discharge: PSYCHIATRIC OR SUBSTANCE USE FACILITY | End: 2024-04-13
Attending: EMERGENCY MEDICINE
Payer: COMMERCIAL

## 2024-04-13 VITALS
RESPIRATION RATE: 18 BRPM | SYSTOLIC BLOOD PRESSURE: 106 MMHG | TEMPERATURE: 97.9 F | WEIGHT: 149.47 LBS | HEIGHT: 72 IN | BODY MASS INDEX: 20.25 KG/M2 | DIASTOLIC BLOOD PRESSURE: 75 MMHG | HEART RATE: 72 BPM | OXYGEN SATURATION: 94 %

## 2024-04-13 DIAGNOSIS — R33.9 URINARY RETENTION: Primary | ICD-10-CM

## 2024-04-13 PROBLEM — K59.00 CONSTIPATION: Status: ACTIVE | Noted: 2024-04-13

## 2024-04-13 PROCEDURE — 51702 INSERT TEMP BLADDER CATH: CPT

## 2024-04-13 PROCEDURE — 99232 SBSQ HOSP IP/OBS MODERATE 35: CPT

## 2024-04-13 PROCEDURE — 99283 EMERGENCY DEPT VISIT LOW MDM: CPT | Mod: 25

## 2024-04-13 PROCEDURE — 99285 EMERGENCY DEPT VISIT HI MDM: CPT

## 2024-04-13 PROCEDURE — 97166 OT EVAL MOD COMPLEX 45 MIN: CPT | Mod: GO

## 2024-04-13 PROCEDURE — 1140000001 HC PRIVATE PSYCH ROOM DAILY

## 2024-04-13 PROCEDURE — 99232 SBSQ HOSP IP/OBS MODERATE 35: CPT | Performed by: INTERNAL MEDICINE

## 2024-04-13 PROCEDURE — 2500000001 HC RX 250 WO HCPCS SELF ADMINISTERED DRUGS (ALT 637 FOR MEDICARE OP)

## 2024-04-13 PROCEDURE — 51798 US URINE CAPACITY MEASURE: CPT

## 2024-04-13 RX ORDER — OLANZAPINE 10 MG/2ML
5 INJECTION, POWDER, FOR SOLUTION INTRAMUSCULAR EVERY 6 HOURS PRN
Status: DISCONTINUED | OUTPATIENT
Start: 2024-04-13 | End: 2024-04-24 | Stop reason: HOSPADM

## 2024-04-13 RX ORDER — MAGNESIUM HYDROXIDE 2400 MG/10ML
10 SUSPENSION ORAL DAILY PRN
Status: DISCONTINUED | OUTPATIENT
Start: 2024-04-13 | End: 2024-04-24 | Stop reason: HOSPADM

## 2024-04-13 RX ORDER — ALUMINUM HYDROXIDE, MAGNESIUM HYDROXIDE, AND SIMETHICONE 2400; 240; 2400 MG/30ML; MG/30ML; MG/30ML
30 SUSPENSION ORAL EVERY 6 HOURS PRN
Status: DISCONTINUED | OUTPATIENT
Start: 2024-04-13 | End: 2024-04-24 | Stop reason: HOSPADM

## 2024-04-13 RX ORDER — TAMSULOSIN HYDROCHLORIDE 0.4 MG/1
0.4 CAPSULE ORAL DAILY
Status: DISCONTINUED | OUTPATIENT
Start: 2024-04-13 | End: 2024-04-24 | Stop reason: HOSPADM

## 2024-04-13 RX ORDER — RISPERIDONE 1 MG/1
1 TABLET, ORALLY DISINTEGRATING ORAL 2 TIMES DAILY
Status: DISCONTINUED | OUTPATIENT
Start: 2024-04-13 | End: 2024-04-13

## 2024-04-13 RX ORDER — RISPERIDONE 1 MG/1
2 TABLET, ORALLY DISINTEGRATING ORAL 2 TIMES DAILY
Status: DISCONTINUED | OUTPATIENT
Start: 2024-04-13 | End: 2024-04-19

## 2024-04-13 RX ORDER — ACETAMINOPHEN 325 MG/1
650 TABLET ORAL EVERY 4 HOURS PRN
Status: DISCONTINUED | OUTPATIENT
Start: 2024-04-13 | End: 2024-04-24 | Stop reason: HOSPADM

## 2024-04-13 RX ORDER — QUETIAPINE FUMARATE 25 MG/1
12.5 TABLET, FILM COATED ORAL EVERY 4 HOURS PRN
Status: DISCONTINUED | OUTPATIENT
Start: 2024-04-13 | End: 2024-04-24 | Stop reason: HOSPADM

## 2024-04-13 RX ADMIN — RISPERIDONE 2 MG: 1 TABLET, ORALLY DISINTEGRATING ORAL at 20:40

## 2024-04-13 ASSESSMENT — COGNITIVE AND FUNCTIONAL STATUS - GENERAL
TOILETING: A LITTLE
HELP NEEDED FOR BATHING: A LITTLE
PERSONAL GROOMING: A LITTLE
EATING MEALS: A LITTLE
DRESSING REGULAR LOWER BODY CLOTHING: A LITTLE
DAILY ACTIVITIY SCORE: 18
DRESSING REGULAR UPPER BODY CLOTHING: A LITTLE

## 2024-04-13 ASSESSMENT — COLUMBIA-SUICIDE SEVERITY RATING SCALE - C-SSRS
1. SINCE LAST CONTACT, HAVE YOU WISHED YOU WERE DEAD OR WISHED YOU COULD GO TO SLEEP AND NOT WAKE UP?: NO
6. HAVE YOU EVER DONE ANYTHING, STARTED TO DO ANYTHING, OR PREPARED TO DO ANYTHING TO END YOUR LIFE?: NO
5. HAVE YOU STARTED TO WORK OUT OR WORKED OUT THE DETAILS OF HOW TO KILL YOURSELF? DO YOU INTEND TO CARRY OUT THIS PLAN?: NO
2. HAVE YOU ACTUALLY HAD ANY THOUGHTS OF KILLING YOURSELF?: NO

## 2024-04-13 ASSESSMENT — ENCOUNTER SYMPTOMS
WEAKNESS: 1
AGITATION: 0
HYPERACTIVE: 0
SLEEP DISTURBANCE: 1
DECREASED CONCENTRATION: 1
ACTIVITY CHANGE: 1
CONFUSION: 1
DYSPHORIC MOOD: 1
NERVOUS/ANXIOUS: 0

## 2024-04-13 ASSESSMENT — PAIN SCALES - GENERAL: PAINLEVEL_OUTOF10: 0 - NO PAIN

## 2024-04-13 ASSESSMENT — ACTIVITIES OF DAILY LIVING (ADL)
BATHING_ASSISTANCE: MINIMAL
ADL_ASSISTANCE: INDEPENDENT

## 2024-04-13 ASSESSMENT — PAIN - FUNCTIONAL ASSESSMENT: PAIN_FUNCTIONAL_ASSESSMENT: 0-10

## 2024-04-13 NOTE — NURSING NOTE
GILBERT NOTE     Problem:  Depression     Behavior:  Patient is alert and oriented x 1. Patient is dressed in hospital attire and appears disheveled. Patient has a 1 to 1 sitter d/t him having a espinal catheter. Patient output was 600 ml, urine color is krystal, no odor noted. Patient ate 100% of breakfast, 25% of lunch and 25% of dinner. Patient intake of fluids 580ml. Patient refused morning medications. Patient refused to go to groups but was visible in dayroom for a short period of time. Patient has healed lacerations on left forearm and black stitches noted. Dr. Galarza informed and assessed patient and ordered sutures to be removed, awaiting order. Patient has a flat affect. Patient denies SI/HI, denies having AH/VH. Patient speaks in a low tone of voice and appears to be confused. Patient is isolative and withdrawn, he wants to stay in bed and sleep throughout the day. Patient initially refused vitals but was encouraged by staff and agreed to have them taken.   Group Participation: Yes  Appetite/Meals: 100/25/25       Interventions:  Nurse encouraged patient several times to take medications as prescribed    Response:  Patient refused to take medications     Plan:  Will continue to monitor, patient has 1:1 sitter

## 2024-04-13 NOTE — ED PROVIDER NOTES
HPI   Chief Complaint   Patient presents with    Urinary Retention     Geuropsych informed ER that patient has not urinated for a long time and feels like he needs to be straight cathed, patient has pain in lower abdomen but no other complaints.         HPI       63-year-old male sent for urinary retention.  Patiently recently placed in geriatric psychiatry unit.  They report decreased urine output.  Attempted to place a catheter but could not so sent him here for evaluation.  Patient reports pain in the low abdomen.  Somewhat sedated otherwise.             [place] Coma Scale Score: 14                     Patient History   Past Medical History:   Diagnosis Date    ADHD (attention deficit hyperactivity disorder)     AMI (acute myocardial infarction) (Multi)     At risk for falls     Catatonic schizophrenia (Multi)     Cognitive decline     COPD (chronic obstructive pulmonary disease) (Multi)     Dementia (Multi)     Depression     GERD (gastroesophageal reflux disease)     Hypertension     Insomnia     Myocardial infarction (Multi)     Parkinson's disease (Multi)     Schizoaffective disorder (Multi)     Schizophrenia (Multi)     Seizures (Multi)     STEMI (ST elevation myocardial infarction) (Multi) 04/17/2017    Weakness      No past surgical history on file.  Family History   Problem Relation Name Age of Onset    Hypertension Other       Social History     Tobacco Use    Smoking status: Former     Current packs/day: 0.50     Types: Cigarettes     Passive exposure: Past    Smokeless tobacco: Never   Vaping Use    Vaping status: Never Used   Substance Use Topics    Alcohol use: Not Currently    Drug use: Never       Physical Exam   ED Triage Vitals   Temp Pulse Resp BP   -- -- -- --      SpO2 Temp src Heart Rate Source Patient Position   -- -- -- --      BP Location FiO2 (%)     -- --       Physical Exam    /69, Temp 36.6, HR 74, Resp 18, Pulse ox 94%    Gen:  Well nourished, no acute distress, somewhat sedate  but able to respond to questioning  Head: Normocephalic, atraumatic  Eyes: PERRL, EOMI, conjunctiva clear  ENT: External ears and nose normal, OP clear, Mucosa moist  Neck: Supple, no tenderness  Chest: No tenderness, no crepitus  CV: Regular rate and rhythm, no Murmur  Lungs: Clear bilaterally, no distress  Abd: Mild suprapubic fullness, no focal tenderness or rebound, no rebound or guarding  Extremities: FROM, no edema  Neuro: Cranial nerves intact, moving all 4 extremities, A&O x 2  Psych: Somewhat somnolent but arousable, answering questions, requesting Ativan  Back: No focal tenderness, no CVA tenderness  Skin: No rashes or lesions noted    ED Course & MDM   Diagnoses as of 04/13/24 0154   Urinary retention     Bladder scan showed about 450 mL.  Aguilar catheter placed.  He drained about 400 mL of dark urine.  He repeatedly requested more Ativan but at this point we will return him back to psychiatric facility.  Encourage increased use of fluids.  He otherwise appears to be at his baseline.  Medical Decision Making      Procedure  Procedures     Jose Owen MD  04/13/24 0154       Jose Owen MD  04/13/24 0154

## 2024-04-13 NOTE — PROGRESS NOTES
Gerardo Albright is a 63 y.o. male on day 2 of admission presenting with Other schizophrenia (Multi).    Subjective   Patient had urinary retention.  Aguilar catheter orders given by the nurses could not put the Aguilar catheter patient was sent to the emergency room and Aguilar catheter is placed  Patient is drinking better making enough urine  He is still not moving his bowels well  He is eating little better and walking here more per nurses  The facility he was not doing much or eating or drinking or walking for a month  He has sutures in the left forearm which are overdue for removal they were placed about 6 weeks ago  Patient is sleepy and not having a conversation       Objective     Physical Exam  Vitals reviewed.   Constitutional:       Appearance: Normal appearance.   HENT:      Head: Normocephalic and atraumatic.      Right Ear: Tympanic membrane, ear canal and external ear normal.      Left Ear: Tympanic membrane, ear canal and external ear normal.      Nose: Nose normal.      Mouth/Throat:      Pharynx: Oropharynx is clear.   Eyes:      Extraocular Movements: Extraocular movements intact.      Conjunctiva/sclera: Conjunctivae normal.      Pupils: Pupils are equal, round, and reactive to light.   Cardiovascular:      Rate and Rhythm: Normal rate and regular rhythm.      Pulses: Normal pulses.      Heart sounds: Normal heart sounds.   Pulmonary:      Effort: Pulmonary effort is normal.      Breath sounds: Normal breath sounds.   Abdominal:      General: Abdomen is flat. Bowel sounds are normal.      Palpations: Abdomen is soft.   Musculoskeletal:      Cervical back: Normal range of motion and neck supple.   Skin:     General: Skin is warm and dry.   Neurological:      General: No focal deficit present.      Mental Status: He is alert.   Psychiatric:         Mood and Affect: Mood normal.         Last Recorded Vitals  Blood pressure 105/70, pulse 74, temperature 36.7 °C (98.1 °F), temperature source Temporal, resp.  rate 15, height 1.829 m (6'), weight 65.3 kg (143 lb 15.4 oz), SpO2 99%.  Intake/Output last 3 Shifts:  I/O last 3 completed shifts:  In: 600 (9.2 mL/kg) [P.O.:600]  Out: 875 (13.4 mL/kg) [Urine:875 (0.4 mL/kg/hr)]  Weight: 65.3 kg     Relevant Results                This patient has a urinary catheter   Reason for the urinary catheter remaining today? urinary retention/bladder outlet obstruction, acute or chronic      Scheduled medications  risperiDONE, 2 mg, oral, BID  tamsulosin, 0.4 mg, oral, Daily            Malnutrition Diagnosis Status: New  Malnutrition Diagnosis: Moderate malnutrition related to acute disease or injury  As Evidenced by: 32# (18.2%) wt loss over ~3 months (1/9-4/11), po intake < 75% estimated needs for > 7 days  I agree with the dietitian's malnutrition diagnosis.      Assessment/Plan   Principal Problem:    Other schizophrenia (Multi)  Active Problems:    Parkinson's disease (Multi)    Depression    Suicide attempt (Multi)    Paranoid schizophrenia, chronic condition (Multi)    Urinary retention    Constipation    Will start Colace  Start Flomax for urinary retention  Aguilar catheter and trial of voiding once patient is more ambulatory  Protein supplement  Psych care per psychiatry team       I spent  minutes in the professional and overall care of this patient.      Shayna Galarza MD

## 2024-04-13 NOTE — PROGRESS NOTES
Gerardo Albright is a 63 y.o. male on day 2 of admission presenting with Other schizophrenia (Multi).      Subjective   Upon reviewing the patient's charts and discussing the case with the assigned RN, it was noted that this morning the patient refused to take his medication, informing the nurse that he had an upset stomach. Despite several attempts by the nurse to administer the medication, the patient continued to refuse, and these were documented as refusal. In the morning, the patient was observed working with an occupational therapist, ambulating in the [facility].  The patient had been sent to the emergency department the previous night due to urinary retention and lack of bowel movements. As a result, the patient underwent straight catheterization and was started on Tomilson 0.4 mg today. There were also old lacerations and sutures present on the patient, indicating previous self-harm with a razor. The patient did not want to communicate and engage in conversation.  The patient denied any current suicidal or homicidal ideation, as well as any auditory or visual hallucinations. He exhibited a flat and withdrawn affect and tended to isolate himself. However, he was strongly encouraged to participate in group activities, and today he took part in the morning group. The patient,  was lying in bed and expressed feeling safe and requested to be allowed to rest.  The patient was informed that he would be followed up with again tomorrow and was encouraged to let the healthcare team know if any questions or concerns arise in the meantime.       Objective     Last Recorded Vitals  Blood pressure 105/70, pulse 74, temperature 36.7 °C (98.1 °F), temperature source Temporal, resp. rate 15, height 1.829 m (6'), weight 65.3 kg (143 lb 15.4 oz), SpO2 99%.    Review of Systems   Constitutional:  Positive for activity change.   Musculoskeletal:  Positive for gait problem.   Neurological:  Positive for weakness.    Psychiatric/Behavioral:  Positive for confusion, decreased concentration, dysphoric mood, self-injury, sleep disturbance and suicidal ideas. Negative for agitation and behavioral problems. The patient is not nervous/anxious and is not hyperactive.        Psychiatric ROS - Adult  Anxiety: decreased anxiety  Depression: anhedonia, interest, suicidal thoughts, and withdrawn   Delirium: negative  Psychosis: negative  Gay: negative  Safety Issues: suicidal ideation  Psychiatric ROS Comment: Poverty of thought       Mental Status Exam  General: lying quietly in hospital bed  Appearance: hospital attire  Attitude: calm  Behavior: withdrawn  Motor Activity: lying quietly under the covers  Speech: low volume slow rate  Mood: anxious  Affect: constricted  Thought Process: difficult to assess  Thought Content: committed tosafety on the unit, endorses fleeting SI but no intent, impulse or plan to hurt himself  Thought Perception: no delusions elicited in short exam, denies paranoia- none noted, fleeting SI, denies HI, says mild AH telling to hurt self but able to let them go  Cognition: alert and oreinted x3  Insight: limited  Judgement: limited- has guardian who nursing reports gives permission for any medications necessary to help stabilize patient    Psychiatric Risk Assessment  Violence Risk Assessment: command hallucinations, major mental illness, and male  Acute Risk of Harm to Others is Considered: low   Suicide Risk Assessment: , chronic medical illness, current psychiatric illness, male, panic attacks, prior suicide attempt, recent suicide attempt, and suicidal behaviors  Protective Factors against Suicide: other agrees to treatment, contracts for safety on unit  Acute Risk of Harm to Self is Considered: low to moderate    Current Medications    Scheduled medications  risperiDONE, 1 mg, oral, BID  tamsulosin, 0.4 mg, oral, Daily      Continuous medications     PRN medications  PRN medications:  acetaminophen, alum-mag hydroxide-simeth, magnesium hydroxide, QUEtiapine **OR** OLANZapine       Medication efficacy: Yes  Patient reporting any side effects? No  Any observed side effects? No      Relevant Results  No results found for this or any previous visit (from the past 24 hour(s)).          Results for orders placed or performed during the hospital encounter of 04/11/24 (from the past 96 hour(s))   Hemoglobin A1C   Result Value Ref Range    Hemoglobin A1C 5.6 See below %    Estimated Average Glucose 114 Not Established mg/dL     Reviewed    Assessment/Plan   Principal Problem:    Other schizophrenia (Multi)  Active Problems:    Parkinson's disease (Multi)    Depression    Suicide attempt (Multi)    Paranoid schizophrenia, chronic condition (Multi)    Patient is dx with schizophrenia.  He has been hospilatized many times. [facility] reports he has taken no medications in the last month.  We are ascertaining when his last Peseris injection was given.  he may have seroqule prn for psychotic anxiety/agitation. Plan to start MCDONALD-probably invega sustenna. Encouraged patient to attend groups and take meds as ordered.     Patient was ambulating in the hallway today, sitter remains present for safety, went to group in the am and consuming his meals. Refused meds this AM.      Increase risperdal tabs to 2  mg PO BID      Will consider   Current malnutriton diganoses:  Malnutrition Diagnosis: Moderate malnutrition related to acute disease or injury             I spent 30 minutes in the professional and overall care of this patient.    Parts of this chart have been completed using voice recognition software.  Please excuse any errors of transcription.  Despite the medical decision making time stamp, my medical decision making has taken place during the patient's entire visit.  Thought process and reason for plan has been formulated from the time that I saw the patient until the time of disposition and is not  specific to one specific moment during their visit and furthermore the medical decision making encompasses the entire chart and not only that represented in this note.    Lorena Moran, VIVIANE-CNP

## 2024-04-13 NOTE — GROUP NOTE
Group Topic: Art Creative   Group Date: 4/13/2024  Start Time: 1330  End Time: 1430  Facilitators: DRU Rasmussen   Department: [facility]    Number of Participants: 8   Group Focus: art therapy and coping skills  Treatment Modality: Other: Recreation Therapy  Interventions utilized were exploration and leisure development  Purpose: coping skills and other: enhance mood, increase attention span, promote creativity    Name: Gerardo Albright YOB: 1960   MR: [id number]      Facilitator: Recreational Therapist  Level of Participation: did not attend  Progress: None  Comments: pt problem is depressed mood. Patients participated in therapeutic creative arts activity. This activity promoted creative expression, leisure awareness and social interaction, while also working on fine motor skills and following directions. CTRS also played music of choice which patients engaged in reminiscing to promote memory recall.   Pt asleep in room with sitter at side.   Plan: continue with services

## 2024-04-13 NOTE — DISCHARGE INSTRUCTIONS
Increase fluid intake until urine is clear.  Return to ER for new or worsening symptoms.  Contact his PCP in the morning for additional advice

## 2024-04-13 NOTE — NURSING NOTE
0305 Pt returned to the unit from [facility] ED, Pt has espinal catheter, VS are WDL. Reported by [facility] RN, output 400 mL after placement, drained.  0325 Dr. QUEEN was contacted,  left regarding Pt's arrival  0330 output 375 mL dark krystal urine, no odor, clear. Pt is resting in bed, no s/s of distress

## 2024-04-13 NOTE — GROUP NOTE
"Group Topic: Feeling Awareness/Expression   Group Date: 4/13/2024  Start Time: 1030  End Time: 1130  Facilitators: FLAVIO RasmussenS   Department: [facility]    Number of Participants: 7   Group Focus: affirmation, coping skills, feeling awareness/expression, self-awareness, and self-esteem  Treatment Modality: Other: Recreation Therapy  Interventions utilized were exploration and problem solving  Purpose: coping skills, feelings, self-worth, and self-care    Name: Gerardo Albright YOB: 1960   MR: [id number]      Facilitator: Recreational Therapist  Level of Participation: moderate  Quality of Participation: appropriate/pleasant and cooperative  Interactions with others: appropriate  Mood/Affect: appropriate  Triggers (if applicable): n/a  Cognition: loose  Progress: Moderate  Comments: pt problem is depressed mood. In this therapeutic group patients were prompted to explore feelings and emotions and provide coping skills they utilize during the \"dark and stormy emotions\" to help foster use of prosocial behaviors and become aware of and handle negative behaviors in a more effective manner. Patients then used positive affirmations and self-esteem boosters to limit barriers and focus on positive aspects on one's own perception. Patients engaged in open discussion and shared self-esteem boosters with CTRS and peers.   Plan: continue with services      "

## 2024-04-13 NOTE — PROGRESS NOTES
04/13/24 [id number]   OT Last Visit   OT Received On 04/13/24   General   Reason for Referral Referral from Dr. Allen due to physical decline affecting pts ADLs and functional mobility. Pt sent out to ED on 4/11 for urinary retention and returned with a espinal cathetar and a sitter.   Referred By Dr Allen   Past Medical History Relevant to Rehab Other schizophrenia (Multi) 7/27/2023     Other seizures (Multi) 7/27/2023    Anxiety 7/27/2023    Parkinson's disease (Multi) 7/27/2023    Dementia (Multi) 7/27/2023    Other insomnia 7/27/2023    Gastroesophageal reflux disease without esophagitis 7/27/2023    Asthenia 7/27/2023    Chronic obstructive pulmonary disease (Multi) 7/27/2023    Benign hypertension 7/27/2023    Depression 7/27/2023    ASHD (arteriosclerotic heart disease) 7/27/2023    At risk for falls 7/30/2023    Suicide attempt (Multi) 1/6/2024    Laceration of left upper extremity 1/10/2024    Paranoid schizophrenia, chronic condition (Multi) 4/11/2024   Family/Caregiver Present No   Prior to Session Communication Bedside nurse   Patient Position Received Up in chair   Preferred Learning Style verbal;visual   General Comment pt seated in wc at table in group treatment room with a sitter   Precautions   Medical Precautions Fall precautions   Precautions Comment risk for falls, espinal cathetar   Pain Assessment   Pain Assessment 0-10   Pain Score 0 - No pain   Cognition   Orientation Level Oriented X4;Appropriate for developmental age   Processing Speed Delayed   Home Living   Type of Home Skilled Nursing facility  ([facility])   Home Living Comments pt resides at a SNF where appropriate AE/DME is provided   Prior Function Per Pt/Caregiver Report   Level of [place] Independent with ADLs and functional transfers   Receives Help From Other (Comment)  (care staff)   ADL Assistance Independent   Homemaking Assistance Independent   Ambulatory Assistance Independent  (Nursing staff reports decline  in past month. Pt prefers in bed all day.)   Hand Dominance Right   ADL   Eating Assistance Stand by   Eating Deficit Setup   Grooming Assistance Stand by   Grooming Deficit Setup   Bathing Assistance Minimal  (anticipated)   UE Dressing Assistance Stand by   UE Dressing Deficit Setup   LE Dressing Assistance Minimal   LE Dressing Deficit Don/doff R sock;Don/doff L sock;Requires assistive device for steadying;Supervision/safety;Increased time to complete;Thread RLE into pants;Thread LLE into pants   Toileting Assistance with Device Minimal  (anticipated)   ADL Comments pt moves at slower pace requiring extra time for task completion.   Activity Tolerance   Endurance Endurance does not limit participation in activity   Bed Mobility   Bed Mobility Yes   Bed Mobility 1   Bed Mobility 1 Sitting to supine   Level of Assistance 1 Contact guard;Minimum assistance   Bed Mobility Comments 1 tactile cuing to bring legs into bed   Bed Mobility 2   Bed Mobility  2 Supine to sitting   Level of Assistance 2 Contact guard   Bed Mobility Comments 2 tactile cuing for trunk up   Functional Mobility   Functional Mobility Performed Yes   Functional Mobility 1   Surface 1 Level tile   Device 1 Rolling walker   Assistance 1 Contact guard   Quality of Functional Mobility 1   (shuffles feet secondary to Parkinson's Disease)   Comments 1 requires vcs to stay inside of the walker; walker management   Transfers   Transfer Yes   Transfer 1   Transfer From 1 Bed to   Transfer to 1 Stand   Technique 1 Sit to stand;Stand to sit   Transfer Level of Assistance 1 Contact guard   Trials/Comments 1 provided vcs to push up from bed as pt was reaching for the walker   Transfers 2   Transfer From 2 Bed to   Transfer to 2 Wheelchair   Technique 2 Stand pivot   Transfer Level of Assistance 2 Contact guard   Trials/Comments 2 assist with espinal bag and provided vcs for safety   Static Sitting Balance   Static Sitting-Balance Support Feet supported   Static  Sitting-Level of Assistance Close supervision   Dynamic Sitting Balance   Dynamic Sitting-Balance Support Bilateral upper extremity supported   Dynamic Sitting-Balance Reaching for objects   Dynamic Sitting-Comments close supervision   Static Standing Balance   Static Standing-Balance Support No upper extremity supported   Static Standing-Level of Assistance Contact guard   Dynamic Standing Balance   Dynamic Standing-Balance Support Left upper extremity supported  (supported on RW)   Dynamic Standing-Balance Reaching for objects   Vision - Basic Assessment   Current Vision Wears glasses only for reading   Strength   Strength Comments BUEs grossly 3+/5   Coordination   Coordination Comment Pt has a h/o Parkinson's Disease affecting pts coordination at times   Hand Function   Gross Grasp Functional   Coordination Impaired   RUE    RUE  WFL   RLE    RLE  WFL   IP OT Assessment   OT Assessment Pt is a 63 year old male who was sent to [facility] ED due to suicidal ideations with plan to cut his wrists, last suicidal attempt on 3/8/2024 when he cut his L arm with razor blade, multiple hospitalizations in last year due to increased depression, meds incompliance and physical decline.  Pt will benefit from skilled OT services to address balance, strength, coordination and activity tolerance to promote safety and independence prior to pts return to [facility].   Prognosis Good   Barriers to Discharge None   Evaluation/Treatment Tolerance Patient tolerated treatment well   Medical Staff Made Aware Yes   End of Session Communication Bedside nurse  (song)   End of Session Patient Position Up in chair   OT Assessment   OT Assessment Results Decreased ADL status;Decreased upper extremity strength;Decreased safe judgment during ADL;Decreased cognition;Decreased endurance;Decreased fine motor control;Decreased functional mobility   Strengths Support of Caregivers   Barriers to Participation Coping skills;Insight into  problems   Education   Individual(s) Educated Patient   Education Provided Fall precautons   Equipment Other  (safe walker management)   Risk and Benefits Discussed with Patient/Caregiver/Other yes   Patient/Caregiver Demonstrated Understanding yes   Plan of Care Discussed and Agreed Upon yes   Patient Response to Education Patient/Caregiver Verbalized Understanding of Information;Patient/Caregiver Performed Return Demonstration of Exercises/Activities   Inpatient/Swing Bed or Outpatient   Inpatient/Swing Bed or Outpatient Inpatient   Inpatient Plan   Equipment Recommended upon Discharge Wheeled walker   Treatment Interventions ADL retraining;Functional transfer training;UE strengthening/ROM;Endurance training;Patient/family training;Equipment evaluation/education;Neuromuscular reeducation;Compensatory technique education;Fine motor coordination activities   OT Frequency 3 times per week   OT - OK to Discharge Yes   OT Discharge Recommendations Moderate intensity level of continued care   OT Recommended Transfer Status Assist of 1      04/13/24 1000   [facility] Daily Activity   Putting on and taking off regular lower body clothing 3   Bathing (including washing, rinsing, drying) 3   Putting on and taking off regular upper body clothing 3   Toileting, which includes using toilet, bedpan or urinal 3   Taking care of personal grooming such as brushing teeth 3   Eating Meals 3   Daily Activity - Total Score 18          Problem: Dressing  Goal: Pt will perform UB and LB dressing with modified independence with AE PRN utilizing safe technique.  Outcome: Progressing     Problem: TRANSFERS  Goal: Pt will perform all functional transfers with least restrictive device with modified independence with safe technique to assist with ADLs upon return to SNF.  Outcome: Progressing     Problem: EXERCISE/STRENGTHENING  Goal: Pt will be educated on BUE HEP and perform reverse demonstration with supervision to improve muscle strength for  ADLs and ADL transfers.  Outcome: Progressing

## 2024-04-13 NOTE — CARE PLAN
Problem: Dressing  Goal: Pt will perform UB and LB dressing with modified independence with AE PRN utilizing safe technique.  Outcome: Progressing     Problem: TRANSFERS  Goal: Pt will perform all functional transfers with least restrictive device with modified independence with safe technique to assist with ADLs upon return to SNF.  Outcome: Progressing     Problem: EXERCISE/STRENGTHENING  Goal: Pt will be educated on BUE HEP and perform reverse demonstration with supervision to improve muscle strength for ADLs and ADL transfers.  Outcome: Progressing

## 2024-04-13 NOTE — NURSING NOTE
0030 assumed care of the patient.  Pt is resting in bed, no needs expressed, awaiting for medical transportation to transfer to [facility] for espinal placement.    0110 Pt was taken by [facility] transportation to [facility], Pt left the unit.

## 2024-04-13 NOTE — NURSING NOTE
BIRP NOTE     Problem:    Schizophrenia     Behavior:    Patient has no displays of depression, anxiety, or suicidal ideations at this time. Patient is forgetful and restive to care at time. Patient refused medications and hands on care.     Group Participation:  None    Appetite/Meals:  poor appetite       Interventions:    Re approached patients multiple times to administer medications and provide care.     Response:    Patient consumed medication after being re approached, patient more receptive to care      Plan:    Continue to encourage consuming medications and meals

## 2024-04-14 PROCEDURE — 2500000001 HC RX 250 WO HCPCS SELF ADMINISTERED DRUGS (ALT 637 FOR MEDICARE OP)

## 2024-04-14 PROCEDURE — 1140000001 HC PRIVATE PSYCH ROOM DAILY

## 2024-04-14 PROCEDURE — 99232 SBSQ HOSP IP/OBS MODERATE 35: CPT

## 2024-04-14 ASSESSMENT — COLUMBIA-SUICIDE SEVERITY RATING SCALE - C-SSRS
2. HAVE YOU ACTUALLY HAD ANY THOUGHTS OF KILLING YOURSELF?: NO
5. HAVE YOU STARTED TO WORK OUT OR WORKED OUT THE DETAILS OF HOW TO KILL YOURSELF? DO YOU INTEND TO CARRY OUT THIS PLAN?: NO
2. HAVE YOU ACTUALLY HAD ANY THOUGHTS OF KILLING YOURSELF?: NO
6. HAVE YOU EVER DONE ANYTHING, STARTED TO DO ANYTHING, OR PREPARED TO DO ANYTHING TO END YOUR LIFE?: NO
1. SINCE LAST CONTACT, HAVE YOU WISHED YOU WERE DEAD OR WISHED YOU COULD GO TO SLEEP AND NOT WAKE UP?: NO
6. HAVE YOU EVER DONE ANYTHING, STARTED TO DO ANYTHING, OR PREPARED TO DO ANYTHING TO END YOUR LIFE?: NO
6. HAVE YOU EVER DONE ANYTHING, STARTED TO DO ANYTHING, OR PREPARED TO DO ANYTHING TO END YOUR LIFE?: NO
2. HAVE YOU ACTUALLY HAD ANY THOUGHTS OF KILLING YOURSELF?: NO
5. HAVE YOU STARTED TO WORK OUT OR WORKED OUT THE DETAILS OF HOW TO KILL YOURSELF? DO YOU INTEND TO CARRY OUT THIS PLAN?: NO
1. SINCE LAST CONTACT, HAVE YOU WISHED YOU WERE DEAD OR WISHED YOU COULD GO TO SLEEP AND NOT WAKE UP?: NO
5. HAVE YOU STARTED TO WORK OUT OR WORKED OUT THE DETAILS OF HOW TO KILL YOURSELF? DO YOU INTEND TO CARRY OUT THIS PLAN?: NO
1. SINCE LAST CONTACT, HAVE YOU WISHED YOU WERE DEAD OR WISHED YOU COULD GO TO SLEEP AND NOT WAKE UP?: NO

## 2024-04-14 ASSESSMENT — ENCOUNTER SYMPTOMS
WEAKNESS: 1
SLEEP DISTURBANCE: 1
DECREASED CONCENTRATION: 1
ACTIVITY CHANGE: 1
AGITATION: 0
HYPERACTIVE: 0
CONFUSION: 1
DYSPHORIC MOOD: 1
NERVOUS/ANXIOUS: 0

## 2024-04-14 ASSESSMENT — PAIN SCALES - GENERAL
PAINLEVEL_OUTOF10: 0 - NO PAIN
PAINLEVEL_OUTOF10: 0 - NO PAIN

## 2024-04-14 ASSESSMENT — PAIN - FUNCTIONAL ASSESSMENT
PAIN_FUNCTIONAL_ASSESSMENT: 0-10
PAIN_FUNCTIONAL_ASSESSMENT: 0-10

## 2024-04-14 NOTE — GROUP NOTE
Group Topic: Art Creative   Group Date: 4/14/2024  Start Time: 1330  End Time: 1430  Facilitators: DRU Rasmussen   Department: [facility]IRT[facility]    Number of Participants: 8   Group Focus: art therapy, coping skills, and relaxation,   Treatment Modality: Other: Recreation Therapy  Interventions utilized were exploration and leisure development  Purpose: coping skills and other: enhance mood, increase attention span, promote creativity    Name: Gerardo Albright YOB: 1960   MR: [id number]      Facilitator: Recreational Therapist  Level of Participation: did not attend  Progress: None  Comments: pt problem is depressed mood. Patients participated in therapeutic creative arts activity. This activity promoted creative expression, leisure awareness and social interaction, while also working on fine motor skills and following directions. CTRS also played music of choice which patients engaged in reminiscing to promote memory recall.   Pt in room with sitter at side.   Plan: continue with services

## 2024-04-14 NOTE — NURSING NOTE
GILBERT NOTE     Problem:  Depression     Behavior:  Pt was lying in bed with PCA sitting at bedside. On approach, pt is very flat and withdrawn. Pt is barely speaking to this nurse. At times, he will not even make eye contact with this RN- he just stares out into the hallway. Pt was offered to come out of his room for snack- pt did not respond. Pt was complaint with his HS medications with help from this RN. Minimal interaction.   Group Participation: Attends  Appetite/Meals: Declined       Interventions:  Hs medications administered per MAR    Response:  Pt continues to rest in bed throughout the night, no s/s of distress noted. PCA remains sitting at bedside.      Plan:  Continue to monitor and assist. Maintain q15 minute rounds for safety.

## 2024-04-14 NOTE — GROUP NOTE
Group Topic: Leisure Skills   Group Date: 4/14/2024  Start Time: 1030  End Time: 1130  Facilitators: DRU Rasmussen   Department: [facility] REHABTH VIRT[facility]    Number of Participants: 8   Group Focus: communication, leisure skills, and social skills  Treatment Modality: Other: Recreation Therapy  Interventions utilized were leisure development and mental fitness  Purpose: communication skills and other: enhance mood, team building, listening skills, following directions, increase socialization    Name: Gerardo Albright YOB: 1960   MR: [id number]      Facilitator: Recreational Therapist  Level of Participation: did not attend  Progress: None  Comments: pt problem is depressed mood. Patients engaged in group “Guess It” game. In this therapeutic group patients play a game which is developed to focus on listening skills, workings together as a team, communication following directions and increase appropriate social interactions. CTRS integrated music to this activity to enhance mood and promote memory stimulation.   Plan: continue with services

## 2024-04-14 NOTE — NURSING NOTE
GILBERT NOTE     Problem:  Depression     Behavior:  Patient is alert and oriented x 1. Patient is dressed in hospital attire and appears disheveled and unkempt. Patient has a 1 to 1 sitter d/t him having a espinal catheter. Patient output was 500 ml, urine color is krystal, no odor noted. Patient ate 25% of breakfast, 25% of lunch and 0% of dinner. Patient intake of fluids 240ml. Patient refused morning medications. Patient refused to go to groups but was visible in dayroom for a short period of time and continued to ask to go back to room. Patient has healed lacerations on left forearm and black stitches noted. Patient has a flat affect. Patient appeared lethargic this morning, patient would not open eyes and arms was flaccid, he did not want to get out of bed for breakfast. Patient speaks in a low tone of voice and appears to be confused. Patient is isolative and withdrawn, he wants to stay in bed and sleep throughout the day.   Group Participation: No  Appetite/Meals: 25%/25%/0%       Interventions:  Nurse encouraged patient to take medications    Response:  Patient refused medications and refused to eat     Plan:  Will continue to monitor, patient has 1:1 sitter

## 2024-04-14 NOTE — CARE PLAN
The patient's goals for the shift include not stated    The clinical goals for the shift include maintain safety    Over the shift, the patient did not make progress toward the following goals. Barriers to progression include not consuming at least 50% of meals. Recommendations to address these barriers include having patient pick food choices.

## 2024-04-14 NOTE — PROGRESS NOTES
Gerardo Albright is a 63 y.o. male on day 3 of admission presenting with Other schizophrenia (Multi).      Subjective     Upon reviewing the patient's charts and discussing the case with the assigned RN,   We approach the patient who was sitting by the chairs in the hallway. The patient made a brief eye contact with his provider and said that he was doing OK. We asked him how his night was and the patient broke the eye contact and did not respond attempting to get the patient attention he replied that he slept well. The patient was minimally engaged. We kept asking a few questions for which we were not receiving the answer. We asked the patient if there’s any reason why he continues not to take the medication as ordered, and he responded that “I don’t feel like talking”  We asked the patient if he has having any suicidal thoughts or homicidal thoughts for which the patient denies. As the patient is having any pain at this time that he says, I said no. We asked the patient if he is hearing any voices or seeing anything. That’s not truly there but the patient did not respond so we decided to let him be. The sitter was present with the patient at all times.       Objective     Last Recorded Vitals  Blood pressure 121/70, pulse 67, temperature 36.3 °C (97.3 °F), temperature source Temporal, resp. rate 16, height 1.829 m (6'), weight 65.3 kg (143 lb 15.4 oz), SpO2 98%.    Review of Systems   Constitutional:  Positive for activity change.   Musculoskeletal:  Positive for gait problem.   Neurological:  Positive for weakness.   Psychiatric/Behavioral:  Positive for confusion, decreased concentration, dysphoric mood, self-injury, sleep disturbance and suicidal ideas. Negative for agitation and behavioral problems. The patient is not nervous/anxious and is not hyperactive.        Psychiatric ROS - Adult  Anxiety: decreased anxiety  Depression: anhedonia, interest, suicidal thoughts, and withdrawn   Delirium:  negative  Psychosis: negative  Gay: negative  Safety Issues: suicidal ideation  Psychiatric ROS Comment: Poverty of thought       Mental Status Exam  General: lying quietly in hospital bed  Appearance: hospital attire  Attitude: calm  Behavior: withdrawn  Motor Activity: lying quietly under the covers  Speech: low volume slow rate  Mood: anxious  Affect: constricted  Thought Process: difficult to assess  Thought Content: committed tosafety on the unit, endorses fleeting SI but no intent, impulse or plan to hurt himself  Thought Perception: no delusions elicited in short exam, denies paranoia- none noted, fleeting SI, denies HI, says mild AH telling to hurt self but able to let them go  Cognition: alert and oreinted x3  Insight: limited  Judgement: limited- has guardian who nursing reports gives permission for any medications necessary to help stabilize patient    Psychiatric Risk Assessment  Violence Risk Assessment: command hallucinations, major mental illness, and male  Acute Risk of Harm to Others is Considered: low   Suicide Risk Assessment: , chronic medical illness, current psychiatric illness, male, panic attacks, prior suicide attempt, recent suicide attempt, and suicidal behaviors  Protective Factors against Suicide: other agrees to treatment, contracts for safety on unit  Acute Risk of Harm to Self is Considered: low to moderate    Current Medications    Scheduled medications  risperiDONE, 2 mg, oral, BID  tamsulosin, 0.4 mg, oral, Daily      Continuous medications     PRN medications  PRN medications: acetaminophen, alum-mag hydroxide-simeth, magnesium hydroxide, QUEtiapine **OR** OLANZapine       Medication efficacy: Yes  Patient reporting any side effects? No  Any observed side effects? No      Relevant Results  No results found for this or any previous visit (from the past 24 hour(s)).          Results for orders placed or performed during the hospital encounter of 04/11/24 (from the past 96  hour(s))   Hemoglobin A1C   Result Value Ref Range    Hemoglobin A1C 5.6 See below %    Estimated Average Glucose 114 Not Established mg/dL     Reviewed    Assessment/Plan   Principal Problem:    Other schizophrenia (Multi)  Active Problems:    Parkinson's disease (Multi)    Depression    Suicide attempt (Multi)    Paranoid schizophrenia, chronic condition (Multi)    Urinary retention    Constipation    Patient is dx with schizophrenia.  He has been hospilatized many times. [facility] reports he has taken no medications in the last month.  We are ascertaining when his last Peseris injection was given.  he may have seroqule prn for psychotic anxiety/agitation. Plan to start MCDONALD-probably invega sustenna. Encouraged patient to attend groups and take meds as ordered.     Patient was ambulating in the hallway today, sitter remains present for safety, went to group in the am and consuming his meals. Refused meds this AM.    - Continue Risperdal tabs to 2  mg PO BID    Will consider   Current malnutriton diganoses:  Malnutrition Diagnosis: Moderate malnutrition related to acute disease or injury        I spent 25 minutes in the professional and overall care of this patient.    Parts of this chart have been completed using voice recognition software.  Please excuse any errors of transcription.  Despite the medical decision making time stamp, my medical decision making has taken place during the patient's entire visit.  Thought process and reason for plan has been formulated from the time that I saw the patient until the time of disposition and is not specific to one specific moment during their visit and furthermore the medical decision making encompasses the entire chart and not only that represented in this note.    Lorena Moran, APRN-CNP

## 2024-04-15 PROCEDURE — 2500000002 HC RX 250 W HCPCS SELF ADMINISTERED DRUGS (ALT 637 FOR MEDICARE OP, ALT 636 FOR OP/ED): Mod: MUE | Performed by: PSYCHIATRY & NEUROLOGY

## 2024-04-15 PROCEDURE — 99232 SBSQ HOSP IP/OBS MODERATE 35: CPT | Performed by: REGISTERED NURSE

## 2024-04-15 PROCEDURE — 97530 THERAPEUTIC ACTIVITIES: CPT | Mod: GP,CQ

## 2024-04-15 PROCEDURE — 2500000001 HC RX 250 WO HCPCS SELF ADMINISTERED DRUGS (ALT 637 FOR MEDICARE OP)

## 2024-04-15 PROCEDURE — 1140000001 HC PRIVATE PSYCH ROOM DAILY

## 2024-04-15 RX ADMIN — RISPERIDONE 2 MG: 1 TABLET, ORALLY DISINTEGRATING ORAL at 22:55

## 2024-04-15 RX ADMIN — TAMSULOSIN HYDROCHLORIDE 0.4 MG: 0.4 CAPSULE ORAL at 10:30

## 2024-04-15 RX ADMIN — RISPERIDONE 2 MG: 1 TABLET, ORALLY DISINTEGRATING ORAL at 10:30

## 2024-04-15 ASSESSMENT — COGNITIVE AND FUNCTIONAL STATUS - GENERAL
WALKING IN HOSPITAL ROOM: A LITTLE
TURNING FROM BACK TO SIDE WHILE IN FLAT BAD: A LITTLE
MOVING FROM LYING ON BACK TO SITTING ON SIDE OF FLAT BED WITH BEDRAILS: A LITTLE
STANDING UP FROM CHAIR USING ARMS: A LITTLE
MOBILITY SCORE: 17
MOVING TO AND FROM BED TO CHAIR: A LITTLE
CLIMB 3 TO 5 STEPS WITH RAILING: A LOT

## 2024-04-15 ASSESSMENT — PAIN - FUNCTIONAL ASSESSMENT
PAIN_FUNCTIONAL_ASSESSMENT: 0-10

## 2024-04-15 ASSESSMENT — COLUMBIA-SUICIDE SEVERITY RATING SCALE - C-SSRS
5. HAVE YOU STARTED TO WORK OUT OR WORKED OUT THE DETAILS OF HOW TO KILL YOURSELF? DO YOU INTEND TO CARRY OUT THIS PLAN?: NO
1. SINCE LAST CONTACT, HAVE YOU WISHED YOU WERE DEAD OR WISHED YOU COULD GO TO SLEEP AND NOT WAKE UP?: NO
6. HAVE YOU EVER DONE ANYTHING, STARTED TO DO ANYTHING, OR PREPARED TO DO ANYTHING TO END YOUR LIFE?: NO
2. HAVE YOU ACTUALLY HAD ANY THOUGHTS OF KILLING YOURSELF?: NO

## 2024-04-15 ASSESSMENT — ENCOUNTER SYMPTOMS
HYPERACTIVE: 0
WEAKNESS: 1
DECREASED CONCENTRATION: 1
SLEEP DISTURBANCE: 1
AGITATION: 0
DYSPHORIC MOOD: 1
NERVOUS/ANXIOUS: 0
ACTIVITY CHANGE: 1
CONFUSION: 1

## 2024-04-15 ASSESSMENT — PAIN SCALES - GENERAL
PAINLEVEL_OUTOF10: 0 - NO PAIN

## 2024-04-15 NOTE — PROGRESS NOTES
Physical Therapy                 Therapy Communication Note    Patient Name: Gerardo Albright  MRN: [id number]  Today's Date: 4/15/2024     Discipline: Physical Therapy    Missed Visit Reason: Missed Visit Reason: Other (Comment) (Spoke with PCA. PCA reports pt sleeping and difficult to arouse to participate. Several attempts to wake and encourage participation in PT. Pt would not open eyes and interact with therapy. Sitter in room.)    Missed Time: Attempt

## 2024-04-15 NOTE — PROGRESS NOTES
Physical Therapy       04/15/24 1440   PT  Visit   PT Received On 04/15/24   General   Reason for Referral Referral from Dr. Allen due to physical decline affecting pts ADLs and functional mobility. Pt sent out to ED on 4/11 for urinary retention and returned with a espinal cathetar and a sitter.   Referred By Dr Allen   Past Medical History Relevant to Rehab Other schizophrenia (Multi) 7/27/2023     Other seizures (Multi) 7/27/2023    Anxiety 7/27/2023    Parkinson's disease (Multi) 7/27/2023    Dementia (Multi) 7/27/2023    Other insomnia 7/27/2023    Gastroesophageal reflux disease without esophagitis 7/27/2023    Asthenia 7/27/2023    Chronic obstructive pulmonary disease (Multi) 7/27/2023    Benign hypertension 7/27/2023    Depression 7/27/2023    ASHD (arteriosclerotic heart disease) 7/27/2023    At risk for falls 7/30/2023    Suicide attempt (Multi) 1/6/2024    Laceration of left upper extremity 1/10/2024    Paranoid schizophrenia, chronic condition (Multi) 4/11/2024   Preferred Learning Style verbal;visual   General Comment Pt in bed, sitter present in room.  Staff and pt agreeable to PT   Precautions   Medical Precautions Fall precautions   Precautions Comment risk for falls, espinal cathetar   Pain Assessment   Pain Assessment 0-10   Pain Score 0 - No pain   Balance/Neuromuscular Re-Education   Balance/Neuromuscular Re-Education Activity 1 static stand without AD, across body reaching at varying heights with alt UE's.  CGA given for safety but no instability noted.   Bed Mobility 1   Bed Mobility 1 Supine to sitting   Level of Assistance 1 Contact guard;Minimum assistance   Bed Mobility Comments 1 vc's and tactile cues for LE's to edge of bed, rolling to reach for bed rail with opposite arm, and assist for trunk up.   Bed Mobility 2   Bed Mobility  2 Sitting to supine   Level of Assistance 2 Set up   Ambulation/Gait Training 1   Surface 1 Level tile   Device 1 Rolling walker   Gait Support Devices Gait  belt   Assistance 1 Contact guard   Quality of Gait 1 Diminished heel strike;Forward flexed posture;Decreased step length;Shuffling gait   Comments/Distance (ft) 1 100 ft x 4 with turns included.  Vc's for posture and closer proximity to RW.  No carryover noted.   Ambulation/Gait Training 2   Surface 2 Level tile   Device 2 No device   Gait Support Devices Gait belt   Assistance 2 Contact guard   Quality of Gait 2 Diminished heel strike;Decreased step length;Shuffling gait;Forward flexed posture   Comments/Distance (ft) 2 50 ft x 2 with turns included, no difference in gait quality noted with and without RW, but pt less inclined to walk as far without RW.   Transfer 1   Transfer From 1 Bed to   Transfer to 1 Wheelchair   Technique 1 Stand pivot   Transfer Device 1 Gait belt   Transfer Level of Assistance 1 Contact guard   Trials/Comments 1 vc's for full turn prior to sitting   Transfers 2   Technique 2 Sit to stand;Stand to sit   Transfer Device 2 Walker   Transfer Level of Assistance 2 Contact guard   Trials/Comments 2 vc's for hand placement both with standing and sitting.  Hand over hand assist needed to place hands on w/c armrests when preparing to stand.   Activity Tolerance   Endurance Endurance does not limit participation in activity   PT Assessment   PT Assessment Results Decreased strength;Decreased endurance;Decreased mobility;Decreased cognition;Impaired judgement;Decreased safety awareness   Rehab Prognosis Good   Barriers to Discharge Behaviors   Strengths Support of Caregivers   Barriers to Participation Coping skills;Insight into problems   End of Session Patient Position Bed, 2 rail up  (sitter present in room)   PT Plan   Inpatient/Swing Bed or Outpatient Inpatient   PT Plan   Treatment/Interventions Bed mobility;Transfer training;Gait training;Neuromuscular re-education;Therapeutic exercise;Therapeutic activity   Equipment Recommended upon Discharge Wheeled walker   PT Recommended Transfer Status  Contact guard      04/15/24 1200   [facility] Basic Mobility   Turning from your back to your side while in a flat bed without using bedrails 3   Moving from lying on your back to sitting on the side of a flat bed without using bedrails 3   Moving to and from bed to chair (including a wheelchair) 3   Standing up from a chair using your arms (e.g. wheelchair or bedside chair) 3   To walk in hospital room 3   Climbing 3-5 steps with railing 2   Basic Mobility - Total Score 17        Cosign Needed            Problem: PT Problem  Goal: BED MOBILITY Patient will transfer supine to sit and sit to supine with independent assist to facilitate mobility.   Outcome: Progressing  Goal: TRANSFER Patient will transfer bed to chair and chair to bed with independent assist to facilitate mobility.   Outcome: Progressing  Goal: AMBULATION Patient will amb 150 feet+with rolling walker device including two turns on even surface with independent assist to facilitate safe mobility.   Outcome: Progressing  Goal: STRENGTH Patient will increase BLE  strength to 4+/5 to improve functional mobility.     Outcome: Progressing

## 2024-04-15 NOTE — GROUP NOTE
Group Topic: Reminiscence   Group Date: 4/15/2024  Start Time: 1340  End Time: 1430  Facilitators: DRU Tapia   Department: [facility]    Number of Participants: 9   Group Focus: other Tell all Game  Treatment Modality: Other: Recreation therapy  Interventions utilized were leisure development, mental fitness, reminiscence, and story telling  Purpose: self-care and other: elevate mood, increase socialization, enhance self esteem    Name: Gerardo Albright YOB: 1960   MR: [id number]      Facilitator: Recreational Therapist  Level of Participation: did not attend  Quality of Participation:  did not attend  Interactions with others:  did not attend  Mood/Affect:  did not attend  Triggers (if applicable): n/a  Cognition:  did not attend  Progress: None  Comments: pt is resting in his room.  Refuses to attend group.  No handout to offer at this time.   Plan: continue with services

## 2024-04-15 NOTE — NURSING NOTE
"GILBERT NOTE     Problem:  Depression     Behavior:  Pt in bed with PCA sitting at bedside. Pt requested cereal with sugar and milk and a ginger ale tonight for snack. Pt was observed eating all the cereal. Then pt requested cookies and more ginger ale. Pt was noncompliant with his medications tonight, reporting that the Risperdal \"makes me feel funny.\" Pt maintains a flat affect, he is withdrawn with minimal eye contact. He declined coming outside of his room for snack and to socialize with peers.    Group Participation: no  Appetite/Meals: Hs snack provided       Interventions:  Pt refused take tonight HS medications.     Response:  Pt continues to rest in bed throughout the night. No needs voiced. Pt is resting with eyes closed, no s/s of distress noted.      Plan:  Continue to monitor and assist. Maintain q15 minute rounds for safety.    "

## 2024-04-15 NOTE — NURSING NOTE
GILBERT NOTE     Problem:  SI     Behavior:  Denies SI but is still a 1:1 for safety  Group Participation: no  Appetite/Meals: 0/0/50       Interventions:  Advised to seek staff w any issues     Response:  Pt states he understands to seek staff w any issues     Plan:  Will continue to monitor

## 2024-04-15 NOTE — PROGRESS NOTES
Gerardo Albright is a 63 y.o. male on day 4 of admission presenting with Other schizophrenia (Multi).      Subjective   Case reviewed in morning team. Sleeping well. Has indwelling urethral catheter after visit to ER over the weekend for urinary retention.  He is up in chair with sitter at side during brief interview.  He denies SI. Denies AH/VH/paranoia and then refused to answer any further questions. Patient is adherent with current med regimen. He has a history of chronic negative sx of schizophrenia.       Objective     Last Recorded Vitals  Blood pressure (!) 145/93, pulse 79, temperature 36.7 °C (98.1 °F), temperature source Temporal, resp. rate 18, height 1.829 m (6'), weight 65.3 kg (143 lb 15.4 oz), SpO2 96%.    Review of Systems   Constitutional:  Positive for activity change.   Musculoskeletal:  Positive for gait problem.   Neurological:  Positive for weakness.   Psychiatric/Behavioral:  Positive for confusion, decreased concentration, dysphoric mood, self-injury, sleep disturbance and suicidal ideas. Negative for agitation and behavioral problems. The patient is not nervous/anxious and is not hyperactive.        Psychiatric ROS - Adult  Anxiety: decreased anxiety  Depression: anhedonia, interest, suicidal thoughts, and withdrawn   Delirium: negative  Psychosis: negative  Gay: negative  Safety Issues: suicidal ideation  Psychiatric ROS Comment: Poverty of thought       Mental Status Exam  General: lying quietly in hospital bed  Appearance: hospital attire  Attitude: calm  Behavior: withdrawn  Motor Activity: up in chair with sitter at side  Speech: low volume slow rate  Mood: anxious  Affect: constricted  Thought Process: difficult to assess  Thought Content: committed tosafety on the unit, denies SI   Thought Perception: no delusions elicited in short exam, denies paranoia- none noted, denies SI, denies HI  Cognition: alert and oriented x3  Insight: limited  Judgement: limited- has guardian who nursing  reports gives permission for any medications necessary to help stabilize patient    Psychiatric Risk Assessment  Violence Risk Assessment: command hallucinations, major mental illness, and male  Acute Risk of Harm to Others is Considered: low   Suicide Risk Assessment: , chronic medical illness, current psychiatric illness, male, panic attacks, prior suicide attempt, recent suicide attempt, and suicidal behaviors  Protective Factors against Suicide: other agrees to treatment, contracts for safety on unit  Acute Risk of Harm to Self is Considered: low to moderate    Current Medications    Scheduled medications  risperiDONE, 2 mg, oral, BID  tamsulosin, 0.4 mg, oral, Daily      Continuous medications     PRN medications  PRN medications: acetaminophen, alum-mag hydroxide-simeth, magnesium hydroxide, QUEtiapine **OR** OLANZapine       Medication efficacy: Yes  Patient reporting any side effects? No  Any observed side effects? No      Relevant Results  No results found for this or any previous visit (from the past 24 hour(s)).          No results found for this or any previous visit (from the past 96 hour(s)).    Reviewed    Assessment/Plan   Principal Problem:    Other schizophrenia (Multi)  Active Problems:    Parkinson's disease (Multi)    Depression    Suicide attempt (Multi)    Paranoid schizophrenia, chronic condition (Multi)    Urinary retention    Constipation    Patient is dx with schizophrenia.  He has been hospilatized many times. [facility] reports he has taken no medications in the last month.  We are ascertaining when his last Peseris injection was given.  he may have seroqule prn for psychotic anxiety/agitation. Plan to start MCDONALD-probably invega sustenna. Encouraged patient to attend groups and take meds as ordered.     Patient was ambulating in the hallway today, sitter remains present for safety, went to group in the am and consuming his meals. Refused meds this AM.    - Continue  Risperdal tabs to 2  mg PO BID    Will consider   Current malnutriton diganoses:  Malnutrition Diagnosis: Moderate malnutrition related to acute disease or injury        I spent 25 minutes in the professional and overall care of this patient.    Parts of this chart have been completed using voice recognition software.  Please excuse any errors of transcription.  Despite the medical decision making time stamp, my medical decision making has taken place during the patient's entire visit.  Thought process and reason for plan has been formulated from the time that I saw the patient until the time of disposition and is not specific to one specific moment during their visit and furthermore the medical decision making encompasses the entire chart and not only that represented in this note.    Tayla Martin, APRN-CNS    Physical Exam

## 2024-04-15 NOTE — GROUP NOTE
Group Topic: Goals   Group Date: 4/15/2024  Start Time: 0730  End Time: 0800  Facilitators: Juan Carlos Whitlock   Department: [facility]     Number of Participants: 8   Group Focus: goals  Treatment Modality: Interpersonal Therapy  Interventions utilized were assignment and group exercise  Purpose: coping skills and self-care    Name: Gerardo Albright YOB: 1960   MR: [id number]      Facilitator: Mental Health [facility]  Level of Participation: did not attend  Quality of Participation: did not attend  Interactions with others: did not attend  Mood/Affect: did not attend  Triggers (if applicable): n/a  Cognition: did not attend  Progress: None  Comments: did not attend  Plan: continue with services

## 2024-04-15 NOTE — GROUP NOTE
Group Topic: Other   Group Date: 4/15/2024  Start Time: 1100  End Time: 1145  Facilitators: DRU Tapia   Department: [facility] REHABTH [facility]IRTUAL    Number of Participants: 10   Group Focus: other Leisure Awareness/Leisure Alphabet  Treatment Modality: Other: Recreation therapy  Interventions utilized were group exercise, leisure development, patient education, problem solving, and support  Purpose: coping skills, maladaptive thinking, feelings, irrational fears, communication skills, insight or knowledge, self-worth, self-care, relapse prevention strategies, and trigger / craving management    Name: Gerardo Albright YOB: 1960   MR: [id number]      Facilitator: Recreational Therapist  Level of Participation: did not attend  Quality of Participation:  did not attend  Interactions with others:  did not attend  Mood/Affect:  did not attend  Triggers (if applicable): n/a  Cognition:  did not attend  Progress: None  Comments: pt problem is depressed mood.  Pt is sitting in the hallway, sitter by his side.  Very flat affect and poor eye contact  upon approach.  Refuses to engage with this writer.  Does not attend session.  Handout offered after group.   Plan: continue with services

## 2024-04-15 NOTE — PROGRESS NOTES
Occupational Therapy                 Therapy Communication Note    Patient Name: Gerardo Albright  MRN: [id number]  Today's Date: 4/15/2024     Discipline: Occupational Therapy    Missed Visit Reason: Missed Visit Reason: Patient sleeping    Missed Time: Attempt    Comment: Attempted to perform OT session this date. Pt with sitter. Pt unable to wake. Multiple attempts with verbal and tactile input. RN was notified.

## 2024-04-16 PROCEDURE — 1140000001 HC PRIVATE PSYCH ROOM DAILY

## 2024-04-16 PROCEDURE — 2500000006 HC RX 250 W HCPCS SELF ADMINISTERED DRUGS (ALT 637 FOR ALL PAYERS): Mod: MUE | Performed by: PSYCHIATRY & NEUROLOGY

## 2024-04-16 PROCEDURE — 2500000006 HC RX 250 W HCPCS SELF ADMINISTERED DRUGS (ALT 637 FOR ALL PAYERS): Performed by: PSYCHIATRY & NEUROLOGY

## 2024-04-16 PROCEDURE — 99232 SBSQ HOSP IP/OBS MODERATE 35: CPT | Performed by: REGISTERED NURSE

## 2024-04-16 PROCEDURE — 2500000001 HC RX 250 WO HCPCS SELF ADMINISTERED DRUGS (ALT 637 FOR MEDICARE OP)

## 2024-04-16 RX ADMIN — TAMSULOSIN HYDROCHLORIDE 0.4 MG: 0.4 CAPSULE ORAL at 10:24

## 2024-04-16 RX ADMIN — RISPERIDONE 2 MG: 1 TABLET, ORALLY DISINTEGRATING ORAL at 10:24

## 2024-04-16 RX ADMIN — RISPERIDONE 2 MG: 1 TABLET, ORALLY DISINTEGRATING ORAL at 21:49

## 2024-04-16 ASSESSMENT — COLUMBIA-SUICIDE SEVERITY RATING SCALE - C-SSRS
1. SINCE LAST CONTACT, HAVE YOU WISHED YOU WERE DEAD OR WISHED YOU COULD GO TO SLEEP AND NOT WAKE UP?: NO
6. HAVE YOU EVER DONE ANYTHING, STARTED TO DO ANYTHING, OR PREPARED TO DO ANYTHING TO END YOUR LIFE?: NO
6. HAVE YOU EVER DONE ANYTHING, STARTED TO DO ANYTHING, OR PREPARED TO DO ANYTHING TO END YOUR LIFE?: NO
1. SINCE LAST CONTACT, HAVE YOU WISHED YOU WERE DEAD OR WISHED YOU COULD GO TO SLEEP AND NOT WAKE UP?: NO
2. HAVE YOU ACTUALLY HAD ANY THOUGHTS OF KILLING YOURSELF?: NO
2. HAVE YOU ACTUALLY HAD ANY THOUGHTS OF KILLING YOURSELF?: NO

## 2024-04-16 ASSESSMENT — PAIN - FUNCTIONAL ASSESSMENT
PAIN_FUNCTIONAL_ASSESSMENT: 0-10
PAIN_FUNCTIONAL_ASSESSMENT: 0-10
PAIN_FUNCTIONAL_ASSESSMENT: FLACC (FACE, LEGS, ACTIVITY, CRY, CONSOLABILITY)

## 2024-04-16 ASSESSMENT — PAIN SCALES - GENERAL
PAINLEVEL_OUTOF10: 0 - NO PAIN

## 2024-04-16 ASSESSMENT — ENCOUNTER SYMPTOMS
CONFUSION: 1
AGITATION: 0
DYSPHORIC MOOD: 1
NERVOUS/ANXIOUS: 0
DECREASED CONCENTRATION: 1
ACTIVITY CHANGE: 1
SLEEP DISTURBANCE: 1
WEAKNESS: 1
HYPERACTIVE: 0

## 2024-04-16 NOTE — PROGRESS NOTES
Spiritual Care Visit    Clinical Encounter Type  Visited With: Patient  Routine Visit: Introduction  Continue Visiting: Yes         Values/Beliefs  Spiritual Requests During Hospitalization: Patrick was very unresponsive when I tried to talk with him with his PCA next to him. I took the approach and told Patrick that I know he can hear me., that he was getting good care from the staff here at the facility and that I was praying for him too. He gave no response.     Reynaldo Hargrove

## 2024-04-16 NOTE — NURSING NOTE
"GILBERT NOTE     Problem:  Depression, SI     Behavior:  Pt awake in room at start of shift. Flat affect noted, mostly nonverbal, occasionally answering a question or two with minimal responses. Pt endorses depression, - unable to rate, denies SI. Pt refuses scheduled meds on 3 attempts by this RN, finally saying \"I promise to take them in a half hour.\" Compliant with meds, cooperative with care. Pt appears restless, having sitter walk pt around unit in WC this evening. Impulsive, standing up from chair at times. Incontinent of stool while in WC. Pt requested snack of oreos, but when cookies were provided, pt states \"I don't want them anymore.\"  Group Participation: n/a  Appetite/Meals: Declined snack     Interventions:  1:1 provided. Evening meds given per orders. Sitter present for safety. Encouraged pt to make needs known.    Response:  Pt resting in bed for short intervals overnight. Restless in bed at times, repositioning self frequently. Denies pain/discomfort.     Plan:  Will continue to monitor behaviors and effectiveness of interventions while maintaining pt safety.    "

## 2024-04-16 NOTE — GROUP NOTE
Group Topic: Medication Education   Group Date: 4/16/2024  Start Time: 1000  End Time: 1050  Facilitators: Nidia Pickett PharmD   Department: [facility]    Number of Participants: 10   Group Focus: daily focus, goals, and other general medication education  Treatment Modality: Patient-Centered Therapy, Skills Training, and Other: medication education in a game format  Interventions utilized were group exercise, other medication education via a A la Mobile game format, and patient education  Purpose: other: medication education to increase compliance    Name: Gerardo Albright YOB: 1960   MR: [id number]      Facilitator: Pharmacist  Level of Participation: did not attend  Quality of Participation:  did not attend  Interactions with others:  did not attend  Mood/Affect:  did not attend  Triggers (if applicable): n/a  Cognition:  did not attend  Progress: None  Comments: Did not attend group  Plan: continue with services

## 2024-04-16 NOTE — PROGRESS NOTES
Occupational Therapy                 Therapy Communication Note    Patient Name: Gerardo Albright  MRN: [id number]  Today's Date: 4/16/2024     Discipline: Occupational Therapy    Missed Visit Reason: Missed Visit Reason: Patient refused (Pt w/ limited interaction, wanting to go back to bed)    Missed Time: Attempt    Comment:

## 2024-04-16 NOTE — PROGRESS NOTES
Social Work Note    FAN called [facility] a second time - requested to speak to a nurse on the unit that was working with pt. Phone disconnected after being transferred to the unit.

## 2024-04-16 NOTE — NURSING NOTE
Problem:  depressive symptoms    Behavior:   Pt stays in bed majority of this shift, would not respond to this nurse when passing morning medications, would not drink the water next to him, this nurse spoon fed water to pt. Pt got up before shift change wanting to watch TV.          Appetite: ate 0% of meal            Group: did not     Intervention:  Administered scheduled medications, encouraged pt to come to group therapy. Educated pt on importance of med compliance.      Response:  Pt continues as above.      Plan:  Maintain pt safety.

## 2024-04-16 NOTE — GROUP NOTE
Group Topic: Other   Group Date: 4/16/2024  Start Time: 1330  End Time: 1445  Facilitators: DRU Tapia   Department: [facility] REHABTH VIRTUAL    Number of Participants: 12   Group Focus: other The myRete Game  Treatment Modality: Other: Recreation therapy  Interventions utilized were exploration, mental fitness, reminiscence, story telling, and support  Purpose: feelings, self-worth, and other: elevate mood, increase socialization, enhance self esteem    Name: Gerardo Albright YOB: 1960   MR: [id number]      Facilitator: Recreational Therapist  Level of Participation: withdrawn  Quality of Participation: quiet and withdrawn  Interactions with others:  passive,  pt does not engage even with encouragement.  Refuses eye contact and very flat with affect.  Sits with his eyes closed.  Mood/Affect: depressed and flat  Triggers (if applicable): n/a  Cognition:  passive, unable to assess   Progress: None  Comments: pt problem is depressed mood.  Plan: continue with services

## 2024-04-16 NOTE — GROUP NOTE
Group Topic: Goals   Group Date: 4/16/2024  Start Time: 0730  End Time: 0800  Facilitators: Fernanda Diaz   Department: [facility]    Number of Participants: 5   Group Focus: coping skills, daily focus, and goals  Treatment Modality: Other: PCA  Interventions utilized were assignment  Purpose: coping skills, feelings, communication skills, self-care, and relapse prevention strategies    Name: Gerardo Albright YOB: 1960   MR: [id number]      Facilitator: Mental Health [facility]  Level of Participation: did not attend  Quality of Participation: did not attend  Interactions with others: did not attend  Mood/Affect: did not attend  Triggers (if applicable): did not attend  Cognition: did not attend  Progress: did not attend  Comments: did not attend  Plan: did not attend

## 2024-04-16 NOTE — PROGRESS NOTES
Gerardo Albright is a 63 y.o. male on day 5 of admission presenting with Other schizophrenia (Multi).      Subjective   Case reviewed in morning team. Sleeping well. Has indwelling urethral catheter after visit to ER over the weekend for urinary retention.  He is in bed most of day with little interaction with sitter at side during brief interview.  He denies SI. Denies AH/VH/paranoia and then refused to answer any further questions. Patient is adherent with current med regimen. He has a history of chronic negative sx of schizophrenia.       Objective     Last Recorded Vitals  Blood pressure 130/81, pulse 77, temperature 36.6 °C (97.9 °F), temperature source Temporal, resp. rate 16, height 1.829 m (6'), weight 65.3 kg (143 lb 15.4 oz), SpO2 95%.    Review of Systems   Constitutional:  Positive for activity change.   Musculoskeletal:  Positive for gait problem.   Neurological:  Positive for weakness.   Psychiatric/Behavioral:  Positive for confusion, decreased concentration, dysphoric mood, self-injury, sleep disturbance and suicidal ideas. Negative for agitation and behavioral problems. The patient is not nervous/anxious and is not hyperactive.        Psychiatric ROS - Adult  Anxiety: decreased anxiety  Depression: anhedonia, interest, suicidal thoughts, and withdrawn   Delirium: negative  Psychosis: negative  Gay: negative  Safety Issues: suicidal ideation  Psychiatric ROS Comment: Poverty of thought       Mental Status Exam  General: lying quietly in hospital bed  Appearance: hospital attire  Attitude: calm  Behavior: withdrawn  Motor Activity: up in chair with sitter at side  Speech: low volume slow rate  Mood: anxious  Affect: constricted  Thought Process: difficult to assess  Thought Content: committed tosafety on the unit, denies SI   Thought Perception: no delusions elicited in short exam, denies paranoia- none noted, denies SI, denies HI  Cognition: alert and oriented x3  Insight: limited  Judgement:  limited- has guardian who nursing reports gives permission for any medications necessary to help stabilize patient    Psychiatric Risk Assessment  Violence Risk Assessment: command hallucinations, major mental illness, and male  Acute Risk of Harm to Others is Considered: low   Suicide Risk Assessment: , chronic medical illness, current psychiatric illness, male, panic attacks, prior suicide attempt, recent suicide attempt, and suicidal behaviors  Protective Factors against Suicide: other agrees to treatment, contracts for safety on unit  Acute Risk of Harm to Self is Considered: low to moderate    Current Medications    Scheduled medications  risperiDONE, 2 mg, oral, BID  tamsulosin, 0.4 mg, oral, Daily      Continuous medications     PRN medications  PRN medications: acetaminophen, alum-mag hydroxide-simeth, magnesium hydroxide, QUEtiapine **OR** OLANZapine       Medication efficacy: Yes  Patient reporting any side effects? No  Any observed side effects? No      Relevant Results  No results found for this or any previous visit (from the past 24 hour(s)).          No results found for this or any previous visit (from the past 96 hour(s)).    Reviewed    Assessment/Plan   Principal Problem:    Other schizophrenia (Multi)  Active Problems:    Parkinson's disease (Multi)    Depression    Suicide attempt (Multi)    Paranoid schizophrenia, chronic condition (Multi)    Urinary retention    Constipation    Patient is dx with schizophrenia.  He has been hospilatized many times. [facility] reports he has taken no medications in the last month.  We are ascertaining when his last Peseris injection was given.  he may have seroqule prn for psychotic anxiety/agitation. Plan to start MCDONALD-probably invega sustenna. Encouraged patient to attend groups and take meds as ordered.     Patient was ambulating in the hallway today, sitter remains present for safety, went to group in the am and consuming his meals. Refused  meds this AM.    - Continue Risperdal tabs to 2  mg PO BID(have call in to find out when had last Lin).  Patienthas past dx of catatonic schizophrenia.  Will consider ativan but is not classic catanonia and he answers questions- denies AH/VH/paranoia today after being on risperdal for two days    Will consider   Current malnutriton diganoses:  Malnutrition Diagnosis: Moderate malnutrition related to acute disease or injury        I spent 25 minutes in the professional and overall care of this patient.    Parts of this chart have been completed using voice recognition software.  Please excuse any errors of transcription.  Despite the medical decision making time stamp, my medical decision making has taken place during the patient's entire visit.  Thought process and reason for plan has been formulated from the time that I saw the patient until the time of disposition and is not specific to one specific moment during their visit and furthermore the medical decision making encompasses the entire chart and not only that represented in this note.    VIVIANE Bloom-CNS    Physical Exam

## 2024-04-16 NOTE — GROUP NOTE
Group Topic: Other   Group Date: 4/16/2024  Start Time: 1100  End Time: 1145  Facilitators: DRU Tapia   Department: [facility]    Number of Participants: 9   Group Focus: other Leisure Jeopardy  Treatment Modality: Other: Recreation Therapy  Interventions utilized were group exercise, leisure development, patient education, problem solving, and support  Purpose: coping skills, maladaptive thinking, feelings, insight or knowledge, self-worth, self-care, and relapse prevention strategies    Name: Gerardo Albright YOB: 1960   MR: [id number]      Facilitator: Recreational Therapist  Level of Participation: did not attend  Quality of Participation:  did not attend  Interactions with others:  did not attend  Mood/Affect:  did not attend  Triggers (if applicable): n/a  Cognition:  n/a  Progress: None  Comments: pt problem is depressed mood.  Pt is resting in his room.  Does not attend group at this time.  No handout to offer.  Plan: continue with services

## 2024-04-16 NOTE — PROGRESS NOTES
Social Work Note    LISW-S called [facility] - requested to speak to a nurse on the unit that was working with pt. Phone disconnected after being transferred to the unit. LISW-S called back and requested to speak to someone else that might be able to address concerns related to medications. LISW-S was sent to [facility]- LISW-S left a message requesting information on the last injection of Invega and the dosage.

## 2024-04-17 DIAGNOSIS — F20.0 PARANOID SCHIZOPHRENIA, CHRONIC CONDITION (MULTI): Primary | ICD-10-CM

## 2024-04-17 PROCEDURE — 1140000001 HC PRIVATE PSYCH ROOM DAILY

## 2024-04-17 PROCEDURE — RXMED WILLOW AMBULATORY MEDICATION CHARGE

## 2024-04-17 PROCEDURE — 2500000006 HC RX 250 W HCPCS SELF ADMINISTERED DRUGS (ALT 637 FOR ALL PAYERS): Performed by: PSYCHIATRY & NEUROLOGY

## 2024-04-17 PROCEDURE — 97530 THERAPEUTIC ACTIVITIES: CPT | Mod: GP

## 2024-04-17 PROCEDURE — 99232 SBSQ HOSP IP/OBS MODERATE 35: CPT | Performed by: REGISTERED NURSE

## 2024-04-17 PROCEDURE — 2500000006 HC RX 250 W HCPCS SELF ADMINISTERED DRUGS (ALT 637 FOR ALL PAYERS): Mod: MUE | Performed by: PSYCHIATRY & NEUROLOGY

## 2024-04-17 PROCEDURE — 2500000001 HC RX 250 WO HCPCS SELF ADMINISTERED DRUGS (ALT 637 FOR MEDICARE OP)

## 2024-04-17 PROCEDURE — 97116 GAIT TRAINING THERAPY: CPT | Mod: GP

## 2024-04-17 PROCEDURE — 97535 SELF CARE MNGMENT TRAINING: CPT | Mod: GO,CO

## 2024-04-17 RX ORDER — RISPERIDONE 120 MG
120 KIT SUBCUTANEOUS
Qty: 1 EACH | Refills: 0 | Status: SHIPPED | OUTPATIENT
Start: 2024-04-17

## 2024-04-17 RX ADMIN — TAMSULOSIN HYDROCHLORIDE 0.4 MG: 0.4 CAPSULE ORAL at 10:57

## 2024-04-17 RX ADMIN — RISPERIDONE 2 MG: 1 TABLET, ORALLY DISINTEGRATING ORAL at 10:57

## 2024-04-17 RX ADMIN — RISPERIDONE 2 MG: 1 TABLET, ORALLY DISINTEGRATING ORAL at 20:41

## 2024-04-17 ASSESSMENT — COGNITIVE AND FUNCTIONAL STATUS - GENERAL
DRESSING REGULAR UPPER BODY CLOTHING: A LITTLE
WALKING IN HOSPITAL ROOM: A LITTLE
EATING MEALS: A LITTLE
PERSONAL GROOMING: A LITTLE
TURNING FROM BACK TO SIDE WHILE IN FLAT BAD: TOTAL
HELP NEEDED FOR BATHING: A LITTLE
MOVING FROM LYING ON BACK TO SITTING ON SIDE OF FLAT BED WITH BEDRAILS: TOTAL
TOILETING: A LITTLE
MOVING TO AND FROM BED TO CHAIR: A LOT
STANDING UP FROM CHAIR USING ARMS: A LITTLE
CLIMB 3 TO 5 STEPS WITH RAILING: A LITTLE
MOBILITY SCORE: 13
DRESSING REGULAR LOWER BODY CLOTHING: A LITTLE
DAILY ACTIVITIY SCORE: 18

## 2024-04-17 ASSESSMENT — COLUMBIA-SUICIDE SEVERITY RATING SCALE - C-SSRS
1. SINCE LAST CONTACT, HAVE YOU WISHED YOU WERE DEAD OR WISHED YOU COULD GO TO SLEEP AND NOT WAKE UP?: NO
6. HAVE YOU EVER DONE ANYTHING, STARTED TO DO ANYTHING, OR PREPARED TO DO ANYTHING TO END YOUR LIFE?: NO
2. HAVE YOU ACTUALLY HAD ANY THOUGHTS OF KILLING YOURSELF?: NO
1. SINCE LAST CONTACT, HAVE YOU WISHED YOU WERE DEAD OR WISHED YOU COULD GO TO SLEEP AND NOT WAKE UP?: NO
1. SINCE LAST CONTACT, HAVE YOU WISHED YOU WERE DEAD OR WISHED YOU COULD GO TO SLEEP AND NOT WAKE UP?: NO
6. HAVE YOU EVER DONE ANYTHING, STARTED TO DO ANYTHING, OR PREPARED TO DO ANYTHING TO END YOUR LIFE?: NO
6. HAVE YOU EVER DONE ANYTHING, STARTED TO DO ANYTHING, OR PREPARED TO DO ANYTHING TO END YOUR LIFE?: NO

## 2024-04-17 ASSESSMENT — ENCOUNTER SYMPTOMS
WEAKNESS: 1
DYSPHORIC MOOD: 1
HYPERACTIVE: 0
CONFUSION: 1
DECREASED CONCENTRATION: 1
SLEEP DISTURBANCE: 1
AGITATION: 0
NERVOUS/ANXIOUS: 0
ACTIVITY CHANGE: 1

## 2024-04-17 ASSESSMENT — PAIN SCALES - GENERAL
PAINLEVEL_OUTOF10: 0 - NO PAIN

## 2024-04-17 ASSESSMENT — PAIN - FUNCTIONAL ASSESSMENT
PAIN_FUNCTIONAL_ASSESSMENT: 0-10

## 2024-04-17 ASSESSMENT — ACTIVITIES OF DAILY LIVING (ADL): HOME_MANAGEMENT_TIME_ENTRY: 25

## 2024-04-17 NOTE — PROGRESS NOTES
Physical Therapy    Physical Therapy Treatment    Patient Name: Gerardo Albright  MRN: [id number]  Today's Date: 4/17/2024  Time Calculation  Start Time: 1015  Stop Time: 1045  Time Calculation (min): 30 min       Assessment/Plan   PT Assessment  PT Assessment Results: Decreased strength, Decreased endurance, Decreased mobility, Decreased cognition, Impaired judgement, Decreased safety awareness  Rehab Prognosis: Good  Barriers to Discharge: Behaviors  End of Session Communication: Bedside nurse  Assessment Comment: limited participation this date, at risk for falls due to impaired balance  End of Session Patient Position: Up in chair  PT Plan  Inpatient/Swing Bed or Outpatient: Inpatient  PT Plan  Treatment/Interventions: Bed mobility, Transfer training, Gait training, Endurance training, Therapeutic activity  PT Plan: Skilled PT  PT Frequency: 4 times per week  PT Discharge Recommendations: Low intensity level of continued care  Equipment Recommended upon Discharge: Wheeled walker  PT Recommended Transfer Status: Assist x1  PT - OK to Discharge: Yes      General Visit Information:   PT  Visit  PT Received On: 04/17/24  Response to Previous Treatment: Patient with no complaints from previous session.  General  Reason for Referral: Referral from Dr. Allen due to physical decline affecting pts ADLs and functional mobility. Pt sent out to ED on 4/11 for urinary retention and returned with a espinal cathetar and a sitter.  Referred By: Dr Allen  Past Medical History Relevant to Rehab: Other schizophrenia (Multi) 7/27/2023     Other seizures (Multi) 7/27/2023    Anxiety 7/27/2023    Parkinson's disease (Multi) 7/27/2023    Dementia (Multi) 7/27/2023    Other insomnia 7/27/2023    Gastroesophageal reflux disease without esophagitis 7/27/2023    Asthenia 7/27/2023    Chronic obstructive pulmonary disease (Multi) 7/27/2023    Benign hypertension 7/27/2023    Depression 7/27/2023    ASHD (arteriosclerotic heart disease)  7/27/2023    At risk for falls 7/30/2023    Suicide attempt (Multi) 1/6/2024    Laceration of left upper extremity 1/10/2024    Paranoid schizophrenia, chronic condition (Multi) 4/11/2024  Prior to Session Communication: Bedside nurse  Patient Position Received: Bed, 3 rail up, Alarm on  Preferred Learning Style: verbal, visual  General Comment: Patient in bed asleep with    Subjective   Precautions:  Precautions  Medical Precautions: Fall precautions  Precautions Comment: risk for falls, espinal cathetar  Vital Signs:       Objective   Pain:  Pain Assessment  Pain Assessment: 0-10  Pain Score: 0 - No pain  Cognition:  Cognition  Orientation Level: Disoriented to place, Disoriented to time, Appropriate for developmental age, Disoriented to situation  Processing Speed: Delayed  Postural Control:     Extremity/Trunk Assessments:        Activity Tolerance:  Activity Tolerance  Endurance: Endurance does not limit participation in activity  Treatments:       Bed Mobility 1  Bed Mobility 1: Supine to sitting  Level of Assistance 1: Maximum assistance (x2)  Bed Mobility Comments 1: Upon entry to room, patient is asleep and arouses with difficulty. limited eye opening. Patient transitioned to sitting with therapist and RN with max A x 2.    Ambulation/Gait Training 2  Surface 2: Level tile  Device 2: No device  Gait Support Devices: Wheelchair follow  Assistance 2: Minimum assistance (x2)  Quality of Gait 2: Diminished heel strike, Decreased step length, Shuffling gait, Forward flexed posture  Comments/Distance (ft) 2: 100 ft x 3 with turns min x 2 hand held assist, shuffling gait, rounded shoulders. Fatigues with distance and sits in wheelchair without warning at end of gait distance.  Transfer 1  Transfer From 1: Bed to  Transfer to 1: Wheelchair  Technique 1: Stand pivot  Transfer Level of Assistance 1: Moderate assistance  Trials/Comments 1: cues for safe technique and hand held assist  Transfers 2  Transfer From 2:  Wheelchair to  Transfer to 2: Stand  Technique 2: Sit to stand, Stand to sit  Trials/Comments 2: min x 2 hand held assist, cues for safety    Wheelchair Activities  Description/Details 1: attempted wheelchair mobility but patient refuses    Outcome Measures:  [facility] Basic Mobility  Turning from your back to your side while in a flat bed without using bedrails: Total  Moving from lying on your back to sitting on the side of a flat bed without using bedrails: Total  Moving to and from bed to chair (including a wheelchair): A lot  Standing up from a chair using your arms (e.g. wheelchair or bedside chair): A little  To walk in hospital room: A little  Climbing 3-5 steps with railing: A little  Basic Mobility - Total Score: 13    Education Documentation  No documentation found.  Education Comments  No comments found.        OP EDUCATION:       Encounter Problems       Encounter Problems (Active)       PT Problem       BED MOBILITY Patient will transfer supine to sit and sit to supine with independent assist to facilitate mobility.  (Progressing)       Start:  04/12/24    Expected End:  05/10/24            TRANSFER Patient will transfer bed to chair and chair to bed with independent assist to facilitate mobility.  (Progressing)       Start:  04/12/24    Expected End:  05/10/24            AMBULATION Patient will amb 150 feet+with rolling walker device including two turns on even surface with independent assist to facilitate safe mobility.  (Progressing)       Start:  04/12/24    Expected End:  05/10/24            STRENGTH Patient will increase BLE  strength to 4+/5 to improve functional mobility.    (Progressing)       Start:  04/12/24    Expected End:  05/10/24

## 2024-04-17 NOTE — PROGRESS NOTES
Gerardo Albright is a 63 y.o. male on day 6 of admission presenting with Other schizophrenia (Multi).      Subjective   Case reviewed in morning team. Sleeping well. Has indwelling urethral catheter after visit to ER over the weekend for urinary retention.  He is in bed most of day with little interaction with sitter at side during brief interview. He walked with PT/OT with belt for support in thomas.  He is taking meds as ordred. He denies SI. Denies AH/VH/paranoia and then refused to answer any further questions. Patient is adherent with current med regimen. He has a history of chronic negative sx of schizophrenia.       Objective     Last Recorded Vitals  Blood pressure 123/65, pulse 72, temperature 36.7 °C (98.1 °F), temperature source Temporal, resp. rate 16, height 1.829 m (6'), weight 64.8 kg (142 lb 13.7 oz), SpO2 96%.    Review of Systems   Constitutional:  Positive for activity change.   Musculoskeletal:  Positive for gait problem.   Neurological:  Positive for weakness.   Psychiatric/Behavioral:  Positive for confusion, decreased concentration, dysphoric mood, self-injury, sleep disturbance and suicidal ideas. Negative for agitation and behavioral problems. The patient is not nervous/anxious and is not hyperactive.        Psychiatric ROS - Adult  Anxiety: decreased anxiety  Depression: anhedonia, interest, suicidal thoughts, and withdrawn   Delirium: negative  Psychosis: negative  Gay: negative  Safety Issues: suicidal ideation  Psychiatric ROS Comment: Poverty of thought       Mental Status Exam  General: lying quietly in hospital bed  Appearance: hospital attire  Attitude: calm  Behavior: withdrawn  Motor Activity: up in chair with sitter at side  Speech: low volume slow rate  Mood: anxious  Affect: constricted  Thought Process: difficult to assess  Thought Content: committed tosafety on the unit, denies SI   Thought Perception: no delusions elicited in short exam, denies paranoia- none noted, denies SI,  denies HI  Cognition: alert and oriented x3  Insight: limited  Judgement: limited- has guardian who nursing reports gives permission for any medications necessary to help stabilize patient    Psychiatric Risk Assessment  Violence Risk Assessment: command hallucinations, major mental illness, and male  Acute Risk of Harm to Others is Considered: low   Suicide Risk Assessment: , chronic medical illness, current psychiatric illness, male, panic attacks, prior suicide attempt, recent suicide attempt, and suicidal behaviors  Protective Factors against Suicide: other agrees to treatment, contracts for safety on unit  Acute Risk of Harm to Self is Considered: low to moderate    Current Medications    Scheduled medications  [START ON 4/18/2024] risperiDONE, 120 mg, subcutaneous, q30 days  risperiDONE, 2 mg, oral, BID  tamsulosin, 0.4 mg, oral, Daily      Continuous medications     PRN medications  PRN medications: acetaminophen, alum-mag hydroxide-simeth, magnesium hydroxide, QUEtiapine **OR** OLANZapine       Medication efficacy: Yes  Patient reporting any side effects? No  Any observed side effects? No      Relevant Results  No results found for this or any previous visit (from the past 24 hour(s)).          No results found for this or any previous visit (from the past 96 hour(s)).    Reviewed    Assessment/Plan   Principal Problem:    Other schizophrenia (Multi)  Active Problems:    Parkinson's disease (Multi)    Depression    Suicide attempt (Multi)    Paranoid schizophrenia, chronic condition (Multi)    Urinary retention    Constipation    Patient is dx with schizophrenia.  He has been hospilatized many times. [facility] reports he has taken no medications in the last month.  We are ascertaining when his last Peseris injection was given.  he may have seroqule prn for psychotic anxiety/agitation. Plan to start MCDONALD-probably invega sustenna. Encouraged patient to attend groups and take meds as ordered.      Patient was ambulating in the hallway today, sitter remains present for safety, went to group in the am and consuming his meals. Refused meds this AM.    - Continue Risperdal tabs to 2  mg PO BID(have call in to find out when had last Lin).  Patienthas past dx of catatonic schizophrenia.  Will consider ativan but is not classic catanonia and he answers questions- denies AH/VH/paranoia today after being on risperdal for two days. Patient does not appear catatonic- it seems that he is at baseline- in notes from [facility] said some days he would just lie in bed and others he would follow staff around muchof the day.  Perseris 120mg IM ordered as outpatient sent information that they were increasing it from 90mg yto 120mg q 4 wks.    Will consider   Current malnutriton diganoses:  Malnutrition Diagnosis: Severe malnutrition related to chronic disease or condition        I spent 25 minutes in the professional and overall care of this patient.    Parts of this chart have been completed using voice recognition software.  Please excuse any errors of transcription.  Despite the medical decision making time stamp, my medical decision making has taken place during the patient's entire visit.  Thought process and reason for plan has been formulated from the time that I saw the patient until the time of disposition and is not specific to one specific moment during their visit and furthermore the medical decision making encompasses the entire chart and not only that represented in this note.    VIVIANE Bloom-CNS    Physical Exam

## 2024-04-17 NOTE — PROGRESS NOTES
Occupational Therapy    Occupational Therapy Treatment    Name: Gerardo Albright  MRN: [id number]  : 1960  Date: 24  Time Calculation  Start Time: 1120  Stop Time: 1145  Time Calculation (min): 25 min    Assessment:  End of Session Communication: Bedside nurse (Patient returned to sitting with nurse in hallway.)  End of Session Patient Position: Up in chair  Plan:  Treatment Interventions: ADL retraining, Functional transfer training, UE strengthening/ROM, Endurance training, Patient/family training, Equipment evaluation/education, Neuromuscular reeducation, Compensatory technique education, Fine motor coordination activities  OT Frequency: 3 times per week  OT Discharge Recommendations: Moderate intensity level of continued care  Equipment Recommended upon Discharge: Wheeled walker  OT Recommended Transfer Status: Assist of 1  OT - OK to Discharge: Yes    Subjective   Previous Visit Info:  OT Last Visit  OT Received On: 24  General:  General  Reason for Referral: Referral from Dr. Allen due to physical decline affecting pts ADLs and functional mobility. Pt sent out to ED on  for urinary retention and returned with a espinal cathetar and a sitter.  Referred By: Dr Allen  Past Medical History Relevant to Rehab: Other schizophrenia (Multi) 2023     Other seizures (Multi) 2023    Anxiety 2023    Parkinson's disease (Multi) 2023    Dementia (Multi) 2023    Other insomnia 2023    Gastroesophageal reflux disease without esophagitis 2023    Asthenia 2023    Chronic obstructive pulmonary disease (Multi) 2023    Benign hypertension 2023    Depression 2023    ASHD (arteriosclerotic heart disease) 2023    At risk for falls 2023    Suicide attempt (Multi) 2024    Laceration of left upper extremity 1/10/2024    Paranoid schizophrenia, chronic condition (Multi) 2024  Prior to Session Communication: Bedside nurse  Patient Position  Received: Up in chair  Preferred Learning Style: verbal, visual  General Comment: Patient in hallway seated in W/c with sitter. Patient cleared to see by nursing. Patient agreeable to therapy  Precautions:  Medical Precautions: Fall precautions  Precautions Comment: risk for falls, espinalmitchell hawleyetar  Vitals:     Pain Assessment:        Objective   Activities of Daily Living: Grooming  Grooming Level of Assistance: Contact guard  Grooming Where Assessed: Standing sinkside    UE Dressing  UE Dressing Level of Assistance: Minimum assistance  UE Dressing Where Assessed: Chair  UE Dressing Comments: assist with pull round in back    Toileting  Toileting Level of Assistance: Setup  Where Assessed: Toilet  Toileting Comments: able to manipulate pants up and down not hygiene    Functional Standing Tolerance:     Bed Mobility/Transfers: Transfers  Transfer: Yes  Transfer 1  Transfer From 1: Wheelchair to  Transfer to 1: Stand, Sit  Technique 1: Sit to stand  Transfer Device 1: Gait belt, Walker  Transfer Level of Assistance 1: Moderate assistance  Trials/Comments 1: Patient required cues for widening alla and cues for handplacement.  Transfers 2  Transfer From 2: Chair with arms to  Transfer to 2: Sit, Stand  Technique 2: Sit to stand, Stand to sit  Transfer Device 2: Walker  Transfer Level of Assistance 2: Contact guard  Trials/Comments 2: cues for handplacement and cues for wider alla.    Toilet Transfers  Toilet Transfer From: Rolling walker  Toilet Transfer Type: To and from  Toilet Transfer to: Standard bedside commode  Toilet Transfer Technique: Ambulating  Toilet Transfers: Contact guard  Toilet Transfers Comments: cues for handplacement and to widen alla.    Functional Mobility:          Therapy/Activity: Therapeutic Exercise  Therapeutic Exercise Performed: Yes  Therapeutic Exercise Activity 1: Patient declined upper extremity exercise.  Sensory:     Cognitive Skill Development:     Vision:     Strength:     Other  Activity:     Outcome Measures:  [facility] Daily Activity  Putting on and taking off regular lower body clothing: A little  Bathing (including washing, rinsing, drying): A little  Putting on and taking off regular upper body clothing: A little  Toileting, which includes using toilet, bedpan or urinal: A little  Taking care of personal grooming such as brushing teeth: A little  Eating Meals: A little  Daily Activity - Total Score: 18        Education Documentation  Precautions, taught by ОЛЕГ Adrian at 4/17/2024 11:59 AM.  Learner: Patient  Readiness: Acceptance  Method: Explanation, Demonstration  Response: Needs Reinforcement  Comment: Patient educated on safe transfers    Body Mechanics, taught by ОЛЕГ Adrian at 4/17/2024 11:59 AM.  Learner: Patient  Readiness: Acceptance  Method: Explanation, Demonstration  Response: Needs Reinforcement  Comment: Patient educated on safe transfers    Mobility Training, taught by ОЛЕГ Adrian at 4/17/2024 11:59 AM.  Learner: Patient  Readiness: Acceptance  Method: Explanation, Demonstration  Response: Needs Reinforcement  Comment: Patient educated on safe transfers    Handouts, taught by ОЛЕГ Adrian at 4/17/2024 11:59 AM.  Learner: Patient  Readiness: Acceptance  Method: Explanation, Demonstration  Response: Needs Reinforcement  Comment: Patient educated on safe transfers    Precautions, taught by ОЛЕГ Adrian at 4/17/2024 11:59 AM.  Learner: Patient  Readiness: Acceptance  Method: Explanation, Demonstration  Response: Needs Reinforcement  Comment: Patient educated on safe transfers    Home Exercise Program, taught by ОЛЕГ Adrian at 4/17/2024 11:59 AM.  Learner: Patient  Readiness: Acceptance  Method: Explanation, Demonstration  Response: Needs Reinforcement  Comment: Patient educated on safe transfers    ADL Training, taught by ОЛЕГ Adrian at 4/17/2024 11:59 AM.  Learner: Patient  Readiness: Acceptance  Method: Explanation,  Demonstration  Response: Needs Reinforcement  Comment: Patient educated on safe transfers    Education Comments  No comments found.      Goals:  Encounter Problems       Encounter Problems (Active)       Dressing       Pt will perform UB and LB dressing with modified independence with AE PRN utilizing safe technique. (Progressing)       Start:  04/13/24    Expected End:  04/27/24               EXERCISE/STRENGTHENING       Pt will be educated on BUE HEP and perform reverse demonstration with supervision to improve muscle strength for ADLs and ADL transfers. (Progressing)       Start:  04/13/24    Expected End:  04/13/24               TRANSFERS       Pt will perform all functional transfers with least restrictive device with modified independence with safe technique to assist with ADLs upon return to SNF. (Progressing)       Start:  04/13/24    Expected End:  04/27/24

## 2024-04-17 NOTE — NURSING NOTE
GILBERT NOTE     Problem:  Depression     Behavior:  Pt awake in bed at start of shift. Flat, nonverbal at times, but makes needs known. Compliant with meds, cooperative with care. Pt isolative to room this evening. Pt endorses depression, denies SI.  Group Participation: Declined  Appetite/Meals: HS snack provided     Interventions:  1:1 provided with reassurance. Evening meds administered per orders. Sitter at bedside for safety.     Response:  Pt appears to be resting in bed overnight.     Plan:  Will continue to monitor behaviors and effectiveness of interventions while maintaining pt safety.

## 2024-04-17 NOTE — PROGRESS NOTES
Social Work Note    FAN called [facility] a third time with no call back - requested to speak to a nurse on the unit that was working with pt. Phone disconnected after being transferred to the unit.

## 2024-04-17 NOTE — NURSING NOTE
Problem:  depressive symptoms    Behavior:   Pt pleasant and cooperative with staff and care. Pt makes needs known and is med compliant. Pt not liking to get up in morning, but stayed up majority of this shift, worked with PT, ate much better this shift from yesterdays shift. Pt drinking more fluids.           Appetite: tolerating food            Group: did     Intervention:  Administered scheduled medications, encouraged pt to come to group therapy. Educated pt on importance of med compliance.      Response:  Pt sitting in hallway with sitter, not real engaging with staff but will answer questions appropriately.      Plan:  Maintain pt safety.

## 2024-04-17 NOTE — GROUP NOTE
Group Topic: Reflection   Group Date: 4/17/2024  Start Time: 1000  End Time: 1050  Facilitators: FAN Nava   Department: [facility] CARE TRANSITIONS VIRTUAL    Number of Participants: 10   Group Focus: other Gratefulness/Gratitude  Treatment Modality: Psychoeducation  Interventions utilized were assignment and exploration  Purpose: maladaptive thinking, feelings, self-worth, and self-care    Name: Gerardo Albright YOB: 1960   MR: [id number]      Facilitator:   Level of Participation: did not attend  Quality of Participation:  n/a  Interactions with others:  n/a  Mood/Affect:  n/a  Triggers (if applicable): n/a  Cognition:  n/a  Progress: None  Comments: Gerardo did not attend group despite encouragement.   Plan: continue with services

## 2024-04-17 NOTE — GROUP NOTE
Group Topic: Goals   Group Date: 4/16/2024  Start Time: 2010  End Time: 2022  Facilitators: Marnie Corona   Department: [facility]     Number of Participants: 8   Group Focus: goals  Treatment Modality: Other: PCA  Interventions utilized were group exercise and reminiscence  Purpose: feelings, self-worth, self-care, and relapse prevention strategies    Name: Gerardo Albright YOB: 1960   MR: [id number]      Facilitator: Mental Health [facility]  Level of Participation: did not attend  Quality of Participation:  did not attend  Interactions with others:  did not attend   Mood/Affect:  did not attend  Triggers (if applicable): N/A  Cognition:  did not attend  Progress: Other  Comments: Pt problem is depression. Pt declined the invitation to attend group.  Plan: continue with services

## 2024-04-17 NOTE — GROUP NOTE
Group Topic: Self-Care/Wellness   Group Date: 4/17/2024  Start Time: 1100  End Time: 1145  Facilitators: DRU Tapia   Department: [facility] REHABTH VIRTUAL    Number of Participants: 12   Group Focus: anxiety and other Grounding Exercise Handout  Treatment Modality: Other: Recreation therapy  Interventions utilized were group exercise, mental fitness, patient education, problem solving, and support  Purpose: coping skills, maladaptive thinking, feelings, irrational fears, communication skills, insight or knowledge, self-worth, self-care, relapse prevention strategies, and trigger / craving management    Name: Gerardo Albright YOB: 1960   MR: [id number]      Facilitator: Recreational Therapist  Level of Participation: did not attend  Quality of Participation:  did not attend  Interactions with others:  did not attend  Mood/Affect:  did not attend  Triggers (if applicable): n/a  Cognition:  did not attend  Progress: None  Comments: pt problem is depressed mood.  Pt is sitting in the hallway near nurses station.  Pt watches peers and people with sitter by his side for safety.  Handout offered after group.  Plan: continue with services

## 2024-04-17 NOTE — PROGRESS NOTES
"Nutrition Follow up Note    Nutrition Assessment      Continues to eat poorly/skip meals. Order for sitter. Transfer to ED on 4/13 for urinary retention. Dr. Galarza in agreement with malnutrition diagnosis. Based on po intake since admission, pt now meets criteria for severe malnutrition as he was \"not eating and drinking too well for past few weeks\" prior to admission. Inadequate oral intake appears to be a chronic problem for pt.     Psych consult notes \"Contacted RN Angelica at [facility]. She states she has taken care of Gerardo for the past 7 years that he has been at [facility]. She reports that his typical behavior one of two ways. He is either despondent, in bed, refusing medications and food at times for days or he is following staff around, asking about medications and what he is receiving\".      Nutrition History:  Food and Nutrient History: po intake averaging 10% over the past 10 meals documented. skipping many meals. nutiriton supplements discontinued on 4/13 - will reorder at this time.  Energy Intake: Poor < 50 %    Anthropometrics:  Ht: 182.9 cm (6'), Wt: 64.8 kg (142 lb 13.7 oz), BMI: 19.37    Weight Change:  Daily Weight  04/17/24 : 64.8 kg (142 lb 13.7 oz)  04/13/24 : 67.8 kg (149 lb 7.6 oz)  04/11/24 : 65.3 kg (143 lb 15.4 oz)   04/09/24 : 67.6 kg (149 lb 0.5 oz)  03/08/24 : 68 kg (150 lb)  02/16/24 : 74.5 kg (164 lb 3.9 oz)  02/14/24 : 74 kg (163 lb 2.3 oz)  01/31/24 : 90.7 kg (200 lb)  01/09/24 : 79.8 kg (175 lb 14.8 oz)  01/06/24 : 78.5 kg (173 lb 1 oz)  01/03/24 : 81.6 kg (180 lb)     Weight History / % Weight Change: gradual wt loss noted in wt hx over the past 3 months  Significant Weight Loss: Yes  Interpretation of Weight Loss:  (32# (18.2%) wt loss over ~3 months (1/9-4/11))    Nutrition Focused Physical Exam Findings: deferred - not appropriate     Nutrition Significant Labs:  Lab Results   Component Value Date    WBC 12.3 (H) 04/10/2024    HGB 16.2 04/10/2024    HCT 47.0 " 04/10/2024     04/10/2024    CHOL 161 01/07/2024    TRIG 146 01/07/2024    HDL 43.9 01/07/2024    ALT 11 04/10/2024    AST 13 04/10/2024     04/10/2024    K 4.3 04/10/2024     04/10/2024    CREATININE 0.60 04/10/2024    BUN 40 (H) 04/10/2024    CO2 24 04/10/2024    TSH 2.54 05/09/2023    INR 1.2 (H) 05/30/2020    GLUF 126 (H) 01/07/2024    HGBA1C 5.6 04/10/2024     Nutrition Specific Medications:  risperiDONE, 2 mg, oral, BID  tamsulosin, 0.4 mg, oral, Daily      Dietary Orders (From admission, onward)       Start     Ordered    04/17/24 0957  Oral nutritional supplements  Until discontinued        Comments: Or ensure original/ensure compact if available.   Question Answer Comment   Deliver with All meals    Select supplement: Sugar Free Mighty Shake        04/17/24 0957    04/13/24 0807  Adult diet Regular  Diet effective now        Question:  Diet type  Answer:  Regular    04/13/24 0810                  Estimated Needs:   Estimated Energy Needs  Total Energy Estimated Needs (kCal):  (1051-2537)  Total Estimated Energy Need per Day (kCal/kg):  (25-30)  Method for Estimating Needs: actual wt    Estimated Protein Needs  Total Protein Estimated Needs (g):  (65-78)  Total Protein Estimated Needs (g/kg):  (1-1.2)  Method for Estimating Needs: actual wt    Estimated Fluid Needs  Method for Estimating Needs: 1 ml/kcal        Nutrition Diagnosis   Nutrition Diagnosis:  Malnutrition Diagnosis  Patient has Malnutrition Diagnosis: Yes  Diagnosis Status: New  Malnutrition Diagnosis: Severe malnutrition related to chronic disease or condition  As Evidenced by: 32# (18.2%) wt loss over ~3 months (1/9-4/11), po intake </= 75% estimated needs for >/= 1 month  Additional Assessment Information: diagnosis updated from moderate chronic malnutrition on 4/17/24       Nutrition Interventions/Recommendations   Nutrition Interventions and Recommendations:    Nutrition Prescription:  Individualized Nutrition Prescription  Provided for : 1475-3273 kcals and 65-78g protein to be provided via diet and supplements    Nutrition Interventions:   Food and/or Nutrient Delivery Interventions  Interventions: Meals and snacks, Medical food supplement  Meals and Snacks: General healthful diet  Goal: provide as ordered  Medical Food Supplement: Modified beverage  Goal: will reorder in attempt to supplement po intake. mighty shake TID to provide 200 kcals and 7g protein each    Education Documentation  No documentation found.           Nutrition Monitoring and Evaluation   Monitoring/Evaluation:   Food/Nutrient Related History Monitoring  Monitoring and Evaluation Plan: Energy intake  Energy Intake: Estimated energy intake  Criteria: pt to consume >/= 75% estimated needs    Body Composition/Growth/Weight History  Monitoring and Evaluation Plan: Weight  Weight: Measured weight  Criteria: pt will maintain wt at this time       Time Spent/Follow-up:   Follow Up  Time Spent (min): 25 minutes  Last Date of Nutrition Visit: 04/17/24  Nutrition Follow-Up Needed?: 3-5 days  Follow up Comment: 4/19/24

## 2024-04-17 NOTE — GROUP NOTE
Group Topic: Other   Group Date: 4/17/2024  Start Time: 1330  End Time: 1430  Facilitators: DRU Tapia   Department: [facility] REHABTH VIRTUAL    Number of Participants: 11   Group Focus: other Are you Smarter than a 6th Grader?  Treatment Modality: Other: Recreation therapy  Interventions utilized were mental fitness, problem solving, reminiscence, and story telling  Purpose: other: fun, elevate mood, enhance self esteem, increase socialization    Name: Gerardo Albright YOB: 1960   MR: [id number]      Facilitator: Recreational Therapist  Level of Participation: did not attend  Quality of Participation:  did not attend  Interactions with others:  did not attend  Mood/Affect:  did not attend  Triggers (if applicable): n/a  Cognition:  did not attend  Progress: None  Comments: pt problem is depressed mood.  Pt continues to sit in the hallway with sitter by his side.  Refuses to attend group at this time.  No handout to offer.  Plan: continue with services

## 2024-04-17 NOTE — GROUP NOTE
Group Topic: Cognitive Focus   Group Date: 4/16/2024  Start Time: 1500  End Time: 1600  Facilitators: VIVIANE Bloom-CNS   Department: [facility] PROVIDER VIRTUAL    Number of Participants: 6   Group Focus: concentration, daily focus, and feeling awareness/expression  Treatment Modality: Cognitive Behavioral Therapy  Interventions utilized were exploration, mental fitness, and reality testing  Purpose: insight or knowledge, self-worth, self-care, relapse prevention strategies, and trigger / craving management    Name: Gerardo Albright YOB: 1960   MR: [id number]      Facilitator: Nurse Practitioner  Level of Participation: did not attend    Comments: schizophrenia  Plan: patient will be encouraged to attend group

## 2024-04-18 ENCOUNTER — PHARMACY VISIT (OUTPATIENT)
Dept: PHARMACY | Facility: CLINIC | Age: 64
End: 2024-04-18
Payer: COMMERCIAL

## 2024-04-18 PROCEDURE — 2500000006 HC RX 250 W HCPCS SELF ADMINISTERED DRUGS (ALT 637 FOR ALL PAYERS): Mod: MUE | Performed by: PSYCHIATRY & NEUROLOGY

## 2024-04-18 PROCEDURE — 2500000004 HC RX 250 GENERAL PHARMACY W/ HCPCS (ALT 636 FOR OP/ED): Performed by: REGISTERED NURSE

## 2024-04-18 PROCEDURE — 99232 SBSQ HOSP IP/OBS MODERATE 35: CPT | Performed by: REGISTERED NURSE

## 2024-04-18 PROCEDURE — 2500000006 HC RX 250 W HCPCS SELF ADMINISTERED DRUGS (ALT 637 FOR ALL PAYERS): Performed by: PSYCHIATRY & NEUROLOGY

## 2024-04-18 PROCEDURE — 2500000001 HC RX 250 WO HCPCS SELF ADMINISTERED DRUGS (ALT 637 FOR MEDICARE OP)

## 2024-04-18 PROCEDURE — 1140000001 HC PRIVATE PSYCH ROOM DAILY

## 2024-04-18 RX ADMIN — RISPERIDONE 2 MG: 1 TABLET, ORALLY DISINTEGRATING ORAL at 20:28

## 2024-04-18 RX ADMIN — RISPERIDONE 2 MG: 1 TABLET, ORALLY DISINTEGRATING ORAL at 09:54

## 2024-04-18 RX ADMIN — TAMSULOSIN HYDROCHLORIDE 0.4 MG: 0.4 CAPSULE ORAL at 09:54

## 2024-04-18 RX ADMIN — RISPERIDONE 120 MG: KIT SUBCUTANEOUS at 16:00

## 2024-04-18 ASSESSMENT — PAIN SCALES - GENERAL
PAINLEVEL_OUTOF10: 0 - NO PAIN
PAINLEVEL_OUTOF10: 0 - NO PAIN

## 2024-04-18 ASSESSMENT — COLUMBIA-SUICIDE SEVERITY RATING SCALE - C-SSRS
6. HAVE YOU EVER DONE ANYTHING, STARTED TO DO ANYTHING, OR PREPARED TO DO ANYTHING TO END YOUR LIFE?: NO
1. SINCE LAST CONTACT, HAVE YOU WISHED YOU WERE DEAD OR WISHED YOU COULD GO TO SLEEP AND NOT WAKE UP?: NO
2. HAVE YOU ACTUALLY HAD ANY THOUGHTS OF KILLING YOURSELF?: NO
2. HAVE YOU ACTUALLY HAD ANY THOUGHTS OF KILLING YOURSELF?: NO
1. SINCE LAST CONTACT, HAVE YOU WISHED YOU WERE DEAD OR WISHED YOU COULD GO TO SLEEP AND NOT WAKE UP?: NO
6. HAVE YOU EVER DONE ANYTHING, STARTED TO DO ANYTHING, OR PREPARED TO DO ANYTHING TO END YOUR LIFE?: NO
6. HAVE YOU EVER DONE ANYTHING, STARTED TO DO ANYTHING, OR PREPARED TO DO ANYTHING TO END YOUR LIFE?: NO
1. SINCE LAST CONTACT, HAVE YOU WISHED YOU WERE DEAD OR WISHED YOU COULD GO TO SLEEP AND NOT WAKE UP?: NO
2. HAVE YOU ACTUALLY HAD ANY THOUGHTS OF KILLING YOURSELF?: NO

## 2024-04-18 ASSESSMENT — ENCOUNTER SYMPTOMS
AGITATION: 0
WEAKNESS: 1
DECREASED CONCENTRATION: 1
DYSPHORIC MOOD: 1
CONFUSION: 1
ACTIVITY CHANGE: 1
HYPERACTIVE: 0
SLEEP DISTURBANCE: 1
NERVOUS/ANXIOUS: 0

## 2024-04-18 ASSESSMENT — PAIN - FUNCTIONAL ASSESSMENT: PAIN_FUNCTIONAL_ASSESSMENT: 0-10

## 2024-04-18 NOTE — GROUP NOTE
Group Topic: Cognitive Focus   Group Date: 4/18/2024  Start Time: 1500  End Time: 1600  Facilitators: VIVIANE Bloom-CNS   Department: [facility] PROVIDER VIRTUAL    Number of Participants: 5   Group Focus: acceptance, anxiety, check in, coping skills, and feeling awareness/expression  Treatment Modality: Cognitive Behavioral Therapy  Interventions utilized were clarification, mental fitness, and reality testing  Purpose: coping skills, maladaptive thinking, self-worth, self-care, relapse prevention strategies, and trigger / craving management    Name: Gerardo Albright YOB: 1960   MR: [id number]      Facilitator: Nurse Practitioner  Level of Participation: did not attend    Comments: Schizophrenia  Plan: patient will be encouraged to attend group

## 2024-04-18 NOTE — PROGRESS NOTES
Gerardo Albright is a 63 y.o. male on day 7 of admission presenting with Other schizophrenia (Multi).      Subjective   Case reviewed in morning team. Sleeping well. Has indwelling urethral catheter after visit to ER over the weekend for urinary retention.  He is in bed most of day with little interaction with sitter at side during brief interview. He walked with PT/OT with belt for support in thomas.  He is taking meds as ordred. He denies SI. Denies AH/VH/paranoia and then refused to answer any further questions. Patient is adherent with current med regimen. He has a history of chronic negative sx of schizophrenia. He is up in chair with sitter- does not respond toquestions from provider. Had no output of urine overnight and Dr Ramos was informed.       Objective     Last Recorded Vitals  Blood pressure 135/87, pulse 72, temperature 37 °C (98.6 °F), temperature source Temporal, resp. rate 16, height 1.829 m (6'), weight 64.8 kg (142 lb 13.7 oz), SpO2 95%.    Review of Systems   Constitutional:  Positive for activity change.   Musculoskeletal:  Positive for gait problem.   Neurological:  Positive for weakness.   Psychiatric/Behavioral:  Positive for confusion, decreased concentration, dysphoric mood, self-injury, sleep disturbance and suicidal ideas. Negative for agitation and behavioral problems. The patient is not nervous/anxious and is not hyperactive.        Psychiatric ROS - Adult  Anxiety: decreased anxiety  Depression: anhedonia, interest, suicidal thoughts, and withdrawn   Delirium: negative  Psychosis: negative  Gay: negative  Safety Issues: suicidal ideation  Psychiatric ROS Comment: Poverty of thought       Mental Status Exam  General: lying quietly in hospital bed  Appearance: hospital attire  Attitude: calm  Behavior: withdrawn  Motor Activity: up in chair with sitter at side  Speech: low volume slow rate  Mood: anxious  Affect: constricted  Thought Process: difficult to assess  Thought Content:  committed tosafety on the unit, denies SI   Thought Perception: no delusions elicited in short exam, denies paranoia- none noted, denies SI, denies HI  Cognition: alert and oriented x3  Insight: limited  Judgement: limited- has guardian who nursing reports gives permission for any medications necessary to help stabilize patient    Psychiatric Risk Assessment  Violence Risk Assessment: command hallucinations, major mental illness, and male  Acute Risk of Harm to Others is Considered: low   Suicide Risk Assessment: , chronic medical illness, current psychiatric illness, male, panic attacks, prior suicide attempt, recent suicide attempt, and suicidal behaviors  Protective Factors against Suicide: other agrees to treatment, contracts for safety on unit  Acute Risk of Harm to Self is Considered: low to moderate    Current Medications    Scheduled medications  risperiDONE, 120 mg, subcutaneous, q30 days  risperiDONE, 2 mg, oral, BID  tamsulosin, 0.4 mg, oral, Daily      Continuous medications     PRN medications  PRN medications: acetaminophen, alum-mag hydroxide-simeth, magnesium hydroxide, QUEtiapine **OR** OLANZapine       Medication efficacy: Yes  Patient reporting any side effects? No  Any observed side effects? No      Relevant Results  No results found for this or any previous visit (from the past 24 hour(s)).          No results found for this or any previous visit (from the past 96 hour(s)).    Reviewed    Assessment/Plan   Principal Problem:    Other schizophrenia (Multi)  Active Problems:    Parkinson's disease (Multi)    Depression    Suicide attempt (Multi)    Paranoid schizophrenia, chronic condition (Multi)    Urinary retention    Constipation    Patient is dx with schizophrenia.  He has been hospilatized many times. [facility] reports he has taken no medications in the last month.  We are ascertaining when his last Peseris injection was given.  he may have seroqule prn for psychotic  anxiety/agitation. Plan to start perseris 120mg IM q 4 wks as that was the dose that was intended as increase by outpatient provider. Sitter will cont at this time.  Patient was ambulating in the hallway today, sitter remains present for safety, went to group in the am and consuming his meals. Refused meds this AM.    - Continue Risperdal tabs to 2  mg PO BID(have call in to find out when had last Lin).  Patienthas past dx of catatonic schizophrenia.  Will consider ativan but is not classic catanonia and he answers questions- denies AH/VH/paranoia today after being on risperdal for two days. Patient does not appear catatonic- it seems that he is at baseline- in notes from [facility] said some days he would just lie in bed and others he would follow staff around muchof the day.  Perseris 120mg IM ordered as outpatient sent information that they were increasing it from 90mg yto 120mg q 4 wks.    Will consider   Current malnutriton diganoses:  Malnutrition Diagnosis: Severe malnutrition related to chronic disease or condition        I spent 25 minutes in the professional and overall care of this patient.    Parts of this chart have been completed using voice recognition software.  Please excuse any errors of transcription.  Despite the medical decision making time stamp, my medical decision making has taken place during the patient's entire visit.  Thought process and reason for plan has been formulated from the time that I saw the patient until the time of disposition and is not specific to one specific moment during their visit and furthermore the medical decision making encompasses the entire chart and not only that represented in this note.    VIVIANE Bloom-CNS    Physical Exam

## 2024-04-18 NOTE — GROUP NOTE
Group Topic: Goals   Group Date: 4/17/2024  Start Time: 2000  End Time: 2033  Facilitators: Leola Ospina   Department: [facility]     Number of Participants: 6   Group Focus: goals  Treatment Modality: Other: PCA   Interventions utilized were reminiscence and support  Purpose: feelings and communication skills    Name: Gerardo Albright YOB: 1960   MR: [id number]      Facilitator: Mental Health [facility]  Level of Participation: did not attend  Quality of Participation: did not attend  Interactions with others: did not attend  Mood/Affect: did not attend  Triggers (if applicable): NA  Cognition: did not attend  Progress: None  Comments: PT PROBLEM IS SCHIZOPHRENIA  Plan: continue with services

## 2024-04-18 NOTE — GROUP NOTE
Group Topic: Self Esteem   Group Date: 4/18/2024  Start Time: 1100  End Time: 1145  Facilitators: BUTCH Marshall   Department: [facility]    Number of Participants: 8   Group Focus: self-esteem  Treatment Modality: Patient-Centered Therapy  Interventions utilized were exploration  Purpose: coping skills, feelings, insight or knowledge, and self-worth    Name: Gerardo Albright YOB: 1960   MR: [id number]      Facilitator:   Level of Participation: did not attend

## 2024-04-18 NOTE — PROGRESS NOTES
"Occupational Therapy                 Therapy Communication Note    Patient Name: Gerardo Albright  MRN: [id number]  Today's Date: 4/18/2024     Discipline: Occupational Therapy    Missed Visit Reason: Missed Visit Reason: Cancel (Therapist  approaching [facility] [place] stating \"I just gave him medication not apprpriate at this time for therapy\")    Missed Time: Cancel    "

## 2024-04-18 NOTE — NURSING NOTE
GILBERT NOTE     Problem:  Depression, SI     Behavior:  Pt awake in bed at start of shift. Blunted affect noted. Initially nonverbal with poor eye contact, pt staring straight ahead, but answering yes/no questions with a soft reply or nod of the head. When this RN went to pt room for med pass, eye contact improved and pt was slightly more engaging, with short responses to questions. Pt able to rate his depression 5/10, denies SI. Compliant with meds. After med pass, pt impulsively stood up from bed and walked out into hallway(with standby assist), briefly looking in group room at peers, then quickly returning to room, flopping self down on bed with eyes closed.   Group Participation: Declined  Appetite/Meals: HS snack provided     Interventions:  1:1 provided with reassurance. Evening meds administered per orders. Encouraged to make needs known. Sitter at bedside for safety.    Response:  Pt calm, resting in bed. No s/s of distress noted.      Plan:  Will continue to monitor behaviors and effectiveness of interventions while maintaining pt safety.

## 2024-04-18 NOTE — GROUP NOTE
Group Topic: Other   Group Date: 4/18/2024  Start Time: 1330  End Time: 1430  Facilitators: DRU Rasmussen   Department: [facility] VIRTUAL    Number of Participants: 4   Group Focus: concentration, leisure skills, and social skills  Treatment Modality: Other: Recreation Therapy  Interventions utilized were leisure development and mental fitness  Purpose: Patients engaged in a therapeutic group game of “[employer]”. Patients take turns creating new four letter words only changing one letter per turn.  This game aims to enhance cognition by ways of concentration and memory stimulation. Also, promotes social stimulation, enhanced mood and following directions.     Name: Gerardo Albright YOB: 1960   MR: [id number]      Facilitator: Recreational Therapist  Level of Participation: did not attend  Progress: None  Comments: pt problem is depressed mood. Pt initially declined group but with some encouragement pt was agreeable. As group began pt laid head on table and refused to participate and engage. Pt left group with sitter.   Plan: continue with services

## 2024-04-18 NOTE — GROUP NOTE
Group Topic: Reflection   Group Date: 4/18/2024  Start Time: 1000  End Time: 1050  Facilitators: DRU Rasmussen   Department: [facility]    Number of Participants: 9   Group Focus: self-awareness and self-esteem  Treatment Modality: Psychoeducation and Other: Recreation Therapy  Interventions utilized were exploration  Purpose: Patients engaged in therapeutic group focused on Identity as a self-exploration exercise. This group discussion and worksheet aims to promote self-awareness, self-worth, feelings and expressions by way of reminiscing to help provide a unique way for patients to explore aspects of their identity and understand how they come together to make a unique individual.     Name: Gerardo Albright YOB: 1960   MR: [id number]      Facilitator: Recreational Therapist  Level of Participation: did not attend  Progress: None  Comments: pt problem is depressed mood. Pt in room with sitter at side.   Plan: continue with services

## 2024-04-18 NOTE — NURSING NOTE
GILBERT NOTE     Problem:  SI, depression, anxiety      Behavior:  Patient is asleep in his bed upon approach. Sitter at bedside. Patient awakened enough to take medications. Patient does not answer this nurses questions; lies in bed with his eyes closed and does not respond when spoken to. Patient currently sitting in the hallway by the nurses station; sitter present.   Group Participation: Does not attend groups  Appetite/Meals: 0-50-50       Interventions:  Medications administered per MAR. Patient is encouraged to attend groups today and spend time out of his room.     Response:  Medication compliant.      Plan:  Continue current plan of care.

## 2024-04-19 PROCEDURE — 2500000006 HC RX 250 W HCPCS SELF ADMINISTERED DRUGS (ALT 637 FOR ALL PAYERS): Performed by: PSYCHIATRY & NEUROLOGY

## 2024-04-19 PROCEDURE — 1140000001 HC PRIVATE PSYCH ROOM DAILY

## 2024-04-19 PROCEDURE — 97530 THERAPEUTIC ACTIVITIES: CPT | Mod: GP

## 2024-04-19 PROCEDURE — 99232 SBSQ HOSP IP/OBS MODERATE 35: CPT | Performed by: REGISTERED NURSE

## 2024-04-19 PROCEDURE — 97530 THERAPEUTIC ACTIVITIES: CPT | Mod: GO,CO

## 2024-04-19 PROCEDURE — 97116 GAIT TRAINING THERAPY: CPT | Mod: GP

## 2024-04-19 PROCEDURE — 2500000001 HC RX 250 WO HCPCS SELF ADMINISTERED DRUGS (ALT 637 FOR MEDICARE OP)

## 2024-04-19 PROCEDURE — 97535 SELF CARE MNGMENT TRAINING: CPT | Mod: GO,CO

## 2024-04-19 PROCEDURE — 2500000006 HC RX 250 W HCPCS SELF ADMINISTERED DRUGS (ALT 637 FOR ALL PAYERS): Mod: MUE | Performed by: PSYCHIATRY & NEUROLOGY

## 2024-04-19 RX ADMIN — TAMSULOSIN HYDROCHLORIDE 0.4 MG: 0.4 CAPSULE ORAL at 08:05

## 2024-04-19 RX ADMIN — RISPERIDONE 2 MG: 1 TABLET, ORALLY DISINTEGRATING ORAL at 08:05

## 2024-04-19 ASSESSMENT — PAIN - FUNCTIONAL ASSESSMENT
PAIN_FUNCTIONAL_ASSESSMENT: 0-10
PAIN_FUNCTIONAL_ASSESSMENT: FLACC (FACE, LEGS, ACTIVITY, CRY, CONSOLABILITY)
PAIN_FUNCTIONAL_ASSESSMENT: 0-10

## 2024-04-19 ASSESSMENT — PAIN SCALES - GENERAL
PAINLEVEL_OUTOF10: 0 - NO PAIN

## 2024-04-19 ASSESSMENT — COGNITIVE AND FUNCTIONAL STATUS - GENERAL
EATING MEALS: A LITTLE
STANDING UP FROM CHAIR USING ARMS: A LITTLE
TOILETING: A LITTLE
DRESSING REGULAR UPPER BODY CLOTHING: A LITTLE
DAILY ACTIVITIY SCORE: 18
WALKING IN HOSPITAL ROOM: A LITTLE
DRESSING REGULAR LOWER BODY CLOTHING: A LITTLE
HELP NEEDED FOR BATHING: A LITTLE
TURNING FROM BACK TO SIDE WHILE IN FLAT BAD: A LITTLE
PERSONAL GROOMING: A LITTLE
MOVING FROM LYING ON BACK TO SITTING ON SIDE OF FLAT BED WITH BEDRAILS: A LITTLE
MOVING TO AND FROM BED TO CHAIR: A LITTLE
MOBILITY SCORE: 17
CLIMB 3 TO 5 STEPS WITH RAILING: A LOT

## 2024-04-19 ASSESSMENT — COLUMBIA-SUICIDE SEVERITY RATING SCALE - C-SSRS
6. HAVE YOU EVER DONE ANYTHING, STARTED TO DO ANYTHING, OR PREPARED TO DO ANYTHING TO END YOUR LIFE?: NO
6. HAVE YOU EVER DONE ANYTHING, STARTED TO DO ANYTHING, OR PREPARED TO DO ANYTHING TO END YOUR LIFE?: NO
2. HAVE YOU ACTUALLY HAD ANY THOUGHTS OF KILLING YOURSELF?: NO
1. SINCE LAST CONTACT, HAVE YOU WISHED YOU WERE DEAD OR WISHED YOU COULD GO TO SLEEP AND NOT WAKE UP?: NO
2. HAVE YOU ACTUALLY HAD ANY THOUGHTS OF KILLING YOURSELF?: NO
1. SINCE LAST CONTACT, HAVE YOU WISHED YOU WERE DEAD OR WISHED YOU COULD GO TO SLEEP AND NOT WAKE UP?: NO

## 2024-04-19 ASSESSMENT — ENCOUNTER SYMPTOMS
DECREASED CONCENTRATION: 1
HYPERACTIVE: 0
CONFUSION: 1
NERVOUS/ANXIOUS: 0
SLEEP DISTURBANCE: 1
WEAKNESS: 1
ACTIVITY CHANGE: 1
DYSPHORIC MOOD: 1
AGITATION: 0

## 2024-04-19 ASSESSMENT — ACTIVITIES OF DAILY LIVING (ADL): HOME_MANAGEMENT_TIME_ENTRY: 14

## 2024-04-19 NOTE — PROGRESS NOTES
eGrardo Albright is a 63 y.o. male on day 8 of admission presenting with Other schizophrenia (Multi).      Subjective   Case reviewed in morning team. Sleeping well. Has indwelling urethral catheter after visit to ER over the weekend for urinary retention.   He is taking meds as ordred- received perseris 120mg IM yesterday- oral risperdal discontinued today.  He has a history of chronic negative sx of schizophrenia. He is up in chair with sitter for group this morning- responds to limited questions from provider. He says his mood is good and he denies AH/VH/paranoia- slightly more open affect. Urine output is improving- Dr Galarza monitoring.       Objective     Last Recorded Vitals  Blood pressure 118/83, pulse 68, temperature 36.2 °C (97.2 °F), temperature source Temporal, resp. rate 18, height 1.829 m (6'), weight 64.8 kg (142 lb 13.7 oz), SpO2 98%.    Review of Systems   Constitutional:  Positive for activity change.   Musculoskeletal:  Positive for gait problem.   Neurological:  Positive for weakness.   Psychiatric/Behavioral:  Positive for confusion, decreased concentration, dysphoric mood, self-injury, sleep disturbance and suicidal ideas. Negative for agitation and behavioral problems. The patient is not nervous/anxious and is not hyperactive.        Psychiatric ROS - Adult  Anxiety: decreased anxiety  Depression: anhedonia, interest, suicidal thoughts, and withdrawn   Delirium: negative  Psychosis: negative  Gay: negative  Safety Issues: suicidal ideation  Psychiatric ROS Comment: Poverty of thought       Mental Status Exam  General: lying quietly in hospital bed an dup in chair with sitter  Appearance: hospital attire  Attitude: calm  Behavior: withdrawn  Motor Activity: up in chair with sitter at side  Speech: low volume slow rate  Mood: anxious  Affect: a little more open  Thought Process: difficult to assess  Thought Content: committed tosafety on the unit, denies SI   Thought Perception: no delusions  elicited in short exam, denies paranoia- none noted, denies SI, denies HI  Cognition: alert and oriented x3  Insight: limited  Judgement: limited- has guardian who nursing reports gives permission for any medications necessary to help stabilize patient    Psychiatric Risk Assessment  Violence Risk Assessment: command hallucinations, major mental illness, and male  Acute Risk of Harm to Others is Considered: low   Suicide Risk Assessment: , chronic medical illness, current psychiatric illness, male, panic attacks, prior suicide attempt, recent suicide attempt, and suicidal behaviors  Protective Factors against Suicide: other agrees to treatment, contracts for safety on unit  Acute Risk of Harm to Self is Considered: low to moderate    Current Medications    Scheduled medications  risperiDONE, 120 mg, subcutaneous, q30 days  tamsulosin, 0.4 mg, oral, Daily      Continuous medications     PRN medications  PRN medications: acetaminophen, alum-mag hydroxide-simeth, magnesium hydroxide, QUEtiapine **OR** OLANZapine       Medication efficacy: Yes  Patient reporting any side effects? No  Any observed side effects? No      Relevant Results  No results found for this or any previous visit (from the past 24 hour(s)).          No results found for this or any previous visit (from the past 96 hour(s)).    Reviewed    Assessment/Plan   Principal Problem:    Other schizophrenia (Multi)  Active Problems:    Parkinson's disease (Multi)    Depression    Suicide attempt (Multi)    Paranoid schizophrenia, chronic condition (Multi)    Urinary retention    Constipation    Patient is dx with schizophrenia.  He has been hospilatized many times. [facility] reports he has taken no medications in the last month.  We are ascertaining when his last Peseris injection was given.  he may have seroqule prn for psychotic anxiety/agitation. Plan to start perseris 120mg IM q 4 wks as that was the dose that was intended as increase by  outpatient provider. Song will cont at this time.  Patient was ambulating in the hallway today, song remains present for safety, went to group in the am and consuming his meals. Refused meds this AM.    - Continue Risperdal tabs to 2  mg PO BID(have call in to find out when had last iLn).  Patienthas past dx of catatonic schizophrenia.  Will consider ativan but is not classic catanonia and he answers questions- denies AH/VH/paranoia today after being on risperdal for two days. Patient does not appear catatonic- it seems that he is at baseline- in notes from [facility] said some days he would just lie in bed and others he would follow staff around muchof the day.  Perseris 120mg IM ordered(given yesterday) as outpatient sent information that they were increasing it from 90mg yto 120mg q 4 wks. Discontinued oral risperdal. Cont meds ordered at this time.      Current malnutriton diganoses:  Malnutrition Diagnosis: Severe malnutrition related to chronic disease or condition        I spent 25 minutes in the professional and overall care of this patient.    Parts of this chart have been completed using voice recognition software.  Please excuse any errors of transcription.  Despite the medical decision making time stamp, my medical decision making has taken place during the patient's entire visit.  Thought process and reason for plan has been formulated from the time that I saw the patient until the time of disposition and is not specific to one specific moment during their visit and furthermore the medical decision making encompasses the entire chart and not only that represented in this note.    VIVIANE Bloom-CNS    Physical Exam

## 2024-04-19 NOTE — GROUP NOTE
Group Topic: Goals   Group Date: 4/18/2024  Start Time: 2000  End Time: 2028  Facilitators: Minal Simeon   Department: [facility]     Number of Participants: 4   Group Focus: daily focus  Treatment Modality: Other:    Interventions utilized were other    Purpose: feelings    Name: Gerardo Albright YOB: 1960   MR: [id number]      Facilitator: Mental Health PCNA  Level of Participation: did not attend  Quality of Participation:   Interactions with others:   Mood/Affect:   Triggers (if applicable):   Cognition:   Progress:   Comments:   Plan:

## 2024-04-19 NOTE — PROGRESS NOTES
Nutrition Follow up Note    Nutrition Assessment      Nutrition History:  Food and Nutrient History: po intake averaging 21% over the past 10 meals documented. he has not eating over 50% at any meals since admission.  Energy Intake: Poor < 50 %    Anthropometrics:  Ht: 182.9 cm (6'), Wt: 64.8 kg (142 lb 13.7 oz), BMI: 19.37    Weight Change:  Daily Weight  04/17/24 : 64.8 kg (142 lb 13.7 oz)  04/13/24 : 67.8 kg (149 lb 7.6 oz)  04/11/24 : 65.3 kg (143 lb 15.4 oz)   04/09/24 : 67.6 kg (149 lb 0.5 oz)  03/08/24 : 68 kg (150 lb)  02/16/24 : 74.5 kg (164 lb 3.9 oz)  02/14/24 : 74 kg (163 lb 2.3 oz)  01/31/24 : 90.7 kg (200 lb)  01/09/24 : 79.8 kg (175 lb 14.8 oz)  01/06/24 : 78.5 kg (173 lb 1 oz)  01/03/24 : 81.6 kg (180 lb)     Weight History / % Weight Change: gradual wt loss noted in wt hx over the past 3 months  Significant Weight Loss: Yes  Interpretation of Weight Loss:  (32# (18.2%) wt loss over ~3 months (1/9-4/11))    Nutrition Focused Physical Exam Findings: deferred     Nutrition Significant Labs:  Lab Results   Component Value Date    WBC 12.3 (H) 04/10/2024    HGB 16.2 04/10/2024    HCT 47.0 04/10/2024     04/10/2024    CHOL 161 01/07/2024    TRIG 146 01/07/2024    HDL 43.9 01/07/2024    ALT 11 04/10/2024    AST 13 04/10/2024     04/10/2024    K 4.3 04/10/2024     04/10/2024    CREATININE 0.60 04/10/2024    BUN 40 (H) 04/10/2024    CO2 24 04/10/2024    TSH 2.54 05/09/2023    INR 1.2 (H) 05/30/2020    GLUF 126 (H) 01/07/2024    HGBA1C 5.6 04/10/2024     Nutrition Specific Medications:  risperiDONE, 120 mg, subcutaneous, q30 days  risperiDONE, 2 mg, oral, BID  tamsulosin, 0.4 mg, oral, Daily      Dietary Orders (From admission, onward)       Start     Ordered    04/17/24 0957  Oral nutritional supplements  Until discontinued        Comments: Or ensure original/ensure compact if available.   Question Answer Comment   Deliver with All meals    Select supplement: Sugar Free Mighty Shake         04/17/24 0957    04/13/24 0807  Adult diet Regular  Diet effective now        Question:  Diet type  Answer:  Regular    04/13/24 0810                  Estimated Needs:   Estimated Energy Needs  Total Energy Estimated Needs (kCal):  (3559-1068)  Total Estimated Energy Need per Day (kCal/kg):  (25-30)  Method for Estimating Needs: actual wt    Estimated Protein Needs  Total Protein Estimated Needs (g):  (65-78)  Total Protein Estimated Needs (g/kg):  (1-1.2)  Method for Estimating Needs: actual wt    Estimated Fluid Needs  Method for Estimating Needs: 1 ml/kcal        Nutrition Diagnosis   Nutrition Diagnosis:  Malnutrition Diagnosis  Patient has Malnutrition Diagnosis: Yes  Diagnosis Status: Ongoing  Malnutrition Diagnosis: Severe malnutrition related to chronic disease or condition  As Evidenced by: 32# (18.2%) wt loss over ~3 months (1/9-4/11), po intake </= 75% estimated needs for >/= 1 month  Additional Assessment Information: diagnosis updated from moderate chronic malnutrition on 4/17/24       Nutrition Interventions/Recommendations   Nutrition Interventions and Recommendations:    Nutrition Prescription:  Individualized Nutrition Prescription Provided for : 0159-0513 kcals and 65-78g protein to be provided via diet and supplements    Nutrition Interventions:   Food and/or Nutrient Delivery Interventions  Interventions: Meals and snacks, Medical food supplement  Meals and Snacks: General healthful diet  Goal: provide as ordered  Medical Food Supplement: Modified beverage  Goal: mighty shake TID to provide 200 kcals and 7g protein each. can substitute ensure original/ensure compact TID to provide 220 kcals and 9g protein each.  Additional Interventions: suggest monitoring for wt changes on a weekly basis    Education Documentation  No documentation found.           Nutrition Monitoring and Evaluation   Monitoring/Evaluation:   Food/Nutrient Related History Monitoring  Monitoring and Evaluation Plan: Energy  intake  Energy Intake: Estimated energy intake  Criteria: pt to consume >/= 75% estimated needs    Body Composition/Growth/Weight History  Monitoring and Evaluation Plan: Weight  Weight: Measured weight  Criteria: pt will maintain wt at this time       Time Spent/Follow-up:   Follow Up  Time Spent (min): 20 minutes  Last Date of Nutrition Visit: 04/19/24  Nutrition Follow-Up Needed?: 5-7 days  Follow up Comment: 4/24/24

## 2024-04-19 NOTE — GROUP NOTE
Group Topic: Leisure Skills   Group Date: 4/19/2024  Start Time: 1100  End Time: 1150  Facilitators: DRU Rasmussen   Department: [facility] REHABTH VIRTUAL    Number of Participants: 7   Group Focus: leisure skills and social skills  Treatment Modality: Leisure Development and Other: Recreation Therapy  Interventions utilized were leisure development  Purpose: Patients engaged in a group session with game of “[employer]” which aims to stimulate cognition, leisure development and social interaction. Group game also aims to increase memory by way of reminiscing, direction following, fine motor skills and eye-hand coordination.     Name: Gerardo Albright YOB: 1960   MR: [id number]      Facilitator: Recreational Therapist  Level of Participation: did not attend  Progress: None  Comments: pt problem is depressed mood. Pt declines group participation. Pt in room with sitter at side.   Plan: continue with services

## 2024-04-19 NOTE — PROGRESS NOTES
Occupational Therapy    OT Treatment    Patient Name: Gerardo Albright  MRN: [id number]  Today's Date: 4/19/2024  Time Calculation  Start Time: 0929  Stop Time: 0953  Time Calculation (min): 24 min         Assessment:  OT Assessment: Pt progressing with established POC intermittent inconsistent participation.  Will continue to address remaining deficits.  Prognosis: Good  Barriers to Discharge: None  Evaluation/Treatment Tolerance: Patient tolerated treatment well  Medical Staff Made Aware: Yes  End of Session Communication: Bedside nurse (OTR/L)  End of Session Patient Position: Bed, 3 rail up (sitter present all needs met)  OT Assessment Results: Decreased ADL status, Decreased upper extremity strength, Decreased safe judgment during ADL, Decreased cognition, Decreased endurance, Decreased fine motor control, Decreased functional mobility  Prognosis: Good  Barriers to Discharge: None  Evaluation/Treatment Tolerance: Patient tolerated treatment well  Medical Staff Made Aware: Yes  Strengths: Support of Caregivers  Barriers to Participation: Comorbidities, Coping skills  Plan:  Treatment Interventions: ADL retraining, Functional transfer training, Endurance training, Patient/family training, Compensatory technique education  OT Frequency: 3 times per week  OT Discharge Recommendations: Moderate intensity level of continued care  Equipment Recommended upon Discharge: Wheeled walker  OT Recommended Transfer Status: Assist of 1  OT - OK to Discharge: Yes  Treatment Interventions: ADL retraining, Functional transfer training, Endurance training, Patient/family training, Compensatory technique education    Subjective   Previous Visit Info:  OT Last Visit  OT Received On: 04/19/24  General:  General  Reason for Referral: Referral from Dr. Allen due to physical decline affecting pts ADLs and functional mobility. Pt sent out to ED on 4/11 for urinary retention and returned with a espinal cathetar and a sitter.  Referred By:  "Dr Allen  Past Medical History Relevant to Rehab: Other schizophrenia (Multi) 7/27/2023     Other seizures (Multi) 7/27/2023    Anxiety 7/27/2023    Parkinson's disease (Multi) 7/27/2023    Dementia (Multi) 7/27/2023    Other insomnia 7/27/2023    Gastroesophageal reflux disease without esophagitis 7/27/2023    Asthenia 7/27/2023    Chronic obstructive pulmonary disease (Multi) 7/27/2023    Benign hypertension 7/27/2023    Depression 7/27/2023    ASHD (arteriosclerotic heart disease) 7/27/2023    At risk for falls 7/30/2023    Suicide attempt (Multi) 1/6/2024    Laceration of left upper extremity 1/10/2024    Paranoid schizophrenia, chronic condition (Multi) 4/11/2024  Missed Visit: No  Missed Visit Reason: Cancel (Therapist  approaching [facility] at 1658 NSG stating \"I just gave him medication not apprpriate at this time for therapy\")  Family/Caregiver Present: Yes  Caregiver Feedback: sitter present  Prior to Session Communication: Bedside nurse (OTR/L re Pts current POC)  Patient Position Received: Bed, 3 rail up, Alarm off, not on at start of session  Preferred Learning Style: verbal, visual  General Comment: Confered with NSG.  Pt resting sitter present however agreeable to skilled therapeutic intervention.  Precautions:  Medical Precautions: Fall precautions  Pain:  Pain Assessment  Pain Assessment: 0-10  Pain Score: 0 - No pain    Objective    Cognition:  Cognition  Orientation Level: Unable to assess  Processing Speed: Delayed  Coordination:  Coordination Comment: Pt has a h/o Parkinson's Disease affecting pts coordination at times  Activities of Daily Living:    Grooming  Grooming Level of Assistance: Close supervision  Grooming Where Assessed: Standing sinkside  Grooming Comments: Pt washing hands    UE Dressing  UE Dressing Level of Assistance: Close supervision  UE Dressing Where Assessed: Other (Comment) (toilet)  UE Dressing Comments: Pt doffr/don hospital gown    LE Dressing  Pants Level of Assistance: " Minimum assistance (assist thread Foloey Pt don/doff pants)  Sock Level of Assistance: Setup (Pt seated doff/don socks efficiently)  LE Dressing Where Assessed: Toilet  LE Dressing Comments: Allow pt to process    Toileting  Toileting Adaptive Equipment: Cathertization equipment (+Aguilar)  Toileting Level of Assistance: Close supervision  Where Assessed: Toilet  Toileting Comments: Pt adjust clothig prior/aftyer no hygiene  Functional Standing Tolerance:  Time: 2 miinutes  Activity: ADL skills  Functional Standing Tolerance Comments: kno hand support  Bed Mobility/Transfers: Bed Mobility  Bed Mobility: Yes  Bed Mobility 1  Bed Mobility 1: Supine to sitting  Level of Assistance 1: Moderate assistance  Bed Mobility Comments 1: assist trunk up d/t volitioin HOB to neutral  Bed Mobility 2  Bed Mobility  2: Sitting to supine  Level of Assistance 2: Modified independent  Bed Mobility Comments 2: bed to neutral    Transfers  Transfer: Yes  Transfer 1  Transfer From 1: Bed to  Transfer to 1: Stand  Technique 1: Sit to stand  Transfer Device 1: Walker, Gait belt  Transfer Level of Assistance 1: Minimum assistance  Trials/Comments 1: vc hand placement  Transfers 2  Transfer From 2: Stand to  Transfer to 2: Sit  Technique 2: Stand to sit  Transfer Device 2: Walker  Transfer Level of Assistance 2: Contact guard  Trials/Comments 2: vc hand placement  to bed    Toilet Transfers  Toilet Transfer From: Rolling walker  Toilet Transfer Type: To and from  Toilet Transfer to: Standard toilet  Toilet Transfer Technique: Ambulating  Toilet Transfers: Contact guard  Toilet Transfers Comments: vc hand placement    Functional Mobility:  Functional Mobility  Functional Mobility Performed: Yes  Functional Mobility 1  Surface 1: Level tile  Device 1: Rolling walker  Assistance 1: Contact guard  Quality of Functional Mobility 1: Narrow base of support  Comments 1: gait belt use 15' to include doorway turns and backing       Therapy/Activity:  Therapeutic Exercise  Therapeutic Exercise Performed: Yes (Therapist educate Pt wih use orange t band 15 reps x 1 set limited participation to shoulder abduction/adduction.)    Outcome Measures:[facility] Daily Activity  Putting on and taking off regular lower body clothing: A little  Bathing (including washing, rinsing, drying): A little  Putting on and taking off regular upper body clothing: A little  Toileting, which includes using toilet, bedpan or urinal: A little  Taking care of personal grooming such as brushing teeth: A little  Eating Meals: A little  Daily Activity - Total Score: 18    Education Documentation  Precautions, taught by ОЛЕГ Ivey at 4/19/2024 10:01 AM.  Learner: Patient  Readiness: Acceptance  Method: Explanation, Demonstration, Teach-back  Response: Needs Reinforcement, Demonstrated Understanding  Comment: Instructed Pt with safety in transfers, ADL skills and compensatory skills.    Home Exercise Program, taught by ОЛЕГ Ivey at 4/19/2024 10:01 AM.  Learner: Patient  Readiness: Acceptance  Method: Explanation, Demonstration, Teach-back  Response: Needs Reinforcement, Demonstrated Understanding  Comment: Instructed Pt with safety in transfers, ADL skills and compensatory skills.    ADL Training, taught by ОЛЕГ Ivey at 4/19/2024 10:01 AM.  Learner: Patient  Readiness: Acceptance  Method: Explanation, Demonstration, Teach-back  Response: Needs Reinforcement, Demonstrated Understanding  Comment: Instructed Pt with safety in transfers, ADL skills and compensatory skills.    Education Comments  No comments found.      OP EDUCATION:     Goals:  Encounter Problems       Encounter Problems (Active)       Dressing       Pt will perform UB and LB dressing with modified independence with AE PRN utilizing safe technique. (Progressing)       Start:  04/13/24    Expected End:  04/27/24               EXERCISE/STRENGTHENING       Pt will be educated on BUE HEP and perform reverse  demonstration with supervision to improve muscle strength for ADLs and ADL transfers. (Progressing)       Start:  04/13/24    Expected End:  04/13/24               TRANSFERS       Pt will perform all functional transfers with least restrictive device with modified independence with safe technique to assist with ADLs upon return to SNF. (Progressing)       Start:  04/13/24    Expected End:  04/27/24

## 2024-04-19 NOTE — GROUP NOTE
Group Topic: Music Therapy   Group Date: 4/19/2024  Start Time: 1000  End Time: 1100  Facilitators: Lien Pearl   Department: [facility]    Number of Participants: 9   Group Focus: calming/relaxation, communication/socialization, community group, leisure skills, music therapy, and relaxation  Treatment Modality: Music Therapy  Interventions Utilized were: passive music engagement and reality testing      Name: Gerardo Albright YOB: 1960   MR: [id number]      Level of Participation: withdrawn  Quality of Participation: isolative, passive, quiet, and withdrawn  Interactions with others: appropriate  Mood/Affect: depressed  Cognition, Pre Treatment: not focused  Cognition, Post Treatment: not focused  Progress: Minimal  Plan: continue with services

## 2024-04-19 NOTE — GROUP NOTE
Group Topic: Goals   Group Date: 4/19/2024  Start Time: 0730  End Time: 0800  Facilitators: Ary Cuevas   Department: [facility]    Number of Participants: 5   Group Focus: community group  Treatment Modality: Other: Daily goals  Interventions utilized were support  Purpose: insight or knowledge    Name: Gerardo Albright YOB: 1960   MR: [id number]      Facilitator: [facility]  Level of Participation: did not attend  Quality of Participation:  did not attend  Interactions with others: did not attend  Mood/Affect: did not attend  Triggers (if applicable): n/a  Cognition: did not attend  Progress: Other  Comments: did not attend  Plan: continue with services

## 2024-04-19 NOTE — NURSING NOTE
GILBERT NOTE     Problem:  SI,depression, anxiety      Behavior:  Patient is cooperative with care and medication administration. Patient does not engage in conversation but will shake his head yes or no to answer questions. Patient wants to return to his room to sleep but is encouraged by staff to remain up and OOB for groups and meals. Patient denies SI. Sitter present.   Group Participation: Attends am group only   Appetite/Meals: 25-25-50       Interventions:  Medications administered per MAR.     Response:  Medication compliance     Plan:  Continue current plan of care.

## 2024-04-19 NOTE — PROGRESS NOTES
REHAB Therapy Assessment & Treatment    Patient Name: Gerardo Albright  MRN: [id number]  Today's Date: 4/19/2024    Recreation Therapy note: Pt is alert and oriented x 2-3 with some poor insight/judgement.  Pt displays poor eye contact and very flat affect, not cooperative and refuses to engage with most interactions with this writer. Pt refuses most groups and independent leisure supplies offered. Needs maximum encouragement from staff. Pt did attend music therapy this morning and displayed some increased response when prompted at times AEB nodding head yes to enjoying songs.  Pt continues to endorse depressed mood. Pt is not eating well and pt can be seen in room most of the day resting with sitter at side. CTRS will continue to encourage pt to attend groups of choice daily.

## 2024-04-19 NOTE — PROGRESS NOTES
Physical Therapy    Physical Therapy Treatment    Patient Name: Gerardo Albright  MRN: [id number]  Today's Date: 4/19/2024  Time in/out 07:38-8:02          Assessment/Plan   PT Assessment  PT Assessment Results: Decreased strength, Decreased endurance, Decreased mobility, Decreased cognition, Impaired judgement, Decreased safety awareness, Impaired balance, Decreased coordination  Assessment Comment: Participation limited by cooperation and cognition  End of Session Patient Position: Up in chair (sitter present. Patient in dining room.)  PT Plan  Inpatient/Swing Bed or Outpatient: Inpatient  PT Plan  Treatment/Interventions: Bed mobility, Transfer training, Gait training, Therapeutic exercise, Therapeutic activity, Wheelchair management  PT Plan: Skilled PT  PT Frequency: 4 times per week  PT Discharge Recommendations: Low intensity level of continued care, 24 hr supervision due to cognition  Equipment Recommended upon Discharge: Wheeled walker  PT Recommended Transfer Status: Assist x1  PT - OK to Discharge: Yes      General Visit Information:   PT  Visit  PT Received On: 04/19/24  Response to Previous Treatment: Patient with no complaints from previous session.  General  Reason for Referral: Referral from Dr. Allen due to physical decline affecting pts ADLs and functional mobility. Pt sent out to ED on 4/11 for urinary retention and returned with a espinal cathetar and a sitter.  Referred By: Dr Allen  Past Medical History Relevant to Rehab: Other schizophrenia (Multi) 7/27/2023     Other seizures (Multi) 7/27/2023    Anxiety 7/27/2023    Parkinson's disease (Multi) 7/27/2023    Dementia (Multi) 7/27/2023    Other insomnia 7/27/2023    Gastroesophageal reflux disease without esophagitis 7/27/2023    Asthenia 7/27/2023    Chronic obstructive pulmonary disease (Multi) 7/27/2023    Benign hypertension 7/27/2023    Depression 7/27/2023    ASHD (arteriosclerotic heart disease) 7/27/2023    At risk for falls 7/30/2023     Suicide attempt (Multi) 1/6/2024    Laceration of left upper extremity 1/10/2024    Paranoid schizophrenia, chronic condition (Multi) 4/11/2024  Patient Position Received: Bed, 3 rail up  General Comment: Patient sleeping in bed upon arrival. Sitter present. Patient arousable and nods head yes to gettng out of bed for breakfast.    Subjective   Precautions:  Precautions  Medical Precautions: Fall precautions  Precautions Comment: risk for falls, espinal cathetar  Vital Signs:       Objective   Pain:  Pain Assessment  Pain Assessment: 0-10  Pain Score: 0 - No pain  Cognition:  Cognition  Processing Speed: Delayed  Postural Control:  Static Sitting Balance  Static Sitting-Balance Support: No upper extremity supported, Feet supported  Static Sitting-Level of Assistance: Close supervision  Static Standing Balance  Static Standing-Balance Support: Bilateral upper extremity supported  Static Standing-Level of Assistance: Contact guard  Static Standing-Comment/Number of Minutes: with RW  Dynamic Standing Balance  Dynamic Standing-Balance Support: Bilateral upper extremity supported  Dynamic Standing-Comments: min assist with RW    Activity Tolerance:  Activity Tolerance  Endurance: Decreased tolerance for upright activites  Treatments:  Therapeutic Exercise  Therapeutic Exercise Activity 1: standign heel raises with RW 1 x 10  Therapeutic Exercise Activity 2: standing hip flex with RW x 4. Patient declines further ther ex even with encouragment    Bed Mobility 1  Bed Mobility 1: Supine to sitting  Level of Assistance 1: Moderate assistance  Bed Mobility Comments 1: Sleeping upon arrival, easily arousable, aggreable to out ob bed for breakfast. Assist with LEs and trunk.    Ambulation/Gait Training 1  Surface 1: Level tile  Device 1: Rolling walker  Gait Support Devices: Gait belt  Assistance 1: Contact guard  Quality of Gait 1: Diminished heel strike, Forward flexed posture, Decreased step length, Shuffling  gait  Comments/Distance (ft) 1: 125', includes turns. Sits when fatigued. Declined second amb attempt.  Transfer 1  Transfer From 1: Bed to  Transfer to 1: Wheelchair  Transfer Device 1: Gait belt, Walker  Transfer Level of Assistance 1: Minimum assistance  Trials/Comments 1: cues for safe technique and full turn before sitting in chair.  Transfers 2  Technique 2: Sit to stand, Stand to sit  Transfer Device 2: Walker  Transfer Level of Assistance 2: Contact guard  Trials/Comments 2: verbal and tactile cues for hands as patient tends to pull up on walker to stand    Wheelchair Activities  Propulsion Type 1: Manual  Level 1: Level tile  Method 1: Right upper extremity, Left upper extremity, Right lower extremity, Left lower extremity  Level of Assistance 1: Minimum assistance  Description/Details 1: difficulty coordinating extemeties to propel. Cues for technique.    Outcome Measures:  [facility] Basic Mobility  Turning from your back to your side while in a flat bed without using bedrails: A little  Moving from lying on your back to sitting on the side of a flat bed without using bedrails: A little  Moving to and from bed to chair (including a wheelchair): A little  Standing up from a chair using your arms (e.g. wheelchair or bedside chair): A little  To walk in hospital room: A little  Climbing 3-5 steps with railing: A lot  Basic Mobility - Total Score: 17    Education Documentation  No documentation found.  Education Comments  No comments found.        OP EDUCATION:       Encounter Problems       Encounter Problems (Active)       PT Problem       BED MOBILITY Patient will transfer supine to sit and sit to supine with independent assist to facilitate mobility.  (Progressing)       Start:  04/12/24    Expected End:  05/10/24            TRANSFER Patient will transfer bed to chair and chair to bed with independent assist to facilitate mobility.  (Progressing)       Start:  04/12/24    Expected End:  05/10/24             AMBULATION Patient will amb 150 feet+with rolling walker device including two turns on even surface with independent assist to facilitate safe mobility.  (Progressing)       Start:  04/12/24    Expected End:  05/10/24            STRENGTH Patient will increase BLE  strength to 4+/5 to improve functional mobility.    (Progressing)       Start:  04/12/24    Expected End:  05/10/24

## 2024-04-19 NOTE — GROUP NOTE
Group Topic: Cognitive Focus   Group Date: 4/19/2024  Start Time: 1330  End Time: 1430  Facilitators: DRU Rasmussen   Department: [facility]    Number of Participants: 6   Group Focus: concentration, leisure skills, self-esteem, and social skills  Treatment Modality: Leisure Development and Other: Recreation Therapy   Interventions utilized were leisure development and mental fitness  Purpose: In this therapeutic group patients played a game of [place]. This game is aimed to promote creative thinking and challenge individuals to come up with unique words that fit specific categories that start with a given letter, fostering critical cognitive skills. This activity also provides opportunity for increased social stimulation, enhanced mood and increased attention span.     Name: Gerardo Albright YOB: 1960   MR: [id number]      Facilitator: Recreational Therapist  Level of Participation: did not attend  Progress: None  Comments: pt problem is depressed mood. Pt declines invitation to attend group. Pt in room with sitter.   Plan: continue with services

## 2024-04-19 NOTE — NURSING NOTE
GILBERT NOTE     Problem:  Depression       Behavior:  Pt in bed with lights off. Sitter at bedside. Pt with flat affect, eye contact very good, he is compliant with hs meds but does not grasp meds of drink, only takes them with help.        Interventions:  This nurse attempted to engage this pt in 1:1.    Response:  Pt not verbally responsive to anything this nurse asks or says but maintains flat affect with good eye contact. It is not clear to this nurse if pt understands anything being said.     Plan:  Continue to monitor pt for depression.

## 2024-04-20 PROCEDURE — 1140000001 HC PRIVATE PSYCH ROOM DAILY

## 2024-04-20 PROCEDURE — 99232 SBSQ HOSP IP/OBS MODERATE 35: CPT | Performed by: STUDENT IN AN ORGANIZED HEALTH CARE EDUCATION/TRAINING PROGRAM

## 2024-04-20 PROCEDURE — 2500000006 HC RX 250 W HCPCS SELF ADMINISTERED DRUGS (ALT 637 FOR ALL PAYERS): Performed by: PSYCHIATRY & NEUROLOGY

## 2024-04-20 ASSESSMENT — PAIN - FUNCTIONAL ASSESSMENT
PAIN_FUNCTIONAL_ASSESSMENT: 0-10
PAIN_FUNCTIONAL_ASSESSMENT: 0-10

## 2024-04-20 ASSESSMENT — COLUMBIA-SUICIDE SEVERITY RATING SCALE - C-SSRS
2. HAVE YOU ACTUALLY HAD ANY THOUGHTS OF KILLING YOURSELF?: NO
1. SINCE LAST CONTACT, HAVE YOU WISHED YOU WERE DEAD OR WISHED YOU COULD GO TO SLEEP AND NOT WAKE UP?: NO
6. HAVE YOU EVER DONE ANYTHING, STARTED TO DO ANYTHING, OR PREPARED TO DO ANYTHING TO END YOUR LIFE?: NO
1. SINCE LAST CONTACT, HAVE YOU WISHED YOU WERE DEAD OR WISHED YOU COULD GO TO SLEEP AND NOT WAKE UP?: NO
6. HAVE YOU EVER DONE ANYTHING, STARTED TO DO ANYTHING, OR PREPARED TO DO ANYTHING TO END YOUR LIFE?: NO
2. HAVE YOU ACTUALLY HAD ANY THOUGHTS OF KILLING YOURSELF?: NO

## 2024-04-20 ASSESSMENT — ENCOUNTER SYMPTOMS
ACTIVITY CHANGE: 1
AGITATION: 0
DECREASED CONCENTRATION: 1
SLEEP DISTURBANCE: 1
HYPERACTIVE: 0
CONFUSION: 1
WEAKNESS: 1
NERVOUS/ANXIOUS: 0
DYSPHORIC MOOD: 1

## 2024-04-20 ASSESSMENT — PAIN SCALES - GENERAL
PAINLEVEL_OUTOF10: 0 - NO PAIN
PAINLEVEL_OUTOF10: 0 - NO PAIN

## 2024-04-20 NOTE — GROUP NOTE
Group Topic: Feeling Awareness/Expression   Group Date: 4/20/2024  Start Time: 1015  End Time: 1100  Facilitators: FLAVIO ReddyS   Department: [facility]    Number of Participants: 6   Group Focus: feeling awareness/expression  Treatment Modality: Other: Recreation Therapy  Interventions utilized were other Emtions  Purpose: feelings  Patient will be aware of one's inner self and draw 6 positive pictures reflecting a positive thought or feeling during self-awareness activity.    Name: Gerardo Albright YOB: 1960   MR: [id number]      Facilitator: Recreational Therapist  Level of Participation: did not attend  Quality of Participation: withdrawn and sleeping and refused  Interactions with others:  N/A  Mood/Affect: flat  Triggers (if applicable): N/A  Cognition:  pt did not attend   Progress: Minimal  Comments: pt problem is depressed mood. Pt declines invitation to attend group. Pt in room with sitter.   Plan: continue with services

## 2024-04-20 NOTE — NURSING NOTE
"Problem:  depressive symptoms    Behavior:   Pt pleasant and impulsive this shift, during breakfast and lunch pt did not want to get out of bed. During dinner, pt came down to dinning room wanting the pca snack, then left dinning room when he could not have the aides snack, then came back to dinning room starring at this nurse and kept coming after RN, then pt attempts to grab RN bottom, advised to sit down and that is not appropriate, then pt moves around dinning room a bit then stands in front of this  nurse states \"I need some loving\" and attempt to grab RN breasts. Once again redirected pt. After dinner this nurse was behind nursing station and pt rolls up to nurses station and stands up attempting to put leg over counter to get over it. Pt then went to his room and laid back down.            Appetite: tolerating food POOR            Group: did not     Intervention:  Administered scheduled medications, encouraged pt to come to group therapy. Educated pt on importance of med compliance.      Response:  Pt in bed laying down.      Plan:  Maintain pt safety.    "

## 2024-04-20 NOTE — PROGRESS NOTES
Gerardo Albright is a 63 y.o. male on day 9 of admission presenting with Other schizophrenia (Multi).      Subjective   Case reviewed with nursing and chart reviewed. Sleeping well. Has indwelling urethral catheter afor urinary retention.   He is taking meds as ordred- received perseris 120mg IM 4/18/24- oral risperdal discontinued 4/19/24.  He has a history of chronic negative sx of schizophrenia. Today he is difficult to arouse for staff or this provider, not engaging in interview. Urine output - Dr Galarza monitoring.       Objective     Last Recorded Vitals  Blood pressure 118/82, pulse 93, temperature 36.7 °C (98.1 °F), temperature source Oral, resp. rate 16, height 1.829 m (6'), weight 64.8 kg (142 lb 13.7 oz), SpO2 96%.    Review of Systems   Constitutional:  Positive for activity change.   Musculoskeletal:  Positive for gait problem.   Neurological:  Positive for weakness.   Psychiatric/Behavioral:  Positive for confusion, decreased concentration, dysphoric mood, self-injury, sleep disturbance and suicidal ideas. Negative for agitation and behavioral problems. The patient is not nervous/anxious and is not hyperactive.        Psychiatric ROS - Adult  Anxiety: decreased anxiety  Depression: anhedonia, interest, suicidal thoughts, and withdrawn   Delirium: negative  Psychosis: negative  Gay: negative  Safety Issues: suicidal ideation  Psychiatric ROS Comment: Poverty of thought       Mental Status Exam  General: lying quietly in hospital bed with sitter  Appearance: hospital attire  Attitude: withdrawn  Behavior: withdrawn  Motor Activity: +PMR  Speech: no spontaneous speech today  Mood: unable to assess  Affect: unrelated  Thought Process: unable to assess  Thought Content: unable  Thought Perception: unable to assess  Cognition: somnolent  Insight: limited  Judgement: limited- has guardian who nursing reports gives permission for any medications necessary to help stabilize patient    Psychiatric Risk  Assessment  Violence Risk Assessment: command hallucinations, major mental illness, and male  Acute Risk of Harm to Others is Considered: low   Suicide Risk Assessment: , chronic medical illness, current psychiatric illness, male, panic attacks, prior suicide attempt, recent suicide attempt, and suicidal behaviors  Protective Factors against Suicide: other agrees to treatment, contracts for safety on unit  Acute Risk of Harm to Self is Considered: low to moderate    Current Medications    Scheduled medications  risperiDONE, 120 mg, subcutaneous, q30 days  tamsulosin, 0.4 mg, oral, Daily      Continuous medications     PRN medications  PRN medications: acetaminophen, alum-mag hydroxide-simeth, magnesium hydroxide, QUEtiapine **OR** OLANZapine       Medication efficacy: Yes  Patient reporting any side effects? No  Any observed side effects? No      Relevant Results  No results found for this or any previous visit (from the past 24 hour(s)).          No results found for this or any previous visit (from the past 96 hour(s)).    Reviewed    Assessment/Plan   Principal Problem:    Other schizophrenia (Multi)  Active Problems:    Parkinson's disease (Multi)    Depression    Suicide attempt (Multi)    Paranoid schizophrenia, chronic condition (Multi)    Urinary retention    Constipation    Patient is dx with schizophrenia.  He has been hospilatized many times. [facility] reports he has taken no medications in the last month.  We are ascertaining when his last Peseris injection was given.  he may have seroqule prn for psychotic anxiety/agitation. Plan to start perseris 120mg IM q 4 wks as that was the dose that was intended as increase by outpatient provider. Sitter will cont at this time.  Patient was ambulating in the hallway today, sitter remains present for safety, went to group in the am and consuming his meals. Refused meds this AM.    - Continue Risperdal tabs to 2  mg PO BID(have call in to find  out when had last Lin).  Patienthas past dx of catatonic schizophrenia.  Will consider ativan but is not classic catanonia and he answers questions- denies AH/VH/paranoia today after being on risperdal for two days. Patient does not appear catatonic- it seems that he is at baseline- in notes from [facility] frankie said some days he would just lie in bed and others he would follow staff around muchof the day.  Perseris 120mg IM ordered(given yesterday) as outpatient sent information that they were increasing it from 90mg yto 120mg q 4 wks. Discontinued oral risperdal. Cont meds ordered at this time.      Current malnutriton diganoses:  Malnutrition Diagnosis: Severe malnutrition related to chronic disease or condition        I spent 25 minutes in the professional and overall care of this patient.        Gabino Hi MD

## 2024-04-20 NOTE — NURSING NOTE
GILBERT NOTE     Problem:  SI/Depression/Anxiety     Behavior:  Pt observed peacefully resting in bed upon approach. PCA sitting near bedside. Pt is resting with his eye closed. He does not respond to touch or open his eyes to engage in conversation. This RN can see fluttering of eyelids when this RN speaks to pt, but he will  not open them.     Group Participation: Attends at times  Appetite/Meals: No HS snack given.        Interventions:  No Hs medications to administer per MAR    Response:  Pt continues to rest in bed, no s/s of distress noted. PCA sitting at bedside continues.      Plan:  Continue to monitor and assist. Maintain q15 minute rounds for safety.

## 2024-04-20 NOTE — GROUP NOTE
Group Topic: Leisure Skills   Group Date: 4/20/2024  Start Time: 1100  End Time: 1145  Facilitators: DRU Reddy   Department: [facility]    Number of Participants: 6   Group Focus: leisure skills  Treatment Modality: Other: Recreation Therapy  Interventions utilized were problem solving  Purpose: other: Pt will be able to answer different categories with a word starting with a selected letter picked by a group member.       Name: Gerardo Albright YOB: 1960   MR: [id number]      Facilitator: Recreational Therapist  Level of Participation: did not attend  Quality of Participation: withdrawn  Interactions with others:  n/a  Mood/Affect:  n/a  Triggers (if applicable): n/a  Cognition:  n/a  Progress: Minimal  Comments: pt problem is depressed mood. Pt declines invitation to attend group. Pt in room with sitter.   Plan: continue with services

## 2024-04-21 PROCEDURE — 2500000006 HC RX 250 W HCPCS SELF ADMINISTERED DRUGS (ALT 637 FOR ALL PAYERS): Performed by: PSYCHIATRY & NEUROLOGY

## 2024-04-21 PROCEDURE — 1140000001 HC PRIVATE PSYCH ROOM DAILY

## 2024-04-21 PROCEDURE — 99232 SBSQ HOSP IP/OBS MODERATE 35: CPT | Performed by: STUDENT IN AN ORGANIZED HEALTH CARE EDUCATION/TRAINING PROGRAM

## 2024-04-21 PROCEDURE — 2500000006 HC RX 250 W HCPCS SELF ADMINISTERED DRUGS (ALT 637 FOR ALL PAYERS): Mod: MUE | Performed by: PSYCHIATRY & NEUROLOGY

## 2024-04-21 RX ORDER — ESCITALOPRAM OXALATE 10 MG/1
10 TABLET ORAL DAILY
Status: DISCONTINUED | OUTPATIENT
Start: 2024-04-21 | End: 2024-04-24 | Stop reason: HOSPADM

## 2024-04-21 RX ADMIN — TAMSULOSIN HYDROCHLORIDE 0.4 MG: 0.4 CAPSULE ORAL at 09:18

## 2024-04-21 ASSESSMENT — PAIN SCALES - GENERAL
PAINLEVEL_OUTOF10: 0 - NO PAIN
PAINLEVEL_OUTOF10: 0 - NO PAIN

## 2024-04-21 ASSESSMENT — COLUMBIA-SUICIDE SEVERITY RATING SCALE - C-SSRS
6. HAVE YOU EVER DONE ANYTHING, STARTED TO DO ANYTHING, OR PREPARED TO DO ANYTHING TO END YOUR LIFE?: NO
1. SINCE LAST CONTACT, HAVE YOU WISHED YOU WERE DEAD OR WISHED YOU COULD GO TO SLEEP AND NOT WAKE UP?: NO
1. SINCE LAST CONTACT, HAVE YOU WISHED YOU WERE DEAD OR WISHED YOU COULD GO TO SLEEP AND NOT WAKE UP?: NO
2. HAVE YOU ACTUALLY HAD ANY THOUGHTS OF KILLING YOURSELF?: NO
2. HAVE YOU ACTUALLY HAD ANY THOUGHTS OF KILLING YOURSELF?: NO
5. HAVE YOU STARTED TO WORK OUT OR WORKED OUT THE DETAILS OF HOW TO KILL YOURSELF? DO YOU INTEND TO CARRY OUT THIS PLAN?: NO
6. HAVE YOU EVER DONE ANYTHING, STARTED TO DO ANYTHING, OR PREPARED TO DO ANYTHING TO END YOUR LIFE?: NO

## 2024-04-21 ASSESSMENT — ENCOUNTER SYMPTOMS
HYPERACTIVE: 0
DECREASED CONCENTRATION: 1
ACTIVITY CHANGE: 1
AGITATION: 0
DYSPHORIC MOOD: 1
NERVOUS/ANXIOUS: 0
SLEEP DISTURBANCE: 1
WEAKNESS: 1
CONFUSION: 1

## 2024-04-21 ASSESSMENT — PAIN - FUNCTIONAL ASSESSMENT: PAIN_FUNCTIONAL_ASSESSMENT: 0-10

## 2024-04-21 NOTE — NURSING NOTE
"GILBERT NOTE     Problem:  SI/Depression/Anxiety     Behavior:  Pt lying in bed at the beginning of the shift. On approach, pt doesn't want to respond to this RN during verbal conversation. He maintains minimal eye contact. PCA sitting near bedside for safety. Pt offered Hs snack, no answers given. Later, pt jumped up from the bed, forgetting that he has his espinal, and started walking out into the hallway. Pt sat in a chair outside the nursing station and asked this RN, \"When can I get my MF-ing meds so that I can get out of here?\" This RN explained to pt that he received his Risperdal injection a few days ago so the pill form was discontinued. \"Oh, ok.\" Then pt jumped up and briskly walked to his room with PCA following behind him.      Group Participation: Attends here and there  Appetite/Meals: Hs snack provided       Interventions:  No HS medications to administered per MAR  Reassurance provided    Response:  Pt is currently resting in bed with his eyes closed. No s/s of distress noted.      Plan:  Continue to monitor and assist. Maintain q15 minute rounds for safety.    "

## 2024-04-21 NOTE — CARE PLAN
The patient's goals for the shift include not stated    The clinical goals for the shift include prevent falls    Over the shift, the patient did make progress toward the following goals. Barriers to progression include weakness and poor nutrition. Recommendations to address these barriers include continual 1:1 and continue to monitor.    Problem: Safety  Goal: I will remain free of falls  4/21/2024 0948 by Haven Ordoñez RN  Outcome: Progressing

## 2024-04-21 NOTE — NURSING NOTE
BIRP NOTE     Problem:  Poor nutrition and weakness     Behavior:  Withdrawn and socially isolative  Group Participation: fair  Appetite/Meals: poor       Interventions:  Encourage group attendance and nutrition    Response:  Poor, difficulty maintaining nutritional status     Plan:  Encourage group attendance and nutrition

## 2024-04-21 NOTE — PROGRESS NOTES
"Gerardo Albright is a 63 y.o. male on day 10 of admission presenting with Other schizophrenia (Multi).      Subjective   Case reviewed with nursing and chart reviewed. Sleeping well. Has indwelling urethral catheter afor urinary retention.   He is taking meds as ordred- received perseris 120mg IM 4/18/24- oral risperdal discontinued 4/19/24.  He has a history of chronic negative sx of schizophrenia. Yesterday had episode of being sexually preoccupied with one of the nurses. More awake today, sitting in wheelchair and occasionally wheeling himself around. States his mood is \"fine\" and denies any specific complaints, states we are at [facility] but open to correction about being at [facility]. Urine output - Dr Galarza monitoring.       Objective     Last Recorded Vitals  Blood pressure 126/80, pulse 59, temperature 36.4 °C (97.5 °F), temperature source Oral, resp. rate 16, height 1.829 m (6'), weight 64.8 kg (142 lb 13.7 oz), SpO2 94%.    Review of Systems   Constitutional:  Positive for activity change.   Musculoskeletal:  Positive for gait problem.   Neurological:  Positive for weakness.   Psychiatric/Behavioral:  Positive for confusion, decreased concentration, dysphoric mood, self-injury, sleep disturbance and suicidal ideas. Negative for agitation and behavioral problems. The patient is not nervous/anxious and is not hyperactive.        Psychiatric ROS - Adult  Anxiety: decreased anxiety  Depression: anhedonia, interest, suicidal thoughts, and withdrawn   Delirium: negative  Psychosis: negative  Gay: negative  Safety Issues: suicidal ideation  Psychiatric ROS Comment: Poverty of thought       Mental Status Exam  General: lying quietly in hospital bed with sitter  Appearance: hospital attire  Attitude: withdrawn  Behavior: withdrawn  Motor Activity: +PMR  Speech: soft, limited spontaneity  Mood: \"fine\"  Affect: blunted, unrelated  Thought Process: concrete  Thought Content: denies SI/HI, no spontaneous " delusions  Thought Perception: denies AVH  Cognition: alert, oriented to person  Insight: limited  Judgement: limited- has guardian who nursing reports gives permission for any medications necessary to help stabilize patient    Psychiatric Risk Assessment  Violence Risk Assessment: command hallucinations, major mental illness, and male  Acute Risk of Harm to Others is Considered: low   Suicide Risk Assessment: , chronic medical illness, current psychiatric illness, male, panic attacks, prior suicide attempt, recent suicide attempt, and suicidal behaviors  Protective Factors against Suicide: other agrees to treatment, contracts for safety on unit  Acute Risk of Harm to Self is Considered: low to moderate    Current Medications    Scheduled medications  risperiDONE, 120 mg, subcutaneous, q30 days  tamsulosin, 0.4 mg, oral, Daily      Continuous medications     PRN medications  PRN medications: acetaminophen, alum-mag hydroxide-simeth, magnesium hydroxide, QUEtiapine **OR** OLANZapine       Medication efficacy: Yes  Patient reporting any side effects? No  Any observed side effects? No      Relevant Results  No results found for this or any previous visit (from the past 24 hour(s)).          No results found for this or any previous visit (from the past 96 hour(s)).    Reviewed    Assessment/Plan   Principal Problem:    Other schizophrenia (Multi)  Active Problems:    Parkinson's disease (Multi)    Depression    Suicide attempt (Multi)    Paranoid schizophrenia, chronic condition (Multi)    Urinary retention    Constipation    Patient is dx with schizophrenia.  He has been hospilatized many times. [facility] reports he has taken no medications in the last month.  We are ascertaining when his last Peseris injection was given.  he may have seroqule prn for psychotic anxiety/agitation. Plan to start perseris 120mg IM q 4 wks as that was the dose that was intended as increase by outpatient provider. Khadar  will cont at this time.  Patient was ambulating in the hallway today, sitter remains present for safety, went to group in the am and consuming his meals. Refused meds this AM.    - Continue Risperdal tabs to 2  mg PO BID (have call in to find out when had last Lin).  Patienthas past dx of catatonic schizophrenia.  Will consider ativan but is not classic catanonia and he answers questions- denies AH/VH/paranoia today after being on risperdal for two days. Patient does not appear catatonic- it seems that he is at baseline- in notes from [facility] frankie said some days he would just lie in bed and others he would follow staff around muchof the day.  Perseris 120mg IM ordered(given yesterday) as outpatient sent information that they were increasing it from 90mg yto 120mg q 4 wks. Discontinued oral risperdal.    -TRIAL escitalopram 10 mg PO daily for depression      Current malnutriton diganoses:  Malnutrition Diagnosis: Severe malnutrition related to chronic disease or condition        I spent 25 minutes in the professional and overall care of this patient.        Gabino Hi MD      Physical Exam

## 2024-04-21 NOTE — GROUP NOTE
Group Topic: Leisure Skills   Group Date: 4/21/2024  Start Time: 1100  End Time: 1145  Facilitators: FLAVIO ReddyS   Department: [facility]    Number of Participants: 8   Group Focus: other Recreation Therapy  Treatment Modality: Leisure Development  Interventions utilized were leisure development  Purpose: Session focused on opening discussion R/T concept of leisure followed by the definition of Leisure, benefits, and identifying leisure interests through brainstorming. Pt.'s then were engaged in filling out a leisure worksheet from [employer] indicating as many leisure activities they can think of. The group discussed benefits of leisure and barriers to leisure.     Goals:  1. Pt. will identify personal definition of leisure.   2. Pt. will identify 1-3 benefits of leisure.    3. Pt. engaged in group discussion appropriately and meaningfully      Name: Gerardo Albright YOB: 1960   MR: [id number]      Facilitator: Recreational Therapist  Level of Participation: withdrawn  Quality of Participation: drowsy  Interactions with others:  quiet but did look around the environment.  Mood/Affect: depressed  Triggers (if applicable): N/A  Cognition: no insight  Progress: Minimal  Comments: pt problem is depressed mood.   Plan: continue with services

## 2024-04-22 PROCEDURE — 2500000006 HC RX 250 W HCPCS SELF ADMINISTERED DRUGS (ALT 637 FOR ALL PAYERS): Mod: MUE | Performed by: PSYCHIATRY & NEUROLOGY

## 2024-04-22 PROCEDURE — 99232 SBSQ HOSP IP/OBS MODERATE 35: CPT

## 2024-04-22 PROCEDURE — 2500000006 HC RX 250 W HCPCS SELF ADMINISTERED DRUGS (ALT 637 FOR ALL PAYERS): Performed by: PSYCHIATRY & NEUROLOGY

## 2024-04-22 PROCEDURE — 2500000001 HC RX 250 WO HCPCS SELF ADMINISTERED DRUGS (ALT 637 FOR MEDICARE OP): Performed by: STUDENT IN AN ORGANIZED HEALTH CARE EDUCATION/TRAINING PROGRAM

## 2024-04-22 PROCEDURE — 1140000001 HC PRIVATE PSYCH ROOM DAILY

## 2024-04-22 RX ADMIN — TAMSULOSIN HYDROCHLORIDE 0.4 MG: 0.4 CAPSULE ORAL at 10:04

## 2024-04-22 RX ADMIN — ESCITALOPRAM OXALATE 10 MG: 10 TABLET ORAL at 10:04

## 2024-04-22 ASSESSMENT — ENCOUNTER SYMPTOMS
AGITATION: 0
DECREASED CONCENTRATION: 1
DYSPHORIC MOOD: 1
ACTIVITY CHANGE: 1
CONFUSION: 1
SLEEP DISTURBANCE: 1
WEAKNESS: 1
HYPERACTIVE: 0
NERVOUS/ANXIOUS: 0

## 2024-04-22 ASSESSMENT — PAIN SCALES - GENERAL
PAINLEVEL_OUTOF10: 0 - NO PAIN

## 2024-04-22 ASSESSMENT — COLUMBIA-SUICIDE SEVERITY RATING SCALE - C-SSRS
2. HAVE YOU ACTUALLY HAD ANY THOUGHTS OF KILLING YOURSELF?: NO
1. SINCE LAST CONTACT, HAVE YOU WISHED YOU WERE DEAD OR WISHED YOU COULD GO TO SLEEP AND NOT WAKE UP?: NO
6. HAVE YOU EVER DONE ANYTHING, STARTED TO DO ANYTHING, OR PREPARED TO DO ANYTHING TO END YOUR LIFE?: NO

## 2024-04-22 ASSESSMENT — PAIN - FUNCTIONAL ASSESSMENT
PAIN_FUNCTIONAL_ASSESSMENT: 0-10

## 2024-04-22 NOTE — PROGRESS NOTES
Gerardo Albright is a 63 y.o. male on day 11 of admission presenting with Other schizophrenia (Multi).      Subjective   Chart reviewed, case discussed in team meeting.  Patient in his bed during assessment, maintains good eye contact, does not offer verbal response to questions.  He does shrug his shoulders or shake his head at times to questions.  He shook his head no when asked about hallucinations or suicidal thoughts.  He did not eat today, went to lunch room but did not eat.  He shakes head no when asked about pain. Took his medications this morning.       Objective     Last Recorded Vitals  Blood pressure 118/66, pulse 57, temperature 36.9 °C (98.4 °F), temperature source Oral, resp. rate 16, height 1.829 m (6'), weight 64.8 kg (142 lb 13.7 oz), SpO2 97%.    Review of Systems   Constitutional:  Positive for activity change.   Musculoskeletal:  Positive for gait problem.   Neurological:  Positive for weakness.   Psychiatric/Behavioral:  Positive for confusion, decreased concentration, dysphoric mood, self-injury, sleep disturbance and suicidal ideas. Negative for agitation and behavioral problems. The patient is not nervous/anxious and is not hyperactive.        Psychiatric ROS - Adult  Anxiety: decreased anxiety  Depression: anhedonia, interest, suicidal thoughts, and withdrawn   Delirium: negative  Psychosis: negative  Gay: negative  Safety Issues: suicidal ideation  Psychiatric ROS Comment: Poverty of thought       Mental Status Exam  General: lying quietly in hospital bed with sitter  Appearance: hospital attire  Attitude: withdrawn  Behavior: withdrawn  Motor Activity: +PMR  Speech: did not speak  Mood: does  not reply  Affect: flat  Thought Process: concrete  Thought Content: denies SI/HI, no spontaneous delusions  Thought Perception: denies AVH  Cognition: alert, oriented to person  Insight: limited  Judgement: limited- has guardian who nursing reports gives permission for any medications necessary to  help stabilize patient    Psychiatric Risk Assessment  Violence Risk Assessment: command hallucinations, major mental illness, and male  Acute Risk of Harm to Others is Considered: low   Suicide Risk Assessment: , chronic medical illness, current psychiatric illness, male, panic attacks, prior suicide attempt, recent suicide attempt, and suicidal behaviors  Protective Factors against Suicide: other agrees to treatment, contracts for safety on unit  Acute Risk of Harm to Self is Considered: low to moderate    Current Medications    Scheduled medications  escitalopram, 10 mg, oral, Daily  risperiDONE, 120 mg, subcutaneous, q30 days  tamsulosin, 0.4 mg, oral, Daily      Continuous medications     PRN medications  PRN medications: acetaminophen, alum-mag hydroxide-simeth, magnesium hydroxide, QUEtiapine **OR** OLANZapine     Patient reporting any side effects? No  Any observed side effects? No    Relevant Results  No results found for this or any previous visit (from the past 24 hour(s)).    Assessment/Plan   Principal Problem:    Other schizophrenia (Multi)  Active Problems:    Parkinson's disease (Multi)    Depression    Suicide attempt (Multi)    Paranoid schizophrenia, chronic condition (Multi)    Urinary retention    Constipation    Patient is dx with schizophrenia.  He has been hospitalized many times, has significant negative features. [facility] reports he has taken no medications in the last month.  We are ascertaining when his last Peseris injection was given.  He may have Seroquel prn for psychotic anxiety/agitation. Plan to start perseris 120mg IM q 4 wks as that was the dose that was intended as increase by outpatient provider. Sitter will cont at this time.    Patient has past dx of catatonic schizophrenia.  Will consider ativan but is not classic catanonia and he answers questions-  Patient does not appear catatonic- it seems that he is at baseline- in notes from NH they said some days  he would just lie in bed and others he would follow staff around much of the day.   Discontinued oral risperdal.    -TRIAL escitalopram 10 mg PO daily for depression    Current malnutriton diganoses:  Malnutrition Diagnosis: Severe malnutrition related to chronic disease or condition      I spent 25 minutes in the professional and overall care of this patient.      VIVIANE Bonilla-CNP      Physical Exam

## 2024-04-22 NOTE — NURSING NOTE
GILBERT NOTE     Problem:  SI, depression, anxiety     Behavior:  Patient asleep in his bed upon RN arrival to the unit. Patient continues to be non-verbal; nods his head yes or no when spoken to. Patient remains wanting to stay in bed but is encouraged to attend groups and come to dining room for meals. Sitter present.   Group Participation: Does not attend groups  Appetite/Meals: 0-25-25       Interventions:  Medications administered per MAR.     Response:  Medication compliant with encouragement.      Plan:  Maintain q 15 minute rounds and sitter for patient safety. Continue current plan of care.

## 2024-04-22 NOTE — GROUP NOTE
Group Topic: Stress Reduction/Relaxation   Group Date: 4/22/2024  Start Time: 1100  End Time: 1200  Facilitators: DRU Rasmussen   Department: [facility]    Number of Participants: 7   Group Focus: coping skills, mindfulness, relaxation, and self-awareness  Treatment Modality: Other: Recreation Therapy  Interventions utilized were other mindfulness   Purpose: In this therapeutic groups patients engaged in mindfulness and relation skills including, chair yoga exercise/stretching which is utilized to assist in improving balance, strength, stress and overall mental health. Deep breathing techniques and guided imagery which both aims to promote relaxation, self-awareness and helps to decrease stress and anxiety.     Name: Gerardo Albright YOB: 1960   MR: [id number]      Facilitator: Recreational Therapist  Level of Participation: did not attend  Progress: None  Comments: pt problem is depressed mood. Pt declines invitation to attend group. No handout available Sitter at side in room   Plan: continue with services

## 2024-04-22 NOTE — NURSING NOTE
GILBERT NOTE     Problem:  SI/Depression/Anxiety     Behavior:  Pt resting in bed already at the beginning of the shift. PCA sitting at bedside. On approach, pt will move his head slightly to one side when this RN speaks to patient, however, he is not opening his eyes.     Group Participation: Attends here and there  Appetite/Meals: HS snack provided- did not eat       Interventions:  No Hs medications to administer per MAR      Response:  Pt continued to lie in bed with eyes closed, no s/s of distress noted.      Plan:  Continue to monitor and assist. Maintain q15 minute rounds for safety.

## 2024-04-22 NOTE — GROUP NOTE
Group Topic: Goals   Group Date: 4/22/2024  Start Time: 0730  End Time: 0800  Facilitators: Juan Carlos Whitlock   Department: [facility]     Number of Participants: 3   Group Focus: goals  Treatment Modality: Skills Training  Interventions utilized were assignment and problem solving  Purpose: coping skills    Name: Gerardo Albright YOB: 1960   MR: [id number]      Facilitator: Mental Health [facility]  Level of Participation: did not attend  Quality of Participation: did not attend  Interactions with others: did not attend  Mood/Affect: did not attend  Triggers (if applicable): did not attend  Cognition: did not attend  Progress: None  Comments: did not attend  Plan: continue with services

## 2024-04-22 NOTE — GROUP NOTE
Group Topic: Art Creative   Group Date: 4/22/2024  Start Time: 1330  End Time: 1445  Facilitators: DRU Rasmussen   Department: [facility]    Number of Participants: 6   Group Focus: affirmation, art therapy, concentration, feeling awareness/expression, and mindfulness  Treatment Modality: Other: Recreation Therapy, mindfulness, creative arts  Interventions utilized were exploration and other mindfulness   Purpose: Patients participated in therapeutic creative arts activity utilizing water color, crayons or markers. Positive words of affirmation. This activity promoted creative expression, leisure awareness and social interaction, while also working on fine motor skills and following directions. Patients were prompted with a guided imagery mindfulness technique to visualize “they are lost on a stormy night. They see a glimmer of light leading to land”. They were then asked to paint an image of a lighthouse as a source of guidance in their life, including positive words of affirmation.  Also to paint themselves in the somewhere in the image and add words to represent their sources of guidance in their life. CTRS also played peaceful music to create a calm and     Name: Gerardo Albright YOB: 1960   MR: [id number]      Facilitator: Recreational Therapist  Level of Participation: did not attend  Progress: None  Comments: pt problem is depressed mood. Pt in room with sitter at side.   Plan: continue with services

## 2024-04-22 NOTE — GROUP NOTE
Group Topic: Music Therapy   Group Date: 4/22/2024  Start Time: 1000  End Time: 1100  Facilitators: Lien Pearl; Tawanna Woody   Department: [facility]    Number of Participants: 9   Group Focus: communication/socialization, community group, concentration, expressive outlet, music therapy, reality orientation, relaxation, and social skills  Treatment Modality: Music Therapy  Interventions Utilized were: exploration, leisure development, orientation, passive music engagement, and sharing/discussion      Name: Gerardo Albright YOB: 1960   MR: [id number]      Level of Participation: withdrawn  Quality of Participation: drowsy, isolative, passive, quiet, and withdrawn  Interactions with others: appropriate  Mood/Affect: closed / guarded and flat  Cognition, Pre Treatment: not focused  Cognition, Post Treatment: not focused  Progress: Minimal  Plan: continue with services

## 2024-04-23 PROCEDURE — 2500000006 HC RX 250 W HCPCS SELF ADMINISTERED DRUGS (ALT 637 FOR ALL PAYERS): Mod: MUE | Performed by: PSYCHIATRY & NEUROLOGY

## 2024-04-23 PROCEDURE — 2500000006 HC RX 250 W HCPCS SELF ADMINISTERED DRUGS (ALT 637 FOR ALL PAYERS): Performed by: PSYCHIATRY & NEUROLOGY

## 2024-04-23 PROCEDURE — 2500000001 HC RX 250 WO HCPCS SELF ADMINISTERED DRUGS (ALT 637 FOR MEDICARE OP): Performed by: STUDENT IN AN ORGANIZED HEALTH CARE EDUCATION/TRAINING PROGRAM

## 2024-04-23 PROCEDURE — 99232 SBSQ HOSP IP/OBS MODERATE 35: CPT

## 2024-04-23 PROCEDURE — 1140000001 HC PRIVATE PSYCH ROOM DAILY

## 2024-04-23 RX ADMIN — TAMSULOSIN HYDROCHLORIDE 0.4 MG: 0.4 CAPSULE ORAL at 08:16

## 2024-04-23 RX ADMIN — ESCITALOPRAM OXALATE 10 MG: 10 TABLET ORAL at 08:17

## 2024-04-23 ASSESSMENT — PAIN SCALES - GENERAL
PAINLEVEL_OUTOF10: 0 - NO PAIN
PAINLEVEL_OUTOF10: 0 - NO PAIN

## 2024-04-23 ASSESSMENT — ENCOUNTER SYMPTOMS
HYPERACTIVE: 0
CONFUSION: 1
AGITATION: 0
SLEEP DISTURBANCE: 1
WEAKNESS: 1
NERVOUS/ANXIOUS: 0
DECREASED CONCENTRATION: 1
DYSPHORIC MOOD: 1

## 2024-04-23 ASSESSMENT — COLUMBIA-SUICIDE SEVERITY RATING SCALE - C-SSRS
1. SINCE LAST CONTACT, HAVE YOU WISHED YOU WERE DEAD OR WISHED YOU COULD GO TO SLEEP AND NOT WAKE UP?: NO
1. SINCE LAST CONTACT, HAVE YOU WISHED YOU WERE DEAD OR WISHED YOU COULD GO TO SLEEP AND NOT WAKE UP?: NO
2. HAVE YOU ACTUALLY HAD ANY THOUGHTS OF KILLING YOURSELF?: NO
1. SINCE LAST CONTACT, HAVE YOU WISHED YOU WERE DEAD OR WISHED YOU COULD GO TO SLEEP AND NOT WAKE UP?: NO
2. HAVE YOU ACTUALLY HAD ANY THOUGHTS OF KILLING YOURSELF?: NO
6. HAVE YOU EVER DONE ANYTHING, STARTED TO DO ANYTHING, OR PREPARED TO DO ANYTHING TO END YOUR LIFE?: NO
6. HAVE YOU EVER DONE ANYTHING, STARTED TO DO ANYTHING, OR PREPARED TO DO ANYTHING TO END YOUR LIFE?: NO
2. HAVE YOU ACTUALLY HAD ANY THOUGHTS OF KILLING YOURSELF?: NO
6. HAVE YOU EVER DONE ANYTHING, STARTED TO DO ANYTHING, OR PREPARED TO DO ANYTHING TO END YOUR LIFE?: NO

## 2024-04-23 ASSESSMENT — PAIN - FUNCTIONAL ASSESSMENT: PAIN_FUNCTIONAL_ASSESSMENT: 0-10

## 2024-04-23 NOTE — PROGRESS NOTES
Gerardo Albright is a 63 y.o. male on day 12 of admission presenting with Other schizophrenia (Multi).      Subjective   Chart reviewed, case discussed in team meeting.  Per nursing, pt ate 75% of breakfast this morning, eats consistently between 25-75% of his meals.  He remains with a sitter for protection of espinal catheter due to impaired cognition and impulsiveness.      During assessment this morning patient is more interactive and verbal.  He reports intermittently hearing voices that tell him he should die, however he states he does not have a plan or intent to harm himself.  He has not made any attempts to harm self while on the unit.  He denies pain, denies VH.  We discussed plans for discharge back to nursing home tomorrow and patient is agreeable nodding his head to show that he would like that.        After lunch patient observed sitting up in wheelchair near group room.  He had a shower and agrees that it felt great.  He ate lunch and is calmly observing the milieu.     Objective     Last Recorded Vitals  Blood pressure 128/72, pulse 74, temperature 36.6 °C (97.9 °F), temperature source Temporal, resp. rate 16, height 1.829 m (6'), weight 64.8 kg (142 lb 13.7 oz), SpO2 97%.    Review of Systems   Musculoskeletal:  Positive for gait problem.   Neurological:  Positive for weakness.   Psychiatric/Behavioral:  Positive for confusion, decreased concentration, dysphoric mood, sleep disturbance and suicidal ideas. Negative for agitation, behavioral problems and self-injury. The patient is not nervous/anxious and is not hyperactive.      Psychiatric ROS - Adult  Anxiety: negative  Depression: anhedonia, interest, suicidal thoughts, and withdrawn   Delirium: negative  Psychosis: negative  Gay: negative  Safety Issues: suicidal ideation  Psychiatric ROS Comment: Poverty of thought     Mental Status Exam  General: lying quietly in hospital bed, espinal catheter to gravity drainage.  He maintains fair eye contact, is  more responsive verbally today.   Appearance: disheveled  Attitude: slightly more interactive, cooperative  Behavior: withdrawn  Motor Activity: +PMR  Speech: responses to questions, no spontaneous conversation  Mood: does not answer  Affect: flat  Thought Process: concrete  Thought Content: denies plan or intent to harm self, reports intermittent chronic auditory hallucinations that tell him he should die.  He is agreeable to telling staff if he feels that he wants to harm himself.   Thought Perception: intermittent auditory hallucination  Cognition: alert, oriented to person  Insight: limited  Judgement: limited- has guardian who nursing reports gives permission for any medications necessary to help stabilize patient    Psychiatric Risk Assessment  Violence Risk Assessment: command hallucinations, major mental illness, and male  Acute Risk of Harm to Others is Considered: low   Suicide Risk Assessment: , chronic medical illness, current psychiatric illness, male, panic attacks, prior suicide attempt, recent suicide attempt, and suicidal behaviors  Protective Factors against Suicide: other agrees to treatment, contracts for safety on unit  Acute Risk of Harm to Self is Considered: low to moderate    Current Medications  Scheduled medications  escitalopram, 10 mg, oral, Daily  risperiDONE, 120 mg, subcutaneous, q30 days  tamsulosin, 0.4 mg, oral, Daily    Continuous medications     PRN medications  PRN medications: acetaminophen, alum-mag hydroxide-simeth, magnesium hydroxide, QUEtiapine **OR** OLANZapine     Patient reporting any side effects? No  Any observed side effects? No    Relevant Results  No results found for this or any previous visit (from the past 24 hour(s)).    Assessment/Plan   Principal Problem:    Other schizophrenia (Multi)  Active Problems:    Parkinson's disease (Multi)    Depression    Suicide attempt (Multi)    Paranoid schizophrenia, chronic condition (Multi)    Urinary retention     Constipation    Patient is dx with schizophrenia.  He has been hospitalized many times, has significant negative features and chronic suicidal thoughts. He has received increased dose of Perseris 120mg and escitalopram 10mg was started for depressive symptoms, psychomotor retardation.      Patient has past dx of catatonic schizophrenia.  Will consider ativan but is not classic catanonia and he answers questions-  Patient does not appear catatonic- it seems that he is at baseline- in notes from NH they said some days he would just lie in bed and others he would follow staff around much of the day.      Patient continues to appear at his baseline level of functioning, slightly improved affect today.  Planning for tentative discharge back to [facility] tomorrow.     -Perseris 120mg given 4/18/24  -TRIAL escitalopram 10 mg PO daily for depression    Current malnutriton diganoses:  Malnutrition Diagnosis: Severe malnutrition related to chronic disease or condition    I spent 25 minutes in the professional and overall care of this patient.      Lilo Plunkett, APRN-CNP

## 2024-04-23 NOTE — GROUP NOTE
Group Topic: Medication Education   Group Date: 4/23/2024  Start Time: 1005  End Time: 1055  Facilitators: Nidia Pickett PharmD   Department: [facility] [employer]    Number of Participants: 7   Group Focus: daily focus, goals, and other medication education  Treatment Modality: Patient-Centered Therapy and Other: Bingo Game  Interventions utilized were group exercise and other Bingo Game  Purpose: insight or knowledge and other: general medication education to improve compliance     Name: Gerardo Albright YOB: 1960   MR: [id number]      Facilitator: Pharmacist  Level of Participation: did not attend  Quality of Participation:  did not attend   Interactions with others:    did not attend   Mood/Affect:    did not attend   Triggers (if applicable): n/a  Cognition:    did not attend   Progress: None  Comments:  did not attend   Plan: continue with services

## 2024-04-23 NOTE — GROUP NOTE
Group Topic: Reminiscence   Group Date: 4/23/2024  Start Time: 1100  End Time: 1140  Facilitators: DRU Tapia   Department: [facility]    Number of Participants: 6   Group Focus: other Let's talk  Treatment Modality: Other: Recreation therapy  Interventions utilized were reminiscence, story telling, and support  Purpose: feelings, communication skills, and other: elevate mood, fun    Name: Gerardo Albright YOB: 1960   MR: [id number]      Facilitator: Recreational Therapist  Level of Participation: did not attend  Quality of Participation:  did not attend  Interactions with others:  did not attend  Mood/Affect:  did not attend  Triggers (if applicable): n/a  Cognition:  did not attend  Progress: None  Comments: pt problem is depressed mood.  Pt is resting in his bed, sitter by his side.  No handout to offer at this time.    Plan: continue with services

## 2024-04-23 NOTE — NURSING NOTE
BIRP NOTE     Problem:  SI, depression, anxiety      Behavior:  Patient cooperative with medication administration. Patient continues to be non-verbal and nods his head yes or no when appropriate. Sitter present.   Group Participation: Does not attend groups  Appetite/Meals: 75-0-75       Interventions:  Medications administered per MAR.     Response:  Medication compliant.      Plan:  Continue current plan of care.

## 2024-04-23 NOTE — GROUP NOTE
Group Topic: Other   Group Date: 4/23/2024  Start Time: 1330  End Time: 1420  Facilitators: Mikala Caballero [facility]   Department: [facility]    Number of Participants: 8   Group Focus: other Rhyme Out!  Treatment Modality: Other: Recreation therapy  Interventions utilized were mental fitness  Purpose: other: fun elevate mood, increase socialization, enhance self esteem    Name: Gerardo Albright YOB: 1960   MR: [id number]      Facilitator: Recreational Therapist  Level of Participation: did not attend  Quality of Participation:  did not attend  Interactions with others:  did not attend  Mood/Affect:  did not attend  Triggers (if applicable): n/a  Cognition:  did not attend  Progress: None  Comments: pt problem is depressed mood.  Pt is sitting in the hallway with sitter by his side, refuses to attend group.  No handout to offer.  After group, pt engages with this writer.  Makes some eye contact and even smiles at [facility].    Plan: continue with services

## 2024-04-23 NOTE — NURSING NOTE
"0040, 0100-2x's Sitter PCA called nurses into room, pt crawled up to top of bed, placing upper half of body between bed and wall. Took 2-3 staff to assist pt back to bed. Pt mostly nonverbal when asked what he was doing, shrugs shoulders and states \"I don't know.\" Pt bed moved closer to wall and table moved to other side of bed for safety. Pt fell asleep afterwards. No s/s of distress noted. Will continue to monitor.  "

## 2024-04-23 NOTE — PROGRESS NOTES
Social Work Note    FAN called [facility] to discuss pt's return back to their care. FAN was informed anytime tomorrow would work and the nurse to nurse number is [phone] and to ask for unit 4. [facility] also requested to have documentation faxed over to [phone].    FAN called Angel Holguin/Legal Guardian and left a Vm regarding pt's discharge for tomorrow.

## 2024-04-23 NOTE — GROUP NOTE
Group Topic: Goals   Group Date: 4/22/2024  Start Time: 2008  End Time: 2020  Facilitators: Marnie Corona   Department: [facility]     Number of Participants: 6   Group Focus: goals  Treatment Modality: Other: PCA  Interventions utilized were group exercise and reminiscence  Purpose: feelings, self-worth, self-care, and relapse prevention strategies    Name: Gerardo Albright YOB: 1960   MR: [id number]      Facilitator: Mental Health PCNA  Level of Participation: did not attend  Quality of Participation:  did not attend  Interactions with others:  did not attend  Mood/Affect:  did not attend  Triggers (if applicable): N/A  Cognition:  did not attend  Progress: Other  Comments: Pt problem is depression. Pt declined the invitation to attend group.  Plan: continue with services

## 2024-04-24 VITALS
HEIGHT: 72 IN | TEMPERATURE: 98.1 F | OXYGEN SATURATION: 96 % | HEART RATE: 79 BPM | BODY MASS INDEX: 19.65 KG/M2 | WEIGHT: 145.06 LBS | SYSTOLIC BLOOD PRESSURE: 126 MMHG | DIASTOLIC BLOOD PRESSURE: 77 MMHG | RESPIRATION RATE: 18 BRPM

## 2024-04-24 PROBLEM — K59.00 CONSTIPATION: Status: RESOLVED | Noted: 2024-04-13 | Resolved: 2024-04-24

## 2024-04-24 PROBLEM — T14.91XA SUICIDE ATTEMPT (MULTI): Status: RESOLVED | Noted: 2024-01-06 | Resolved: 2024-04-24

## 2024-04-24 PROCEDURE — 2500000006 HC RX 250 W HCPCS SELF ADMINISTERED DRUGS (ALT 637 FOR ALL PAYERS): Performed by: PSYCHIATRY & NEUROLOGY

## 2024-04-24 PROCEDURE — 2500000001 HC RX 250 WO HCPCS SELF ADMINISTERED DRUGS (ALT 637 FOR MEDICARE OP): Performed by: STUDENT IN AN ORGANIZED HEALTH CARE EDUCATION/TRAINING PROGRAM

## 2024-04-24 PROCEDURE — 97110 THERAPEUTIC EXERCISES: CPT | Mod: GP

## 2024-04-24 PROCEDURE — 2500000006 HC RX 250 W HCPCS SELF ADMINISTERED DRUGS (ALT 637 FOR ALL PAYERS): Mod: MUE | Performed by: PSYCHIATRY & NEUROLOGY

## 2024-04-24 PROCEDURE — 97116 GAIT TRAINING THERAPY: CPT | Mod: GP

## 2024-04-24 PROCEDURE — 99231 SBSQ HOSP IP/OBS SF/LOW 25: CPT

## 2024-04-24 RX ORDER — TAMSULOSIN HYDROCHLORIDE 0.4 MG/1
0.4 CAPSULE ORAL DAILY
Start: 2024-04-25

## 2024-04-24 RX ORDER — ESCITALOPRAM OXALATE 10 MG/1
10 TABLET ORAL DAILY
Start: 2024-04-25 | End: 2024-05-25

## 2024-04-24 RX ADMIN — ESCITALOPRAM OXALATE 10 MG: 10 TABLET ORAL at 10:11

## 2024-04-24 RX ADMIN — TAMSULOSIN HYDROCHLORIDE 0.4 MG: 0.4 CAPSULE ORAL at 10:11

## 2024-04-24 ASSESSMENT — PAIN SCALES - GENERAL
PAINLEVEL_OUTOF10: 0 - NO PAIN

## 2024-04-24 ASSESSMENT — PAIN - FUNCTIONAL ASSESSMENT
PAIN_FUNCTIONAL_ASSESSMENT: 0-10

## 2024-04-24 NOTE — PROGRESS NOTES
Physical Therapy Treatment Note        04/24/24 [id number]   PT  Visit   PT Received On 04/24/24   Response to Previous Treatment Patient with no complaints from previous session.   General   Reason for Referral Referral from Dr. Allen due to physical decline affecting pts ADLs and functional mobility. Pt sent out to ED on 4/11 for urinary retention and returned with a espinal cathetar and a sitter.   Referred By Dr Allen   Past Medical History Relevant to Rehab Other schizophrenia (Multi) 7/27/2023     Other seizures (Multi) 7/27/2023    Anxiety 7/27/2023    Parkinson's disease (Multi) 7/27/2023    Dementia (Multi) 7/27/2023    Other insomnia 7/27/2023    Gastroesophageal reflux disease without esophagitis 7/27/2023    Asthenia 7/27/2023    Chronic obstructive pulmonary disease (Multi) 7/27/2023    Benign hypertension 7/27/2023    Depression 7/27/2023    ASHD (arteriosclerotic heart disease) 7/27/2023    At risk for falls 7/30/2023    Suicide attempt (Multi) 1/6/2024    Laceration of left upper extremity 1/10/2024    Paranoid schizophrenia, chronic condition (Multi) 4/11/2024   General Comment Confered with NSG.  Pt resting sitter present however agreeable to skilled therapeutic intervention.   Precautions   Medical Precautions Fall precautions   Precautions Comment risk for falls, espinal cathetar   Pain Assessment   Pain Assessment 0-10   Pain Score 0 - No pain   Pain Type Chronic pain   Pain Location Leg   Pain Orientation Left   Cognition   Processing Speed Delayed   Static Sitting Balance   Static Sitting-Balance Support No upper extremity supported;Feet supported   Static Sitting-Level of Assistance Modified independent   Dynamic Standing Balance   Dynamic Standing-Balance Support Bilateral upper extremity supported   Dynamic Standing-Comments CGx1 with RW   Therapeutic Exercise   Therapeutic Exercise Performed Yes   Therapeutic Exercise Activity 1 Standing Heel Raises 2x15   Therapeutic Exercise Activity 2  Seated Bicycle x 8 mins with good speed.   Bed Mobility   Bed Mobility Yes   Bed Mobility 1   Bed Mobility 1 Supine to sitting   Level of Assistance 1 Contact guard   Bed Mobility Comments 1 with use of bed rail   Ambulation/Gait Training   Ambulation/Gait Training Performed Yes   Ambulation/Gait Training 1   Surface 1 Level tile   Device 1 Rolling walker   Assistance 1 Contact guard   Quality of Gait 1 Diminished heel strike;Forward flexed posture;Decreased step length;Shuffling gait   Comments/Distance (ft) 1 150 x2 with RW CGx1. NBOS fast sean with poor safety. Cues for getting RW before walking,   Transfers   Transfer Yes   Transfer 1   Transfer From 1 Bed to   Transfer to 1 Stand   Technique 1 Sit to stand   Transfer Device 1 Walker   Transfer Level of Assistance 1 Contact guard   Trials/Comments 1 safety cues   PT Assessment   PT Assessment Results Decreased strength;Decreased endurance;Decreased mobility;Decreased cognition;Impaired judgement;Decreased safety awareness;Impaired balance;Decreased coordination   Rehab Prognosis Good   Barriers to Discharge Behaviors   End of Session Patient Position Up in chair   Outpatient Education   Individual(s) Educated Patient   PT Plan   Treatment/Interventions Bed mobility;Transfer training;Gait training;Stair training;Balance training;Neuromuscular re-education   PT Plan Skilled PT   PT Recommended Transfer Status Assist x1   PT - OK to Discharge Yes

## 2024-04-24 NOTE — DISCHARGE SUMMARY
Admit Date:  4/11/2024  Discharge Date:  4/24/2024    Reason For Admission:   Schizophrenia  Suicidal ideation    Discharge Diagnosis  Other schizophrenia (Multi)    Issues Requiring Follow-Up  Indwelling espinal catheter    Test Results Pending At Discharge  Pending Labs       No current pending labs.          Hospital Course   [facility], refusing to take medications, suicidal ideation with plan to cut himself.  Patient has had several inpatient hospitalizations this year for SI, SA.    Per HPI 4/12/2024  On arrival to the unit he declined interview, however committed to not harming himself on the unit.  He admitted to low level voices telling him to hurt himself but said that they were not intrusive and he didn't need to listen to them.  He asked for ativan for anxiety.      4/13/2024  Upon reviewing the patient's charts and discussing the case with the assigned RN, it was noted that this morning the patient refused to take his medication, informing the nurse that he had an upset stomach. Despite several attempts by the nurse to administer the medication, the patient continued to refuse, and these were documented as refusal. In the morning, the patient was observed working with an occupational therapist, ambulating in the [facility].  The patient had been sent to the emergency department the previous night due to urinary retention and lack of bowel movements. As a result, the patient underwent straight catheterization and was started on Tamsilosin 0.4 mg today. There were also old lacerations and sutures present on the patient, indicating previous self-harm with a razor. The patient did not want to communicate and engage in conversation.  The patient denied any current suicidal or homicidal ideation, as well as any auditory or visual hallucinations. He exhibited a flat and withdrawn affect and tended to isolate himself. However, he was strongly  encouraged to participate in group activities, and today he took part in the morning group. The patient,  was lying in bed and expressed feeling safe and requested to be allowed to rest.  The patient was informed that he would be followed up with again tomorrow and was encouraged to let the healthcare team know if any questions or concerns arise in the meantime.    4/14/2024  Upon reviewing the patient's charts and discussing the case with the assigned RN,   We approach the patient who was sitting by the chairs in the hallway. The patient made a brief eye contact with his provider and said that he was doing OK. We asked him how his night was and the patient broke the eye contact and did not respond attempting to get the patient attention he replied that he slept well. The patient was minimally engaged. We kept asking a few questions for which we were not receiving the answer. We asked the patient if there’s any reason why he continues not to take the medication as ordered, and he responded that “I don’t feel like talking”  We asked the patient if he has having any suicidal thoughts or homicidal thoughts for which the patient denies. As the patient is having any pain at this time that he says, I said no. We asked the patient if he is hearing any voices or seeing anything. That’s not truly there but the patient did not respond so we decided to let him be. The sitter was present with the patient at all times.    4/15/2024  Case reviewed in morning team. Sleeping well. Has indwelling urethral catheter after visit to ER over the weekend for urinary retention.  He is up in chair with sitter at side during brief interview.  He denies SI. Denies AH/VH/paranoia and then refused to answer any further questions. Patient is adherent with current med regimen. He has a history of chronic negative sx of schizophrenia.     4/16/2024  Case reviewed in morning team. Sleeping well. Has indwelling urethral catheter after visit to ER  over the weekend for urinary retention.  He is in bed most of day with little interaction with sitter at side during brief interview.  He denies SI. Denies AH/VH/paranoia and then refused to answer any further questions. Patient is adherent with current med regimen. He has a history of chronic negative sx of schizophrenia.     4/17/2024  Case reviewed in morning team. Sleeping well. Has indwelling urethral catheter after visit to ER over the weekend for urinary retention.  He is in bed most of day with little interaction with sitter at side during brief interview. He walked with PT/OT with belt for support in thomas.  He is taking meds as ordred. He denies SI. Denies AH/VH/paranoia and then refused to answer any further questions. Patient is adherent with current med regimen. He has a history of chronic negative sx of schizophrenia.     4/18/2024  Case reviewed in morning team. Sleeping well. Has indwelling urethral catheter after visit to ER over the weekend for urinary retention.  He is in bed most of day with little interaction with sitter at side during brief interview. He walked with PT/OT with belt for support in thomas.  He is taking meds as ordred. He denies SI. Denies AH/VH/paranoia and then refused to answer any further questions. Patient is adherent with current med regimen. He has a history of chronic negative sx of schizophrenia. He is up in chair with sitter- does not respond toquestions from provider. Had no output of urine overnight and Dr Ramos was informed.      4/19/2024  Case reviewed in morning team. Sleeping well. Has indwelling urethral catheter after visit to ER over the weekend for urinary retention.   He is taking meds as ordred- received perseris 120mg IM yesterday- oral risperdal discontinued today.  He has a history of chronic negative sx of schizophrenia. He is up in chair with sitter for group this morning- responds to limited questions from provider. He says his mood is good and he denies  "AH/VH/paranoia- slightly more open affect. Urine output is improving- Dr Galarza monitoring.     4/20/2024  Case reviewed with nursing and chart reviewed. Sleeping well. Has indwelling urethral catheter afor urinary retention.   He is taking meds as ordred- received perseris 120mg IM 4/18/24- oral risperdal discontinued 4/19/24.  He has a history of chronic negative sx of schizophrenia. Today he is difficult to arouse for staff or this provider, not engaging in interview. Urine output - Dr Galarza monitoring.     4/21/2024  Case reviewed with nursing and chart reviewed. Sleeping well. Has indwelling urethral catheter afor urinary retention.   He is taking meds as ordred- received perseris 120mg IM 4/18/24- oral risperdal discontinued 4/19/24.  He has a history of chronic negative sx of schizophrenia. Yesterday had episode of being sexually preoccupied with one of the nurses. More awake today, sitting in wheelchair and occasionally wheeling himself around. States his mood is \"fine\" and denies any specific complaints, states we are at [facility] but open to correction about being at [facility]. Urine output - Dr Galarza monitoring.     4/22/2024  Chart reviewed, case discussed in team meeting.  Patient in his bed during assessment, maintains good eye contact, does not offer verbal response to questions.  He does shrug his shoulders or shake his head at times to questions.  He shook his head no when asked about hallucinations or suicidal thoughts.  He did not eat today, went to lunch room but did not eat.  He shakes head no when asked about pain. Took his medications this morning.       4/23/2024  Chart reviewed, case discussed in team meeting.  Per nursing, pt ate 75% of breakfast this morning, eats consistently between 25-75% of his meals.  He remains with a sitter for protection of espinal catheter due to impaired cognition and impulsiveness.       During assessment this morning patient is more interactive and verbal.  " "He reports intermittently hearing voices that tell him he should die, however he states he does not have a plan or intent to harm himself.  He has not made any attempts to harm self while on the unit.  He denies pain, denies VH.  We discussed plans for discharge back to nursing home tomorrow and patient is agreeable nodding his head to show that he would like that.         After lunch patient observed sitting up in wheelchair near group room.  He had a shower and agrees that it felt great.  He ate lunch and is calmly observing the milieu.      4/24/2024- Day of discharge  Seen in follow up for schizoaffective disorder.  Case discussed in treatment team and chart reviewed.  Patient has not been agitated, dangerous, or hostile during admission.  He spends most of his time in bed, withdrawn from mileu.  Patient is seen wheeling around the common area, remains with sitter for safety related to falls risk, cognition, safety of equipment (espinal catheter).  He answers \"yes\" that he is ready to go home today.  He reports his mood is \"fine\" and denies auditory hallucinations today. He denies suicidal thoughts, denies thoughts of harm to others. He has been eating better, taking his medications. He received increased dose of Perseris 120 mg on  4/18/24.  He does not appear internally stimulated.  Medical physician was notified regarding patient's discharge and presence of indwelling catheter.  She will handle this and a void trial once patient returns to the facility.      Admission goals attempted during stay include reconciliation of medications, treatment of target symptoms, gathering of collateral information supportive of discharge.  Social functioning is baseline with periods of isolation intermixed with periods of increased activity.  Nursing home is aware to provide environment of care absent of items which patient could use to harm himself.      Pertinent Physical Exam At Time of Discharge  Physical Exam    Mental " "Status Exam  General: Fairly well groomed, dressed in hospital attire, espinal catheter to gravity drainage  Attitude: pleasant  Behavior: withdrawn, cooperative  Motor Activity: PSR remains with some improvement, no tics tremors or other abnormal motor movements  Speech: minimal amount, clear  Mood: \"fine\"  Affect: blunted  Thought Process: simple, difficult to determine due to minimum speech  Thought Content: denies SI, HI, SIB  Thought Perception: Denies hallucinations, however they are chronic in nature and intermittent  Cognition: alert, person  Insight: guardian, chronically impaired  Judgement: guardian, chronically impaired     Risk Assessment at Discharge:  Violence Risk Assessment: major mental illness and male  Acute Risk of Harm to Others is Considered: low     Suicide Risk Assessment:  and other chronic command hallucinations, previous SA, serious mental illness  Protective Factors against Suicide: other environment- lives at nursing home with continued 24 hr care  Risk of Harm to Self is Considered: moderate  Risk Mitigated by: denying suicidality, taking medications    Outpatient Appointments/Follow-Ups  No future appointments.  No follow-up provider specified.    Lilo Plunkett, APRN-CNP  "

## 2024-04-24 NOTE — GROUP NOTE
Group Topic: Cognitive Behavioral Therapy   Group Date: 4/24/2024  Start Time: 1000  End Time: 1045  Facilitators: FAN Nava   Department: [facility] CARE TRANSITIONS VIRTUAL    Number of Participants: 6   Group Focus: self-awareness  Treatment Modality: Psychoeducation  Interventions utilized were assignment  Purpose: insight or knowledge    Name: Gerardo Albright YOB: 1960   MR: [id number]      Facilitator:   Level of Participation: did not attend  Quality of Participation: n/a  Interactions with others: n/a  Mood/Affect: n/a  Triggers (if applicable): n/a  Cognition: n/a  Progress: None  Comments: Pt did not attend group despite staff encouragement. FAN will make efforts to meet with pt to provider handout on discussion.  Plan: continue with services

## 2024-04-24 NOTE — NURSING NOTE
"Discharge Nursing Note    Discharge date: 04/24/24  Discharge time: 1335    Discharged to:  home    [facility]    Responsible person notified of discharge/transfer?  Include name of person if answering \"YES\"  Yes, justyn [facility]-nurse candance    Patient left with all belongings:  Yes    Patient left with discharge medication list:  Yes    Patient left with discharge instructions:  Yes    AVS/Continuing Care Plan discussed with patient and copy given to either patient or handed to medical transport for discharges to facilities:  Yes    Other concerns at discharge:  none    Presentation on discharge:  No issues   "

## 2024-04-24 NOTE — NURSING NOTE
GILBERT NOTE     Problem:  SI     Behavior:  Patient spent all shift in bed sleeping. He continues non-verbal but does respond to questions by nodding his head. Continues 1:1 for safety. Lauren is draining krystal color urine with out issues.     Group Participation: No    Appetite/Meals: Refused snacks       Interventions:  1:1 monitoring     Response:  Compliant with care     Plan:  Continue current plan of care

## 2024-04-27 ENCOUNTER — NURSING HOME VISIT (OUTPATIENT)
Dept: POST ACUTE CARE | Facility: EXTERNAL LOCATION | Age: 64
End: 2024-04-27
Payer: COMMERCIAL

## 2024-04-27 DIAGNOSIS — F32.A DEPRESSION, UNSPECIFIED DEPRESSION TYPE: ICD-10-CM

## 2024-04-27 DIAGNOSIS — R53.1 WEAKNESS: ICD-10-CM

## 2024-04-27 DIAGNOSIS — F20.9 SCHIZOPHRENIA, UNSPECIFIED TYPE (MULTI): Primary | ICD-10-CM

## 2024-04-27 DIAGNOSIS — K21.9 GASTROESOPHAGEAL REFLUX DISEASE, UNSPECIFIED WHETHER ESOPHAGITIS PRESENT: ICD-10-CM

## 2024-04-27 DIAGNOSIS — G20.A1 PARKINSON'S DISEASE WITHOUT DYSKINESIA, UNSPECIFIED WHETHER MANIFESTATIONS FLUCTUATE (MULTI): ICD-10-CM

## 2024-04-27 DIAGNOSIS — G40.909 SEIZURE DISORDER (MULTI): ICD-10-CM

## 2024-04-27 DIAGNOSIS — I10 ESSENTIAL HYPERTENSION: ICD-10-CM

## 2024-04-27 DIAGNOSIS — Z91.81 AT RISK FOR FALLS: ICD-10-CM

## 2024-04-27 DIAGNOSIS — J44.9 CHRONIC OBSTRUCTIVE PULMONARY DISEASE, UNSPECIFIED COPD TYPE (MULTI): ICD-10-CM

## 2024-04-27 DIAGNOSIS — R45.851 SUICIDAL IDEATIONS: ICD-10-CM

## 2024-04-27 DIAGNOSIS — F03.90 DEMENTIA, UNSPECIFIED DEMENTIA SEVERITY, UNSPECIFIED DEMENTIA TYPE, UNSPECIFIED WHETHER BEHAVIORAL, PSYCHOTIC, OR MOOD DISTURBANCE OR ANXIETY (MULTI): ICD-10-CM

## 2024-04-27 PROCEDURE — 99306 1ST NF CARE HIGH MDM 50: CPT | Performed by: INTERNAL MEDICINE

## 2024-04-27 NOTE — LETTER
Patient: Gerardo Albright  : 1960    Encounter Date: 2024    PLACE OF SERVICE:  [facility]    This is a readmission.    Subjective  Patient ID: Gerardo Albright is a 63 y.o. male who presents for readmission.    Mr. Gerardo Albright is a 63-year-old male with history of schizophrenia with Parkinson's disease and depression.  He has had a recent suicidal ideation causing admission.  During that time, he did have urinary retention.  Aguilar catheter was placed.    Review of Systems   Constitutional:  Negative for chills and fever.   Cardiovascular:  Negative for chest pain.   All other systems reviewed and are negative.    Objective  /76   Pulse 78   Temp 36.6 °C (97.9 °F)   Resp 16     Physical Exam  Vitals reviewed.   Constitutional:       General: He is not in acute distress.     Comments: This is a well-developed, well-nourished male, sitting in a chair   HENT:      Right Ear: Tympanic membrane, ear canal and external ear normal.      Left Ear: Tympanic membrane, ear canal and external ear normal.   Eyes:      General: No scleral icterus.     Pupils: Pupils are equal, round, and reactive to light.   Neck:      Vascular: No carotid bruit.   Cardiovascular:      Heart sounds: Normal heart sounds, S1 normal and S2 normal. No murmur heard.     No friction rub.   Pulmonary:      Effort: Pulmonary effort is normal.      Breath sounds: Normal breath sounds and air entry.   Abdominal:      Palpations: There is no hepatomegaly, splenomegaly or mass.   Musculoskeletal:         General: No swelling or deformity. Normal range of motion.      Cervical back: Neck supple.      Right lower leg: No edema.      Left lower leg: No edema.   Lymphadenopathy:      Cervical: No cervical adenopathy.      Upper Body:      Right upper body: No axillary adenopathy.      Left upper body: No axillary adenopathy.      Lower Body: No right inguinal adenopathy. No left inguinal adenopathy.   Neurological:      Mental  Status: He is oriented to person, place, and time.      Cranial Nerves: Cranial nerves 2-12 are intact. No cranial nerve deficit.      Sensory: No sensory deficit.      Motor: Motor function is intact. No weakness.      Gait: Gait is intact.      Deep Tendon Reflexes: Reflexes normal.   Psychiatric:         Mood and Affect: Mood normal. Mood is not anxious or depressed. Affect is not angry.         Behavior: Behavior is not agitated.         Thought Content: Thought content normal.         Judgment: Judgment normal.     LAB WORK:  Laboratory studies were reviewed.    Assessment/Plan  Problem List Items Addressed This Visit             ICD-10-CM       Advance Directives and General Issues    At risk for falls Z91.81       Mental Health    Depression F32.A       Neuro    Parkinson's disease (Multi) G20.A1    Dementia (Multi) F03.90       Pulmonary and Pneumonias    Chronic obstructive pulmonary disease (Multi) J44.9     Other Visit Diagnoses         Codes    Schizophrenia, unspecified type (Multi)    -  Primary F20.9    Suicidal ideations     R45.851    Weakness     R53.1    Essential hypertension     I10    Seizure disorder (Multi)     G40.909    Gastroesophageal reflux disease, unspecified whether esophagitis present     K21.9        1. Schizophrenia, on medication.  2. Suicidal ideations, on suicide precautions.  3. Parkinson’s disease, on carbidopa and levodopa.  4. Depression, on medication.  5. Dementia, unchanged.  6. COPD, on bronchodilator therapy.  7. Weakness, on PT/OT.  8. Fall risk, on fall precautions.  9. Hypertension, medically controlled.  10. Seizure disorder, on antiepileptic.  11. Reflux disease, on PPI.    Scribe Attestation  By signing my name below, IMerna Scribe attest that this documentation has been prepared under the direction and in the presence of Amparo Galarza MD.     All medical record entries made by the scribe were personally dictated by me I have reviewed the chart and agree  the record accurately reflects my personal performance of his history physical examination and management      Electronically Signed By: Amparo Galarza MD   5/2/24  1:01 AM

## 2024-04-29 VITALS
DIASTOLIC BLOOD PRESSURE: 76 MMHG | SYSTOLIC BLOOD PRESSURE: 124 MMHG | HEART RATE: 78 BPM | RESPIRATION RATE: 16 BRPM | TEMPERATURE: 97.9 F

## 2024-04-29 ASSESSMENT — ENCOUNTER SYMPTOMS
FEVER: 0
CHILLS: 0

## 2024-04-29 NOTE — PROGRESS NOTES
PLACE OF SERVICE:  [facility]    This is a readmission.    Subjective   Patient ID: Gerardo Albright is a 63 y.o. male who presents for readmission.    Mr. Gerardo Albright is a 63-year-old male with history of schizophrenia with Parkinson's disease and depression.  He has had a recent suicidal ideation causing admission.  During that time, he did have urinary retention.  Aguilar catheter was placed.    Review of Systems   Constitutional:  Negative for chills and fever.   Cardiovascular:  Negative for chest pain.   All other systems reviewed and are negative.    Objective   /76   Pulse 78   Temp 36.6 °C (97.9 °F)   Resp 16     Physical Exam  Vitals reviewed.   Constitutional:       General: He is not in acute distress.     Comments: This is a well-developed, well-nourished male, sitting in a chair   HENT:      Right Ear: Tympanic membrane, ear canal and external ear normal.      Left Ear: Tympanic membrane, ear canal and external ear normal.   Eyes:      General: No scleral icterus.     Pupils: Pupils are equal, round, and reactive to light.   Neck:      Vascular: No carotid bruit.   Cardiovascular:      Heart sounds: Normal heart sounds, S1 normal and S2 normal. No murmur heard.     No friction rub.   Pulmonary:      Effort: Pulmonary effort is normal.      Breath sounds: Normal breath sounds and air entry.   Abdominal:      Palpations: There is no hepatomegaly, splenomegaly or mass.   Musculoskeletal:         General: No swelling or deformity. Normal range of motion.      Cervical back: Neck supple.      Right lower leg: No edema.      Left lower leg: No edema.   Lymphadenopathy:      Cervical: No cervical adenopathy.      Upper Body:      Right upper body: No axillary adenopathy.      Left upper body: No axillary adenopathy.      Lower Body: No right inguinal adenopathy. No left inguinal adenopathy.   Neurological:      Mental Status: He is oriented to person, place, and time.      Cranial Nerves:  Cranial nerves 2-12 are intact. No cranial nerve deficit.      Sensory: No sensory deficit.      Motor: Motor function is intact. No weakness.      Gait: Gait is intact.      Deep Tendon Reflexes: Reflexes normal.   Psychiatric:         Mood and Affect: Mood normal. Mood is not anxious or depressed. Affect is not angry.         Behavior: Behavior is not agitated.         Thought Content: Thought content normal.         Judgment: Judgment normal.     LAB WORK:  Laboratory studies were reviewed.    Assessment/Plan   Problem List Items Addressed This Visit             ICD-10-CM       Advance Directives and General Issues    At risk for falls Z91.81       Mental Health    Depression F32.A       Neuro    Parkinson's disease (Multi) G20.A1    Dementia (Multi) F03.90       Pulmonary and Pneumonias    Chronic obstructive pulmonary disease (Multi) J44.9     Other Visit Diagnoses         Codes    Schizophrenia, unspecified type (Multi)    -  Primary F20.9    Suicidal ideations     R45.851    Weakness     R53.1    Essential hypertension     I10    Seizure disorder (Multi)     G40.909    Gastroesophageal reflux disease, unspecified whether esophagitis present     K21.9        1. Schizophrenia, on medication.  2. Suicidal ideations, on suicide precautions.  3. Parkinson’s disease, on carbidopa and levodopa.  4. Depression, on medication.  5. Dementia, unchanged.  6. COPD, on bronchodilator therapy.  7. Weakness, on PT/OT.  8. Fall risk, on fall precautions.  9. Hypertension, medically controlled.  10. Seizure disorder, on antiepileptic.  11. Reflux disease, on PPI.    Scribe Attestation  By signing my name below, IMerna Scribe attest that this documentation has been prepared under the direction and in the presence of Amparo Galarza MD.     All medical record entries made by the scribe were personally dictated by me I have reviewed the chart and agree the record accurately reflects my personal performance of his history  physical examination and management

## 2024-05-05 ENCOUNTER — NURSING HOME VISIT (OUTPATIENT)
Dept: POST ACUTE CARE | Facility: EXTERNAL LOCATION | Age: 64
End: 2024-05-05
Payer: COMMERCIAL

## 2024-05-05 DIAGNOSIS — I10 ESSENTIAL HYPERTENSION: ICD-10-CM

## 2024-05-05 DIAGNOSIS — F20.89 OTHER SCHIZOPHRENIA (MULTI): ICD-10-CM

## 2024-05-05 DIAGNOSIS — R62.7 ADULT FAILURE TO THRIVE: ICD-10-CM

## 2024-05-05 DIAGNOSIS — F32.A DEPRESSION, UNSPECIFIED DEPRESSION TYPE: ICD-10-CM

## 2024-05-05 DIAGNOSIS — K21.9 GASTROESOPHAGEAL REFLUX DISEASE, UNSPECIFIED WHETHER ESOPHAGITIS PRESENT: ICD-10-CM

## 2024-05-05 DIAGNOSIS — F03.90 DEMENTIA, UNSPECIFIED DEMENTIA SEVERITY, UNSPECIFIED DEMENTIA TYPE, UNSPECIFIED WHETHER BEHAVIORAL, PSYCHOTIC, OR MOOD DISTURBANCE OR ANXIETY (MULTI): ICD-10-CM

## 2024-05-05 DIAGNOSIS — G40.909 SEIZURE DISORDER (MULTI): ICD-10-CM

## 2024-05-05 DIAGNOSIS — F25.9 SCHIZOAFFECTIVE DISORDER, UNSPECIFIED TYPE (MULTI): ICD-10-CM

## 2024-05-05 DIAGNOSIS — G20.A1 PARKINSON'S DISEASE WITHOUT DYSKINESIA, UNSPECIFIED WHETHER MANIFESTATIONS FLUCTUATE (MULTI): ICD-10-CM

## 2024-05-05 DIAGNOSIS — J44.9 CHRONIC OBSTRUCTIVE PULMONARY DISEASE, UNSPECIFIED COPD TYPE (MULTI): Primary | ICD-10-CM

## 2024-05-05 PROCEDURE — 99309 SBSQ NF CARE MODERATE MDM 30: CPT | Performed by: INTERNAL MEDICINE

## 2024-05-05 NOTE — LETTER
Patient: Gerardo Albright  : 1960    Encounter Date: 2024    PLACE OF SERVICE:  [facility]    This is a subsequent visit.    Subjective  Patient ID: Gerardo Albright is a 63 y.o. male who presents for Follow-up.    Mr. Gerardo Albright is a 63-year-old male with history of dementia with schizophrenia.  He suffers from COPD and is currently on hospice care.    Review of Systems   Constitutional:  Negative for chills and fever.   Cardiovascular:  Negative for chest pain.   All other systems reviewed and are negative.    Objective  /78   Pulse 78   Temp 36.6 °C (97.8 °F)   Resp 18     Physical Exam  Vitals reviewed.   Constitutional:       General: He is not in acute distress.     Comments: This is a well-developed, well-nourished male, sitting in a chair   HENT:      Right Ear: Tympanic membrane, ear canal and external ear normal.      Left Ear: Tympanic membrane, ear canal and external ear normal.   Eyes:      General: No scleral icterus.     Pupils: Pupils are equal, round, and reactive to light.   Neck:      Vascular: No carotid bruit.   Cardiovascular:      Heart sounds: Normal heart sounds, S1 normal and S2 normal. No murmur heard.     No friction rub.   Pulmonary:      Breath sounds: Decreased breath sounds (throughout) present.   Abdominal:      Palpations: There is no hepatomegaly, splenomegaly or mass.   Musculoskeletal:         General: No swelling or deformity. Normal range of motion.      Cervical back: Neck supple.      Right lower leg: No edema.      Left lower leg: No edema.   Lymphadenopathy:      Cervical: No cervical adenopathy.      Upper Body:      Right upper body: No axillary adenopathy.      Left upper body: No axillary adenopathy.      Lower Body: No right inguinal adenopathy. No left inguinal adenopathy.   Neurological:      Mental Status: He is alert.      Cranial Nerves: Cranial nerves 2-12 are intact. No cranial nerve deficit.      Sensory: No sensory deficit.       Motor: Motor function is intact. No weakness.      Gait: Gait is intact.      Deep Tendon Reflexes: Reflexes normal.      Comments: The patient is oriented x2.   Psychiatric:         Mood and Affect: Mood normal. Mood is not anxious or depressed. Affect is not angry.         Behavior: Behavior is not agitated.         Thought Content: Thought content normal.         Judgment: Judgment normal.     LAB WORK:  Laboratory studies were reviewed.    Assessment/Plan  Problem List Items Addressed This Visit             ICD-10-CM       Mental Health    Other schizophrenia (Multi) F20.89    Depression F32.A       Neuro    Parkinson's disease (Multi) G20.A1    Dementia (Multi) F03.90       Pulmonary and Pneumonias    Chronic obstructive pulmonary disease (Multi) - Primary J44.9     Other Visit Diagnoses         Codes    Adult failure to thrive     R62.7    Schizoaffective disorder, unspecified type (Multi)     F25.9    Essential hypertension     I10    Seizure disorder (Multi)     G40.909    Gastroesophageal reflux disease, unspecified whether esophagitis present     K21.9        1. COPD, on bronchodilator therapy.  2. Dementia, on hospice care.  3. Adult failure to thrive.  Encouraged to eat.  4. Schizophrenia, on medication.  5. Schizoaffective disorder, on medication.  6. Hypertension, medically controlled.  7. Parkinson’s disease, on carbidopa and levodopa.  8. Seizure disorder, on antiepileptic.  9. Reflux disease, on PPI.  10. Depression, on medication.    Scribe Attestation  By signing my name below, IMerna Scribe attest that this documentation has been prepared under the direction and in the presence of Amparo Galarza MD.     All medical record entries made by the scribe were personally dictated by me I have reviewed the chart and agree the record accurately reflects my personal performance of his history physical examination and management      Electronically Signed By: Amparo Galarza MD   5/26/24 11:49  PM

## 2024-05-23 VITALS
RESPIRATION RATE: 18 BRPM | DIASTOLIC BLOOD PRESSURE: 78 MMHG | HEART RATE: 78 BPM | SYSTOLIC BLOOD PRESSURE: 120 MMHG | TEMPERATURE: 97.8 F

## 2024-05-23 ASSESSMENT — ENCOUNTER SYMPTOMS
CHILLS: 0
FEVER: 0

## 2024-05-23 NOTE — PROGRESS NOTES
PLACE OF SERVICE:  [facility]    This is a subsequent visit.    Subjective   Patient ID: Gerardo Albright is a 63 y.o. male who presents for Follow-up.    Mr. Gerardo Albright is a 63-year-old male with history of dementia with schizophrenia.  He suffers from COPD and is currently on hospice care.    Review of Systems   Constitutional:  Negative for chills and fever.   Cardiovascular:  Negative for chest pain.   All other systems reviewed and are negative.    Objective   /78   Pulse 78   Temp 36.6 °C (97.8 °F)   Resp 18     Physical Exam  Vitals reviewed.   Constitutional:       General: He is not in acute distress.     Comments: This is a well-developed, well-nourished male, sitting in a chair   HENT:      Right Ear: Tympanic membrane, ear canal and external ear normal.      Left Ear: Tympanic membrane, ear canal and external ear normal.   Eyes:      General: No scleral icterus.     Pupils: Pupils are equal, round, and reactive to light.   Neck:      Vascular: No carotid bruit.   Cardiovascular:      Heart sounds: Normal heart sounds, S1 normal and S2 normal. No murmur heard.     No friction rub.   Pulmonary:      Breath sounds: Decreased breath sounds (throughout) present.   Abdominal:      Palpations: There is no hepatomegaly, splenomegaly or mass.   Musculoskeletal:         General: No swelling or deformity. Normal range of motion.      Cervical back: Neck supple.      Right lower leg: No edema.      Left lower leg: No edema.   Lymphadenopathy:      Cervical: No cervical adenopathy.      Upper Body:      Right upper body: No axillary adenopathy.      Left upper body: No axillary adenopathy.      Lower Body: No right inguinal adenopathy. No left inguinal adenopathy.   Neurological:      Mental Status: He is alert.      Cranial Nerves: Cranial nerves 2-12 are intact. No cranial nerve deficit.      Sensory: No sensory deficit.      Motor: Motor function is intact. No weakness.      Gait: Gait is  intact.      Deep Tendon Reflexes: Reflexes normal.      Comments: The patient is oriented x2.   Psychiatric:         Mood and Affect: Mood normal. Mood is not anxious or depressed. Affect is not angry.         Behavior: Behavior is not agitated.         Thought Content: Thought content normal.         Judgment: Judgment normal.     LAB WORK:  Laboratory studies were reviewed.    Assessment/Plan   Problem List Items Addressed This Visit             ICD-10-CM       Mental Health    Other schizophrenia (Multi) F20.89    Depression F32.A       Neuro    Parkinson's disease (Multi) G20.A1    Dementia (Multi) F03.90       Pulmonary and Pneumonias    Chronic obstructive pulmonary disease (Multi) - Primary J44.9     Other Visit Diagnoses         Codes    Adult failure to thrive     R62.7    Schizoaffective disorder, unspecified type (Multi)     F25.9    Essential hypertension     I10    Seizure disorder (Multi)     G40.909    Gastroesophageal reflux disease, unspecified whether esophagitis present     K21.9        1. COPD, on bronchodilator therapy.  2. Dementia, on hospice care.  3. Adult failure to thrive.  Encouraged to eat.  4. Schizophrenia, on medication.  5. Schizoaffective disorder, on medication.  6. Hypertension, medically controlled.  7. Parkinson’s disease, on carbidopa and levodopa.  8. Seizure disorder, on antiepileptic.  9. Reflux disease, on PPI.  10. Depression, on medication.    Scribe Attestation  By signing my name below, IMerna Scribe attest that this documentation has been prepared under the direction and in the presence of Amparo Galarza MD.     All medical record entries made by the scribe were personally dictated by me I have reviewed the chart and agree the record accurately reflects my personal performance of his history physical examination and management

## 2024-06-11 ENCOUNTER — NURSING HOME VISIT (OUTPATIENT)
Dept: POST ACUTE CARE | Facility: EXTERNAL LOCATION | Age: 64
End: 2024-06-11
Payer: COMMERCIAL

## 2024-06-11 DIAGNOSIS — G89.29 OTHER CHRONIC PAIN: ICD-10-CM

## 2024-06-11 DIAGNOSIS — I25.10 CORONARY ARTERY DISEASE, UNSPECIFIED VESSEL OR LESION TYPE, UNSPECIFIED WHETHER ANGINA PRESENT, UNSPECIFIED WHETHER NATIVE OR TRANSPLANTED HEART: ICD-10-CM

## 2024-06-11 DIAGNOSIS — G20.A1 PARKINSON'S DISEASE WITHOUT DYSKINESIA, UNSPECIFIED WHETHER MANIFESTATIONS FLUCTUATE (MULTI): Primary | ICD-10-CM

## 2024-06-11 DIAGNOSIS — I10 ESSENTIAL HYPERTENSION: ICD-10-CM

## 2024-06-11 DIAGNOSIS — E11.9 TYPE 2 DIABETES MELLITUS WITHOUT COMPLICATION, WITHOUT LONG-TERM CURRENT USE OF INSULIN (MULTI): ICD-10-CM

## 2024-06-11 DIAGNOSIS — N40.0 BENIGN PROSTATIC HYPERPLASIA, UNSPECIFIED WHETHER LOWER URINARY TRACT SYMPTOMS PRESENT: ICD-10-CM

## 2024-06-11 PROCEDURE — 99309 SBSQ NF CARE MODERATE MDM 30: CPT | Performed by: NURSE PRACTITIONER

## 2024-06-17 NOTE — PROGRESS NOTES
*Provider Impression*  Patient is a 65 year y/o male who is seen today for management of multiple medical problems  #Parkinson's / Chronic pain - hospice consult  #BPH - flomax 0.4mg daily  #HTN / CAD - diet controlled  #T2DM - diet controlled  #Schizoaffective / Depression - per psych - lexapro, Perseris  #ACP - DNRCC-A - hospice consult  Follow up as needed      *Chief Complaint*  multiple medical problems      *History of Present Illness*  Pt is a 64 y/o male LTC resident of [facility] w/ PMH as below who is seen today for follow up and management of multiple medical problems.     He was sent to ED on  w/ suicidal ideation and auditory hallucinations with plan to cut himself.  He was found to have urinary retention and constipation. He was straight cath'ed w/ espinal placed d/t retention and started on tamsulosin.  Started on Perseris and risperdal was d/c. Ultimately most of his medications were discontinued and hospice was consulted and she was d/c back to [facility].     He is seen lying in bed today. Denies any pain. Not forthcoming with information at this time. Nursing staff report that he has been up and out of his room independently today.     Allergies - NKDA  PMH - ADHD, Schizoaffective, HTN, CAD, MDD  PSH - cardiac cath, PCI, CABG  FH - unreliable  SocHx - Current everyday smoker      *Review of Systems*  All other systems reviewed are negative except as noted in the HPI      *Vitals*  Date: 24 - T: 96.7 P: 66 R: 20 BP: 122/64 SpO2: 94% on RA ; 24 - Wt 137      *Results/Data*  CBC - Date: 4/10/24 WBC: 12.3 HGB: 16.2 HCT: 47.0 PLT: 339 ;   BMP - Date: 4/10/24 Na: 141 K: 4.3 Cl: 103 CO2: 24 BUN: 40 Cr: 0.60 Glu: 123 Ca: 9.8 Alb: 4.3 ;   LFT - Date: 4/10/24 AST: 13 ALT: 11 ALP: 71 Tbili: 1.0 ;   Coags - Date: INR: PT:  Other - Date: 9/3/20 - TC: 139 T HDL: 25 LDL: 93; 11/10/20 - CRP: 15.1 D-dimer: 0.46; 21 - TC: 199 T HDL: 36 LDL: 138 VPA: 86 TSH: 2.715  25-OH-D: 19.07 ; 6/1/21 - VPA: 49; 8/12/21 - 25-OH-D: 26.67; 12/13/22 - TSH: 2.218 ; 8/8/23  TSH: 2.888  25-OH-D: 31.67; 9/30/23  A1c: 6.5%; 4/10/24  A1c: 5.7%    *Physical Exam*  Gen: (+) NAD, (+) well-appearing  HEENT: (+) normocephalic, (+) MMM  Neck: (+) supple  Lungs: (-) cough  Abd: (+) ND  Ext: (-) edema, (-) deformity  MSK: (-) joint swelling  Skin: (-) rash,  Neuro: (+) follows commands, (+) tremor, (+) alert

## 2024-07-13 ENCOUNTER — NURSING HOME VISIT (OUTPATIENT)
Dept: POST ACUTE CARE | Facility: EXTERNAL LOCATION | Age: 64
End: 2024-07-13
Payer: COMMERCIAL

## 2024-07-13 DIAGNOSIS — G40.909 SEIZURE DISORDER (MULTI): ICD-10-CM

## 2024-07-13 DIAGNOSIS — G20.A1 PARKINSON'S DISEASE WITHOUT DYSKINESIA, UNSPECIFIED WHETHER MANIFESTATIONS FLUCTUATE (MULTI): ICD-10-CM

## 2024-07-13 DIAGNOSIS — Z91.81 AT RISK FOR FALLS: ICD-10-CM

## 2024-07-13 DIAGNOSIS — R53.1 WEAKNESS: ICD-10-CM

## 2024-07-13 DIAGNOSIS — F02.80 ALZHEIMER'S DEMENTIA, UNSPECIFIED DEMENTIA SEVERITY, UNSPECIFIED TIMING OF DEMENTIA ONSET, UNSPECIFIED WHETHER BEHAVIORAL, PSYCHOTIC, OR MOOD DISTURBANCE OR ANXIETY (MULTI): ICD-10-CM

## 2024-07-13 DIAGNOSIS — F20.9 SCHIZOPHRENIA, UNSPECIFIED TYPE (MULTI): ICD-10-CM

## 2024-07-13 DIAGNOSIS — I10 ESSENTIAL HYPERTENSION: ICD-10-CM

## 2024-07-13 DIAGNOSIS — K21.9 GASTROESOPHAGEAL REFLUX DISEASE, UNSPECIFIED WHETHER ESOPHAGITIS PRESENT: ICD-10-CM

## 2024-07-13 DIAGNOSIS — F32.A DEPRESSION, UNSPECIFIED DEPRESSION TYPE: ICD-10-CM

## 2024-07-13 DIAGNOSIS — G30.9 ALZHEIMER'S DEMENTIA, UNSPECIFIED DEMENTIA SEVERITY, UNSPECIFIED TIMING OF DEMENTIA ONSET, UNSPECIFIED WHETHER BEHAVIORAL, PSYCHOTIC, OR MOOD DISTURBANCE OR ANXIETY (MULTI): ICD-10-CM

## 2024-07-13 DIAGNOSIS — I21.9 ACUTE MYOCARDIAL INFARCTION, UNSPECIFIED MI TYPE, UNSPECIFIED ARTERY (MULTI): ICD-10-CM

## 2024-07-13 DIAGNOSIS — J44.9 CHRONIC OBSTRUCTIVE PULMONARY DISEASE, UNSPECIFIED COPD TYPE (MULTI): Primary | ICD-10-CM

## 2024-07-13 PROCEDURE — 99309 SBSQ NF CARE MODERATE MDM 30: CPT | Performed by: INTERNAL MEDICINE

## 2024-07-13 NOTE — LETTER
Patient: Gerardo Albright  : 1960    Encounter Date: 2024    PLACE OF SERVICE:  [facility]    This is a subsequent visit.    Subjective  Patient ID: Gerardo Albright is a 64 y.o. male who presents for Follow-up.    Mr. Gerardo Albright is a 64-year-old male with history of COPD.  He suffers from Parkinson's disease as well as dementia.  He is unable to care for himself and requires supportive care.    Review of Systems   Constitutional:  Negative for chills and fever.   Cardiovascular:  Negative for chest pain.   All other systems reviewed and are negative.    Objective  /76   Pulse 78   Temp 36.5 °C (97.7 °F)   Resp 18     Physical Exam  Vitals reviewed.   Constitutional:       General: He is not in acute distress.     Comments: This is a well-developed, well-nourished male, sitting in a chair.   HENT:      Right Ear: Tympanic membrane, ear canal and external ear normal.      Left Ear: Tympanic membrane, ear canal and external ear normal.   Eyes:      General: No scleral icterus.     Pupils: Pupils are equal, round, and reactive to light.   Neck:      Vascular: No carotid bruit.   Cardiovascular:      Heart sounds: Normal heart sounds, S1 normal and S2 normal. No murmur heard.     No friction rub.   Pulmonary:      Breath sounds: Decreased breath sounds (throughout) present.   Abdominal:      Palpations: There is no hepatomegaly, splenomegaly or mass.   Musculoskeletal:         General: No swelling or deformity. Normal range of motion.      Cervical back: Neck supple.      Right lower leg: No edema.      Left lower leg: No edema.   Lymphadenopathy:      Cervical: No cervical adenopathy.      Upper Body:      Right upper body: No axillary adenopathy.      Left upper body: No axillary adenopathy.      Lower Body: No right inguinal adenopathy. No left inguinal adenopathy.   Neurological:      Mental Status: He is alert.      Cranial Nerves: Cranial nerves 2-12 are intact. No cranial nerve  deficit.      Sensory: No sensory deficit.      Motor: Motor function is intact. No weakness.      Gait: Gait is intact.      Deep Tendon Reflexes: Reflexes normal.      Comments: The patient is oriented x2.   Psychiatric:         Mood and Affect: Mood normal. Mood is not anxious or depressed. Affect is not angry.         Behavior: Behavior is not agitated.         Thought Content: Thought content normal.         Judgment: Judgment normal.     LAB WORK:  Laboratory studies were reviewed.    Assessment/Plan  Problem List Items Addressed This Visit             ICD-10-CM       Advance Directives and General Issues    At risk for falls Z91.81       Mental Health    Depression F32.A       Neuro    Parkinson's disease (Multi) G20.A1    Dementia (Multi) F03.90       Pulmonary and Pneumonias    Chronic obstructive pulmonary disease (Multi) - Primary J44.9     Other Visit Diagnoses         Codes    Essential hypertension     I10    Schizophrenia, unspecified type (Multi)     F20.9    Seizure disorder (Multi)     G40.909    Weakness     R53.1    Gastroesophageal reflux disease, unspecified whether esophagitis present     K21.9    Acute myocardial infarction, unspecified MI type, unspecified artery (Multi)     I21.9        1. COPD, on bronchodilator therapy.  2. Parkinson’s disease, on carbidopa and levodopa.  3. Hypertension, medically controlled.  4. Alzheimer’s dementia, on supportive care.  5. Schizophrenia, on medication.  6. Seizure disorder, on antiepileptic.  7. Weakness, on PT/OT.  8. Fall risk, on fall precautions.  9. GERD, on PPI.  10. Depression, on medication.  11. History of AMI, on aspirin.    Scribe Attestation  By signing my name below, IMerna Scribe attest that this documentation has been prepared under the direction and in the presence of Amparo Galarza MD.     All medical record entries made by the scribbran were personally dictated by me I have reviewed the chart and agree the record accurately  reflects my personal performance of his history physical examination and management      Electronically Signed By: Amparo Galarza MD   7/24/24 12:59 AM

## 2024-07-17 VITALS
HEART RATE: 78 BPM | TEMPERATURE: 97.7 F | SYSTOLIC BLOOD PRESSURE: 126 MMHG | RESPIRATION RATE: 18 BRPM | DIASTOLIC BLOOD PRESSURE: 76 MMHG

## 2024-07-17 ASSESSMENT — ENCOUNTER SYMPTOMS
CHILLS: 0
FEVER: 0

## 2024-07-17 NOTE — PROGRESS NOTES
PLACE OF SERVICE:  [facility]    This is a subsequent visit.    Subjective   Patient ID: Gerardo Albright is a 64 y.o. male who presents for Follow-up.    Mr. Gerardo Albright is a 64-year-old male with history of COPD.  He suffers from Parkinson's disease as well as dementia.  He is unable to care for himself and requires supportive care.    Review of Systems   Constitutional:  Negative for chills and fever.   Cardiovascular:  Negative for chest pain.   All other systems reviewed and are negative.    Objective   /76   Pulse 78   Temp 36.5 °C (97.7 °F)   Resp 18     Physical Exam  Vitals reviewed.   Constitutional:       General: He is not in acute distress.     Comments: This is a well-developed, well-nourished male, sitting in a chair.   HENT:      Right Ear: Tympanic membrane, ear canal and external ear normal.      Left Ear: Tympanic membrane, ear canal and external ear normal.   Eyes:      General: No scleral icterus.     Pupils: Pupils are equal, round, and reactive to light.   Neck:      Vascular: No carotid bruit.   Cardiovascular:      Heart sounds: Normal heart sounds, S1 normal and S2 normal. No murmur heard.     No friction rub.   Pulmonary:      Breath sounds: Decreased breath sounds (throughout) present.   Abdominal:      Palpations: There is no hepatomegaly, splenomegaly or mass.   Musculoskeletal:         General: No swelling or deformity. Normal range of motion.      Cervical back: Neck supple.      Right lower leg: No edema.      Left lower leg: No edema.   Lymphadenopathy:      Cervical: No cervical adenopathy.      Upper Body:      Right upper body: No axillary adenopathy.      Left upper body: No axillary adenopathy.      Lower Body: No right inguinal adenopathy. No left inguinal adenopathy.   Neurological:      Mental Status: He is alert.      Cranial Nerves: Cranial nerves 2-12 are intact. No cranial nerve deficit.      Sensory: No sensory deficit.      Motor: Motor function is  intact. No weakness.      Gait: Gait is intact.      Deep Tendon Reflexes: Reflexes normal.      Comments: The patient is oriented x2.   Psychiatric:         Mood and Affect: Mood normal. Mood is not anxious or depressed. Affect is not angry.         Behavior: Behavior is not agitated.         Thought Content: Thought content normal.         Judgment: Judgment normal.     LAB WORK:  Laboratory studies were reviewed.    Assessment/Plan   Problem List Items Addressed This Visit             ICD-10-CM       Advance Directives and General Issues    At risk for falls Z91.81       Mental Health    Depression F32.A       Neuro    Parkinson's disease (Multi) G20.A1    Dementia (Multi) F03.90       Pulmonary and Pneumonias    Chronic obstructive pulmonary disease (Multi) - Primary J44.9     Other Visit Diagnoses         Codes    Essential hypertension     I10    Schizophrenia, unspecified type (Multi)     F20.9    Seizure disorder (Multi)     G40.909    Weakness     R53.1    Gastroesophageal reflux disease, unspecified whether esophagitis present     K21.9    Acute myocardial infarction, unspecified MI type, unspecified artery (Multi)     I21.9        1. COPD, on bronchodilator therapy.  2. Parkinson’s disease, on carbidopa and levodopa.  3. Hypertension, medically controlled.  4. Alzheimer’s dementia, on supportive care.  5. Schizophrenia, on medication.  6. Seizure disorder, on antiepileptic.  7. Weakness, on PT/OT.  8. Fall risk, on fall precautions.  9. GERD, on PPI.  10. Depression, on medication.  11. History of AMI, on aspirin.    Scribe Attestation  By signing my name below, IMerna Scribe attest that this documentation has been prepared under the direction and in the presence of Amparo Galarza MD.     All medical record entries made by the scribe were personally dictated by me I have reviewed the chart and agree the record accurately reflects my personal performance of his history physical examination and  management

## 2024-08-04 ENCOUNTER — NURSING HOME VISIT (OUTPATIENT)
Dept: POST ACUTE CARE | Facility: EXTERNAL LOCATION | Age: 64
End: 2024-08-04
Payer: COMMERCIAL

## 2024-08-04 DIAGNOSIS — J44.9 CHRONIC OBSTRUCTIVE PULMONARY DISEASE, UNSPECIFIED COPD TYPE (MULTI): Primary | ICD-10-CM

## 2024-08-04 DIAGNOSIS — I21.9 ACUTE MYOCARDIAL INFARCTION, UNSPECIFIED MI TYPE, UNSPECIFIED ARTERY (MULTI): ICD-10-CM

## 2024-08-04 DIAGNOSIS — F03.90 DEMENTIA, UNSPECIFIED DEMENTIA SEVERITY, UNSPECIFIED DEMENTIA TYPE, UNSPECIFIED WHETHER BEHAVIORAL, PSYCHOTIC, OR MOOD DISTURBANCE OR ANXIETY (MULTI): ICD-10-CM

## 2024-08-04 DIAGNOSIS — G20.A1 PARKINSON'S DISEASE WITHOUT DYSKINESIA, UNSPECIFIED WHETHER MANIFESTATIONS FLUCTUATE (MULTI): ICD-10-CM

## 2024-08-04 DIAGNOSIS — F32.A DEPRESSION, UNSPECIFIED DEPRESSION TYPE: ICD-10-CM

## 2024-08-04 DIAGNOSIS — Z91.81 AT RISK FOR FALLS: ICD-10-CM

## 2024-08-04 DIAGNOSIS — F20.9 SCHIZOPHRENIA, UNSPECIFIED TYPE (MULTI): ICD-10-CM

## 2024-08-04 DIAGNOSIS — R62.7 ADULT FAILURE TO THRIVE: ICD-10-CM

## 2024-08-04 DIAGNOSIS — I10 ESSENTIAL HYPERTENSION: ICD-10-CM

## 2024-08-04 DIAGNOSIS — K21.9 GASTROESOPHAGEAL REFLUX DISEASE, UNSPECIFIED WHETHER ESOPHAGITIS PRESENT: ICD-10-CM

## 2024-08-04 DIAGNOSIS — G40.909 SEIZURE DISORDER (MULTI): ICD-10-CM

## 2024-08-04 DIAGNOSIS — R53.1 WEAKNESS: ICD-10-CM

## 2024-08-04 PROCEDURE — 99309 SBSQ NF CARE MODERATE MDM 30: CPT | Performed by: INTERNAL MEDICINE

## 2024-08-04 NOTE — LETTER
Patient: Gerardo Albright  : 1960    Encounter Date: 2024    PLACE OF SERVICE:  [facility]    This is a subsequent visit.    Subjective  Patient ID: Gerardo Albright is a 64 y.o. male who presents for Follow-up.    Mr. Gerardo Albright is a 64-year-old male with history of COPD with Parkinson's disease and schizophrenia.  He is unable to care for himself and requires supportive care.    Review of Systems   Constitutional:  Negative for chills and fever.   Cardiovascular:  Negative for chest pain.   All other systems reviewed and are negative.    Objective  /78   Pulse 76   Temp 36.6 °C (97.9 °F)   Resp 18     Physical Exam  Vitals reviewed.   Constitutional:       General: He is not in acute distress.     Comments: This is a well-developed, well-nourished male, sitting in a chair   HENT:      Right Ear: Tympanic membrane, ear canal and external ear normal.      Left Ear: Tympanic membrane, ear canal and external ear normal.   Eyes:      General: No scleral icterus.     Pupils: Pupils are equal, round, and reactive to light.   Neck:      Vascular: No carotid bruit.   Cardiovascular:      Heart sounds: Normal heart sounds, S1 normal and S2 normal. No murmur heard.     No friction rub.   Pulmonary:      Effort: Pulmonary effort is normal.      Breath sounds: Decreased breath sounds (throughout.) present.   Abdominal:      Palpations: There is no hepatomegaly, splenomegaly or mass.   Musculoskeletal:         General: No swelling or deformity. Normal range of motion.      Cervical back: Neck supple.      Right lower leg: No edema.      Left lower leg: No edema.   Lymphadenopathy:      Cervical: No cervical adenopathy.      Upper Body:      Right upper body: No axillary adenopathy.      Left upper body: No axillary adenopathy.      Lower Body: No right inguinal adenopathy. No left inguinal adenopathy.   Neurological:      Cranial Nerves: Cranial nerves 2-12 are intact. No cranial nerve deficit.       Sensory: No sensory deficit.      Motor: Motor function is intact. No weakness.      Gait: Gait is intact.      Deep Tendon Reflexes: Reflexes normal.      Comments: The patient is alert and oriented x2.   Psychiatric:         Mood and Affect: Mood normal. Mood is not anxious or depressed. Affect is not angry.         Behavior: Behavior is not agitated.         Thought Content: Thought content normal.         Judgment: Judgment normal.     LAB WORK:  Laboratory studies reviewed.    Assessment/Plan  Problem List Items Addressed This Visit             ICD-10-CM       Advance Directives and General Issues    At risk for falls Z91.81       Mental Health    Depression F32.A       Neuro    Parkinson's disease (Multi) G20.A1    Dementia (Multi) F03.90       Pulmonary and Pneumonias    Chronic obstructive pulmonary disease (Multi) - Primary J44.9     Other Visit Diagnoses         Codes    Adult failure to thrive     R62.7    Schizophrenia, unspecified type (Multi)     F20.9    Essential hypertension     I10    Seizure disorder (Multi)     G40.909    Weakness     R53.1    Acute myocardial infarction, unspecified MI type, unspecified artery (Multi)     I21.9    Gastroesophageal reflux disease, unspecified whether esophagitis present     K21.9        1. COPD, on bronchodilator therapy.  2. Parkinson’s disease, on carbidopa, levodopa.  3. Dementia, unchanged.  4. Adult failure to thrive, encouraged to eat.  5. Schizophrenia, on medication.  6. Hypertension, med controlled.  7. Seizure disorder, on antiepileptic.  8. Weakness, on PT/OT.  9. Fall risk, on fall precautions.  10. Depression, on medication.  11. History of AMI, on aspirin.  12. GERD, on PPI.    Scribe Attestation  By signing my name below, IMerna Scribe attest that this documentation has been prepared under the direction and in the presence of Amparo Galarza MD.     All medical record entries made by the scribbran were personally dictated by me I have  reviewed the chart and agree the record accurately reflects my personal performance of his history physical examination and management      Electronically Signed By: Amparo Galarza MD   8/9/24 10:47 PM

## 2024-08-07 VITALS
SYSTOLIC BLOOD PRESSURE: 128 MMHG | DIASTOLIC BLOOD PRESSURE: 78 MMHG | TEMPERATURE: 97.9 F | HEART RATE: 76 BPM | RESPIRATION RATE: 18 BRPM

## 2024-08-07 ASSESSMENT — ENCOUNTER SYMPTOMS
CHILLS: 0
FEVER: 0

## 2024-08-08 NOTE — PROGRESS NOTES
PLACE OF SERVICE:  [facility]    This is a subsequent visit.    Subjective   Patient ID: Gerardo Albright is a 64 y.o. male who presents for Follow-up.    Mr. Gerardo Albright is a 64-year-old male with history of COPD with Parkinson's disease and schizophrenia.  He is unable to care for himself and requires supportive care.    Review of Systems   Constitutional:  Negative for chills and fever.   Cardiovascular:  Negative for chest pain.   All other systems reviewed and are negative.    Objective   /78   Pulse 76   Temp 36.6 °C (97.9 °F)   Resp 18     Physical Exam  Vitals reviewed.   Constitutional:       General: He is not in acute distress.     Comments: This is a well-developed, well-nourished male, sitting in a chair   HENT:      Right Ear: Tympanic membrane, ear canal and external ear normal.      Left Ear: Tympanic membrane, ear canal and external ear normal.   Eyes:      General: No scleral icterus.     Pupils: Pupils are equal, round, and reactive to light.   Neck:      Vascular: No carotid bruit.   Cardiovascular:      Heart sounds: Normal heart sounds, S1 normal and S2 normal. No murmur heard.     No friction rub.   Pulmonary:      Effort: Pulmonary effort is normal.      Breath sounds: Decreased breath sounds (throughout.) present.   Abdominal:      Palpations: There is no hepatomegaly, splenomegaly or mass.   Musculoskeletal:         General: No swelling or deformity. Normal range of motion.      Cervical back: Neck supple.      Right lower leg: No edema.      Left lower leg: No edema.   Lymphadenopathy:      Cervical: No cervical adenopathy.      Upper Body:      Right upper body: No axillary adenopathy.      Left upper body: No axillary adenopathy.      Lower Body: No right inguinal adenopathy. No left inguinal adenopathy.   Neurological:      Cranial Nerves: Cranial nerves 2-12 are intact. No cranial nerve deficit.      Sensory: No sensory deficit.      Motor: Motor function is intact.  No weakness.      Gait: Gait is intact.      Deep Tendon Reflexes: Reflexes normal.      Comments: The patient is alert and oriented x2.   Psychiatric:         Mood and Affect: Mood normal. Mood is not anxious or depressed. Affect is not angry.         Behavior: Behavior is not agitated.         Thought Content: Thought content normal.         Judgment: Judgment normal.     LAB WORK:  Laboratory studies reviewed.    Assessment/Plan   Problem List Items Addressed This Visit             ICD-10-CM       Advance Directives and General Issues    At risk for falls Z91.81       Mental Health    Depression F32.A       Neuro    Parkinson's disease (Multi) G20.A1    Dementia (Multi) F03.90       Pulmonary and Pneumonias    Chronic obstructive pulmonary disease (Multi) - Primary J44.9     Other Visit Diagnoses         Codes    Adult failure to thrive     R62.7    Schizophrenia, unspecified type (Multi)     F20.9    Essential hypertension     I10    Seizure disorder (Multi)     G40.909    Weakness     R53.1    Acute myocardial infarction, unspecified MI type, unspecified artery (Multi)     I21.9    Gastroesophageal reflux disease, unspecified whether esophagitis present     K21.9        1. COPD, on bronchodilator therapy.  2. Parkinson’s disease, on carbidopa, levodopa.  3. Dementia, unchanged.  4. Adult failure to thrive, encouraged to eat.  5. Schizophrenia, on medication.  6. Hypertension, med controlled.  7. Seizure disorder, on antiepileptic.  8. Weakness, on PT/OT.  9. Fall risk, on fall precautions.  10. Depression, on medication.  11. History of AMI, on aspirin.  12. GERD, on PPI.    Scribe Attestation  By signing my name below, IMerna Scribe attest that this documentation has been prepared under the direction and in the presence of Amparo Galarza MD.     All medical record entries made by the scribe were personally dictated by me I have reviewed the chart and agree the record accurately reflects my personal  performance of his history physical examination and management

## 2024-09-04 ENCOUNTER — NURSING HOME VISIT (OUTPATIENT)
Dept: POST ACUTE CARE | Facility: EXTERNAL LOCATION | Age: 64
End: 2024-09-04
Payer: COMMERCIAL

## 2024-09-04 DIAGNOSIS — J96.01 ACUTE RESPIRATORY FAILURE WITH HYPOXIA (MULTI): ICD-10-CM

## 2024-09-04 DIAGNOSIS — F32.A DEPRESSION, UNSPECIFIED DEPRESSION TYPE: ICD-10-CM

## 2024-09-04 DIAGNOSIS — K21.9 GASTROESOPHAGEAL REFLUX DISEASE, UNSPECIFIED WHETHER ESOPHAGITIS PRESENT: ICD-10-CM

## 2024-09-04 DIAGNOSIS — F03.90 DEMENTIA, UNSPECIFIED DEMENTIA SEVERITY, UNSPECIFIED DEMENTIA TYPE, UNSPECIFIED WHETHER BEHAVIORAL, PSYCHOTIC, OR MOOD DISTURBANCE OR ANXIETY (MULTI): Primary | ICD-10-CM

## 2024-09-04 DIAGNOSIS — G20.A1 PARKINSON'S DISEASE WITHOUT DYSKINESIA, UNSPECIFIED WHETHER MANIFESTATIONS FLUCTUATE (MULTI): ICD-10-CM

## 2024-09-04 DIAGNOSIS — R62.7 ADULT FAILURE TO THRIVE: ICD-10-CM

## 2024-09-04 DIAGNOSIS — G47.09 OTHER INSOMNIA: ICD-10-CM

## 2024-09-04 DIAGNOSIS — G40.909 SEIZURE DISORDER (MULTI): ICD-10-CM

## 2024-09-04 DIAGNOSIS — I10 ESSENTIAL HYPERTENSION: ICD-10-CM

## 2024-09-04 DIAGNOSIS — E46 PROTEIN-CALORIE MALNUTRITION, UNSPECIFIED SEVERITY (MULTI): ICD-10-CM

## 2024-09-04 DIAGNOSIS — J44.9 CHRONIC OBSTRUCTIVE PULMONARY DISEASE, UNSPECIFIED COPD TYPE (MULTI): ICD-10-CM

## 2024-09-04 DIAGNOSIS — R41.89 COGNITIVE DECLINE: ICD-10-CM

## 2024-09-04 DIAGNOSIS — F20.9 SCHIZOPHRENIA, UNSPECIFIED TYPE (MULTI): ICD-10-CM

## 2024-09-04 DIAGNOSIS — I21.9 ACUTE MYOCARDIAL INFARCTION, UNSPECIFIED MI TYPE, UNSPECIFIED ARTERY (MULTI): ICD-10-CM

## 2024-09-04 PROCEDURE — 99309 SBSQ NF CARE MODERATE MDM 30: CPT | Performed by: INTERNAL MEDICINE

## 2024-09-04 NOTE — LETTER
Patient: Gerardo Albright  : 1960    Encounter Date: 2024    PLACE OF SERVICE:  [facility]    This is a subsequent visit.    Subjective  Patient ID: Gerardo Albright is a 64 y.o. male who presents for Follow-up.    Mr. Gerardo Albright is a 64-year-old male with history of dementia with Parkinson's disease and schizophrenia.  He suffers from adult failure to thrive and is unable to care for himself.  He is currently on hospice care.    Review of Systems   Constitutional:  Negative for chills and fever.   Cardiovascular:  Negative for chest pain.   All other systems reviewed and are negative.    Objective  /82   Pulse 78   Temp 36.7 °C (98 °F)   Resp 18     Physical Exam  Vitals reviewed.   Constitutional:       General: He is not in acute distress.     Comments: This is a well-developed, well-nourished male, sitting in a chair   HENT:      Right Ear: Tympanic membrane, ear canal and external ear normal.      Left Ear: Tympanic membrane, ear canal and external ear normal.   Eyes:      General: No scleral icterus.     Pupils: Pupils are equal, round, and reactive to light.   Neck:      Vascular: No carotid bruit.   Cardiovascular:      Heart sounds: Normal heart sounds, S1 normal and S2 normal. No murmur heard.     No friction rub.   Pulmonary:      Breath sounds: Decreased breath sounds (throughout) present.   Abdominal:      Palpations: There is no hepatomegaly, splenomegaly or mass.   Musculoskeletal:         General: No swelling or deformity. Normal range of motion.      Cervical back: Neck supple.      Right lower leg: No edema.      Left lower leg: No edema.   Lymphadenopathy:      Cervical: No cervical adenopathy.      Upper Body:      Right upper body: No axillary adenopathy.      Left upper body: No axillary adenopathy.      Lower Body: No right inguinal adenopathy. No left inguinal adenopathy.   Neurological:      Mental Status: He is alert.      Cranial Nerves: Cranial nerves 2-12  are intact. No cranial nerve deficit.      Sensory: No sensory deficit.      Motor: Motor function is intact. No weakness.      Gait: Gait is intact.      Deep Tendon Reflexes: Reflexes normal.      Comments: The patient is oriented x2.   Psychiatric:         Mood and Affect: Mood normal. Mood is not anxious or depressed. Affect is not angry.         Behavior: Behavior is not agitated.         Thought Content: Thought content normal.         Judgment: Judgment normal.     LAB WORK:  Laboratory studies reviewed.    Assessment/Plan  Problem List Items Addressed This Visit             ICD-10-CM       Mental Health    Depression F32.A       Neuro    Parkinson's disease (Multi) G20.A1    Dementia (Multi) - Primary F03.90       Pulmonary and Pneumonias    Chronic obstructive pulmonary disease (Multi) J44.9       Sleep    Other insomnia G47.09     Other Visit Diagnoses         Codes    Adult failure to thrive     R62.7    Schizophrenia, unspecified type (Multi)     F20.9    Acute myocardial infarction, unspecified MI type, unspecified artery (Multi)     I21.9    Gastroesophageal reflux disease, unspecified whether esophagitis present     K21.9    Seizure disorder (Multi)     G40.909    Essential hypertension     I10    Cognitive decline     R41.89        1. Dementia, unchanged.  2. Adult failure to thrive.  3. Parkinson’s disease, on carbidopa, levodopa.  4. Schizophrenia, on medication.  5. COPD, on bronchodilator therapy.  6. History of AMI, on aspirin.  7. Depression, on medication.  8. GERD, on PPI.  9. Seizure disorder, on antiepileptic.  10. Hypertension, med controlled.  7. Cognitive decline, supportive care.  12. Insomnia, on melatonin.    Scribe Attestation  By signing my name below, Merna SHANNON Scribe attest that this documentation has been prepared under the direction and in the presence of Amparo Galarza MD.     All medical record entries made by the scribbran were personally dictated by me I have reviewed the  chart and agree the record accurately reflects my personal performance of his history physical examination and management      Electronically Signed By: Amparo Galarza MD   9/14/24 10:56 PM

## 2024-09-10 VITALS
RESPIRATION RATE: 18 BRPM | HEART RATE: 78 BPM | TEMPERATURE: 98 F | DIASTOLIC BLOOD PRESSURE: 82 MMHG | SYSTOLIC BLOOD PRESSURE: 126 MMHG

## 2024-09-10 ASSESSMENT — ENCOUNTER SYMPTOMS
FEVER: 0
CHILLS: 0

## 2024-09-10 NOTE — PROGRESS NOTES
PLACE OF SERVICE:  [facility]    This is a subsequent visit.    Subjective   Patient ID: Gerardo Albright is a 64 y.o. male who presents for Follow-up.    Mr. Gerardo Albright is a 64-year-old male with history of dementia with Parkinson's disease and schizophrenia.  He suffers from adult failure to thrive and is unable to care for himself.  He is currently on hospice care.    Review of Systems   Constitutional:  Negative for chills and fever.   Cardiovascular:  Negative for chest pain.   All other systems reviewed and are negative.    Objective   /82   Pulse 78   Temp 36.7 °C (98 °F)   Resp 18     Physical Exam  Vitals reviewed.   Constitutional:       General: He is not in acute distress.     Comments: This is a well-developed, well-nourished male, sitting in a chair   HENT:      Right Ear: Tympanic membrane, ear canal and external ear normal.      Left Ear: Tympanic membrane, ear canal and external ear normal.   Eyes:      General: No scleral icterus.     Pupils: Pupils are equal, round, and reactive to light.   Neck:      Vascular: No carotid bruit.   Cardiovascular:      Heart sounds: Normal heart sounds, S1 normal and S2 normal. No murmur heard.     No friction rub.   Pulmonary:      Breath sounds: Decreased breath sounds (throughout) present.   Abdominal:      Palpations: There is no hepatomegaly, splenomegaly or mass.   Musculoskeletal:         General: No swelling or deformity. Normal range of motion.      Cervical back: Neck supple.      Right lower leg: No edema.      Left lower leg: No edema.   Lymphadenopathy:      Cervical: No cervical adenopathy.      Upper Body:      Right upper body: No axillary adenopathy.      Left upper body: No axillary adenopathy.      Lower Body: No right inguinal adenopathy. No left inguinal adenopathy.   Neurological:      Mental Status: He is alert.      Cranial Nerves: Cranial nerves 2-12 are intact. No cranial nerve deficit.      Sensory: No sensory  deficit.      Motor: Motor function is intact. No weakness.      Gait: Gait is intact.      Deep Tendon Reflexes: Reflexes normal.      Comments: The patient is oriented x2.   Psychiatric:         Mood and Affect: Mood normal. Mood is not anxious or depressed. Affect is not angry.         Behavior: Behavior is not agitated.         Thought Content: Thought content normal.         Judgment: Judgment normal.     LAB WORK:  Laboratory studies reviewed.    Assessment/Plan   Problem List Items Addressed This Visit             ICD-10-CM       Mental Health    Depression F32.A       Neuro    Parkinson's disease (Multi) G20.A1    Dementia (Multi) - Primary F03.90       Pulmonary and Pneumonias    Chronic obstructive pulmonary disease (Multi) J44.9       Sleep    Other insomnia G47.09     Other Visit Diagnoses         Codes    Adult failure to thrive     R62.7    Schizophrenia, unspecified type (Multi)     F20.9    Acute myocardial infarction, unspecified MI type, unspecified artery (Multi)     I21.9    Gastroesophageal reflux disease, unspecified whether esophagitis present     K21.9    Seizure disorder (Multi)     G40.909    Essential hypertension     I10    Cognitive decline     R41.89        1. Dementia, unchanged.  2. Adult failure to thrive.  3. Parkinson’s disease, on carbidopa, levodopa.  4. Schizophrenia, on medication.  5. COPD, on bronchodilator therapy.  6. History of AMI, on aspirin.  7. Depression, on medication.  8. GERD, on PPI.  9. Seizure disorder, on antiepileptic.  10. Hypertension, med controlled.  7. Cognitive decline, supportive care.  12. Insomnia, on melatonin.    Scribe Attestation  By signing my name below, IMerna Scribe attest that this documentation has been prepared under the direction and in the presence of Amparo Galarza MD.     All medical record entries made by the scribbran were personally dictated by me I have reviewed the chart and agree the record accurately reflects my personal  performance of his history physical examination and management

## 2024-09-14 PROBLEM — J96.01 ACUTE RESPIRATORY FAILURE WITH HYPOXIA (MULTI): Status: ACTIVE | Noted: 2024-09-14

## 2024-09-14 PROBLEM — E46 PROTEIN-CALORIE MALNUTRITION, UNSPECIFIED SEVERITY (MULTI): Status: ACTIVE | Noted: 2024-09-14

## 2024-10-24 ENCOUNTER — NURSING HOME VISIT (OUTPATIENT)
Dept: POST ACUTE CARE | Facility: EXTERNAL LOCATION | Age: 64
End: 2024-10-24
Payer: COMMERCIAL

## 2024-10-24 DIAGNOSIS — E11.42 TYPE 2 DIABETES MELLITUS WITH DIABETIC POLYNEUROPATHY, WITHOUT LONG-TERM CURRENT USE OF INSULIN: ICD-10-CM

## 2024-10-24 DIAGNOSIS — N40.0 BENIGN PROSTATIC HYPERPLASIA, UNSPECIFIED WHETHER LOWER URINARY TRACT SYMPTOMS PRESENT: ICD-10-CM

## 2024-10-24 DIAGNOSIS — G20.A1 PARKINSON'S DISEASE WITHOUT DYSKINESIA, UNSPECIFIED WHETHER MANIFESTATIONS FLUCTUATE: Primary | ICD-10-CM

## 2024-10-24 DIAGNOSIS — I10 ESSENTIAL HYPERTENSION: ICD-10-CM

## 2024-10-24 DIAGNOSIS — G89.29 OTHER CHRONIC PAIN: ICD-10-CM

## 2024-10-24 DIAGNOSIS — I25.10 CORONARY ARTERY DISEASE, UNSPECIFIED VESSEL OR LESION TYPE, UNSPECIFIED WHETHER ANGINA PRESENT, UNSPECIFIED WHETHER NATIVE OR TRANSPLANTED HEART: ICD-10-CM

## 2024-10-24 PROCEDURE — 99309 SBSQ NF CARE MODERATE MDM 30: CPT | Performed by: NURSE PRACTITIONER

## 2024-11-02 ENCOUNTER — NURSING HOME VISIT (OUTPATIENT)
Dept: POST ACUTE CARE | Facility: EXTERNAL LOCATION | Age: 64
End: 2024-11-02
Payer: COMMERCIAL

## 2024-11-02 DIAGNOSIS — I25.10 ASHD (ARTERIOSCLEROTIC HEART DISEASE): ICD-10-CM

## 2024-11-02 DIAGNOSIS — G40.909 SEIZURE DISORDER (MULTI): ICD-10-CM

## 2024-11-02 DIAGNOSIS — I10 ESSENTIAL HYPERTENSION: ICD-10-CM

## 2024-11-02 DIAGNOSIS — J44.9 CHRONIC OBSTRUCTIVE PULMONARY DISEASE, UNSPECIFIED COPD TYPE (MULTI): Primary | ICD-10-CM

## 2024-11-02 DIAGNOSIS — F20.9 SCHIZOPHRENIA, UNSPECIFIED TYPE: ICD-10-CM

## 2024-11-02 DIAGNOSIS — F03.90 DEMENTIA, UNSPECIFIED DEMENTIA SEVERITY, UNSPECIFIED DEMENTIA TYPE, UNSPECIFIED WHETHER BEHAVIORAL, PSYCHOTIC, OR MOOD DISTURBANCE OR ANXIETY (MULTI): ICD-10-CM

## 2024-11-02 DIAGNOSIS — R41.89 COGNITIVE DECLINE: ICD-10-CM

## 2024-11-02 DIAGNOSIS — G20.A1 PARKINSON'S DISEASE WITHOUT DYSKINESIA, UNSPECIFIED WHETHER MANIFESTATIONS FLUCTUATE: ICD-10-CM

## 2024-11-02 DIAGNOSIS — K21.9 GASTROESOPHAGEAL REFLUX DISEASE, UNSPECIFIED WHETHER ESOPHAGITIS PRESENT: ICD-10-CM

## 2024-11-02 DIAGNOSIS — R53.1 WEAKNESS: ICD-10-CM

## 2024-11-02 DIAGNOSIS — Z91.81 AT RISK FOR FALLS: ICD-10-CM

## 2024-11-02 DIAGNOSIS — F32.A DEPRESSION, UNSPECIFIED DEPRESSION TYPE: ICD-10-CM

## 2024-11-02 PROCEDURE — 99309 SBSQ NF CARE MODERATE MDM 30: CPT | Performed by: INTERNAL MEDICINE

## 2024-11-02 NOTE — LETTER
Patient: Gerardo Albright  : 1960    Encounter Date: 2024    PLACE OF SERVICE:  [facility].    This is a subsequent visit.    Subjective  Patient ID: Gerardo Albright is a 64 y.o. male who presents for Follow-up.    Mr. Gerardo Albright is a 64-year-old male with history of COPD.  He suffers from dementia as well as Parkinson's disease.  He is unable to care for himself and requires supportive care.    Review of Systems   Constitutional:  Negative for chills and fever.   Cardiovascular:  Negative for chest pain.   All other systems reviewed and are negative.    Objective  /78   Pulse 78   Temp 36.6 °C (97.8 °F)   Resp 18     Physical Exam  Vitals reviewed.   Constitutional:       General: He is not in acute distress.     Comments: This is a well-developed, well-nourished male, sitting in a chair.   HENT:      Right Ear: Tympanic membrane, ear canal and external ear normal.      Left Ear: Tympanic membrane, ear canal and external ear normal.   Eyes:      General: No scleral icterus.     Pupils: Pupils are equal, round, and reactive to light.   Neck:      Vascular: No carotid bruit.   Cardiovascular:      Heart sounds: Normal heart sounds, S1 normal and S2 normal. No murmur heard.     No friction rub.   Pulmonary:      Effort: Pulmonary effort is normal.      Breath sounds: Decreased breath sounds (throughout) present.   Abdominal:      Palpations: There is no hepatomegaly, splenomegaly or mass.   Musculoskeletal:         General: No swelling or deformity. Normal range of motion.      Cervical back: Neck supple.      Right lower leg: No edema.      Left lower leg: No edema.   Lymphadenopathy:      Cervical: No cervical adenopathy.      Upper Body:      Right upper body: No axillary adenopathy.      Left upper body: No axillary adenopathy.      Lower Body: No right inguinal adenopathy. No left inguinal adenopathy.   Neurological:      Mental Status: He is alert.      Cranial Nerves: Cranial  nerves 2-12 are intact. No cranial nerve deficit.      Sensory: No sensory deficit.      Motor: Motor function is intact. No weakness.      Gait: Gait is intact.      Deep Tendon Reflexes: Reflexes normal.      Comments: The patient is oriented x2.   Psychiatric:         Mood and Affect: Mood normal. Mood is not anxious or depressed. Affect is not angry.         Behavior: Behavior is not agitated.         Thought Content: Thought content normal.         Judgment: Judgment normal.     LAB WORK:  Laboratory studies were reviewed.    Assessment/Plan  Problem List Items Addressed This Visit             ICD-10-CM       Advance Directives and General Issues    At risk for falls Z91.81       Cardiac and Vasculature    ASHD (arteriosclerotic heart disease) I25.10       Mental Health    Depression F32.A       Neuro    Parkinson's disease (Multi) G20.A1    Dementia F03.90       Pulmonary and Pneumonias    Chronic obstructive pulmonary disease (Multi) - Primary J44.9     Other Visit Diagnoses         Codes    Schizophrenia, unspecified type     F20.9    Seizure disorder (Multi)     G40.909    Essential hypertension     I10    Cognitive decline     R41.89    Gastroesophageal reflux disease, unspecified whether esophagitis present     K21.9    Weakness     R53.1        1. COPD, on bronchodilator therapy.  2. Parkinson’s disease, on carbidopa and levodopa.  3. Schizophrenia, on medication.  4. Dementia, unchanged.  5. Seizure disorder, on antiepileptic.  6. Hypertension, med controlled.  7. Cognitive decline, on supportive care.  8. ASHD, on aspirin.  9. Depression, on medication.  10. GERD, on PPI.  11. Weakness, on PT/OT.  12. Fall risk, on fall precautions.    Scribe Attestation  By signing my name below, I, Venu Garza attest that this documentation has been prepared under the direction and in the presence of Amparo Galarza MD.     All medical record entries made by the scribbran were personally dictated by me I have  reviewed the chart and agree the record accurately reflects my personal performance of his history physical examination and management      Electronically Signed By: Amparo Galarza MD   11/10/24 11:45 PM

## 2024-11-09 VITALS
HEART RATE: 78 BPM | TEMPERATURE: 97.8 F | RESPIRATION RATE: 18 BRPM | SYSTOLIC BLOOD PRESSURE: 126 MMHG | DIASTOLIC BLOOD PRESSURE: 78 MMHG

## 2024-11-09 ASSESSMENT — ENCOUNTER SYMPTOMS
FEVER: 0
CHILLS: 0

## 2024-11-09 NOTE — PROGRESS NOTES
PLACE OF SERVICE:  [facility].    This is a subsequent visit.    Subjective   Patient ID: Gerardo Albright is a 64 y.o. male who presents for Follow-up.    Mr. Gerardo Albright is a 64-year-old male with history of COPD.  He suffers from dementia as well as Parkinson's disease.  He is unable to care for himself and requires supportive care.    Review of Systems   Constitutional:  Negative for chills and fever.   Cardiovascular:  Negative for chest pain.   All other systems reviewed and are negative.    Objective   /78   Pulse 78   Temp 36.6 °C (97.8 °F)   Resp 18     Physical Exam  Vitals reviewed.   Constitutional:       General: He is not in acute distress.     Comments: This is a well-developed, well-nourished male, sitting in a chair.   HENT:      Right Ear: Tympanic membrane, ear canal and external ear normal.      Left Ear: Tympanic membrane, ear canal and external ear normal.   Eyes:      General: No scleral icterus.     Pupils: Pupils are equal, round, and reactive to light.   Neck:      Vascular: No carotid bruit.   Cardiovascular:      Heart sounds: Normal heart sounds, S1 normal and S2 normal. No murmur heard.     No friction rub.   Pulmonary:      Effort: Pulmonary effort is normal.      Breath sounds: Decreased breath sounds (throughout) present.   Abdominal:      Palpations: There is no hepatomegaly, splenomegaly or mass.   Musculoskeletal:         General: No swelling or deformity. Normal range of motion.      Cervical back: Neck supple.      Right lower leg: No edema.      Left lower leg: No edema.   Lymphadenopathy:      Cervical: No cervical adenopathy.      Upper Body:      Right upper body: No axillary adenopathy.      Left upper body: No axillary adenopathy.      Lower Body: No right inguinal adenopathy. No left inguinal adenopathy.   Neurological:      Mental Status: He is alert.      Cranial Nerves: Cranial nerves 2-12 are intact. No cranial nerve deficit.      Sensory: No  sensory deficit.      Motor: Motor function is intact. No weakness.      Gait: Gait is intact.      Deep Tendon Reflexes: Reflexes normal.      Comments: The patient is oriented x2.   Psychiatric:         Mood and Affect: Mood normal. Mood is not anxious or depressed. Affect is not angry.         Behavior: Behavior is not agitated.         Thought Content: Thought content normal.         Judgment: Judgment normal.     LAB WORK:  Laboratory studies were reviewed.    Assessment/Plan   Problem List Items Addressed This Visit             ICD-10-CM       Advance Directives and General Issues    At risk for falls Z91.81       Cardiac and Vasculature    ASHD (arteriosclerotic heart disease) I25.10       Mental Health    Depression F32.A       Neuro    Parkinson's disease (Multi) G20.A1    Dementia F03.90       Pulmonary and Pneumonias    Chronic obstructive pulmonary disease (Multi) - Primary J44.9     Other Visit Diagnoses         Codes    Schizophrenia, unspecified type     F20.9    Seizure disorder (Multi)     G40.909    Essential hypertension     I10    Cognitive decline     R41.89    Gastroesophageal reflux disease, unspecified whether esophagitis present     K21.9    Weakness     R53.1        1. COPD, on bronchodilator therapy.  2. Parkinson’s disease, on carbidopa and levodopa.  3. Schizophrenia, on medication.  4. Dementia, unchanged.  5. Seizure disorder, on antiepileptic.  6. Hypertension, med controlled.  7. Cognitive decline, on supportive care.  8. ASHD, on aspirin.  9. Depression, on medication.  10. GERD, on PPI.  11. Weakness, on PT/OT.  12. Fall risk, on fall precautions.    Scribe Attestation  By signing my name below, I, Venu Garza attest that this documentation has been prepared under the direction and in the presence of Amparo Galarza MD.     All medical record entries made by the scribe were personally dictated by me I have reviewed the chart and agree the record accurately reflects my  personal performance of his history physical examination and management

## 2024-12-11 ENCOUNTER — PHARMACY VISIT (OUTPATIENT)
Dept: PHARMACY | Facility: CLINIC | Age: 64
End: 2024-12-11
Payer: COMMERCIAL

## 2024-12-11 ENCOUNTER — HOSPITAL ENCOUNTER (EMERGENCY)
Facility: HOSPITAL | Age: 64
Discharge: HOME | End: 2024-12-11
Attending: FAMILY MEDICINE
Payer: COMMERCIAL

## 2024-12-11 ENCOUNTER — APPOINTMENT (OUTPATIENT)
Dept: CARDIOLOGY | Facility: HOSPITAL | Age: 64
End: 2024-12-11
Payer: COMMERCIAL

## 2024-12-11 ENCOUNTER — APPOINTMENT (OUTPATIENT)
Dept: RADIOLOGY | Facility: HOSPITAL | Age: 64
End: 2024-12-11
Payer: COMMERCIAL

## 2024-12-11 VITALS
RESPIRATION RATE: 15 BRPM | OXYGEN SATURATION: 96 % | HEART RATE: 71 BPM | DIASTOLIC BLOOD PRESSURE: 69 MMHG | SYSTOLIC BLOOD PRESSURE: 104 MMHG | HEIGHT: 72 IN | BODY MASS INDEX: 24.13 KG/M2 | TEMPERATURE: 98.4 F | WEIGHT: 178.13 LBS

## 2024-12-11 DIAGNOSIS — J15.9 BACTERIAL PNEUMONIA: Primary | ICD-10-CM

## 2024-12-11 LAB
ALBUMIN SERPL BCP-MCNC: 3.6 G/DL (ref 3.4–5)
ALP SERPL-CCNC: 65 U/L (ref 33–136)
ALT SERPL W P-5'-P-CCNC: 17 U/L (ref 10–52)
ANION GAP SERPL CALC-SCNC: 12 MMOL/L (ref 10–20)
AST SERPL W P-5'-P-CCNC: 13 U/L (ref 9–39)
BASOPHILS # BLD AUTO: 0.02 X10*3/UL (ref 0–0.1)
BASOPHILS NFR BLD AUTO: 0.3 %
BILIRUB SERPL-MCNC: 0.4 MG/DL (ref 0–1.2)
BUN SERPL-MCNC: 29 MG/DL (ref 6–23)
CALCIUM SERPL-MCNC: 8.9 MG/DL (ref 8.6–10.3)
CARDIAC TROPONIN I PNL SERPL HS: 6 NG/L (ref 0–20)
CARDIAC TROPONIN I PNL SERPL HS: 9 NG/L (ref 0–20)
CHLORIDE SERPL-SCNC: 102 MMOL/L (ref 98–107)
CO2 SERPL-SCNC: 27 MMOL/L (ref 21–32)
CREAT SERPL-MCNC: 0.81 MG/DL (ref 0.5–1.3)
EGFRCR SERPLBLD CKD-EPI 2021: >90 ML/MIN/1.73M*2
EOSINOPHIL # BLD AUTO: 0.06 X10*3/UL (ref 0–0.7)
EOSINOPHIL NFR BLD AUTO: 0.9 %
ERYTHROCYTE [DISTWIDTH] IN BLOOD BY AUTOMATED COUNT: 12.3 % (ref 11.5–14.5)
FLUAV RNA RESP QL NAA+PROBE: NOT DETECTED
FLUBV RNA RESP QL NAA+PROBE: NOT DETECTED
GLUCOSE SERPL-MCNC: 215 MG/DL (ref 74–99)
HCT VFR BLD AUTO: 38.6 % (ref 41–52)
HGB BLD-MCNC: 12.7 G/DL (ref 13.5–17.5)
IMM GRANULOCYTES # BLD AUTO: 0.04 X10*3/UL (ref 0–0.7)
IMM GRANULOCYTES NFR BLD AUTO: 0.6 % (ref 0–0.9)
LYMPHOCYTES # BLD AUTO: 0.86 X10*3/UL (ref 1.2–4.8)
LYMPHOCYTES NFR BLD AUTO: 13.4 %
MAGNESIUM SERPL-MCNC: 1.9 MG/DL (ref 1.6–2.4)
MCH RBC QN AUTO: 31.2 PG (ref 26–34)
MCHC RBC AUTO-ENTMCNC: 32.9 G/DL (ref 32–36)
MCV RBC AUTO: 95 FL (ref 80–100)
MONOCYTES # BLD AUTO: 0.48 X10*3/UL (ref 0.1–1)
MONOCYTES NFR BLD AUTO: 7.5 %
NEUTROPHILS # BLD AUTO: 4.96 X10*3/UL (ref 1.2–7.7)
NEUTROPHILS NFR BLD AUTO: 77.3 %
NRBC BLD-RTO: 0 /100 WBCS (ref 0–0)
PLATELET # BLD AUTO: 244 X10*3/UL (ref 150–450)
POTASSIUM SERPL-SCNC: 4.1 MMOL/L (ref 3.5–5.3)
PROT SERPL-MCNC: 6.1 G/DL (ref 6.4–8.2)
RBC # BLD AUTO: 4.07 X10*6/UL (ref 4.5–5.9)
SARS-COV-2 RNA RESP QL NAA+PROBE: NOT DETECTED
SODIUM SERPL-SCNC: 137 MMOL/L (ref 136–145)
WBC # BLD AUTO: 6.4 X10*3/UL (ref 4.4–11.3)

## 2024-12-11 PROCEDURE — 80053 COMPREHEN METABOLIC PANEL: CPT | Performed by: FAMILY MEDICINE

## 2024-12-11 PROCEDURE — 84484 ASSAY OF TROPONIN QUANT: CPT | Mod: 91 | Performed by: FAMILY MEDICINE

## 2024-12-11 PROCEDURE — RXMED WILLOW AMBULATORY MEDICATION CHARGE

## 2024-12-11 PROCEDURE — 2500000001 HC RX 250 WO HCPCS SELF ADMINISTERED DRUGS (ALT 637 FOR MEDICARE OP): Performed by: FAMILY MEDICINE

## 2024-12-11 PROCEDURE — 87635 SARS-COV-2 COVID-19 AMP PRB: CPT | Performed by: FAMILY MEDICINE

## 2024-12-11 PROCEDURE — 36415 COLL VENOUS BLD VENIPUNCTURE: CPT | Performed by: FAMILY MEDICINE

## 2024-12-11 PROCEDURE — 83735 ASSAY OF MAGNESIUM: CPT | Performed by: FAMILY MEDICINE

## 2024-12-11 PROCEDURE — 2500000004 HC RX 250 GENERAL PHARMACY W/ HCPCS (ALT 636 FOR OP/ED): Performed by: FAMILY MEDICINE

## 2024-12-11 PROCEDURE — 93005 ELECTROCARDIOGRAM TRACING: CPT

## 2024-12-11 PROCEDURE — 71045 X-RAY EXAM CHEST 1 VIEW: CPT | Performed by: RADIOLOGY

## 2024-12-11 PROCEDURE — 96365 THER/PROPH/DIAG IV INF INIT: CPT

## 2024-12-11 PROCEDURE — 87075 CULTR BACTERIA EXCEPT BLOOD: CPT | Mod: 59,GENLAB | Performed by: FAMILY MEDICINE

## 2024-12-11 PROCEDURE — 2500000001 HC RX 250 WO HCPCS SELF ADMINISTERED DRUGS (ALT 637 FOR MEDICARE OP)

## 2024-12-11 PROCEDURE — 71045 X-RAY EXAM CHEST 1 VIEW: CPT

## 2024-12-11 PROCEDURE — 84484 ASSAY OF TROPONIN QUANT: CPT | Performed by: FAMILY MEDICINE

## 2024-12-11 PROCEDURE — 85025 COMPLETE CBC W/AUTO DIFF WBC: CPT | Performed by: FAMILY MEDICINE

## 2024-12-11 PROCEDURE — 99285 EMERGENCY DEPT VISIT HI MDM: CPT | Mod: 25 | Performed by: FAMILY MEDICINE

## 2024-12-11 RX ORDER — DOXYCYCLINE 100 MG/1
100 TABLET ORAL 2 TIMES DAILY
Qty: 20 TABLET | Refills: 0 | Status: SHIPPED | OUTPATIENT
Start: 2024-12-11 | End: 2024-12-21

## 2024-12-11 RX ORDER — ACETAMINOPHEN 325 MG/1
TABLET ORAL
Status: COMPLETED
Start: 2024-12-11 | End: 2024-12-11

## 2024-12-11 RX ORDER — CEFDINIR 300 MG/1
300 CAPSULE ORAL 2 TIMES DAILY
Qty: 20 CAPSULE | Refills: 0 | Status: SHIPPED | OUTPATIENT
Start: 2024-12-11 | End: 2024-12-21

## 2024-12-11 RX ORDER — ACETAMINOPHEN 325 MG/1
650 TABLET ORAL ONCE
Status: COMPLETED | OUTPATIENT
Start: 2024-12-11 | End: 2024-12-11

## 2024-12-11 RX ORDER — DOXYCYCLINE HYCLATE 50 MG/1
100 CAPSULE, GELATIN COATED ORAL ONCE
Status: COMPLETED | OUTPATIENT
Start: 2024-12-11 | End: 2024-12-11

## 2024-12-11 RX ORDER — CEFTRIAXONE 1 G/50ML
1 INJECTION, SOLUTION INTRAVENOUS ONCE
Status: COMPLETED | OUTPATIENT
Start: 2024-12-11 | End: 2024-12-11

## 2024-12-11 ASSESSMENT — PAIN DESCRIPTION - LOCATION: LOCATION: OTHER (COMMENT)

## 2024-12-11 ASSESSMENT — COLUMBIA-SUICIDE SEVERITY RATING SCALE - C-SSRS
2. HAVE YOU ACTUALLY HAD ANY THOUGHTS OF KILLING YOURSELF?: NO
6. HAVE YOU EVER DONE ANYTHING, STARTED TO DO ANYTHING, OR PREPARED TO DO ANYTHING TO END YOUR LIFE?: NO
1. IN THE PAST MONTH, HAVE YOU WISHED YOU WERE DEAD OR WISHED YOU COULD GO TO SLEEP AND NOT WAKE UP?: NO

## 2024-12-11 ASSESSMENT — PAIN - FUNCTIONAL ASSESSMENT: PAIN_FUNCTIONAL_ASSESSMENT: 0-10

## 2024-12-11 ASSESSMENT — PAIN DESCRIPTION - PAIN TYPE: TYPE: ACUTE PAIN;CHRONIC PAIN

## 2024-12-11 ASSESSMENT — PAIN SCALES - GENERAL
PAINLEVEL_OUTOF10: 9
PAINLEVEL_OUTOF10: 5 - MODERATE PAIN

## 2024-12-11 NOTE — ED NOTES
Call placed to [facility] to inform them of plan to discharge pt back to facility and that antibiotics will be sent with pt     Mikala Kimball RN  12/11/24 [id number]

## 2024-12-11 NOTE — DISCHARGE INSTRUCTIONS
As discussed patient is breathing fine no hypoxemia respiratory distress however he was found to have pneumonia he will need to be on antibiotic for 10 days.  Watch closely at home.  Watch when he eats and drink.  Doxycycline can cause irritation of esophagus must take with full glass of water and standing position.  If any problem or concern return to ER immediately.  Otherwise follow-up evaluation by the primary care physician or facility physician.

## 2024-12-11 NOTE — ED PROVIDER NOTES
HPI   No chief complaint on file.      HPI  This is a 64-year-old intellectually generally male patient who has a history of ADHD, failure to thrive, history of myocardial infarct, abnormal dementia cataract, depression dementia hypertension and schizoaffective disorder as well as multiple other comorbidities was sent to the ER because he reportedly choked on the food when he was eating cinnamon bread.  Squad stated that he was not having choking of symptoms when they picked him up and he did have symptom of choking before the squad was called in.  Patient himself cannot provide history due to multiple underlying comorbidities.  There was no reported loss of conscious blackout or pass out.  There was no reported vomiting or diarrhea.  Further history is not obtainable.      Family history: Unobtainable      Social history: Lives at nursing home.  Review of system: 10 review of system obtained review of system described in HPI otherwise negative  Patient History   Past Medical History:   Diagnosis Date    ADHD (attention deficit hyperactivity disorder)     Adult failure to thrive     Alzheimer's dementia (Multi)     AMI (acute myocardial infarction) (Multi)     Anginal pain (CMS-HCC)     Anxiety     At risk for falls     Catatonic schizophrenia (Multi)     Cognitive decline     COPD (chronic obstructive pulmonary disease) (Multi)     Dementia     Depression     GERD (gastroesophageal reflux disease)     Hypertension     Insomnia     Myocardial infarction (Multi)     Parkinson's disease (Multi)     Schizoaffective disorder (Multi)     Schizophrenia     Seizures (Multi)     STEMI (ST elevation myocardial infarction) (Multi) 04/17/2017    Suicidal ideation     Urinary retention     Weakness      No past surgical history on file.  Family History   Problem Relation Name Age of Onset    Hypertension Other       Social History     Tobacco Use    Smoking status: Every Day     Current packs/day: 0.50     Types: Cigarettes      Passive exposure: Past    Smokeless tobacco: Never   Vaping Use    Vaping status: Never Used   Substance Use Topics    Alcohol use: Not Currently    Drug use: Never   EKG obtained at 927 hours showed normal sinus rhythm rate of 66.  Lower discharge complex.  No ST-T evaluation.  No STEMI.  Borderline EKG.  I read this EKG.    Second EKG obtained at 1028 hrs. showed normal sinus rhythm with PACs.  Inferior infarct age undetermined.  No ST-T evaluation.  No STEMI.  Abnormal EKG.  I read this EKG.    Physical Exam   ED Triage Vitals [12/11/24 0847]   Temperature Pulse Respirations BP   36.1 °C (96.9 °F) -- 18 --      SpO2 Temp Source Heart Rate Source Patient Position   -- Temporal Monitor Lying      BP Location FiO2 (%)     Right arm --       Physical Exam  Constitutional:       Appearance: Normal appearance.      Comments: Patient multiple chronic underlying comorbidities and interest in acute disability dementia Alzheimer dementia, myocardial infarct Parkinson disease schizoaffective disorder was awake responsive follows commands bilaterally moves all he facilitated movements of both upper lower extremities.  No facial drooping or drooling no stridor completely without any acute symptom at this time beside chronic underlying comorbidities.  He was breathing without acute distress no tachypnea hypoxemia respite distress noted protecting airway neck is supple  He has shallow inspiration but no tachypnea hypoxemia respite distress maintaining pulse ox in the high 90s.  Good skin perfusion.  Heart regular rate and rhythm neurovascularly abdomen soft positive bowel sound nontender no guarding rebound CVA.  Keep wounds arms or legs due to underlying intellectual disability dementia and other disorder he follows command but full neurologic examination to check the strength and range of motion is limited but I could move his arms or legs and facilitated movement of arms or legs no arms or leg drift noted intact sensation  strength cannot be fully evaluated but appears symmetrical bilaterally.  No facial drooping or drooling stridor no nuchal rigidity.  Limited general examination.   HENT:      Head: Normocephalic and atraumatic.      Right Ear: External ear normal.      Left Ear: External ear normal.      Nose: Nose normal. No congestion or rhinorrhea.   Eyes:      Extraocular Movements: Extraocular movements intact.      Conjunctiva/sclera: Conjunctivae normal.      Pupils: Pupils are equal, round, and reactive to light.   Cardiovascular:      Rate and Rhythm: Normal rate and regular rhythm.      Pulses: Normal pulses.      Heart sounds: Normal heart sounds.   Pulmonary:      Effort: Pulmonary effort is normal.      Breath sounds: Normal breath sounds.   Abdominal:      General: Abdomen is flat. Bowel sounds are normal.      Palpations: Abdomen is soft.   Musculoskeletal:      Cervical back: Normal range of motion and neck supple.   Skin:     General: Skin is warm.      Capillary Refill: Capillary refill takes less than 2 seconds.   Neurological:      General: No focal deficit present.      Mental Status: He is alert and oriented to person, place, and time.   Psychiatric:         Mood and Affect: Mood normal.         Behavior: Behavior normal.           ED Course & MDM   Diagnoses as of 12/11/24 1115   Bacterial pneumonia                 No data recorded                                 Medical Decision Making  Intellectually challenged history history of failure dementia multiple comorbidities sent to the ER due to suspected aspiration as she will eating more for/cinnamon burn and started coughing was concern of aspiration pneumonia sent to the ER for evaluation at arrival he was without acute distress some fine crackles noted on auscultation but no tachypnea hypoxemia respirations heart regular rate and neurovascularly abdomen soft nontender.  He has chronic lung intellectual disability but clinically did not any hypoxemia respite  distress or tachypnea.  He did not look sick toxic distress except chronic comorbidities.    His workup was unremarkable except the chest x-ray with finding consistent mild infiltrate.  Since he is not hypoxemic no respiratory distress he is is doing drink fluids able to drink fluidsAble to tolerate p.o. diet without any choking he was given p.o. doxycycline in the ER without any problem swallowing and kept it down.  He was also given Rocephin IV after his chest x-ray showed infiltrate findings.  Cultures pending labs are otherwise unremarkable troponin is negative.  Patient is being discharged with prescription of Omnicef and doxycycline doxycycline to be taken for physical glass of water and standing position to prevent any esophagitis type symptoms.  If any worsening symptoms new symptom of concern return.  He was not hypoxemic no history of stress being discharged back to nursing where he can be watched closely while getting treatment.  Follow-up with the facility physician or primary care physician.  If any problem concern return to ER mostly watch closely at nursing home facility.    Procedure  Procedures     Josefa Mills MD  12/11/24 3035       Josefa Mills MD  12/11/24 1107       Josefa Mills MD  12/11/24 1119

## 2024-12-15 ENCOUNTER — NURSING HOME VISIT (OUTPATIENT)
Dept: POST ACUTE CARE | Facility: EXTERNAL LOCATION | Age: 64
End: 2024-12-15
Payer: COMMERCIAL

## 2024-12-15 DIAGNOSIS — K21.9 GASTROESOPHAGEAL REFLUX DISEASE, UNSPECIFIED WHETHER ESOPHAGITIS PRESENT: ICD-10-CM

## 2024-12-15 DIAGNOSIS — F25.9 SCHIZOAFFECTIVE DISORDER, UNSPECIFIED TYPE: ICD-10-CM

## 2024-12-15 DIAGNOSIS — R53.1 WEAKNESS: ICD-10-CM

## 2024-12-15 DIAGNOSIS — G20.A1 PARKINSON'S DISEASE WITHOUT DYSKINESIA, UNSPECIFIED WHETHER MANIFESTATIONS FLUCTUATE: Primary | ICD-10-CM

## 2024-12-15 DIAGNOSIS — I21.9 ACUTE MYOCARDIAL INFARCTION, UNSPECIFIED MI TYPE, UNSPECIFIED ARTERY (MULTI): ICD-10-CM

## 2024-12-15 DIAGNOSIS — I10 ESSENTIAL HYPERTENSION: ICD-10-CM

## 2024-12-15 DIAGNOSIS — F32.A DEPRESSION, UNSPECIFIED DEPRESSION TYPE: ICD-10-CM

## 2024-12-15 DIAGNOSIS — F20.9 SCHIZOPHRENIA, UNSPECIFIED TYPE: ICD-10-CM

## 2024-12-15 DIAGNOSIS — F03.90 DEMENTIA, UNSPECIFIED DEMENTIA SEVERITY, UNSPECIFIED DEMENTIA TYPE, UNSPECIFIED WHETHER BEHAVIORAL, PSYCHOTIC, OR MOOD DISTURBANCE OR ANXIETY (MULTI): ICD-10-CM

## 2024-12-15 DIAGNOSIS — G40.909 SEIZURE DISORDER (MULTI): ICD-10-CM

## 2024-12-15 DIAGNOSIS — Z91.81 AT RISK FOR FALLS: ICD-10-CM

## 2024-12-15 DIAGNOSIS — R41.89 COGNITIVE DECLINE: ICD-10-CM

## 2024-12-15 LAB
ATRIAL RATE: 66 BPM
ATRIAL RATE: 68 BPM
BACTERIA BLD CULT: NORMAL
BACTERIA BLD CULT: NORMAL
P AXIS: 64 DEGREES
P AXIS: 9 DEGREES
P OFFSET: 185 MS
P OFFSET: 200 MS
P ONSET: 136 MS
P ONSET: 148 MS
PR INTERVAL: 144 MS
PR INTERVAL: 156 MS
Q ONSET: 208 MS
Q ONSET: 226 MS
QRS COUNT: 11 BEATS
QRS COUNT: 11 BEATS
QRS DURATION: 90 MS
QRS DURATION: 92 MS
QT INTERVAL: 380 MS
QT INTERVAL: 406 MS
QTC CALCULATION(BAZETT): 404 MS
QTC CALCULATION(BAZETT): 425 MS
QTC FREDERICIA: 396 MS
QTC FREDERICIA: 419 MS
R AXIS: 21 DEGREES
R AXIS: 43 DEGREES
T AXIS: 15 DEGREES
T AXIS: 32 DEGREES
T OFFSET: 411 MS
T OFFSET: 416 MS
VENTRICULAR RATE: 66 BPM
VENTRICULAR RATE: 68 BPM

## 2024-12-15 PROCEDURE — 99309 SBSQ NF CARE MODERATE MDM 30: CPT | Performed by: INTERNAL MEDICINE

## 2024-12-15 NOTE — LETTER
Patient: Gerardo Albright  : 1960    Encounter Date: 12/15/2024    PLACE OF SERVICE:  [facility].    This is a subsequent visit.    Subjective  Patient ID: Gerardo Albright is a 64 y.o. male who presents for Follow-up.    Mr. Gerardo Albright is a 64-year-old male with history of dementia with Parkinson's disease.  He suffers from schizophrenia.  He is unable to care for himself and requires supportive care.  He did have a recent choking episode and he is being monitored.    Review of Systems   Constitutional:  Negative for chills and fever.   Cardiovascular:  Negative for chest pain.   All other systems reviewed and are negative.    Objective  /82   Pulse 80   Temp 36.6 °C (97.9 °F)   Resp 16     Physical Exam  Vitals reviewed.   Constitutional:       General: He is not in acute distress.     Comments: This is a well-developed, well-nourished male, sitting in a chair.   HENT:      Right Ear: Tympanic membrane, ear canal and external ear normal.      Left Ear: Tympanic membrane, ear canal and external ear normal.   Eyes:      General: No scleral icterus.     Pupils: Pupils are equal, round, and reactive to light.   Neck:      Vascular: No carotid bruit.   Cardiovascular:      Heart sounds: Normal heart sounds, S1 normal and S2 normal. No murmur heard.     No friction rub.   Pulmonary:      Effort: Pulmonary effort is normal.      Breath sounds: Normal breath sounds and air entry.   Abdominal:      Palpations: There is no hepatomegaly, splenomegaly or mass.   Musculoskeletal:         General: No swelling or deformity. Normal range of motion.      Cervical back: Neck supple.      Right lower leg: No edema.      Left lower leg: No edema.   Lymphadenopathy:      Cervical: No cervical adenopathy.      Upper Body:      Right upper body: No axillary adenopathy.      Left upper body: No axillary adenopathy.      Lower Body: No right inguinal adenopathy. No left inguinal adenopathy.   Neurological:       Mental Status: He is alert.      Cranial Nerves: Cranial nerves 2-12 are intact. No cranial nerve deficit.      Sensory: No sensory deficit.      Motor: Motor function is intact. No weakness.      Gait: Gait is intact.      Deep Tendon Reflexes: Reflexes normal.      Comments: The patient is oriented x2.   Psychiatric:         Mood and Affect: Mood normal. Mood is not anxious or depressed. Affect is not angry.         Behavior: Behavior is not agitated.         Thought Content: Thought content normal.         Judgment: Judgment normal.     LAB WORK: Laboratory studies reviewed.    Assessment/Plan  Problem List Items Addressed This Visit             ICD-10-CM       Advance Directives and General Issues    At risk for falls Z91.81       Mental Health    Depression F32.A       Neuro    Parkinson's disease (Multi) - Primary G20.A1    Dementia F03.90     Other Visit Diagnoses         Codes    Seizure disorder (Multi)     G40.909    Essential hypertension     I10    Cognitive decline     R41.89    Schizoaffective disorder, unspecified type     F25.9    Schizophrenia, unspecified type     F20.9    Acute myocardial infarction, unspecified MI type, unspecified artery (Multi)     I21.9    Gastroesophageal reflux disease, unspecified whether esophagitis present     K21.9    Weakness     R53.1        1. Parkinson’s disease, on carbidopa levodopa.  2. Dementia, unchanged.  3. Seizure disorder, on anti-epileptic.  4. Hypertension, medically controlled.  5. Cognitive decline, on supportive care.  6. Schizoaffective disorder, on medication.  7. Schizophrenia, on medication.  8. History of AMI, on aspirin.  9. GERD, on PPI.  10. Depression, on medication.  11. Weakness, on PT/OT.  12. Fall risk, on fall precautions.    Scribe Attestation  By signing my name below, Marva SHANNON Scribe attest that this documentation has been prepared under the direction and in the presence of Amparo Galarza MD.     All medical record entries made  by the scribe were personally dictated by me I have reviewed the chart and agree the record accurately reflects my personal performance of his history physical examination and management      Electronically Signed By: Amparo Galarza MD   12/19/24 10:42 PM

## 2024-12-16 VITALS
DIASTOLIC BLOOD PRESSURE: 82 MMHG | SYSTOLIC BLOOD PRESSURE: 126 MMHG | TEMPERATURE: 97.9 F | HEART RATE: 80 BPM | RESPIRATION RATE: 16 BRPM

## 2024-12-16 ASSESSMENT — ENCOUNTER SYMPTOMS
FEVER: 0
CHILLS: 0

## 2024-12-16 NOTE — PROGRESS NOTES
PLACE OF SERVICE:  [facility].    This is a subsequent visit.    Subjective   Patient ID: Gerardo Albright is a 64 y.o. male who presents for Follow-up.    Mr. Gerardo Albright is a 64-year-old male with history of dementia with Parkinson's disease.  He suffers from schizophrenia.  He is unable to care for himself and requires supportive care.  He did have a recent choking episode and he is being monitored.    Review of Systems   Constitutional:  Negative for chills and fever.   Cardiovascular:  Negative for chest pain.   All other systems reviewed and are negative.    Objective   /82   Pulse 80   Temp 36.6 °C (97.9 °F)   Resp 16     Physical Exam  Vitals reviewed.   Constitutional:       General: He is not in acute distress.     Comments: This is a well-developed, well-nourished male, sitting in a chair.   HENT:      Right Ear: Tympanic membrane, ear canal and external ear normal.      Left Ear: Tympanic membrane, ear canal and external ear normal.   Eyes:      General: No scleral icterus.     Pupils: Pupils are equal, round, and reactive to light.   Neck:      Vascular: No carotid bruit.   Cardiovascular:      Heart sounds: Normal heart sounds, S1 normal and S2 normal. No murmur heard.     No friction rub.   Pulmonary:      Effort: Pulmonary effort is normal.      Breath sounds: Normal breath sounds and air entry.   Abdominal:      Palpations: There is no hepatomegaly, splenomegaly or mass.   Musculoskeletal:         General: No swelling or deformity. Normal range of motion.      Cervical back: Neck supple.      Right lower leg: No edema.      Left lower leg: No edema.   Lymphadenopathy:      Cervical: No cervical adenopathy.      Upper Body:      Right upper body: No axillary adenopathy.      Left upper body: No axillary adenopathy.      Lower Body: No right inguinal adenopathy. No left inguinal adenopathy.   Neurological:      Mental Status: He is alert.      Cranial Nerves: Cranial nerves 2-12  are intact. No cranial nerve deficit.      Sensory: No sensory deficit.      Motor: Motor function is intact. No weakness.      Gait: Gait is intact.      Deep Tendon Reflexes: Reflexes normal.      Comments: The patient is oriented x2.   Psychiatric:         Mood and Affect: Mood normal. Mood is not anxious or depressed. Affect is not angry.         Behavior: Behavior is not agitated.         Thought Content: Thought content normal.         Judgment: Judgment normal.     LAB WORK: Laboratory studies reviewed.    Assessment/Plan   Problem List Items Addressed This Visit             ICD-10-CM       Advance Directives and General Issues    At risk for falls Z91.81       Mental Health    Depression F32.A       Neuro    Parkinson's disease (Multi) - Primary G20.A1    Dementia F03.90     Other Visit Diagnoses         Codes    Seizure disorder (Multi)     G40.909    Essential hypertension     I10    Cognitive decline     R41.89    Schizoaffective disorder, unspecified type     F25.9    Schizophrenia, unspecified type     F20.9    Acute myocardial infarction, unspecified MI type, unspecified artery (Multi)     I21.9    Gastroesophageal reflux disease, unspecified whether esophagitis present     K21.9    Weakness     R53.1        1. Parkinson’s disease, on carbidopa levodopa.  2. Dementia, unchanged.  3. Seizure disorder, on anti-epileptic.  4. Hypertension, medically controlled.  5. Cognitive decline, on supportive care.  6. Schizoaffective disorder, on medication.  7. Schizophrenia, on medication.  8. History of AMI, on aspirin.  9. GERD, on PPI.  10. Depression, on medication.  11. Weakness, on PT/OT.  12. Fall risk, on fall precautions.    Scribe Attestation  By signing my name below, I, Venu Garza attest that this documentation has been prepared under the direction and in the presence of Amparo Galarza MD.     All medical record entries made by the zaheeribbran were personally dictated by me I have reviewed the  chart and agree the record accurately reflects my personal performance of his history physical examination and management

## 2025-01-09 ENCOUNTER — NURSING HOME VISIT (OUTPATIENT)
Dept: POST ACUTE CARE | Facility: EXTERNAL LOCATION | Age: 65
End: 2025-01-09
Payer: COMMERCIAL

## 2025-01-09 DIAGNOSIS — Z91.81 AT RISK FOR FALLS: ICD-10-CM

## 2025-01-09 DIAGNOSIS — J44.9 CHRONIC OBSTRUCTIVE PULMONARY DISEASE, UNSPECIFIED COPD TYPE (MULTI): Primary | ICD-10-CM

## 2025-01-09 DIAGNOSIS — E11.69 TYPE 2 DIABETES MELLITUS WITH OTHER SPECIFIED COMPLICATION, WITHOUT LONG-TERM CURRENT USE OF INSULIN: ICD-10-CM

## 2025-01-09 DIAGNOSIS — F20.9 SCHIZOPHRENIA, UNSPECIFIED TYPE: ICD-10-CM

## 2025-01-09 DIAGNOSIS — F32.A DEPRESSION, UNSPECIFIED DEPRESSION TYPE: ICD-10-CM

## 2025-01-09 DIAGNOSIS — K21.9 GASTROESOPHAGEAL REFLUX DISEASE, UNSPECIFIED WHETHER ESOPHAGITIS PRESENT: ICD-10-CM

## 2025-01-09 DIAGNOSIS — G47.00 INSOMNIA, UNSPECIFIED TYPE: ICD-10-CM

## 2025-01-09 DIAGNOSIS — I10 ESSENTIAL HYPERTENSION: ICD-10-CM

## 2025-01-09 DIAGNOSIS — G20.A1 PARKINSON'S DISEASE WITHOUT DYSKINESIA, UNSPECIFIED WHETHER MANIFESTATIONS FLUCTUATE: ICD-10-CM

## 2025-01-09 DIAGNOSIS — R62.7 ADULT FAILURE TO THRIVE: ICD-10-CM

## 2025-01-09 DIAGNOSIS — G40.909 SEIZURE DISORDER (MULTI): ICD-10-CM

## 2025-01-09 DIAGNOSIS — F03.90 DEMENTIA, UNSPECIFIED DEMENTIA SEVERITY, UNSPECIFIED DEMENTIA TYPE, UNSPECIFIED WHETHER BEHAVIORAL, PSYCHOTIC, OR MOOD DISTURBANCE OR ANXIETY (MULTI): ICD-10-CM

## 2025-01-09 NOTE — LETTER
Patient: Gerardo Albright  : 1960    Encounter Date: 2025    PLACE OF SERVICE:  [facility]    This is a subsequent visit.    Subjective  Patient ID: Gerardo Albright is a 64 y.o. male who presents for Follow-up.    Mr. Gerardo Albright is a 64-year-old male with history of dementia with schizophrenia.  He suffers from COPD and is unable to care for himself.  He requires supportive care.    Review of Systems   Constitutional:  Negative for chills and fever.   Cardiovascular:  Negative for chest pain.   All other systems reviewed and are negative.    Objective  /76   Pulse 76   Temp 36.5 °C (97.7 °F)   Resp 18     Physical Exam  Vitals reviewed.   Constitutional:       General: He is not in acute distress.     Comments: This is a well-developed, well-nourished male, sitting in a chair   HENT:      Right Ear: Tympanic membrane, ear canal and external ear normal.      Left Ear: Tympanic membrane, ear canal and external ear normal.   Eyes:      General: No scleral icterus.     Pupils: Pupils are equal, round, and reactive to light.   Neck:      Vascular: No carotid bruit.   Cardiovascular:      Heart sounds: Normal heart sounds, S1 normal and S2 normal. No murmur heard.     No friction rub.   Pulmonary:      Effort: Pulmonary effort is normal.      Breath sounds: Decreased breath sounds (throughout.) present.   Abdominal:      Palpations: There is no hepatomegaly, splenomegaly or mass.   Musculoskeletal:         General: No swelling or deformity. Normal range of motion.      Cervical back: Neck supple.      Right lower leg: No edema.      Left lower leg: No edema.   Lymphadenopathy:      Cervical: No cervical adenopathy.      Upper Body:      Right upper body: No axillary adenopathy.      Left upper body: No axillary adenopathy.      Lower Body: No right inguinal adenopathy. No left inguinal adenopathy.   Neurological:      Cranial Nerves: Cranial nerves 2-12 are intact. No cranial nerve  deficit.      Sensory: No sensory deficit.      Motor: Motor function is intact. No weakness.      Gait: Gait is intact.      Deep Tendon Reflexes: Reflexes normal.      Comments: The patient is alert and oriented x2.   Psychiatric:         Mood and Affect: Mood normal. Mood is not anxious or depressed. Affect is not angry.         Behavior: Behavior is not agitated.         Thought Content: Thought content normal.         Judgment: Judgment normal.     LAB WORK: Laboratory studies were reviewed.    Assessment/Plan  Problem List Items Addressed This Visit             ICD-10-CM       Advance Directives and General Issues    At risk for falls Z91.81       Mental Health    Depression F32.A       Neuro    Parkinson's disease (Multi) G20.A1    Dementia F03.90       Pulmonary and Pneumonias    Chronic obstructive pulmonary disease (Multi) - Primary J44.9     Other Visit Diagnoses         Codes    Adult failure to thrive     R62.7    Seizure disorder (Multi)     G40.909    Essential hypertension     I10    Insomnia, unspecified type     G47.00    Schizophrenia, unspecified type     F20.9    Gastroesophageal reflux disease, unspecified whether esophagitis present     K21.9        1. COPD, on bronchodilator therapy.  2. Adult failure to thrive, encouraged to eat.  3. Dementia unchanged, on hospice care.  4. Parkinson’s disease, on carbidopa and levodopa.  5. Hypertension, medically controlled.  6. Seizure disorder, on antiepileptic.  7. Insomnia, on melatonin.  8. Schizophrenia, on medication.  9. Depression, on medication.  10. GERD, on PPI.  11. Fall risk, on fall precautions.    Scribe Attestation  By signing my name below, IMerna Scribe attest that this documentation has been prepared under the direction and in the presence of Amparo Galarza MD.     All medical record entries made by the scribe were personally dictated by me I have reviewed the chart and agree the record accurately reflects my personal  performance of his history physical examination and management      Electronically Signed By: Amparo Galarza MD   1/18/25 10:54 PM

## 2025-01-13 VITALS
DIASTOLIC BLOOD PRESSURE: 76 MMHG | HEART RATE: 76 BPM | RESPIRATION RATE: 18 BRPM | SYSTOLIC BLOOD PRESSURE: 124 MMHG | TEMPERATURE: 97.7 F

## 2025-01-13 ASSESSMENT — ENCOUNTER SYMPTOMS
FEVER: 0
CHILLS: 0

## 2025-01-13 NOTE — PROGRESS NOTES
PLACE OF SERVICE:  [facility]    This is a subsequent visit.    Subjective   Patient ID: Gerardo Albright is a 64 y.o. male who presents for Follow-up.    Mr. Gerardo Albright is a 64-year-old male with history of dementia with schizophrenia.  He suffers from COPD and is unable to care for himself.  He requires supportive care.    Review of Systems   Constitutional:  Negative for chills and fever.   Cardiovascular:  Negative for chest pain.   All other systems reviewed and are negative.    Objective   /76   Pulse 76   Temp 36.5 °C (97.7 °F)   Resp 18     Physical Exam  Vitals reviewed.   Constitutional:       General: He is not in acute distress.     Comments: This is a well-developed, well-nourished male, sitting in a chair   HENT:      Right Ear: Tympanic membrane, ear canal and external ear normal.      Left Ear: Tympanic membrane, ear canal and external ear normal.   Eyes:      General: No scleral icterus.     Pupils: Pupils are equal, round, and reactive to light.   Neck:      Vascular: No carotid bruit.   Cardiovascular:      Heart sounds: Normal heart sounds, S1 normal and S2 normal. No murmur heard.     No friction rub.   Pulmonary:      Effort: Pulmonary effort is normal.      Breath sounds: Decreased breath sounds (throughout.) present.   Abdominal:      Palpations: There is no hepatomegaly, splenomegaly or mass.   Musculoskeletal:         General: No swelling or deformity. Normal range of motion.      Cervical back: Neck supple.      Right lower leg: No edema.      Left lower leg: No edema.   Lymphadenopathy:      Cervical: No cervical adenopathy.      Upper Body:      Right upper body: No axillary adenopathy.      Left upper body: No axillary adenopathy.      Lower Body: No right inguinal adenopathy. No left inguinal adenopathy.   Neurological:      Cranial Nerves: Cranial nerves 2-12 are intact. No cranial nerve deficit.      Sensory: No sensory deficit.      Motor: Motor function is  intact. No weakness.      Gait: Gait is intact.      Deep Tendon Reflexes: Reflexes normal.      Comments: The patient is alert and oriented x2.   Psychiatric:         Mood and Affect: Mood normal. Mood is not anxious or depressed. Affect is not angry.         Behavior: Behavior is not agitated.         Thought Content: Thought content normal.         Judgment: Judgment normal.     LAB WORK: Laboratory studies were reviewed.    Assessment/Plan   Problem List Items Addressed This Visit             ICD-10-CM       Advance Directives and General Issues    At risk for falls Z91.81       Mental Health    Depression F32.A       Neuro    Parkinson's disease (Multi) G20.A1    Dementia F03.90       Pulmonary and Pneumonias    Chronic obstructive pulmonary disease (Multi) - Primary J44.9     Other Visit Diagnoses         Codes    Adult failure to thrive     R62.7    Seizure disorder (Multi)     G40.909    Essential hypertension     I10    Insomnia, unspecified type     G47.00    Schizophrenia, unspecified type     F20.9    Gastroesophageal reflux disease, unspecified whether esophagitis present     K21.9        1. COPD, on bronchodilator therapy.  2. Adult failure to thrive, encouraged to eat.  3. Dementia unchanged, on hospice care.  4. Parkinson’s disease, on carbidopa and levodopa.  5. Hypertension, medically controlled.  6. Seizure disorder, on antiepileptic.  7. Insomnia, on melatonin.  8. Schizophrenia, on medication.  9. Depression, on medication.  10. GERD, on PPI.  11. Fall risk, on fall precautions.    Scribe Attestation  By signing my name below, IMerna Scribe attest that this documentation has been prepared under the direction and in the presence of Amparo Galarza MD.     All medical record entries made by the scribe were personally dictated by me I have reviewed the chart and agree the record accurately reflects my personal performance of his history physical examination and management

## 2025-01-18 PROBLEM — E11.69 TYPE 2 DIABETES MELLITUS WITH OTHER SPECIFIED COMPLICATION, WITHOUT LONG-TERM CURRENT USE OF INSULIN: Status: ACTIVE | Noted: 2025-01-18

## 2025-02-09 ENCOUNTER — NURSING HOME VISIT (OUTPATIENT)
Dept: POST ACUTE CARE | Facility: EXTERNAL LOCATION | Age: 65
End: 2025-02-09
Payer: COMMERCIAL

## 2025-02-09 DIAGNOSIS — I21.9 ACUTE MYOCARDIAL INFARCTION, UNSPECIFIED MI TYPE, UNSPECIFIED ARTERY (MULTI): ICD-10-CM

## 2025-02-09 DIAGNOSIS — F03.90 DEMENTIA, UNSPECIFIED DEMENTIA SEVERITY, UNSPECIFIED DEMENTIA TYPE, UNSPECIFIED WHETHER BEHAVIORAL, PSYCHOTIC, OR MOOD DISTURBANCE OR ANXIETY (MULTI): ICD-10-CM

## 2025-02-09 DIAGNOSIS — R41.89 COGNITIVE DECLINE: ICD-10-CM

## 2025-02-09 DIAGNOSIS — I10 ESSENTIAL HYPERTENSION: ICD-10-CM

## 2025-02-09 DIAGNOSIS — F32.A DEPRESSION, UNSPECIFIED DEPRESSION TYPE: ICD-10-CM

## 2025-02-09 DIAGNOSIS — J44.9 CHRONIC OBSTRUCTIVE PULMONARY DISEASE, UNSPECIFIED COPD TYPE (MULTI): ICD-10-CM

## 2025-02-09 DIAGNOSIS — G40.909 SEIZURE DISORDER (MULTI): ICD-10-CM

## 2025-02-09 DIAGNOSIS — R62.7 ADULT FAILURE TO THRIVE: ICD-10-CM

## 2025-02-09 DIAGNOSIS — K21.9 GASTROESOPHAGEAL REFLUX DISEASE, UNSPECIFIED WHETHER ESOPHAGITIS PRESENT: ICD-10-CM

## 2025-02-09 DIAGNOSIS — G20.A1 PARKINSON'S DISEASE WITHOUT DYSKINESIA, UNSPECIFIED WHETHER MANIFESTATIONS FLUCTUATE: ICD-10-CM

## 2025-02-09 DIAGNOSIS — F20.9 SCHIZOPHRENIA, UNSPECIFIED TYPE: Primary | ICD-10-CM

## 2025-02-09 PROCEDURE — 99309 SBSQ NF CARE MODERATE MDM 30: CPT | Performed by: INTERNAL MEDICINE

## 2025-02-09 NOTE — LETTER
Patient: Gerardo Albright  : 1960    Encounter Date: 2025    PLACE OF SERVICE:  [facility]    This is a subsequent visit.    Subjective  Patient ID: Gerardo Albright is a 64 y.o. male who presents for Follow-up.    Mr. Gerardo Albright is a 64-year-old male with history of COPD and dementia.  He has multiple medical issues and is currently on hospice care.    Review of Systems   Constitutional:  Negative for chills and fever.   Cardiovascular:  Negative for chest pain.   All other systems reviewed and are negative.    Objective  /78   Pulse 80   Temp 36.7 °C (98.1 °F)   Resp 18     Physical Exam  Vitals reviewed.   Constitutional:       General: He is not in acute distress.     Comments: This is a well-developed, well-nourished male, sitting in a chair.   HENT:      Right Ear: Tympanic membrane, ear canal and external ear normal.      Left Ear: Tympanic membrane, ear canal and external ear normal.   Eyes:      General: No scleral icterus.     Pupils: Pupils are equal, round, and reactive to light.   Neck:      Vascular: No carotid bruit.   Cardiovascular:      Heart sounds: Normal heart sounds, S1 normal and S2 normal. No murmur heard.     No friction rub.   Pulmonary:      Breath sounds: Decreased breath sounds (throughout) present.   Abdominal:      Palpations: There is no hepatomegaly, splenomegaly or mass.   Musculoskeletal:         General: No swelling or deformity. Normal range of motion.      Cervical back: Neck supple.      Right lower leg: No edema.      Left lower leg: No edema.   Lymphadenopathy:      Cervical: No cervical adenopathy.      Upper Body:      Right upper body: No axillary adenopathy.      Left upper body: No axillary adenopathy.      Lower Body: No right inguinal adenopathy. No left inguinal adenopathy.   Neurological:      Mental Status: He is alert.      Cranial Nerves: Cranial nerves 2-12 are intact. No cranial nerve deficit.      Sensory: No sensory deficit.       Motor: Motor function is intact. No weakness.      Gait: Gait is intact.      Deep Tendon Reflexes: Reflexes normal.      Comments: The patient is oriented x2.   Psychiatric:         Mood and Affect: Mood normal. Mood is not anxious or depressed. Affect is not angry.         Behavior: Behavior is not agitated.         Thought Content: Thought content normal.         Judgment: Judgment normal.     LAB WORK: Laboratory studies reviewed.    Assessment/Plan  Problem List Items Addressed This Visit             ICD-10-CM       Mental Health    Depression F32.A       Neuro    Parkinson's disease (Multi) G20.A1    Dementia F03.90       Pulmonary and Pneumonias    Chronic obstructive pulmonary disease (Multi) J44.9     Other Visit Diagnoses         Codes    Schizophrenia, unspecified type    -  Primary F20.9    Gastroesophageal reflux disease, unspecified whether esophagitis present     K21.9    Adult failure to thrive     R62.7    Essential hypertension     I10    Seizure disorder (Multi)     G40.909    Acute myocardial infarction, unspecified MI type, unspecified artery (Multi)     I21.9    Cognitive decline     R41.89        1. Schizophrenia, on medication.  2. COPD, on bronchodilator therapy.  3. Dementia unchanged, on hospice care.  4. Parkinson’s disease, on carbidopa and levodopa.  5. Depression, on medication.  6. GERD, on PPI.  7. History of AMI, on aspirin.  8. Adult failure to thrive, encouraged to eat.  9. Hypertension, med controlled.  10. Seizure disorder, on antiepileptic.  11. Cognitive decline, on supportive care.    Scribe Attestation  By signing my name below, Merna SHANNON Scribe attest that this documentation has been prepared under the direction and in the presence of Amparo Galarza MD.     All medical record entries made by the scribe were personally dictated by me I have reviewed the chart and agree the record accurately reflects my personal performance of his history physical examination and  management      Electronically Signed By: Amparo Galarza MD   2/16/25 12:03 AM

## 2025-02-12 VITALS
SYSTOLIC BLOOD PRESSURE: 126 MMHG | HEART RATE: 80 BPM | TEMPERATURE: 98.1 F | RESPIRATION RATE: 18 BRPM | DIASTOLIC BLOOD PRESSURE: 78 MMHG

## 2025-02-12 ASSESSMENT — ENCOUNTER SYMPTOMS
CHILLS: 0
FEVER: 0

## 2025-02-12 NOTE — PROGRESS NOTES
PLACE OF SERVICE:  [facility]    This is a subsequent visit.    Subjective   Patient ID: Gerardo Albright is a 64 y.o. male who presents for Follow-up.    Mr. Gerardo Albright is a 64-year-old male with history of COPD and dementia.  He has multiple medical issues and is currently on hospice care.    Review of Systems   Constitutional:  Negative for chills and fever.   Cardiovascular:  Negative for chest pain.   All other systems reviewed and are negative.    Objective   /78   Pulse 80   Temp 36.7 °C (98.1 °F)   Resp 18     Physical Exam  Vitals reviewed.   Constitutional:       General: He is not in acute distress.     Comments: This is a well-developed, well-nourished male, sitting in a chair.   HENT:      Right Ear: Tympanic membrane, ear canal and external ear normal.      Left Ear: Tympanic membrane, ear canal and external ear normal.   Eyes:      General: No scleral icterus.     Pupils: Pupils are equal, round, and reactive to light.   Neck:      Vascular: No carotid bruit.   Cardiovascular:      Heart sounds: Normal heart sounds, S1 normal and S2 normal. No murmur heard.     No friction rub.   Pulmonary:      Breath sounds: Decreased breath sounds (throughout) present.   Abdominal:      Palpations: There is no hepatomegaly, splenomegaly or mass.   Musculoskeletal:         General: No swelling or deformity. Normal range of motion.      Cervical back: Neck supple.      Right lower leg: No edema.      Left lower leg: No edema.   Lymphadenopathy:      Cervical: No cervical adenopathy.      Upper Body:      Right upper body: No axillary adenopathy.      Left upper body: No axillary adenopathy.      Lower Body: No right inguinal adenopathy. No left inguinal adenopathy.   Neurological:      Mental Status: He is alert.      Cranial Nerves: Cranial nerves 2-12 are intact. No cranial nerve deficit.      Sensory: No sensory deficit.      Motor: Motor function is intact. No weakness.      Gait: Gait is  intact.      Deep Tendon Reflexes: Reflexes normal.      Comments: The patient is oriented x2.   Psychiatric:         Mood and Affect: Mood normal. Mood is not anxious or depressed. Affect is not angry.         Behavior: Behavior is not agitated.         Thought Content: Thought content normal.         Judgment: Judgment normal.     LAB WORK: Laboratory studies reviewed.    Assessment/Plan   Problem List Items Addressed This Visit             ICD-10-CM       Mental Health    Depression F32.A       Neuro    Parkinson's disease (Multi) G20.A1    Dementia F03.90       Pulmonary and Pneumonias    Chronic obstructive pulmonary disease (Multi) J44.9     Other Visit Diagnoses         Codes    Schizophrenia, unspecified type    -  Primary F20.9    Gastroesophageal reflux disease, unspecified whether esophagitis present     K21.9    Adult failure to thrive     R62.7    Essential hypertension     I10    Seizure disorder (Multi)     G40.909    Acute myocardial infarction, unspecified MI type, unspecified artery (Multi)     I21.9    Cognitive decline     R41.89        1. Schizophrenia, on medication.  2. COPD, on bronchodilator therapy.  3. Dementia unchanged, on hospice care.  4. Parkinson’s disease, on carbidopa and levodopa.  5. Depression, on medication.  6. GERD, on PPI.  7. History of AMI, on aspirin.  8. Adult failure to thrive, encouraged to eat.  9. Hypertension, med controlled.  10. Seizure disorder, on antiepileptic.  11. Cognitive decline, on supportive care.    Scribe Attestation  By signing my name below, Merna SHANNON Scribe attest that this documentation has been prepared under the direction and in the presence of Amparo Galarza MD.     All medical record entries made by the scribe were personally dictated by me I have reviewed the chart and agree the record accurately reflects my personal performance of his history physical examination and management

## 2025-03-03 ENCOUNTER — NURSING HOME VISIT (OUTPATIENT)
Dept: POST ACUTE CARE | Facility: EXTERNAL LOCATION | Age: 65
End: 2025-03-03
Payer: COMMERCIAL

## 2025-03-03 DIAGNOSIS — Z91.81 AT RISK FOR FALLS: ICD-10-CM

## 2025-03-03 DIAGNOSIS — I25.2 HISTORY OF ACUTE MYOCARDIAL INFARCTION: ICD-10-CM

## 2025-03-03 DIAGNOSIS — G40.909 SEIZURE DISORDER (MULTI): ICD-10-CM

## 2025-03-03 DIAGNOSIS — G20.A1 PARKINSON'S DISEASE WITHOUT DYSKINESIA, UNSPECIFIED WHETHER MANIFESTATIONS FLUCTUATE: ICD-10-CM

## 2025-03-03 DIAGNOSIS — R41.89 COGNITIVE DECLINE: ICD-10-CM

## 2025-03-03 DIAGNOSIS — R62.7 ADULT FAILURE TO THRIVE: ICD-10-CM

## 2025-03-03 DIAGNOSIS — F25.9 SCHIZOAFFECTIVE DISORDER, UNSPECIFIED TYPE: ICD-10-CM

## 2025-03-03 DIAGNOSIS — Z79.899 LONG-TERM CURRENT USE OF PROTON PUMP INHIBITOR THERAPY: ICD-10-CM

## 2025-03-03 DIAGNOSIS — F20.9 SCHIZOPHRENIA, UNSPECIFIED TYPE: ICD-10-CM

## 2025-03-03 DIAGNOSIS — F03.90 DEMENTIA, UNSPECIFIED DEMENTIA SEVERITY, UNSPECIFIED DEMENTIA TYPE, UNSPECIFIED WHETHER BEHAVIORAL, PSYCHOTIC, OR MOOD DISTURBANCE OR ANXIETY (MULTI): Primary | ICD-10-CM

## 2025-03-03 DIAGNOSIS — J44.9 CHRONIC OBSTRUCTIVE PULMONARY DISEASE, UNSPECIFIED COPD TYPE (MULTI): ICD-10-CM

## 2025-03-03 DIAGNOSIS — I10 ESSENTIAL HYPERTENSION: ICD-10-CM

## 2025-03-03 DIAGNOSIS — K21.9 GASTROESOPHAGEAL REFLUX DISEASE, UNSPECIFIED WHETHER ESOPHAGITIS PRESENT: ICD-10-CM

## 2025-03-03 PROCEDURE — 99309 SBSQ NF CARE MODERATE MDM 30: CPT | Performed by: INTERNAL MEDICINE

## 2025-03-03 NOTE — LETTER
Patient: Gerardo Albright  : 1960    Encounter Date: 2025    PLACE OF SERVICE:  [facility]    This is a subsequent visit.    Subjective  Patient ID: Gerardo Albright is a 64 y.o. male who presents for Follow-up.    Mr. Gerardo Albright is a 64-year-old male with history of dementia with Parkinson's disease.  He suffers from COPD and is currently in hospice care.    Review of Systems   Constitutional:  Negative for chills and fever.   Cardiovascular:  Negative for chest pain.   All other systems reviewed and are negative.    Objective  /80   Pulse 82   Temp 36.7 °C (98 °F)   Resp 18     Physical Exam  Vitals reviewed.   Constitutional:       General: He is not in acute distress.     Comments: This is a well-developed, well-nourished male, sitting in a chair   HENT:      Right Ear: Tympanic membrane, ear canal and external ear normal.      Left Ear: Tympanic membrane, ear canal and external ear normal.   Eyes:      General: No scleral icterus.     Pupils: Pupils are equal, round, and reactive to light.   Neck:      Vascular: No carotid bruit.   Cardiovascular:      Heart sounds: Normal heart sounds, S1 normal and S2 normal. No murmur heard.     No friction rub.   Pulmonary:      Effort: Pulmonary effort is normal.      Breath sounds: Decreased breath sounds (throughout.) present.   Abdominal:      Palpations: There is no hepatomegaly, splenomegaly or mass.   Musculoskeletal:         General: No swelling or deformity. Normal range of motion.      Cervical back: Neck supple.      Right lower leg: No edema.      Left lower leg: No edema.   Lymphadenopathy:      Cervical: No cervical adenopathy.      Upper Body:      Right upper body: No axillary adenopathy.      Left upper body: No axillary adenopathy.      Lower Body: No right inguinal adenopathy. No left inguinal adenopathy.   Neurological:      Cranial Nerves: Cranial nerves 2-12 are intact. No cranial nerve deficit.      Sensory: No sensory  deficit.      Motor: Motor function is intact. No weakness.      Gait: Gait is intact.      Deep Tendon Reflexes: Reflexes normal.      Comments: The patient is alert and oriented x2.   Psychiatric:         Mood and Affect: Mood normal. Mood is not anxious or depressed. Affect is not angry.         Behavior: Behavior is not agitated.         Thought Content: Thought content normal.         Judgment: Judgment normal.     LAB WORK: Laboratory studies were reviewed.    Assessment/Plan  Problem List Items Addressed This Visit             ICD-10-CM    Parkinson's disease (Multi) G20.A1    Dementia - Primary F03.90    Chronic obstructive pulmonary disease (Multi) J44.9    At risk for falls Z91.81     Other Visit Diagnoses         Codes    Adult failure to thrive     R62.7    Seizure disorder (Multi)     G40.909    Essential hypertension     I10    Cognitive decline     R41.89    Schizoaffective disorder, unspecified type     F25.9    Schizophrenia, unspecified type     F20.9    History of acute myocardial infarction     I25.2    Gastroesophageal reflux disease, unspecified whether esophagitis present     K21.9    Long-term current use of proton pump inhibitor therapy     Z79.899        1. Dementia, unchanged on hospice care.  2. Parkinson’s disease, on carbidopa and levodopa.  3. Adult failure to thrive, encouraged to eat.  4. COPD, on bronchodilator therapy.  5. Seizure disorder, on anti-epileptic.  6. Hypertension, med controlled.  7. Cognitive decline, on supportive care.  8. Schizophrenia, on medication.  9. Schizoaffective disorder, on medication.  10. History of AMI, on aspirin.  11. GERD, on PPI.  12. Fall risk, on fall precautions.    Scribe Attestation  By signing my name below, IMerna Scribe attest that this documentation has been prepared under the direction and in the presence of Amparo Galarza MD.     All medical record entries made by the zaheeribbran were personally dictated by me I have reviewed the  chart and agree the record accurately reflects my personal performance of his history physical examination and management        Electronically Signed By: Amparo Galarza MD   3/9/25  1:20 AM

## 2025-03-04 VITALS
SYSTOLIC BLOOD PRESSURE: 128 MMHG | DIASTOLIC BLOOD PRESSURE: 80 MMHG | RESPIRATION RATE: 18 BRPM | TEMPERATURE: 98 F | HEART RATE: 82 BPM

## 2025-03-04 ASSESSMENT — ENCOUNTER SYMPTOMS
FEVER: 0
CHILLS: 0

## 2025-03-05 NOTE — PROGRESS NOTES
PLACE OF SERVICE:  [facility]    This is a subsequent visit.    Subjective   Patient ID: Gerardo Albright is a 64 y.o. male who presents for Follow-up.    Mr. Gerardo Albright is a 64-year-old male with history of dementia with Parkinson's disease.  He suffers from COPD and is currently in hospice care.    Review of Systems   Constitutional:  Negative for chills and fever.   Cardiovascular:  Negative for chest pain.   All other systems reviewed and are negative.    Objective   /80   Pulse 82   Temp 36.7 °C (98 °F)   Resp 18     Physical Exam  Vitals reviewed.   Constitutional:       General: He is not in acute distress.     Comments: This is a well-developed, well-nourished male, sitting in a chair   HENT:      Right Ear: Tympanic membrane, ear canal and external ear normal.      Left Ear: Tympanic membrane, ear canal and external ear normal.   Eyes:      General: No scleral icterus.     Pupils: Pupils are equal, round, and reactive to light.   Neck:      Vascular: No carotid bruit.   Cardiovascular:      Heart sounds: Normal heart sounds, S1 normal and S2 normal. No murmur heard.     No friction rub.   Pulmonary:      Effort: Pulmonary effort is normal.      Breath sounds: Decreased breath sounds (throughout.) present.   Abdominal:      Palpations: There is no hepatomegaly, splenomegaly or mass.   Musculoskeletal:         General: No swelling or deformity. Normal range of motion.      Cervical back: Neck supple.      Right lower leg: No edema.      Left lower leg: No edema.   Lymphadenopathy:      Cervical: No cervical adenopathy.      Upper Body:      Right upper body: No axillary adenopathy.      Left upper body: No axillary adenopathy.      Lower Body: No right inguinal adenopathy. No left inguinal adenopathy.   Neurological:      Cranial Nerves: Cranial nerves 2-12 are intact. No cranial nerve deficit.      Sensory: No sensory deficit.      Motor: Motor function is intact. No weakness.      Gait:  Gait is intact.      Deep Tendon Reflexes: Reflexes normal.      Comments: The patient is alert and oriented x2.   Psychiatric:         Mood and Affect: Mood normal. Mood is not anxious or depressed. Affect is not angry.         Behavior: Behavior is not agitated.         Thought Content: Thought content normal.         Judgment: Judgment normal.     LAB WORK: Laboratory studies were reviewed.    Assessment/Plan   Problem List Items Addressed This Visit             ICD-10-CM    Parkinson's disease (Multi) G20.A1    Dementia - Primary F03.90    Chronic obstructive pulmonary disease (Multi) J44.9    At risk for falls Z91.81     Other Visit Diagnoses         Codes    Adult failure to thrive     R62.7    Seizure disorder (Multi)     G40.909    Essential hypertension     I10    Cognitive decline     R41.89    Schizoaffective disorder, unspecified type     F25.9    Schizophrenia, unspecified type     F20.9    History of acute myocardial infarction     I25.2    Gastroesophageal reflux disease, unspecified whether esophagitis present     K21.9    Long-term current use of proton pump inhibitor therapy     Z79.899        1. Dementia, unchanged on hospice care.  2. Parkinson’s disease, on carbidopa and levodopa.  3. Adult failure to thrive, encouraged to eat.  4. COPD, on bronchodilator therapy.  5. Seizure disorder, on anti-epileptic.  6. Hypertension, med controlled.  7. Cognitive decline, on supportive care.  8. Schizophrenia, on medication.  9. Schizoaffective disorder, on medication.  10. History of AMI, on aspirin.  11. GERD, on PPI.  12. Fall risk, on fall precautions.    Scribe Attestation  By signing my name below, IMerna Scribe attest that this documentation has been prepared under the direction and in the presence of Amparo Galarza MD.     All medical record entries made by the scribe were personally dictated by me I have reviewed the chart and agree the record accurately reflects my personal performance of  his history physical examination and management

## 2025-04-12 ENCOUNTER — NURSING HOME VISIT (OUTPATIENT)
Dept: POST ACUTE CARE | Facility: EXTERNAL LOCATION | Age: 65
End: 2025-04-12
Payer: COMMERCIAL

## 2025-04-12 DIAGNOSIS — J44.9 CHRONIC OBSTRUCTIVE PULMONARY DISEASE, UNSPECIFIED COPD TYPE (MULTI): Primary | ICD-10-CM

## 2025-04-12 DIAGNOSIS — K21.9 GASTROESOPHAGEAL REFLUX DISEASE, UNSPECIFIED WHETHER ESOPHAGITIS PRESENT: ICD-10-CM

## 2025-04-12 DIAGNOSIS — F03.90 DEMENTIA, UNSPECIFIED DEMENTIA SEVERITY, UNSPECIFIED DEMENTIA TYPE, UNSPECIFIED WHETHER BEHAVIORAL, PSYCHOTIC, OR MOOD DISTURBANCE OR ANXIETY (MULTI): ICD-10-CM

## 2025-04-12 DIAGNOSIS — I10 ESSENTIAL HYPERTENSION: ICD-10-CM

## 2025-04-12 DIAGNOSIS — G20.A1 PARKINSON'S DISEASE WITHOUT DYSKINESIA, UNSPECIFIED WHETHER MANIFESTATIONS FLUCTUATE: ICD-10-CM

## 2025-04-12 DIAGNOSIS — F25.9 SCHIZOAFFECTIVE DISORDER, UNSPECIFIED TYPE: ICD-10-CM

## 2025-04-12 DIAGNOSIS — R41.89 COGNITIVE DECLINE: ICD-10-CM

## 2025-04-12 DIAGNOSIS — Z91.81 AT RISK FOR FALLS: ICD-10-CM

## 2025-04-12 DIAGNOSIS — R62.7 ADULT FAILURE TO THRIVE: ICD-10-CM

## 2025-04-12 DIAGNOSIS — I21.9 ACUTE MYOCARDIAL INFARCTION, UNSPECIFIED MI TYPE, UNSPECIFIED ARTERY (MULTI): ICD-10-CM

## 2025-04-12 DIAGNOSIS — F20.9 SCHIZOPHRENIA, UNSPECIFIED TYPE: ICD-10-CM

## 2025-04-12 DIAGNOSIS — J96.01 ACUTE RESPIRATORY FAILURE WITH HYPOXIA: ICD-10-CM

## 2025-04-12 DIAGNOSIS — G40.909 SEIZURE DISORDER (MULTI): ICD-10-CM

## 2025-04-12 PROCEDURE — 99309 SBSQ NF CARE MODERATE MDM 30: CPT | Performed by: INTERNAL MEDICINE

## 2025-04-12 NOTE — LETTER
Patient: Gerardo Albright  : 1960    Encounter Date: 2025    PLACE OF SERVICE:  [facility].    This is a subsequent visit.    Subjective  Patient ID: Gerardo Albright is a 64 y.o. male who presents for Follow-up.    Mr. Gerardo Albright is a 64-year-old male with history of COPD with dementia and Parkinson's disease.  He is currently on hospice care.    Review of Systems   Constitutional:  Negative for chills and fever.   Cardiovascular:  Negative for chest pain.   All other systems reviewed and are negative.    Objective  /78   Pulse 82   Temp 36.6 °C (97.8 °F)   Resp 18     Physical Exam  Vitals reviewed.   Constitutional:       General: He is not in acute distress.     Comments: This is a well-developed, well-nourished male, sitting in a chair.   HENT:      Right Ear: Tympanic membrane, ear canal and external ear normal.      Left Ear: Tympanic membrane, ear canal and external ear normal.   Eyes:      General: No scleral icterus.     Pupils: Pupils are equal, round, and reactive to light.   Neck:      Vascular: No carotid bruit.   Cardiovascular:      Heart sounds: Normal heart sounds, S1 normal and S2 normal. No murmur heard.     No friction rub.   Pulmonary:      Effort: Pulmonary effort is normal.      Breath sounds: Decreased breath sounds (throughout) present.   Abdominal:      Palpations: There is no hepatomegaly, splenomegaly or mass.   Musculoskeletal:         General: No swelling or deformity. Normal range of motion.      Cervical back: Neck supple.      Right lower leg: No edema.      Left lower leg: No edema.   Lymphadenopathy:      Cervical: No cervical adenopathy.      Upper Body:      Right upper body: No axillary adenopathy.      Left upper body: No axillary adenopathy.      Lower Body: No right inguinal adenopathy. No left inguinal adenopathy.   Neurological:      Mental Status: He is alert.      Cranial Nerves: Cranial nerves 2-12 are intact. No cranial nerve deficit.       Sensory: No sensory deficit.      Motor: Motor function is intact. No weakness.      Gait: Gait is intact.      Deep Tendon Reflexes: Reflexes normal.      Comments: The patient is oriented x2.   Psychiatric:         Mood and Affect: Mood normal. Mood is not anxious or depressed. Affect is not angry.         Behavior: Behavior is not agitated.         Thought Content: Thought content normal.         Judgment: Judgment normal.     LAB WORK: Laboratory studies reviewed.    Assessment/Plan  Problem List Items Addressed This Visit           ICD-10-CM       Advance Directives and General Issues    At risk for falls Z91.81       Neuro    Parkinson's disease G20.A1    Dementia F03.90       Pulmonary and Pneumonias    Chronic obstructive pulmonary disease (Multi) - Primary J44.9     Other Visit Diagnoses         Codes      Adult failure to thrive     R62.7      Cognitive decline     R41.89      Essential hypertension     I10      Seizure disorder (Multi)     G40.909      Acute myocardial infarction, unspecified MI type, unspecified artery (Multi)     I21.9      Gastroesophageal reflux disease, unspecified whether esophagitis present     K21.9      Schizophrenia, unspecified type     F20.9      Schizoaffective disorder, unspecified type     F25.9        1. COPD, on bronchodilator therapy.  2. Parkinson’s disease, on carbidopa and levodopa.  3. Adult failure to thrive, encouraged to eat.  4. Dementia, unchanged on hospice care.  5. Cognitive decline, on supportive care.  6. Hypertension, med controlled.  7. Seizure disorder, on anti-epileptic.  8. History of AMI, on aspirin.  9. GERD, on PPI.  10. Schizophrenia, on medication.  11. Schizoaffective disorder, on medication.  12. Fall risk, fall precautions.    Scribe Attestation  By signing my name below, IMarva, Venu attest that this documentation has been prepared under the direction and in the presence of Amparo Galarza MD.     All medical record entries made  by the scribe were personally dictated by me I have reviewed the chart and agree the record accurately reflects my personal performance of his history physical examination and management      Electronically Signed By: Amparo Galarza MD   4/22/25 11:21 PM

## 2025-04-19 VITALS
SYSTOLIC BLOOD PRESSURE: 128 MMHG | RESPIRATION RATE: 18 BRPM | HEART RATE: 82 BPM | TEMPERATURE: 97.8 F | DIASTOLIC BLOOD PRESSURE: 78 MMHG

## 2025-04-19 ASSESSMENT — ENCOUNTER SYMPTOMS
FEVER: 0
CHILLS: 0

## 2025-04-19 NOTE — PROGRESS NOTES
PLACE OF SERVICE:  [facility].    This is a subsequent visit.    Subjective   Patient ID: Gerardo Albright is a 64 y.o. male who presents for Follow-up.    Mr. Gerardo Albright is a 64-year-old male with history of COPD with dementia and Parkinson's disease.  He is currently on hospice care.    Review of Systems   Constitutional:  Negative for chills and fever.   Cardiovascular:  Negative for chest pain.   All other systems reviewed and are negative.    Objective   /78   Pulse 82   Temp 36.6 °C (97.8 °F)   Resp 18     Physical Exam  Vitals reviewed.   Constitutional:       General: He is not in acute distress.     Comments: This is a well-developed, well-nourished male, sitting in a chair.   HENT:      Right Ear: Tympanic membrane, ear canal and external ear normal.      Left Ear: Tympanic membrane, ear canal and external ear normal.   Eyes:      General: No scleral icterus.     Pupils: Pupils are equal, round, and reactive to light.   Neck:      Vascular: No carotid bruit.   Cardiovascular:      Heart sounds: Normal heart sounds, S1 normal and S2 normal. No murmur heard.     No friction rub.   Pulmonary:      Effort: Pulmonary effort is normal.      Breath sounds: Decreased breath sounds (throughout) present.   Abdominal:      Palpations: There is no hepatomegaly, splenomegaly or mass.   Musculoskeletal:         General: No swelling or deformity. Normal range of motion.      Cervical back: Neck supple.      Right lower leg: No edema.      Left lower leg: No edema.   Lymphadenopathy:      Cervical: No cervical adenopathy.      Upper Body:      Right upper body: No axillary adenopathy.      Left upper body: No axillary adenopathy.      Lower Body: No right inguinal adenopathy. No left inguinal adenopathy.   Neurological:      Mental Status: He is alert.      Cranial Nerves: Cranial nerves 2-12 are intact. No cranial nerve deficit.      Sensory: No sensory deficit.      Motor: Motor function is intact.  No weakness.      Gait: Gait is intact.      Deep Tendon Reflexes: Reflexes normal.      Comments: The patient is oriented x2.   Psychiatric:         Mood and Affect: Mood normal. Mood is not anxious or depressed. Affect is not angry.         Behavior: Behavior is not agitated.         Thought Content: Thought content normal.         Judgment: Judgment normal.     LAB WORK: Laboratory studies reviewed.    Assessment/Plan   Problem List Items Addressed This Visit           ICD-10-CM       Advance Directives and General Issues    At risk for falls Z91.81       Neuro    Parkinson's disease G20.A1    Dementia F03.90       Pulmonary and Pneumonias    Chronic obstructive pulmonary disease (Multi) - Primary J44.9     Other Visit Diagnoses         Codes      Adult failure to thrive     R62.7      Cognitive decline     R41.89      Essential hypertension     I10      Seizure disorder (Multi)     G40.909      Acute myocardial infarction, unspecified MI type, unspecified artery (Multi)     I21.9      Gastroesophageal reflux disease, unspecified whether esophagitis present     K21.9      Schizophrenia, unspecified type     F20.9      Schizoaffective disorder, unspecified type     F25.9        1. COPD, on bronchodilator therapy.  2. Parkinson’s disease, on carbidopa and levodopa.  3. Adult failure to thrive, encouraged to eat.  4. Dementia, unchanged on hospice care.  5. Cognitive decline, on supportive care.  6. Hypertension, med controlled.  7. Seizure disorder, on anti-epileptic.  8. History of AMI, on aspirin.  9. GERD, on PPI.  10. Schizophrenia, on medication.  11. Schizoaffective disorder, on medication.  12. Fall risk, fall precautions.    Scribe Attestation  By signing my name below, I, Venu Garza attest that this documentation has been prepared under the direction and in the presence of Amparo Galarza MD.     All medical record entries made by the scribe were personally dictated by me I have reviewed the chart  and agree the record accurately reflects my personal performance of his history physical examination and management

## 2025-05-05 ENCOUNTER — NURSING HOME VISIT (OUTPATIENT)
Dept: POST ACUTE CARE | Facility: EXTERNAL LOCATION | Age: 65
End: 2025-05-05
Payer: COMMERCIAL

## 2025-05-05 DIAGNOSIS — R41.89 COGNITIVE DECLINE: ICD-10-CM

## 2025-05-05 DIAGNOSIS — I21.9 ACUTE MYOCARDIAL INFARCTION, UNSPECIFIED MI TYPE, UNSPECIFIED ARTERY (MULTI): ICD-10-CM

## 2025-05-05 DIAGNOSIS — R62.7 ADULT FAILURE TO THRIVE: Primary | ICD-10-CM

## 2025-05-05 DIAGNOSIS — F20.9 SCHIZOPHRENIA, UNSPECIFIED TYPE: ICD-10-CM

## 2025-05-05 DIAGNOSIS — F25.9 SCHIZOAFFECTIVE DISORDER, UNSPECIFIED TYPE: ICD-10-CM

## 2025-05-05 DIAGNOSIS — G20.A1 PARKINSON'S DISEASE WITHOUT DYSKINESIA, UNSPECIFIED WHETHER MANIFESTATIONS FLUCTUATE: ICD-10-CM

## 2025-05-05 DIAGNOSIS — J44.9 CHRONIC OBSTRUCTIVE PULMONARY DISEASE, UNSPECIFIED COPD TYPE (MULTI): ICD-10-CM

## 2025-05-05 DIAGNOSIS — G40.909 SEIZURE DISORDER (MULTI): ICD-10-CM

## 2025-05-05 DIAGNOSIS — F03.90 DEMENTIA, UNSPECIFIED DEMENTIA SEVERITY, UNSPECIFIED DEMENTIA TYPE, UNSPECIFIED WHETHER BEHAVIORAL, PSYCHOTIC, OR MOOD DISTURBANCE OR ANXIETY (MULTI): ICD-10-CM

## 2025-05-05 DIAGNOSIS — Z91.81 AT RISK FOR FALLS: ICD-10-CM

## 2025-05-05 DIAGNOSIS — I10 ESSENTIAL HYPERTENSION: ICD-10-CM

## 2025-05-05 DIAGNOSIS — K21.9 GASTROESOPHAGEAL REFLUX DISEASE, UNSPECIFIED WHETHER ESOPHAGITIS PRESENT: ICD-10-CM

## 2025-05-05 PROCEDURE — 99309 SBSQ NF CARE MODERATE MDM 30: CPT | Performed by: INTERNAL MEDICINE

## 2025-05-05 NOTE — LETTER
Patient: Gerardo Albright  : 1960    Encounter Date: 2025    PLACE OF SERVICE:  [facility].    This is a subsequent visit.    Subjective  Patient ID: Gerardo Albright is a 64 y.o. male who presents for Follow-up.    Mr. Gerardo Albright is a 64-year-old male with history of Parkinson's disease and COPD.  He suffers from adult failure to thrive.  He is unable to care for himself.  He requires supportive care.    Review of Systems   Constitutional:  Negative for chills and fever.   Cardiovascular:  Negative for chest pain.   All other systems reviewed and are negative.    Objective  /82   Pulse 84   Temp 36.7 °C (98 °F)   Resp 18     Physical Exam  Vitals reviewed.   Constitutional:       General: He is not in acute distress.     Comments: This is a well-developed, well-nourished male, sitting in a chair.   HENT:      Right Ear: Tympanic membrane, ear canal and external ear normal.      Left Ear: Tympanic membrane, ear canal and external ear normal.   Eyes:      General: No scleral icterus.     Pupils: Pupils are equal, round, and reactive to light.   Neck:      Vascular: No carotid bruit.   Cardiovascular:      Heart sounds: Normal heart sounds, S1 normal and S2 normal. No murmur heard.     No friction rub.   Pulmonary:      Effort: Pulmonary effort is normal.      Breath sounds: Decreased breath sounds (throughout) present.   Abdominal:      Palpations: There is no hepatomegaly, splenomegaly or mass.   Musculoskeletal:         General: No swelling or deformity. Normal range of motion.      Cervical back: Neck supple.      Right lower leg: No edema.      Left lower leg: No edema.   Lymphadenopathy:      Cervical: No cervical adenopathy.      Upper Body:      Right upper body: No axillary adenopathy.      Left upper body: No axillary adenopathy.      Lower Body: No right inguinal adenopathy. No left inguinal adenopathy.   Neurological:      Mental Status: He is alert.      Cranial Nerves:  Cranial nerves 2-12 are intact. No cranial nerve deficit.      Sensory: No sensory deficit.      Motor: Motor function is intact. No weakness.      Gait: Gait is intact.      Deep Tendon Reflexes: Reflexes normal.      Comments: The patient is oriented x2.   Psychiatric:         Mood and Affect: Mood normal. Mood is not anxious or depressed. Affect is not angry.         Behavior: Behavior is not agitated.         Thought Content: Thought content normal.         Judgment: Judgment normal.     LAB WORK: Laboratory studies reviewed.    Assessment/Plan  Problem List Items Addressed This Visit           ICD-10-CM       Advance Directives and General Issues    At risk for falls Z91.81       Neuro    Parkinson's disease G20.A1    Dementia F03.90       Pulmonary and Pneumonias    Chronic obstructive pulmonary disease (Multi) J44.9     Other Visit Diagnoses         Codes      Adult failure to thrive    -  Primary R62.7      Schizophrenia, unspecified type     F20.9      Seizure disorder (Multi)     G40.909      Essential hypertension     I10      Cognitive decline     R41.89      Acute myocardial infarction, unspecified MI type, unspecified artery (Multi)     I21.9      Gastroesophageal reflux disease, unspecified whether esophagitis present     K21.9      Schizoaffective disorder, unspecified type     F25.9        1. Adult failure to thrive, encouraged to eat.  2. Parkinson’s disease, on carbidopa and levodopa.  3. COPD, on bronchodilator therapy.  4. Schizophrenia, on medication.  5. Seizure disorder, on antiepileptic.  6. Hypertension, med controlled.  7. Dementia, unchanged.  8. Cognitive decline, on supportive care.  9. History of AMI, on aspirin.  10. GERD, on PPI.  11. Schizoaffective disorder, on medication.  12. Fall risk, fall precautions.    Scribe Attestation  By signing my name below, Marva SHANNON Scribe attest that this documentation has been prepared under the direction and in the presence of Amparo Galarza  MD.     All medical record entries made by the scribe were personally dictated by me I have reviewed the chart and agree the record accurately reflects my personal performance of his history physical examination and management      Electronically Signed By: Amparo Galarza MD   5/12/25 12:09 AM

## 2025-05-08 VITALS
SYSTOLIC BLOOD PRESSURE: 126 MMHG | RESPIRATION RATE: 18 BRPM | HEART RATE: 84 BPM | TEMPERATURE: 98 F | DIASTOLIC BLOOD PRESSURE: 82 MMHG

## 2025-05-08 ASSESSMENT — ENCOUNTER SYMPTOMS
FEVER: 0
CHILLS: 0

## 2025-05-08 NOTE — PROGRESS NOTES
PLACE OF SERVICE:  [facility].    This is a subsequent visit.    Subjective   Patient ID: Gerardo Albright is a 64 y.o. male who presents for Follow-up.    Mr. Gerardo Albright is a 64-year-old male with history of Parkinson's disease and COPD.  He suffers from adult failure to thrive.  He is unable to care for himself.  He requires supportive care.    Review of Systems   Constitutional:  Negative for chills and fever.   Cardiovascular:  Negative for chest pain.   All other systems reviewed and are negative.    Objective   /82   Pulse 84   Temp 36.7 °C (98 °F)   Resp 18     Physical Exam  Vitals reviewed.   Constitutional:       General: He is not in acute distress.     Comments: This is a well-developed, well-nourished male, sitting in a chair.   HENT:      Right Ear: Tympanic membrane, ear canal and external ear normal.      Left Ear: Tympanic membrane, ear canal and external ear normal.   Eyes:      General: No scleral icterus.     Pupils: Pupils are equal, round, and reactive to light.   Neck:      Vascular: No carotid bruit.   Cardiovascular:      Heart sounds: Normal heart sounds, S1 normal and S2 normal. No murmur heard.     No friction rub.   Pulmonary:      Effort: Pulmonary effort is normal.      Breath sounds: Decreased breath sounds (throughout) present.   Abdominal:      Palpations: There is no hepatomegaly, splenomegaly or mass.   Musculoskeletal:         General: No swelling or deformity. Normal range of motion.      Cervical back: Neck supple.      Right lower leg: No edema.      Left lower leg: No edema.   Lymphadenopathy:      Cervical: No cervical adenopathy.      Upper Body:      Right upper body: No axillary adenopathy.      Left upper body: No axillary adenopathy.      Lower Body: No right inguinal adenopathy. No left inguinal adenopathy.   Neurological:      Mental Status: He is alert.      Cranial Nerves: Cranial nerves 2-12 are intact. No cranial nerve deficit.      Sensory:  No sensory deficit.      Motor: Motor function is intact. No weakness.      Gait: Gait is intact.      Deep Tendon Reflexes: Reflexes normal.      Comments: The patient is oriented x2.   Psychiatric:         Mood and Affect: Mood normal. Mood is not anxious or depressed. Affect is not angry.         Behavior: Behavior is not agitated.         Thought Content: Thought content normal.         Judgment: Judgment normal.     LAB WORK: Laboratory studies reviewed.    Assessment/Plan   Problem List Items Addressed This Visit           ICD-10-CM       Advance Directives and General Issues    At risk for falls Z91.81       Neuro    Parkinson's disease G20.A1    Dementia F03.90       Pulmonary and Pneumonias    Chronic obstructive pulmonary disease (Multi) J44.9     Other Visit Diagnoses         Codes      Adult failure to thrive    -  Primary R62.7      Schizophrenia, unspecified type     F20.9      Seizure disorder (Multi)     G40.909      Essential hypertension     I10      Cognitive decline     R41.89      Acute myocardial infarction, unspecified MI type, unspecified artery (Multi)     I21.9      Gastroesophageal reflux disease, unspecified whether esophagitis present     K21.9      Schizoaffective disorder, unspecified type     F25.9        1. Adult failure to thrive, encouraged to eat.  2. Parkinson’s disease, on carbidopa and levodopa.  3. COPD, on bronchodilator therapy.  4. Schizophrenia, on medication.  5. Seizure disorder, on antiepileptic.  6. Hypertension, med controlled.  7. Dementia, unchanged.  8. Cognitive decline, on supportive care.  9. History of AMI, on aspirin.  10. GERD, on PPI.  11. Schizoaffective disorder, on medication.  12. Fall risk, fall precautions.    Scribe Attestation  By signing my name below, IMarva Scribe attest that this documentation has been prepared under the direction and in the presence of Amparo Galarza MD.     All medical record entries made by the scribe were personally  dictated by me I have reviewed the chart and agree the record accurately reflects my personal performance of his history physical examination and management

## 2025-06-02 ENCOUNTER — NURSING HOME VISIT (OUTPATIENT)
Dept: POST ACUTE CARE | Facility: EXTERNAL LOCATION | Age: 65
End: 2025-06-02
Payer: COMMERCIAL

## 2025-06-02 DIAGNOSIS — I10 ESSENTIAL HYPERTENSION: ICD-10-CM

## 2025-06-02 DIAGNOSIS — F25.9 SCHIZOAFFECTIVE DISORDER, UNSPECIFIED TYPE: ICD-10-CM

## 2025-06-02 DIAGNOSIS — I21.9 ACUTE MYOCARDIAL INFARCTION, UNSPECIFIED MI TYPE, UNSPECIFIED ARTERY (MULTI): ICD-10-CM

## 2025-06-02 DIAGNOSIS — G40.909 SEIZURE DISORDER (MULTI): ICD-10-CM

## 2025-06-02 DIAGNOSIS — R41.89 COGNITIVE DECLINE: ICD-10-CM

## 2025-06-02 DIAGNOSIS — G20.A1 PARKINSON'S DISEASE WITHOUT DYSKINESIA, UNSPECIFIED WHETHER MANIFESTATIONS FLUCTUATE: ICD-10-CM

## 2025-06-02 DIAGNOSIS — K21.9 GASTROESOPHAGEAL REFLUX DISEASE, UNSPECIFIED WHETHER ESOPHAGITIS PRESENT: ICD-10-CM

## 2025-06-02 DIAGNOSIS — Z91.81 AT RISK FOR FALLS: ICD-10-CM

## 2025-06-02 DIAGNOSIS — R62.7 ADULT FAILURE TO THRIVE: ICD-10-CM

## 2025-06-02 DIAGNOSIS — F03.90 DEMENTIA, UNSPECIFIED DEMENTIA SEVERITY, UNSPECIFIED DEMENTIA TYPE, UNSPECIFIED WHETHER BEHAVIORAL, PSYCHOTIC, OR MOOD DISTURBANCE OR ANXIETY (MULTI): ICD-10-CM

## 2025-06-02 DIAGNOSIS — J44.9 CHRONIC OBSTRUCTIVE PULMONARY DISEASE, UNSPECIFIED COPD TYPE (MULTI): Primary | ICD-10-CM

## 2025-06-02 DIAGNOSIS — F20.9 SCHIZOPHRENIA, UNSPECIFIED TYPE: ICD-10-CM

## 2025-06-02 NOTE — LETTER
Patient: Gerardo Albright  : 1960    Encounter Date: 2025    PLACE OF SERVICE:  [facility].    This is a subsequent visit.    Subjective  Patient ID: Gerardo Albright is a 65 y.o. male who presents for Follow-up.    Mr. Gerardo Albright is a 65-year-old male with history of COPD with adult failure to thrive with Parkinson's disease.  He is unable to care for himself and requires supportive care.    Review of Systems   Constitutional:  Negative for chills and fever.   Cardiovascular:  Negative for chest pain.   All other systems reviewed and are negative.    Objective  /82   Pulse 78   Temp 36.7 °C (98 °F)   Resp 18     Physical Exam  Vitals reviewed.   Constitutional:       General: He is not in acute distress.     Comments: This is a well-developed, well-nourished male, sitting in a chair.   HENT:      Right Ear: Tympanic membrane, ear canal and external ear normal.      Left Ear: Tympanic membrane, ear canal and external ear normal.   Eyes:      General: No scleral icterus.     Pupils: Pupils are equal, round, and reactive to light.   Neck:      Vascular: No carotid bruit.   Cardiovascular:      Heart sounds: Normal heart sounds, S1 normal and S2 normal. No murmur heard.     No friction rub.   Pulmonary:      Effort: Pulmonary effort is normal.      Breath sounds: Decreased breath sounds (throughout) present.   Abdominal:      Palpations: There is no hepatomegaly, splenomegaly or mass.   Musculoskeletal:         General: No swelling or deformity. Normal range of motion.      Cervical back: Neck supple.      Right lower leg: No edema.      Left lower leg: No edema.   Lymphadenopathy:      Cervical: No cervical adenopathy.      Upper Body:      Right upper body: No axillary adenopathy.      Left upper body: No axillary adenopathy.      Lower Body: No right inguinal adenopathy. No left inguinal adenopathy.   Neurological:      Mental Status: He is alert.      Cranial Nerves: Cranial nerves  2-12 are intact. No cranial nerve deficit.      Sensory: No sensory deficit.      Motor: Motor function is intact. No weakness.      Gait: Gait is intact.      Deep Tendon Reflexes: Reflexes normal.      Comments: The patient is oriented x2.   Psychiatric:         Mood and Affect: Mood normal. Mood is not anxious or depressed. Affect is not angry.         Behavior: Behavior is not agitated.         Thought Content: Thought content normal.         Judgment: Judgment normal.     LAB WORK:  Laboratory studies were reviewed.    Assessment/Plan  Problem List Items Addressed This Visit           ICD-10-CM    Parkinson's disease G20.A1    Dementia F03.90    Chronic obstructive pulmonary disease (Multi) - Primary J44.9    At risk for falls Z91.81     Other Visit Diagnoses         Codes      Adult failure to thrive     R62.7      Seizure disorder (Multi)     G40.909      Schizophrenia, unspecified type     F20.9      Essential hypertension     I10      Acute myocardial infarction, unspecified MI type, unspecified artery (Multi)     I21.9      Gastroesophageal reflux disease, unspecified whether esophagitis present     K21.9      Schizoaffective disorder, unspecified type     F25.9      Cognitive decline     R41.89        1. COPD, on bronchodilator therapy.  2. Parkinson's disease, on carbidopa and levodopa.  3. Adult failure to thrive.  Encouraged to eat.  4. Dementia, unchanged.  5. Seizure disorder, on antiepileptic.  6. Schizophrenia, on medication.  7. Hypertension, medically controlled.  8. AMI, on aspirin.  9. GERD, on PPI.  10. Schizoaffective disorder, on medication.  11. Cognitive decline, on supportive care.  12. Fall risk, on fall precautions.    Scribe Attestation  By signing my name below, IMerna Scribe attest that this documentation has been prepared under the direction and in the presence of Amparo Galarza MD.     All medical record entries made by the zaheeribbran were personally dictated by me I have  reviewed the chart and agree the record accurately reflects my personal performance of his history physical examination and management      Electronically Signed By: Amparo Galarza MD   6/13/25 10:54 PM

## 2025-06-11 VITALS
RESPIRATION RATE: 18 BRPM | TEMPERATURE: 98 F | HEART RATE: 78 BPM | DIASTOLIC BLOOD PRESSURE: 82 MMHG | SYSTOLIC BLOOD PRESSURE: 120 MMHG

## 2025-06-11 ASSESSMENT — ENCOUNTER SYMPTOMS
CHILLS: 0
FEVER: 0

## 2025-06-11 NOTE — PROGRESS NOTES
PLACE OF SERVICE:  [facility].    This is a subsequent visit.    Subjective   Patient ID: Gerardo Albright is a 65 y.o. male who presents for Follow-up.    Mr. Gerardo Albright is a 65-year-old male with history of COPD with adult failure to thrive with Parkinson's disease.  He is unable to care for himself and requires supportive care.    Review of Systems   Constitutional:  Negative for chills and fever.   Cardiovascular:  Negative for chest pain.   All other systems reviewed and are negative.    Objective   /82   Pulse 78   Temp 36.7 °C (98 °F)   Resp 18     Physical Exam  Vitals reviewed.   Constitutional:       General: He is not in acute distress.     Comments: This is a well-developed, well-nourished male, sitting in a chair.   HENT:      Right Ear: Tympanic membrane, ear canal and external ear normal.      Left Ear: Tympanic membrane, ear canal and external ear normal.   Eyes:      General: No scleral icterus.     Pupils: Pupils are equal, round, and reactive to light.   Neck:      Vascular: No carotid bruit.   Cardiovascular:      Heart sounds: Normal heart sounds, S1 normal and S2 normal. No murmur heard.     No friction rub.   Pulmonary:      Effort: Pulmonary effort is normal.      Breath sounds: Decreased breath sounds (throughout) present.   Abdominal:      Palpations: There is no hepatomegaly, splenomegaly or mass.   Musculoskeletal:         General: No swelling or deformity. Normal range of motion.      Cervical back: Neck supple.      Right lower leg: No edema.      Left lower leg: No edema.   Lymphadenopathy:      Cervical: No cervical adenopathy.      Upper Body:      Right upper body: No axillary adenopathy.      Left upper body: No axillary adenopathy.      Lower Body: No right inguinal adenopathy. No left inguinal adenopathy.   Neurological:      Mental Status: He is alert.      Cranial Nerves: Cranial nerves 2-12 are intact. No cranial nerve deficit.      Sensory: No sensory  deficit.      Motor: Motor function is intact. No weakness.      Gait: Gait is intact.      Deep Tendon Reflexes: Reflexes normal.      Comments: The patient is oriented x2.   Psychiatric:         Mood and Affect: Mood normal. Mood is not anxious or depressed. Affect is not angry.         Behavior: Behavior is not agitated.         Thought Content: Thought content normal.         Judgment: Judgment normal.     LAB WORK:  Laboratory studies were reviewed.    Assessment/Plan   Problem List Items Addressed This Visit           ICD-10-CM    Parkinson's disease G20.A1    Dementia F03.90    Chronic obstructive pulmonary disease (Multi) - Primary J44.9    At risk for falls Z91.81     Other Visit Diagnoses         Codes      Adult failure to thrive     R62.7      Seizure disorder (Multi)     G40.909      Schizophrenia, unspecified type     F20.9      Essential hypertension     I10      Acute myocardial infarction, unspecified MI type, unspecified artery (Multi)     I21.9      Gastroesophageal reflux disease, unspecified whether esophagitis present     K21.9      Schizoaffective disorder, unspecified type     F25.9      Cognitive decline     R41.89        1. COPD, on bronchodilator therapy.  2. Parkinson's disease, on carbidopa and levodopa.  3. Adult failure to thrive.  Encouraged to eat.  4. Dementia, unchanged.  5. Seizure disorder, on antiepileptic.  6. Schizophrenia, on medication.  7. Hypertension, medically controlled.  8. AMI, on aspirin.  9. GERD, on PPI.  10. Schizoaffective disorder, on medication.  11. Cognitive decline, on supportive care.  12. Fall risk, on fall precautions.    Scribe Attestation  By signing my name below, IMerna Scribe attest that this documentation has been prepared under the direction and in the presence of Amparo Galarza MD.     All medical record entries made by the scribbran were personally dictated by me I have reviewed the chart and agree the record accurately reflects my personal  performance of his history physical examination and management

## 2025-07-06 ENCOUNTER — NURSING HOME VISIT (OUTPATIENT)
Dept: POST ACUTE CARE | Facility: EXTERNAL LOCATION | Age: 65
End: 2025-07-06
Payer: COMMERCIAL

## 2025-07-06 DIAGNOSIS — K21.9 GASTROESOPHAGEAL REFLUX DISEASE, UNSPECIFIED WHETHER ESOPHAGITIS PRESENT: ICD-10-CM

## 2025-07-06 DIAGNOSIS — R62.7 ADULT FAILURE TO THRIVE: Primary | ICD-10-CM

## 2025-07-06 DIAGNOSIS — F25.9 SCHIZOAFFECTIVE DISORDER, UNSPECIFIED TYPE: ICD-10-CM

## 2025-07-06 DIAGNOSIS — G20.A1 PARKINSON'S DISEASE WITHOUT DYSKINESIA, UNSPECIFIED WHETHER MANIFESTATIONS FLUCTUATE: ICD-10-CM

## 2025-07-06 DIAGNOSIS — Z91.81 AT RISK FOR FALLS: ICD-10-CM

## 2025-07-06 DIAGNOSIS — I10 ESSENTIAL HYPERTENSION: ICD-10-CM

## 2025-07-06 DIAGNOSIS — G40.909 SEIZURE DISORDER (MULTI): ICD-10-CM

## 2025-07-06 DIAGNOSIS — I21.9 ACUTE MYOCARDIAL INFARCTION, UNSPECIFIED MI TYPE, UNSPECIFIED ARTERY (MULTI): ICD-10-CM

## 2025-07-06 DIAGNOSIS — F03.90 DEMENTIA, UNSPECIFIED DEMENTIA SEVERITY, UNSPECIFIED DEMENTIA TYPE, UNSPECIFIED WHETHER BEHAVIORAL, PSYCHOTIC, OR MOOD DISTURBANCE OR ANXIETY (MULTI): ICD-10-CM

## 2025-07-06 DIAGNOSIS — J44.9 CHRONIC OBSTRUCTIVE PULMONARY DISEASE, UNSPECIFIED COPD TYPE (MULTI): ICD-10-CM

## 2025-07-06 DIAGNOSIS — F20.9 SCHIZOPHRENIA, UNSPECIFIED TYPE: ICD-10-CM

## 2025-07-06 DIAGNOSIS — R41.89 COGNITIVE DECLINE: ICD-10-CM

## 2025-07-06 PROCEDURE — 99309 SBSQ NF CARE MODERATE MDM 30: CPT | Performed by: INTERNAL MEDICINE

## 2025-07-06 NOTE — LETTER
Patient: Gerardo Albright  : 1960    Encounter Date: 2025    PLACE OF SERVICE:  [facility].    This is a subsequent visit.    Subjective  Patient ID: Gerardo Albright is a 65 y.o. male who presents for Follow-up.    Mr. Gerardo Albright is a 65-year-old male with history of adult failure to thrive.  He suffers from COPD and Parkinson's disease.  He is unable to care for himself and requires supportive care.    Review of Systems   Constitutional:  Negative for chills and fever.   Cardiovascular:  Negative for chest pain.   All other systems reviewed and are negative.    Objective  /82   Pulse 76   Temp 36.6 °C (97.8 °F)   Resp 18     Physical Exam  Vitals reviewed.   Constitutional:       General: He is not in acute distress.     Comments: This is a well-developed, well-nourished male, sitting in a chair.   HENT:      Right Ear: Tympanic membrane, ear canal and external ear normal.      Left Ear: Tympanic membrane, ear canal and external ear normal.   Eyes:      General: No scleral icterus.     Pupils: Pupils are equal, round, and reactive to light.   Neck:      Vascular: No carotid bruit.   Cardiovascular:      Heart sounds: Normal heart sounds, S1 normal and S2 normal. No murmur heard.     No friction rub.   Pulmonary:      Effort: Pulmonary effort is normal.      Breath sounds: Decreased breath sounds (throughout) present.   Abdominal:      Palpations: There is no hepatomegaly, splenomegaly or mass.   Musculoskeletal:         General: No swelling or deformity. Normal range of motion.      Cervical back: Neck supple.      Right lower leg: No edema.      Left lower leg: No edema.   Lymphadenopathy:      Cervical: No cervical adenopathy.      Upper Body:      Right upper body: No axillary adenopathy.      Left upper body: No axillary adenopathy.      Lower Body: No right inguinal adenopathy. No left inguinal adenopathy.   Neurological:      Mental Status: He is alert.      Cranial Nerves:  Cranial nerves 2-12 are intact. No cranial nerve deficit.      Sensory: No sensory deficit.      Motor: Motor function is intact. No weakness.      Gait: Gait is intact.      Deep Tendon Reflexes: Reflexes normal.      Comments: The patient is oriented x2.   Psychiatric:         Mood and Affect: Mood normal. Mood is not anxious or depressed. Affect is not angry.         Behavior: Behavior is not agitated.         Thought Content: Thought content normal.         Judgment: Judgment normal.     LAB WORK: Laboratory studies reviewed.    Assessment/Plan  Problem List Items Addressed This Visit           ICD-10-CM    Parkinson's disease G20.A1    Dementia F03.90    Chronic obstructive pulmonary disease (Multi) J44.9    At risk for falls Z91.81     Other Visit Diagnoses         Codes      Adult failure to thrive    -  Primary R62.7      Essential hypertension     I10      Seizure disorder (Multi)     G40.909      Schizophrenia, unspecified type     F20.9      Gastroesophageal reflux disease, unspecified whether esophagitis present     K21.9      Schizoaffective disorder, unspecified type     F25.9      Acute myocardial infarction, unspecified MI type, unspecified artery (Multi)     I21.9      Cognitive decline     R41.89        1. Adult failure to thrive.  2. COPD, on bronchodilator therapy.  3. Parkinson's disease, on carbidopa and levodopa.  4. Hypertension, med controlled.  5. Seizure disorder, on anti-epileptic.  6. Schizophrenia, on medication.  7. Dementia, unchanged.  8. GERD, on PPI.  9. Schizoaffective disorder, on medication.  10. AMI, on aspirin.  11. Cognitive decline, on supportive care.  12. Fall risk, on fall precautions.    Scribe Attestation  By signing my name below, IMerna Scribe attest that this documentation has been prepared under the direction and in the presence of Amparo Galarza MD.     All medical record entries made by the scribe were personally dictated by me I have reviewed the chart  and agree the record accurately reflects my personal performance of his history physical examination and management      Electronically Signed By: Amparo Galarza MD   7/15/25 11:18 PM

## 2025-07-12 VITALS
RESPIRATION RATE: 18 BRPM | DIASTOLIC BLOOD PRESSURE: 82 MMHG | HEART RATE: 76 BPM | SYSTOLIC BLOOD PRESSURE: 126 MMHG | TEMPERATURE: 97.8 F

## 2025-07-12 ASSESSMENT — ENCOUNTER SYMPTOMS
CHILLS: 0
FEVER: 0

## 2025-07-12 NOTE — PROGRESS NOTES
PLACE OF SERVICE:  [facility].    This is a subsequent visit.    Subjective   Patient ID: Gerardo Albright is a 65 y.o. male who presents for Follow-up.    Mr. Gerardo Albright is a 65-year-old male with history of adult failure to thrive.  He suffers from COPD and Parkinson's disease.  He is unable to care for himself and requires supportive care.    Review of Systems   Constitutional:  Negative for chills and fever.   Cardiovascular:  Negative for chest pain.   All other systems reviewed and are negative.    Objective   /82   Pulse 76   Temp 36.6 °C (97.8 °F)   Resp 18     Physical Exam  Vitals reviewed.   Constitutional:       General: He is not in acute distress.     Comments: This is a well-developed, well-nourished male, sitting in a chair.   HENT:      Right Ear: Tympanic membrane, ear canal and external ear normal.      Left Ear: Tympanic membrane, ear canal and external ear normal.   Eyes:      General: No scleral icterus.     Pupils: Pupils are equal, round, and reactive to light.   Neck:      Vascular: No carotid bruit.   Cardiovascular:      Heart sounds: Normal heart sounds, S1 normal and S2 normal. No murmur heard.     No friction rub.   Pulmonary:      Effort: Pulmonary effort is normal.      Breath sounds: Decreased breath sounds (throughout) present.   Abdominal:      Palpations: There is no hepatomegaly, splenomegaly or mass.   Musculoskeletal:         General: No swelling or deformity. Normal range of motion.      Cervical back: Neck supple.      Right lower leg: No edema.      Left lower leg: No edema.   Lymphadenopathy:      Cervical: No cervical adenopathy.      Upper Body:      Right upper body: No axillary adenopathy.      Left upper body: No axillary adenopathy.      Lower Body: No right inguinal adenopathy. No left inguinal adenopathy.   Neurological:      Mental Status: He is alert.      Cranial Nerves: Cranial nerves 2-12 are intact. No cranial nerve deficit.      Sensory:  No sensory deficit.      Motor: Motor function is intact. No weakness.      Gait: Gait is intact.      Deep Tendon Reflexes: Reflexes normal.      Comments: The patient is oriented x2.   Psychiatric:         Mood and Affect: Mood normal. Mood is not anxious or depressed. Affect is not angry.         Behavior: Behavior is not agitated.         Thought Content: Thought content normal.         Judgment: Judgment normal.     LAB WORK: Laboratory studies reviewed.    Assessment/Plan   Problem List Items Addressed This Visit           ICD-10-CM    Parkinson's disease G20.A1    Dementia F03.90    Chronic obstructive pulmonary disease (Multi) J44.9    At risk for falls Z91.81     Other Visit Diagnoses         Codes      Adult failure to thrive    -  Primary R62.7      Essential hypertension     I10      Seizure disorder (Multi)     G40.909      Schizophrenia, unspecified type     F20.9      Gastroesophageal reflux disease, unspecified whether esophagitis present     K21.9      Schizoaffective disorder, unspecified type     F25.9      Acute myocardial infarction, unspecified MI type, unspecified artery (Multi)     I21.9      Cognitive decline     R41.89        1. Adult failure to thrive.  2. COPD, on bronchodilator therapy.  3. Parkinson's disease, on carbidopa and levodopa.  4. Hypertension, med controlled.  5. Seizure disorder, on anti-epileptic.  6. Schizophrenia, on medication.  7. Dementia, unchanged.  8. GERD, on PPI.  9. Schizoaffective disorder, on medication.  10. AMI, on aspirin.  11. Cognitive decline, on supportive care.  12. Fall risk, on fall precautions.    Scribe Attestation  By signing my name below, IMerna Scribe attest that this documentation has been prepared under the direction and in the presence of Amparo Galarza MD.     All medical record entries made by the scribbran were personally dictated by me I have reviewed the chart and agree the record accurately reflects my personal performance of his  history physical examination and management

## 2025-07-20 ENCOUNTER — NURSING HOME VISIT (OUTPATIENT)
Dept: POST ACUTE CARE | Facility: EXTERNAL LOCATION | Age: 65
End: 2025-07-20
Payer: COMMERCIAL

## 2025-07-20 DIAGNOSIS — K21.9 GASTROESOPHAGEAL REFLUX DISEASE, UNSPECIFIED WHETHER ESOPHAGITIS PRESENT: ICD-10-CM

## 2025-07-20 DIAGNOSIS — G20.A1 PARKINSON'S DISEASE WITHOUT DYSKINESIA, UNSPECIFIED WHETHER MANIFESTATIONS FLUCTUATE: ICD-10-CM

## 2025-07-20 DIAGNOSIS — R53.1 WEAKNESS: ICD-10-CM

## 2025-07-20 DIAGNOSIS — F20.9 SCHIZOPHRENIA, UNSPECIFIED TYPE: ICD-10-CM

## 2025-07-20 DIAGNOSIS — I21.9 ACUTE MYOCARDIAL INFARCTION, UNSPECIFIED MI TYPE, UNSPECIFIED ARTERY (MULTI): ICD-10-CM

## 2025-07-20 DIAGNOSIS — G40.909 SEIZURE DISORDER (MULTI): ICD-10-CM

## 2025-07-20 DIAGNOSIS — F25.9 SCHIZOAFFECTIVE DISORDER, UNSPECIFIED TYPE: ICD-10-CM

## 2025-07-20 DIAGNOSIS — F03.90 DEMENTIA, UNSPECIFIED DEMENTIA SEVERITY, UNSPECIFIED DEMENTIA TYPE, UNSPECIFIED WHETHER BEHAVIORAL, PSYCHOTIC, OR MOOD DISTURBANCE OR ANXIETY (MULTI): ICD-10-CM

## 2025-07-20 DIAGNOSIS — R62.7 ADULT FAILURE TO THRIVE: ICD-10-CM

## 2025-07-20 DIAGNOSIS — Z91.81 AT RISK FOR FALLS: ICD-10-CM

## 2025-07-20 DIAGNOSIS — J44.9 CHRONIC OBSTRUCTIVE PULMONARY DISEASE, UNSPECIFIED COPD TYPE (MULTI): Primary | ICD-10-CM

## 2025-07-20 DIAGNOSIS — I10 ESSENTIAL HYPERTENSION: ICD-10-CM

## 2025-07-20 PROCEDURE — 99309 SBSQ NF CARE MODERATE MDM 30: CPT | Performed by: INTERNAL MEDICINE

## 2025-07-20 NOTE — LETTER
Patient: Gerardo Albright  : 1960    Encounter Date: 2025    PLACE OF SERVICE:  [facility]     This is a subsequent visit.    Subjective  Patient ID: Gerardo Albright is a 65 y.o. male who presents for Follow-up.    Mr. Gerardo Albright is a 65-year-old male with history of COPD with Parkinson's disease.  He suffers from dementia and is unable to care for himself.  He requires supportive care.    Review of Systems   Constitutional:  Negative for chills and fever.   Cardiovascular:  Negative for chest pain.   All other systems reviewed and are negative.    Objective  /80   Pulse 78   Temp 36.6 °C (97.9 °F)   Resp 18     Physical Exam  Vitals reviewed.   Constitutional:       General: He is not in acute distress.     Comments: This is a well-developed, well-nourished male, sitting in a chair   HENT:      Right Ear: Tympanic membrane, ear canal and external ear normal.      Left Ear: Tympanic membrane, ear canal and external ear normal.     Eyes:      General: No scleral icterus.     Pupils: Pupils are equal, round, and reactive to light.     Neck:      Vascular: No carotid bruit.     Cardiovascular:      Heart sounds: Normal heart sounds, S1 normal and S2 normal. No murmur heard.     No friction rub.   Pulmonary:      Effort: Pulmonary effort is normal.      Breath sounds: Decreased breath sounds (throughout.) present.   Abdominal:      Palpations: There is no hepatomegaly, splenomegaly or mass.     Musculoskeletal:         General: No swelling or deformity. Normal range of motion.      Cervical back: Neck supple.      Right lower leg: No edema.      Left lower leg: No edema.   Lymphadenopathy:      Cervical: No cervical adenopathy.      Upper Body:      Right upper body: No axillary adenopathy.      Left upper body: No axillary adenopathy.      Lower Body: No right inguinal adenopathy. No left inguinal adenopathy.     Neurological:      Cranial Nerves: Cranial nerves 2-12 are intact. No  cranial nerve deficit.      Sensory: No sensory deficit.      Motor: Motor function is intact. No weakness.      Gait: Gait is intact.      Deep Tendon Reflexes: Reflexes normal.      Comments: The patient is alert and oriented x2.   Psychiatric:         Mood and Affect: Mood normal. Mood is not anxious or depressed. Affect is not angry.         Behavior: Behavior is not agitated.         Thought Content: Thought content normal.         Judgment: Judgment normal.     LAB WORK: Laboratory studies reviewed.    Assessment/Plan  Problem List Items Addressed This Visit           ICD-10-CM    Parkinson's disease G20.A1    Dementia F03.90    Chronic obstructive pulmonary disease (Multi) - Primary J44.9    At risk for falls Z91.81     Other Visit Diagnoses         Codes      Seizure disorder (Multi)     G40.909      Adult failure to thrive     R62.7      Essential hypertension     I10      Schizophrenia, unspecified type     F20.9      Schizoaffective disorder, unspecified type     F25.9      Acute myocardial infarction, unspecified MI type, unspecified artery (Multi)     I21.9      Gastroesophageal reflux disease, unspecified whether esophagitis present     K21.9      Weakness     R53.1        1. COPD, on bronchodilator therapy.  2. Seizure disorder, on anti-epileptic.  3. Parkinson's disease, on carbidopa and levodopa.  4. Adult failure to thrive, encouraged to eat.  5. Hypertension, med controlled.  6. Dementia, unchanged.  7. Schizophrenia, on medication.  8. Schizoaffective disorder, on medication.  9. AMI, on aspirin.  10. GERD, on PPI.  11. Weakness, on PT/OT.  12. Fall risk, fall precautions.    Scribe Attestation  By signing my name below, IMerna Scribe attest that this documentation has been prepared under the direction and in the presence of Amparo Galarza MD.     All medical record entries made by the scribe were personally dictated by me I have reviewed the chart and agree the record accurately  reflects my personal performance of his history physical examination and management      Electronically Signed By: Amparo Galarza MD   7/25/25 12:40 AM

## 2025-07-24 VITALS
RESPIRATION RATE: 18 BRPM | SYSTOLIC BLOOD PRESSURE: 128 MMHG | DIASTOLIC BLOOD PRESSURE: 80 MMHG | TEMPERATURE: 97.9 F | HEART RATE: 78 BPM

## 2025-07-24 ASSESSMENT — ENCOUNTER SYMPTOMS
CHILLS: 0
FEVER: 0

## 2025-07-24 NOTE — PROGRESS NOTES
PLACE OF SERVICE:  [facility]     This is a subsequent visit.    Subjective   Patient ID: Gerardo Albright is a 65 y.o. male who presents for Follow-up.    Mr. Gerardo Albright is a 65-year-old male with history of COPD with Parkinson's disease.  He suffers from dementia and is unable to care for himself.  He requires supportive care.    Review of Systems   Constitutional:  Negative for chills and fever.   Cardiovascular:  Negative for chest pain.   All other systems reviewed and are negative.    Objective   /80   Pulse 78   Temp 36.6 °C (97.9 °F)   Resp 18     Physical Exam  Vitals reviewed.   Constitutional:       General: He is not in acute distress.     Comments: This is a well-developed, well-nourished male, sitting in a chair   HENT:      Right Ear: Tympanic membrane, ear canal and external ear normal.      Left Ear: Tympanic membrane, ear canal and external ear normal.     Eyes:      General: No scleral icterus.     Pupils: Pupils are equal, round, and reactive to light.     Neck:      Vascular: No carotid bruit.     Cardiovascular:      Heart sounds: Normal heart sounds, S1 normal and S2 normal. No murmur heard.     No friction rub.   Pulmonary:      Effort: Pulmonary effort is normal.      Breath sounds: Decreased breath sounds (throughout.) present.   Abdominal:      Palpations: There is no hepatomegaly, splenomegaly or mass.     Musculoskeletal:         General: No swelling or deformity. Normal range of motion.      Cervical back: Neck supple.      Right lower leg: No edema.      Left lower leg: No edema.   Lymphadenopathy:      Cervical: No cervical adenopathy.      Upper Body:      Right upper body: No axillary adenopathy.      Left upper body: No axillary adenopathy.      Lower Body: No right inguinal adenopathy. No left inguinal adenopathy.     Neurological:      Cranial Nerves: Cranial nerves 2-12 are intact. No cranial nerve deficit.      Sensory: No sensory deficit.      Motor: Motor  function is intact. No weakness.      Gait: Gait is intact.      Deep Tendon Reflexes: Reflexes normal.      Comments: The patient is alert and oriented x2.   Psychiatric:         Mood and Affect: Mood normal. Mood is not anxious or depressed. Affect is not angry.         Behavior: Behavior is not agitated.         Thought Content: Thought content normal.         Judgment: Judgment normal.     LAB WORK: Laboratory studies reviewed.    Assessment/Plan   Problem List Items Addressed This Visit           ICD-10-CM    Parkinson's disease G20.A1    Dementia F03.90    Chronic obstructive pulmonary disease (Multi) - Primary J44.9    At risk for falls Z91.81     Other Visit Diagnoses         Codes      Seizure disorder (Multi)     G40.909      Adult failure to thrive     R62.7      Essential hypertension     I10      Schizophrenia, unspecified type     F20.9      Schizoaffective disorder, unspecified type     F25.9      Acute myocardial infarction, unspecified MI type, unspecified artery (Multi)     I21.9      Gastroesophageal reflux disease, unspecified whether esophagitis present     K21.9      Weakness     R53.1        1. COPD, on bronchodilator therapy.  2. Seizure disorder, on anti-epileptic.  3. Parkinson's disease, on carbidopa and levodopa.  4. Adult failure to thrive, encouraged to eat.  5. Hypertension, med controlled.  6. Dementia, unchanged.  7. Schizophrenia, on medication.  8. Schizoaffective disorder, on medication.  9. AMI, on aspirin.  10. GERD, on PPI.  11. Weakness, on PT/OT.  12. Fall risk, fall precautions.    Scribe Attestation  By signing my name below, IMerna Scribe attest that this documentation has been prepared under the direction and in the presence of Amparo Galarza MD.     All medical record entries made by the scribe were personally dictated by me I have reviewed the chart and agree the record accurately reflects my personal performance of his history physical examination and  management

## 2025-07-27 ENCOUNTER — NURSING HOME VISIT (OUTPATIENT)
Dept: POST ACUTE CARE | Facility: EXTERNAL LOCATION | Age: 65
End: 2025-07-27
Payer: COMMERCIAL

## 2025-07-27 DIAGNOSIS — F20.9 SCHIZOPHRENIA, UNSPECIFIED TYPE: ICD-10-CM

## 2025-07-27 DIAGNOSIS — K21.9 GASTROESOPHAGEAL REFLUX DISEASE, UNSPECIFIED WHETHER ESOPHAGITIS PRESENT: ICD-10-CM

## 2025-07-27 DIAGNOSIS — J44.9 CHRONIC OBSTRUCTIVE PULMONARY DISEASE, UNSPECIFIED COPD TYPE (MULTI): ICD-10-CM

## 2025-07-27 DIAGNOSIS — F03.90 DEMENTIA, UNSPECIFIED DEMENTIA SEVERITY, UNSPECIFIED DEMENTIA TYPE, UNSPECIFIED WHETHER BEHAVIORAL, PSYCHOTIC, OR MOOD DISTURBANCE OR ANXIETY (MULTI): ICD-10-CM

## 2025-07-27 DIAGNOSIS — G20.A1 PARKINSON'S DISEASE WITHOUT DYSKINESIA, UNSPECIFIED WHETHER MANIFESTATIONS FLUCTUATE: ICD-10-CM

## 2025-07-27 DIAGNOSIS — R62.7 ADULT FAILURE TO THRIVE: Primary | ICD-10-CM

## 2025-07-27 DIAGNOSIS — I10 ESSENTIAL HYPERTENSION: ICD-10-CM

## 2025-07-27 DIAGNOSIS — Z91.81 AT RISK FOR FALLS: ICD-10-CM

## 2025-07-27 DIAGNOSIS — G40.909 SEIZURE DISORDER (MULTI): ICD-10-CM

## 2025-07-27 DIAGNOSIS — F25.9 SCHIZOAFFECTIVE DISORDER, UNSPECIFIED TYPE: ICD-10-CM

## 2025-07-27 DIAGNOSIS — R53.1 WEAKNESS: ICD-10-CM

## 2025-07-27 DIAGNOSIS — I21.9 ACUTE MYOCARDIAL INFARCTION, UNSPECIFIED MI TYPE, UNSPECIFIED ARTERY (MULTI): ICD-10-CM

## 2025-07-27 NOTE — LETTER
Patient: Gerardo Albright  : 1960    Encounter Date: 2025    PLACE OF SERVICE:  [facility]    This is a subsequent visit.    Subjective  Patient ID: Gerardo Albright is a 65 y.o. male who presents for Follow-up.    Mr. Gerardo Albright is a 65-year-old male with history of adult failure to thrive and Parkinson's disease.  He suffers from COPD.  He is unable to care for himself.  He requires supportive care.    Review of Systems   Constitutional:  Negative for chills and fever.   Cardiovascular:  Negative for chest pain.   All other systems reviewed and are negative.    Objective  /80   Pulse 82   Temp 36.7 °C (98.1 °F)   Resp 18     Physical Exam  Vitals reviewed.   Constitutional:       General: He is not in acute distress.     Comments: This is a well-developed, well-nourished male, sitting in a chair   HENT:      Right Ear: Tympanic membrane, ear canal and external ear normal.      Left Ear: Tympanic membrane, ear canal and external ear normal.     Eyes:      General: No scleral icterus.     Pupils: Pupils are equal, round, and reactive to light.     Neck:      Vascular: No carotid bruit.     Cardiovascular:      Heart sounds: Normal heart sounds, S1 normal and S2 normal. No murmur heard.     No friction rub.   Pulmonary:      Effort: Pulmonary effort is normal.      Breath sounds: Decreased breath sounds (throughout.) present.   Abdominal:      Palpations: There is no hepatomegaly, splenomegaly or mass.     Musculoskeletal:         General: No swelling or deformity. Normal range of motion.      Cervical back: Neck supple.      Right lower leg: No edema.      Left lower leg: No edema.   Lymphadenopathy:      Cervical: No cervical adenopathy.      Upper Body:      Right upper body: No axillary adenopathy.      Left upper body: No axillary adenopathy.      Lower Body: No right inguinal adenopathy. No left inguinal adenopathy.     Neurological:      Cranial Nerves: Cranial nerves 2-12 are  intact. No cranial nerve deficit.      Sensory: No sensory deficit.      Motor: Motor function is intact. No weakness.      Gait: Gait is intact.      Deep Tendon Reflexes: Reflexes normal.      Comments: The patient is alert and oriented x2.   Psychiatric:         Mood and Affect: Mood normal. Mood is not anxious or depressed. Affect is not angry.         Behavior: Behavior is not agitated.         Thought Content: Thought content normal.         Judgment: Judgment normal.     LAB WORK:  Laboratory studies were reviewed.    Assessment/Plan  Problem List Items Addressed This Visit           ICD-10-CM    Parkinson's disease G20.A1    Dementia F03.90    Chronic obstructive pulmonary disease (Multi) J44.9    At risk for falls Z91.81     Other Visit Diagnoses         Codes      Adult failure to thrive    -  Primary R62.7      Seizure disorder (Multi)     G40.909      Schizophrenia, unspecified type     F20.9      Schizoaffective disorder, unspecified type     F25.9      Essential hypertension     I10      Gastroesophageal reflux disease, unspecified whether esophagitis present     K21.9      Acute myocardial infarction, unspecified MI type, unspecified artery (Multi)     I21.9      Weakness     R53.1        1. Adult failure to thrive.  Encouraged to eat.  2. COPD, on bronchodilator therapy.  3. Seizure disorder, on antiepileptic.  4. Parkinson's disease, on carbidopa and levodopa.  5. Schizophrenia, on medication.  6. Schizoaffective disorder, on medication.  7. Hypertension, medically controlled.  8. Dementia, unchanged.  9. GERD, on PPI.  10. AMI, on aspirin.  11. Weakness, on PT/OT.  12. Fall risk, on fall precautions.    Scribe Attestation  By signing my name below, IMerna Scribe attest that this documentation has been prepared under the direction and in the presence of Amparo Galarza MD.     All medical record entries made by the scribbran were personally dictated by me I have reviewed the chart and agree the  record accurately reflects my personal performance of his history physical examination and management      Electronically Signed By: Amparo Galarza MD   8/2/25  4:31 PM

## 2025-07-28 VITALS
RESPIRATION RATE: 18 BRPM | SYSTOLIC BLOOD PRESSURE: 130 MMHG | HEART RATE: 82 BPM | DIASTOLIC BLOOD PRESSURE: 80 MMHG | TEMPERATURE: 98.1 F

## 2025-07-28 ASSESSMENT — ENCOUNTER SYMPTOMS
CHILLS: 0
FEVER: 0

## 2025-07-29 NOTE — PROGRESS NOTES
PLACE OF SERVICE:  [facility]    This is a subsequent visit.    Subjective   Patient ID: Gerardo Albright is a 65 y.o. male who presents for Follow-up.    Mr. Gerardo Albright is a 65-year-old male with history of adult failure to thrive and Parkinson's disease.  He suffers from COPD.  He is unable to care for himself.  He requires supportive care.    Review of Systems   Constitutional:  Negative for chills and fever.   Cardiovascular:  Negative for chest pain.   All other systems reviewed and are negative.    Objective   /80   Pulse 82   Temp 36.7 °C (98.1 °F)   Resp 18     Physical Exam  Vitals reviewed.   Constitutional:       General: He is not in acute distress.     Comments: This is a well-developed, well-nourished male, sitting in a chair   HENT:      Right Ear: Tympanic membrane, ear canal and external ear normal.      Left Ear: Tympanic membrane, ear canal and external ear normal.     Eyes:      General: No scleral icterus.     Pupils: Pupils are equal, round, and reactive to light.     Neck:      Vascular: No carotid bruit.     Cardiovascular:      Heart sounds: Normal heart sounds, S1 normal and S2 normal. No murmur heard.     No friction rub.   Pulmonary:      Effort: Pulmonary effort is normal.      Breath sounds: Decreased breath sounds (throughout.) present.   Abdominal:      Palpations: There is no hepatomegaly, splenomegaly or mass.     Musculoskeletal:         General: No swelling or deformity. Normal range of motion.      Cervical back: Neck supple.      Right lower leg: No edema.      Left lower leg: No edema.   Lymphadenopathy:      Cervical: No cervical adenopathy.      Upper Body:      Right upper body: No axillary adenopathy.      Left upper body: No axillary adenopathy.      Lower Body: No right inguinal adenopathy. No left inguinal adenopathy.     Neurological:      Cranial Nerves: Cranial nerves 2-12 are intact. No cranial nerve deficit.      Sensory: No sensory deficit.       Motor: Motor function is intact. No weakness.      Gait: Gait is intact.      Deep Tendon Reflexes: Reflexes normal.      Comments: The patient is alert and oriented x2.   Psychiatric:         Mood and Affect: Mood normal. Mood is not anxious or depressed. Affect is not angry.         Behavior: Behavior is not agitated.         Thought Content: Thought content normal.         Judgment: Judgment normal.     LAB WORK:  Laboratory studies were reviewed.    Assessment/Plan   Problem List Items Addressed This Visit           ICD-10-CM    Parkinson's disease G20.A1    Dementia F03.90    Chronic obstructive pulmonary disease (Multi) J44.9    At risk for falls Z91.81     Other Visit Diagnoses         Codes      Adult failure to thrive    -  Primary R62.7      Seizure disorder (Multi)     G40.909      Schizophrenia, unspecified type     F20.9      Schizoaffective disorder, unspecified type     F25.9      Essential hypertension     I10      Gastroesophageal reflux disease, unspecified whether esophagitis present     K21.9      Acute myocardial infarction, unspecified MI type, unspecified artery (Multi)     I21.9      Weakness     R53.1        1. Adult failure to thrive.  Encouraged to eat.  2. COPD, on bronchodilator therapy.  3. Seizure disorder, on antiepileptic.  4. Parkinson's disease, on carbidopa and levodopa.  5. Schizophrenia, on medication.  6. Schizoaffective disorder, on medication.  7. Hypertension, medically controlled.  8. Dementia, unchanged.  9. GERD, on PPI.  10. AMI, on aspirin.  11. Weakness, on PT/OT.  12. Fall risk, on fall precautions.    Scribe Attestation  By signing my name below, IMerna Scribe attest that this documentation has been prepared under the direction and in the presence of Amparo Galarza MD.     All medical record entries made by the scribe were personally dictated by me I have reviewed the chart and agree the record accurately reflects my personal performance of his history  physical examination and management

## 2025-08-01 ENCOUNTER — NURSING HOME VISIT (OUTPATIENT)
Dept: POST ACUTE CARE | Facility: EXTERNAL LOCATION | Age: 65
End: 2025-08-01
Payer: COMMERCIAL

## 2025-08-01 DIAGNOSIS — Z91.81 AT RISK FOR FALLS: ICD-10-CM

## 2025-08-01 DIAGNOSIS — I21.9 ACUTE MYOCARDIAL INFARCTION, UNSPECIFIED MI TYPE, UNSPECIFIED ARTERY (MULTI): ICD-10-CM

## 2025-08-01 DIAGNOSIS — F03.90 DEMENTIA, UNSPECIFIED DEMENTIA SEVERITY, UNSPECIFIED DEMENTIA TYPE, UNSPECIFIED WHETHER BEHAVIORAL, PSYCHOTIC, OR MOOD DISTURBANCE OR ANXIETY (MULTI): ICD-10-CM

## 2025-08-01 DIAGNOSIS — F20.9 SCHIZOPHRENIA, UNSPECIFIED TYPE: ICD-10-CM

## 2025-08-01 DIAGNOSIS — G40.909 SEIZURE DISORDER (MULTI): ICD-10-CM

## 2025-08-01 DIAGNOSIS — J44.9 CHRONIC OBSTRUCTIVE PULMONARY DISEASE, UNSPECIFIED COPD TYPE (MULTI): Primary | ICD-10-CM

## 2025-08-01 DIAGNOSIS — R53.1 WEAKNESS: ICD-10-CM

## 2025-08-01 DIAGNOSIS — I10 ESSENTIAL HYPERTENSION: ICD-10-CM

## 2025-08-01 DIAGNOSIS — K21.9 GASTROESOPHAGEAL REFLUX DISEASE, UNSPECIFIED WHETHER ESOPHAGITIS PRESENT: ICD-10-CM

## 2025-08-01 DIAGNOSIS — G20.A1 PARKINSON'S DISEASE WITHOUT DYSKINESIA, UNSPECIFIED WHETHER MANIFESTATIONS FLUCTUATE: ICD-10-CM

## 2025-08-01 DIAGNOSIS — F25.9 SCHIZOAFFECTIVE DISORDER, UNSPECIFIED TYPE: ICD-10-CM

## 2025-08-01 DIAGNOSIS — R62.7 ADULT FAILURE TO THRIVE: ICD-10-CM

## 2025-08-01 PROCEDURE — 99309 SBSQ NF CARE MODERATE MDM 30: CPT | Performed by: INTERNAL MEDICINE

## 2025-08-17 ENCOUNTER — NURSING HOME VISIT (OUTPATIENT)
Dept: POST ACUTE CARE | Facility: EXTERNAL LOCATION | Age: 65
End: 2025-08-17
Payer: COMMERCIAL

## 2025-08-17 DIAGNOSIS — J44.9 CHRONIC OBSTRUCTIVE PULMONARY DISEASE, UNSPECIFIED COPD TYPE (MULTI): Primary | ICD-10-CM

## 2025-08-17 DIAGNOSIS — G40.909 SEIZURE DISORDER (MULTI): ICD-10-CM

## 2025-08-17 DIAGNOSIS — R62.7 ADULT FAILURE TO THRIVE: ICD-10-CM

## 2025-08-17 DIAGNOSIS — F20.9 SCHIZOPHRENIA, UNSPECIFIED TYPE: ICD-10-CM

## 2025-08-17 DIAGNOSIS — I10 ESSENTIAL HYPERTENSION: ICD-10-CM

## 2025-08-17 DIAGNOSIS — F03.90 DEMENTIA, UNSPECIFIED DEMENTIA SEVERITY, UNSPECIFIED DEMENTIA TYPE, UNSPECIFIED WHETHER BEHAVIORAL, PSYCHOTIC, OR MOOD DISTURBANCE OR ANXIETY (MULTI): ICD-10-CM

## 2025-08-17 DIAGNOSIS — R53.1 WEAKNESS: ICD-10-CM

## 2025-08-17 DIAGNOSIS — G20.A1 PARKINSON'S DISEASE WITHOUT DYSKINESIA, UNSPECIFIED WHETHER MANIFESTATIONS FLUCTUATE: ICD-10-CM

## 2025-08-17 DIAGNOSIS — I21.9 ACUTE MYOCARDIAL INFARCTION, UNSPECIFIED MI TYPE, UNSPECIFIED ARTERY (MULTI): ICD-10-CM

## 2025-08-17 DIAGNOSIS — F25.9 SCHIZOAFFECTIVE DISORDER, UNSPECIFIED TYPE: ICD-10-CM

## 2025-08-17 DIAGNOSIS — K21.9 GASTROESOPHAGEAL REFLUX DISEASE, UNSPECIFIED WHETHER ESOPHAGITIS PRESENT: ICD-10-CM

## 2025-08-17 DIAGNOSIS — Z91.81 AT RISK FOR FALLS: ICD-10-CM

## 2025-08-17 PROCEDURE — 99309 SBSQ NF CARE MODERATE MDM 30: CPT | Performed by: INTERNAL MEDICINE

## 2025-08-24 ENCOUNTER — NURSING HOME VISIT (OUTPATIENT)
Dept: POST ACUTE CARE | Facility: EXTERNAL LOCATION | Age: 65
End: 2025-08-24
Payer: COMMERCIAL

## 2025-08-24 DIAGNOSIS — K21.9 GASTROESOPHAGEAL REFLUX DISEASE, UNSPECIFIED WHETHER ESOPHAGITIS PRESENT: ICD-10-CM

## 2025-08-24 DIAGNOSIS — Z91.81 AT RISK FOR FALLS: ICD-10-CM

## 2025-08-24 DIAGNOSIS — F03.90 DEMENTIA, UNSPECIFIED DEMENTIA SEVERITY, UNSPECIFIED DEMENTIA TYPE, UNSPECIFIED WHETHER BEHAVIORAL, PSYCHOTIC, OR MOOD DISTURBANCE OR ANXIETY (MULTI): ICD-10-CM

## 2025-08-24 DIAGNOSIS — G20.A1 PARKINSON'S DISEASE WITHOUT DYSKINESIA, UNSPECIFIED WHETHER MANIFESTATIONS FLUCTUATE: Primary | ICD-10-CM

## 2025-08-24 DIAGNOSIS — I21.9 ACUTE MYOCARDIAL INFARCTION, UNSPECIFIED MI TYPE, UNSPECIFIED ARTERY (MULTI): ICD-10-CM

## 2025-08-24 DIAGNOSIS — G40.909 SEIZURE DISORDER (MULTI): ICD-10-CM

## 2025-08-24 DIAGNOSIS — F25.9 SCHIZOAFFECTIVE DISORDER, UNSPECIFIED TYPE: ICD-10-CM

## 2025-08-24 DIAGNOSIS — I10 ESSENTIAL HYPERTENSION: ICD-10-CM

## 2025-08-24 DIAGNOSIS — R62.7 ADULT FAILURE TO THRIVE: ICD-10-CM

## 2025-08-24 DIAGNOSIS — R53.1 WEAKNESS: ICD-10-CM

## 2025-08-24 DIAGNOSIS — J44.9 CHRONIC OBSTRUCTIVE PULMONARY DISEASE, UNSPECIFIED COPD TYPE (MULTI): ICD-10-CM

## 2025-08-24 DIAGNOSIS — F20.9 SCHIZOPHRENIA, UNSPECIFIED TYPE: ICD-10-CM

## 2025-08-24 PROCEDURE — 99309 SBSQ NF CARE MODERATE MDM 30: CPT | Performed by: INTERNAL MEDICINE

## 2025-08-25 VITALS
HEART RATE: 80 BPM | RESPIRATION RATE: 18 BRPM | DIASTOLIC BLOOD PRESSURE: 80 MMHG | SYSTOLIC BLOOD PRESSURE: 126 MMHG | TEMPERATURE: 98.2 F

## 2025-08-25 VITALS
DIASTOLIC BLOOD PRESSURE: 84 MMHG | TEMPERATURE: 97.9 F | RESPIRATION RATE: 18 BRPM | HEART RATE: 82 BPM | SYSTOLIC BLOOD PRESSURE: 128 MMHG

## 2025-08-25 ASSESSMENT — ENCOUNTER SYMPTOMS
FEVER: 0
CHILLS: 0
FEVER: 0
CHILLS: 0

## 2025-09-01 VITALS
SYSTOLIC BLOOD PRESSURE: 126 MMHG | DIASTOLIC BLOOD PRESSURE: 76 MMHG | HEART RATE: 82 BPM | RESPIRATION RATE: 18 BRPM | TEMPERATURE: 97.8 F

## 2025-09-01 ASSESSMENT — ENCOUNTER SYMPTOMS
CHILLS: 0
FEVER: 0

## 2025-09-04 ENCOUNTER — HOSPITAL ENCOUNTER (INPATIENT)
Facility: HOSPITAL | Age: 65
End: 2025-09-04
Attending: EMERGENCY MEDICINE | Admitting: INTERNAL MEDICINE
Payer: COMMERCIAL

## 2025-09-04 DIAGNOSIS — J69.0 ASPIRATION PNEUMONIA OF BOTH LUNGS, UNSPECIFIED ASPIRATION PNEUMONIA TYPE, UNSPECIFIED PART OF LUNG (MULTI): Primary | ICD-10-CM

## 2025-09-04 LAB
ALBUMIN SERPL BCP-MCNC: 3.3 G/DL (ref 3.4–5)
ALP SERPL-CCNC: 51 U/L (ref 33–136)
ALT SERPL W P-5'-P-CCNC: 15 U/L (ref 10–52)
ANION GAP SERPL CALC-SCNC: 14 MMOL/L (ref 10–20)
APPEARANCE UR: CLEAR
AST SERPL W P-5'-P-CCNC: 13 U/L (ref 9–39)
BASOPHILS # BLD AUTO: 0.04 X10*3/UL (ref 0–0.1)
BASOPHILS NFR BLD AUTO: 0.2 %
BILIRUB SERPL-MCNC: 0.5 MG/DL (ref 0–1.2)
BILIRUB UR STRIP.AUTO-MCNC: NEGATIVE MG/DL
BUN SERPL-MCNC: 21 MG/DL (ref 6–23)
CALCIUM SERPL-MCNC: 8.9 MG/DL (ref 8.6–10.3)
CARDIAC TROPONIN I PNL SERPL HS: 6 NG/L (ref 0–20)
CHLORIDE SERPL-SCNC: 100 MMOL/L (ref 98–107)
CO2 SERPL-SCNC: 26 MMOL/L (ref 21–32)
COLOR UR: YELLOW
CREAT SERPL-MCNC: 0.71 MG/DL (ref 0.5–1.3)
EGFRCR SERPLBLD CKD-EPI 2021: >90 ML/MIN/1.73M*2
EOSINOPHIL # BLD AUTO: 0 X10*3/UL (ref 0–0.7)
EOSINOPHIL NFR BLD AUTO: 0 %
ERYTHROCYTE [DISTWIDTH] IN BLOOD BY AUTOMATED COUNT: 13 % (ref 11.5–14.5)
FLUAV RNA RESP QL NAA+PROBE: NOT DETECTED
FLUBV RNA RESP QL NAA+PROBE: NOT DETECTED
GLUCOSE BLD MANUAL STRIP-MCNC: 90 MG/DL (ref 74–99)
GLUCOSE SERPL-MCNC: 115 MG/DL (ref 74–99)
GLUCOSE UR STRIP.AUTO-MCNC: NORMAL MG/DL
HCT VFR BLD AUTO: 37.8 % (ref 41–52)
HGB BLD-MCNC: 12.5 G/DL (ref 13.5–17.5)
IMM GRANULOCYTES # BLD AUTO: 0.09 X10*3/UL (ref 0–0.7)
IMM GRANULOCYTES NFR BLD AUTO: 0.5 % (ref 0–0.9)
KETONES UR STRIP.AUTO-MCNC: NEGATIVE MG/DL
LACTATE SERPL-SCNC: 1.6 MMOL/L (ref 0.4–2)
LACTATE SERPL-SCNC: 2.2 MMOL/L (ref 0.4–2)
LEUKOCYTE ESTERASE UR QL STRIP.AUTO: NEGATIVE
LYMPHOCYTES # BLD AUTO: 0.73 X10*3/UL (ref 1.2–4.8)
LYMPHOCYTES NFR BLD AUTO: 4.2 %
MCH RBC QN AUTO: 30.9 PG (ref 26–34)
MCHC RBC AUTO-ENTMCNC: 33.1 G/DL (ref 32–36)
MCV RBC AUTO: 93 FL (ref 80–100)
MONOCYTES # BLD AUTO: 1.18 X10*3/UL (ref 0.1–1)
MONOCYTES NFR BLD AUTO: 6.8 %
MRSA DNA SPEC QL NAA+PROBE: NOT DETECTED
NEUTROPHILS # BLD AUTO: 15.24 X10*3/UL (ref 1.2–7.7)
NEUTROPHILS NFR BLD AUTO: 88.3 %
NITRITE UR QL STRIP.AUTO: NEGATIVE
NRBC BLD-RTO: 0 /100 WBCS (ref 0–0)
PH UR STRIP.AUTO: 7 [PH]
PLATELET # BLD AUTO: 355 X10*3/UL (ref 150–450)
POTASSIUM SERPL-SCNC: 4.1 MMOL/L (ref 3.5–5.3)
PROT SERPL-MCNC: 6.5 G/DL (ref 6.4–8.2)
PROT UR STRIP.AUTO-MCNC: NEGATIVE MG/DL
RBC # BLD AUTO: 4.05 X10*6/UL (ref 4.5–5.9)
RBC # UR STRIP.AUTO: NEGATIVE MG/DL
SARS-COV-2 RNA RESP QL NAA+PROBE: NOT DETECTED
SODIUM SERPL-SCNC: 136 MMOL/L (ref 136–145)
SP GR UR STRIP.AUTO: 1.02
UROBILINOGEN UR STRIP.AUTO-MCNC: NORMAL MG/DL
WBC # BLD AUTO: 17.3 X10*3/UL (ref 4.4–11.3)

## 2025-09-04 PROCEDURE — 1200000002 HC GENERAL ROOM WITH TELEMETRY DAILY: Mod: IPSPLIT

## 2025-09-04 PROCEDURE — 87040 BLOOD CULTURE FOR BACTERIA: CPT | Mod: GENLAB | Performed by: EMERGENCY MEDICINE

## 2025-09-04 PROCEDURE — 94664 DEMO&/EVAL PT USE INHALER: CPT | Mod: IPSPLIT

## 2025-09-04 PROCEDURE — 99285 EMERGENCY DEPT VISIT HI MDM: CPT | Performed by: EMERGENCY MEDICINE

## 2025-09-04 PROCEDURE — 85025 COMPLETE CBC W/AUTO DIFF WBC: CPT | Performed by: EMERGENCY MEDICINE

## 2025-09-04 PROCEDURE — 84075 ASSAY ALKALINE PHOSPHATASE: CPT | Performed by: EMERGENCY MEDICINE

## 2025-09-04 PROCEDURE — 2500000004 HC RX 250 GENERAL PHARMACY W/ HCPCS (ALT 636 FOR OP/ED): Performed by: EMERGENCY MEDICINE

## 2025-09-04 PROCEDURE — 82947 ASSAY GLUCOSE BLOOD QUANT: CPT

## 2025-09-04 PROCEDURE — 71045 X-RAY EXAM CHEST 1 VIEW: CPT | Performed by: STUDENT IN AN ORGANIZED HEALTH CARE EDUCATION/TRAINING PROGRAM

## 2025-09-04 PROCEDURE — 36415 COLL VENOUS BLD VENIPUNCTURE: CPT | Performed by: EMERGENCY MEDICINE

## 2025-09-04 PROCEDURE — 2500000004 HC RX 250 GENERAL PHARMACY W/ HCPCS (ALT 636 FOR OP/ED)

## 2025-09-04 PROCEDURE — 84484 ASSAY OF TROPONIN QUANT: CPT | Performed by: EMERGENCY MEDICINE

## 2025-09-04 PROCEDURE — 2500000005 HC RX 250 GENERAL PHARMACY W/O HCPCS: Performed by: EMERGENCY MEDICINE

## 2025-09-04 PROCEDURE — 99223 1ST HOSP IP/OBS HIGH 75: CPT | Performed by: NURSE PRACTITIONER

## 2025-09-04 PROCEDURE — 9420000001 HC RT PATIENT EDUCATION 5 MIN: Mod: IPSPLIT

## 2025-09-04 PROCEDURE — 2500000004 HC RX 250 GENERAL PHARMACY W/ HCPCS (ALT 636 FOR OP/ED): Mod: IPSPLIT | Performed by: NURSE PRACTITIONER

## 2025-09-04 PROCEDURE — 83605 ASSAY OF LACTIC ACID: CPT | Performed by: EMERGENCY MEDICINE

## 2025-09-04 PROCEDURE — 87640 STAPH A DNA AMP PROBE: CPT | Performed by: EMERGENCY MEDICINE

## 2025-09-04 PROCEDURE — 94760 N-INVAS EAR/PLS OXIMETRY 1: CPT

## 2025-09-04 PROCEDURE — 81003 URINALYSIS AUTO W/O SCOPE: CPT | Performed by: EMERGENCY MEDICINE

## 2025-09-04 PROCEDURE — 94760 N-INVAS EAR/PLS OXIMETRY 1: CPT | Mod: IPSPLIT

## 2025-09-04 PROCEDURE — 2500000002 HC RX 250 W HCPCS SELF ADMINISTERED DRUGS (ALT 637 FOR MEDICARE OP, ALT 636 FOR OP/ED): Mod: IPSPLIT | Performed by: NURSE PRACTITIONER

## 2025-09-04 PROCEDURE — 2500000004 HC RX 250 GENERAL PHARMACY W/ HCPCS (ALT 636 FOR OP/ED): Mod: JZ,IPSPLIT | Performed by: NURSE PRACTITIONER

## 2025-09-04 PROCEDURE — 2500000005 HC RX 250 GENERAL PHARMACY W/O HCPCS: Mod: IPSPLIT | Performed by: NURSE PRACTITIONER

## 2025-09-04 PROCEDURE — 2500000004 HC RX 250 GENERAL PHARMACY W/ HCPCS (ALT 636 FOR OP/ED): Mod: IPSPLIT

## 2025-09-04 PROCEDURE — 2500000004 HC RX 250 GENERAL PHARMACY W/ HCPCS (ALT 636 FOR OP/ED): Mod: JZ,TB

## 2025-09-04 PROCEDURE — 94640 AIRWAY INHALATION TREATMENT: CPT | Mod: IPSPLIT

## 2025-09-04 PROCEDURE — 2500000002 HC RX 250 W HCPCS SELF ADMINISTERED DRUGS (ALT 637 FOR MEDICARE OP, ALT 636 FOR OP/ED): Mod: IPSPLIT | Performed by: INTERNAL MEDICINE

## 2025-09-04 PROCEDURE — 87636 SARSCOV2 & INF A&B AMP PRB: CPT | Performed by: EMERGENCY MEDICINE

## 2025-09-04 RX ORDER — DIPHENHYDRAMINE HYDROCHLORIDE 50 MG/ML
12.5 INJECTION, SOLUTION INTRAMUSCULAR; INTRAVENOUS ONCE
Status: COMPLETED | OUTPATIENT
Start: 2025-09-04 | End: 2025-09-04

## 2025-09-04 RX ORDER — ACETAMINOPHEN 500 MG
5 TABLET ORAL NIGHTLY PRN
Status: DISPENSED | OUTPATIENT
Start: 2025-09-04

## 2025-09-04 RX ORDER — CALCIUM CARBONATE 200(500)MG
500 TABLET,CHEWABLE ORAL 4 TIMES DAILY PRN
Status: ACTIVE | OUTPATIENT
Start: 2025-09-04

## 2025-09-04 RX ORDER — SODIUM CHLORIDE 9 MG/ML
75 INJECTION, SOLUTION INTRAVENOUS CONTINUOUS
Status: DISPENSED | OUTPATIENT
Start: 2025-09-04 | End: 2025-09-05

## 2025-09-04 RX ORDER — DIPHENHYDRAMINE HYDROCHLORIDE 50 MG/ML
INJECTION, SOLUTION INTRAMUSCULAR; INTRAVENOUS
Status: COMPLETED
Start: 2025-09-04 | End: 2025-09-04

## 2025-09-04 RX ORDER — ACETAMINOPHEN 325 MG/1
650 TABLET ORAL EVERY 6 HOURS PRN
Status: ON HOLD | COMMUNITY

## 2025-09-04 RX ORDER — IPRATROPIUM BROMIDE AND ALBUTEROL SULFATE 2.5; .5 MG/3ML; MG/3ML
3 SOLUTION RESPIRATORY (INHALATION) 3 TIMES DAILY
Status: DISPENSED | OUTPATIENT
Start: 2025-09-04

## 2025-09-04 RX ORDER — VANCOMYCIN HYDROCHLORIDE 1 G/200ML
INJECTION, SOLUTION INTRAVENOUS
Status: COMPLETED
Start: 2025-09-04 | End: 2025-09-04

## 2025-09-04 RX ORDER — TAMSULOSIN HYDROCHLORIDE 0.4 MG/1
0.4 CAPSULE ORAL DAILY
Status: DISPENSED | OUTPATIENT
Start: 2025-09-04

## 2025-09-04 RX ORDER — PANTOPRAZOLE SODIUM 40 MG/10ML
40 INJECTION, POWDER, LYOPHILIZED, FOR SOLUTION INTRAVENOUS
Status: DISPENSED | OUTPATIENT
Start: 2025-09-05

## 2025-09-04 RX ORDER — AMOXICILLIN 250 MG
1 CAPSULE ORAL NIGHTLY PRN
Status: ACTIVE | OUTPATIENT
Start: 2025-09-04

## 2025-09-04 RX ORDER — ACETAMINOPHEN 160 MG/5ML
650 SOLUTION ORAL EVERY 4 HOURS PRN
Status: DISCONTINUED | OUTPATIENT
Start: 2025-09-04 | End: 2025-09-04

## 2025-09-04 RX ORDER — GABAPENTIN 100 MG/1
100 CAPSULE ORAL 3 TIMES DAILY
Status: DISPENSED | OUTPATIENT
Start: 2025-09-04

## 2025-09-04 RX ORDER — AZITHROMYCIN MONOHYDRATE 500 MG/5ML
INJECTION, POWDER, LYOPHILIZED, FOR SOLUTION INTRAVENOUS
Status: COMPLETED
Start: 2025-09-04 | End: 2025-09-04

## 2025-09-04 RX ORDER — VANCOMYCIN HYDROCHLORIDE 1 G/20ML
INJECTION, POWDER, LYOPHILIZED, FOR SOLUTION INTRAVENOUS DAILY PRN
Status: DISCONTINUED | OUTPATIENT
Start: 2025-09-04 | End: 2025-09-04

## 2025-09-04 RX ORDER — ESCITALOPRAM OXALATE 10 MG/1
20 TABLET ORAL DAILY
Status: DISPENSED | OUTPATIENT
Start: 2025-09-04

## 2025-09-04 RX ORDER — DIVALPROEX SODIUM 125 MG/1
250 TABLET, DELAYED RELEASE ORAL 3 TIMES DAILY
Status: ON HOLD | COMMUNITY

## 2025-09-04 RX ORDER — ENOXAPARIN SODIUM 100 MG/ML
40 INJECTION SUBCUTANEOUS EVERY 24 HOURS
Status: DISPENSED | OUTPATIENT
Start: 2025-09-04

## 2025-09-04 RX ORDER — DIVALPROEX SODIUM 125 MG/1
250 TABLET, DELAYED RELEASE ORAL 3 TIMES DAILY
Status: DISCONTINUED | OUTPATIENT
Start: 2025-09-04 | End: 2025-09-05

## 2025-09-04 RX ORDER — KETOROLAC TROMETHAMINE 15 MG/ML
15 INJECTION, SOLUTION INTRAMUSCULAR; INTRAVENOUS EVERY 6 HOURS PRN
Status: DISPENSED | OUTPATIENT
Start: 2025-09-04 | End: 2025-09-09

## 2025-09-04 RX ORDER — VANCOMYCIN HYDROCHLORIDE 1 G/200ML
1 INJECTION, SOLUTION INTRAVENOUS
Status: COMPLETED | OUTPATIENT
Start: 2025-09-04 | End: 2025-09-04

## 2025-09-04 RX ORDER — SODIUM CHLORIDE 9 MG/ML
10 INJECTION, SOLUTION INTRAVENOUS CONTINUOUS PRN
Status: DISPENSED | OUTPATIENT
Start: 2025-09-04 | End: 2026-09-04

## 2025-09-04 RX ORDER — ACETAMINOPHEN 325 MG/1
650 TABLET ORAL EVERY 4 HOURS PRN
Status: DISCONTINUED | OUTPATIENT
Start: 2025-09-04 | End: 2025-09-04

## 2025-09-04 RX ORDER — KETOROLAC TROMETHAMINE 15 MG/ML
15 INJECTION, SOLUTION INTRAMUSCULAR; INTRAVENOUS ONCE
Status: COMPLETED | OUTPATIENT
Start: 2025-09-04 | End: 2025-09-04

## 2025-09-04 RX ORDER — GUAIFENESIN 600 MG/1
600 TABLET, EXTENDED RELEASE ORAL EVERY 12 HOURS PRN
Status: ACTIVE | OUTPATIENT
Start: 2025-09-04

## 2025-09-04 RX ORDER — ALBUTEROL SULFATE 0.83 MG/ML
2.5 SOLUTION RESPIRATORY (INHALATION) EVERY 2 HOUR PRN
Status: ACTIVE | OUTPATIENT
Start: 2025-09-04

## 2025-09-04 RX ORDER — GUAIFENESIN/DEXTROMETHORPHAN 100-10MG/5
5 SYRUP ORAL EVERY 4 HOURS PRN
Status: ACTIVE | OUTPATIENT
Start: 2025-09-04

## 2025-09-04 RX ORDER — ONDANSETRON HYDROCHLORIDE 2 MG/ML
4 INJECTION, SOLUTION INTRAVENOUS EVERY 8 HOURS PRN
Status: ACTIVE | OUTPATIENT
Start: 2025-09-04

## 2025-09-04 RX ORDER — IPRATROPIUM BROMIDE AND ALBUTEROL SULFATE 2.5; .5 MG/3ML; MG/3ML
3 SOLUTION RESPIRATORY (INHALATION)
Status: DISCONTINUED | OUTPATIENT
Start: 2025-09-04 | End: 2025-09-04

## 2025-09-04 RX ORDER — ACETAMINOPHEN 650 MG/1
650 SUPPOSITORY RECTAL EVERY 4 HOURS PRN
Status: DISCONTINUED | OUTPATIENT
Start: 2025-09-04 | End: 2025-09-04

## 2025-09-04 RX ORDER — PANTOPRAZOLE SODIUM 40 MG/1
40 TABLET, DELAYED RELEASE ORAL
Status: DISPENSED | OUTPATIENT
Start: 2025-09-05

## 2025-09-04 RX ORDER — KETOROLAC TROMETHAMINE 15 MG/ML
INJECTION, SOLUTION INTRAMUSCULAR; INTRAVENOUS
Status: COMPLETED
Start: 2025-09-04 | End: 2025-09-04

## 2025-09-04 RX ORDER — GABAPENTIN 100 MG/1
100 CAPSULE ORAL 3 TIMES DAILY
Status: ON HOLD | COMMUNITY

## 2025-09-04 RX ORDER — ONDANSETRON 4 MG/1
4 TABLET, FILM COATED ORAL EVERY 8 HOURS PRN
Status: ACTIVE | OUTPATIENT
Start: 2025-09-04

## 2025-09-04 RX ADMIN — KETOROLAC TROMETHAMINE 15 MG: 15 INJECTION, SOLUTION INTRAMUSCULAR; INTRAVENOUS at 12:24

## 2025-09-04 RX ADMIN — DEXTROSE 190 MG: 50 INJECTION, SOLUTION INTRAVENOUS at 21:17

## 2025-09-04 RX ADMIN — PIPERACILLIN SODIUM AND TAZOBACTAM SODIUM 4.5 G: 4; .5 INJECTION, SOLUTION INTRAVENOUS at 21:08

## 2025-09-04 RX ADMIN — SODIUM CHLORIDE 75 ML/HR: 0.9 INJECTION, SOLUTION INTRAVENOUS at 15:33

## 2025-09-04 RX ADMIN — ENOXAPARIN SODIUM 40 MG: 40 INJECTION SUBCUTANEOUS at 15:31

## 2025-09-04 RX ADMIN — VANCOMYCIN HYDROCHLORIDE 1 G: 1 INJECTION, SOLUTION INTRAVENOUS at 05:04

## 2025-09-04 RX ADMIN — Medication: at 00:55

## 2025-09-04 RX ADMIN — SODIUM CHLORIDE 2000 ML: 9 INJECTION, SOLUTION INTRAVENOUS at 04:08

## 2025-09-04 RX ADMIN — PIPERACILLIN SODIUM AND TAZOBACTAM SODIUM 4.5 G: 4; .5 INJECTION, SOLUTION INTRAVENOUS at 01:53

## 2025-09-04 RX ADMIN — IPRATROPIUM BROMIDE AND ALBUTEROL SULFATE 3 ML: .5; 3 SOLUTION RESPIRATORY (INHALATION) at 15:45

## 2025-09-04 RX ADMIN — DIPHENHYDRAMINE HYDROCHLORIDE 12.5 MG: 50 INJECTION INTRAMUSCULAR; INTRAVENOUS at 17:58

## 2025-09-04 RX ADMIN — Medication 1 DOSE: at 11:22

## 2025-09-04 RX ADMIN — AZITHROMYCIN 500 MG: 500 INJECTION, POWDER, LYOPHILIZED, FOR SOLUTION INTRAVENOUS at 02:52

## 2025-09-04 RX ADMIN — Medication: at 19:00

## 2025-09-04 RX ADMIN — PIPERACILLIN SODIUM AND TAZOBACTAM SODIUM 4.5 G: 4; .5 INJECTION, SOLUTION INTRAVENOUS at 15:32

## 2025-09-04 RX ADMIN — DEXTROSE 190 MG: 50 INJECTION, SOLUTION INTRAVENOUS at 16:23

## 2025-09-04 RX ADMIN — Medication: at 09:58

## 2025-09-04 RX ADMIN — IPRATROPIUM BROMIDE AND ALBUTEROL SULFATE 3 ML: .5; 3 SOLUTION RESPIRATORY (INHALATION) at 20:40

## 2025-09-04 RX ADMIN — SODIUM CHLORIDE 1000 ML: 9 INJECTION, SOLUTION INTRAVENOUS at 01:12

## 2025-09-04 RX ADMIN — DIPHENHYDRAMINE HYDROCHLORIDE 12.5 MG: 50 INJECTION, SOLUTION INTRAMUSCULAR; INTRAVENOUS at 17:58

## 2025-09-04 RX ADMIN — VANCOMYCIN HYDROCHLORIDE 1 G: 1 INJECTION, SOLUTION INTRAVENOUS at 04:00

## 2025-09-04 RX ADMIN — KETOROLAC TROMETHAMINE 15 MG: 15 INJECTION, SOLUTION INTRAMUSCULAR; INTRAVENOUS at 21:41

## 2025-09-04 SDOH — ECONOMIC STABILITY: FOOD INSECURITY
WITHIN THE PAST 12 MONTHS, YOU WORRIED THAT YOUR FOOD WOULD RUN OUT BEFORE YOU GOT THE MONEY TO BUY MORE.: PATIENT UNABLE TO ANSWER

## 2025-09-04 SDOH — SOCIAL STABILITY: SOCIAL INSECURITY: DOES ANYONE TRY TO KEEP YOU FROM HAVING/CONTACTING OTHER FRIENDS OR DOING THINGS OUTSIDE YOUR HOME?: UNABLE TO ASSESS

## 2025-09-04 SDOH — SOCIAL STABILITY: SOCIAL INSECURITY: ABUSE: ADULT

## 2025-09-04 SDOH — SOCIAL STABILITY: SOCIAL INSECURITY: ARE YOU OR HAVE YOU BEEN THREATENED OR ABUSED PHYSICALLY, EMOTIONALLY, OR SEXUALLY BY ANYONE?: NO

## 2025-09-04 SDOH — ECONOMIC STABILITY: INCOME INSECURITY
IN THE PAST 12 MONTHS HAS THE ELECTRIC, GAS, OIL, OR WATER COMPANY THREATENED TO SHUT OFF SERVICES IN YOUR HOME?: PATIENT UNABLE TO ANSWER

## 2025-09-04 SDOH — SOCIAL STABILITY: SOCIAL INSECURITY: WITHIN THE LAST YEAR, HAVE YOU BEEN AFRAID OF YOUR PARTNER OR EX-PARTNER?: PATIENT UNABLE TO ANSWER

## 2025-09-04 SDOH — SOCIAL STABILITY: SOCIAL INSECURITY: HAVE YOU HAD THOUGHTS OF HARMING ANYONE ELSE?: UNABLE TO ASSESS

## 2025-09-04 SDOH — SOCIAL STABILITY: SOCIAL INSECURITY: HAVE YOU HAD ANY THOUGHTS OF HARMING ANYONE ELSE?: UNABLE TO ASSESS

## 2025-09-04 SDOH — SOCIAL STABILITY: SOCIAL INSECURITY: DO YOU FEEL ANYONE HAS EXPLOITED OR TAKEN ADVANTAGE OF YOU FINANCIALLY OR OF YOUR PERSONAL PROPERTY?: UNABLE TO ASSESS

## 2025-09-04 SDOH — SOCIAL STABILITY: SOCIAL INSECURITY: DO YOU FEEL UNSAFE GOING BACK TO THE PLACE WHERE YOU ARE LIVING?: NO

## 2025-09-04 SDOH — SOCIAL STABILITY: SOCIAL INSECURITY: ARE THERE ANY APPARENT SIGNS OF INJURIES/BEHAVIORS THAT COULD BE RELATED TO ABUSE/NEGLECT?: NO

## 2025-09-04 SDOH — SOCIAL STABILITY: SOCIAL INSECURITY: HAS ANYONE EVER THREATENED TO HURT YOUR FAMILY OR YOUR PETS?: NO

## 2025-09-04 SDOH — SOCIAL STABILITY: SOCIAL INSECURITY: WERE YOU ABLE TO COMPLETE ALL THE BEHAVIORAL HEALTH SCREENINGS?: NO

## 2025-09-04 ASSESSMENT — COGNITIVE AND FUNCTIONAL STATUS - GENERAL
CLIMB 3 TO 5 STEPS WITH RAILING: TOTAL
DRESSING REGULAR LOWER BODY CLOTHING: A LOT
MOVING TO AND FROM BED TO CHAIR: A LOT
CLIMB 3 TO 5 STEPS WITH RAILING: TOTAL
DRESSING REGULAR UPPER BODY CLOTHING: A LITTLE
TOILETING: A LOT
STANDING UP FROM CHAIR USING ARMS: A LOT
TURNING FROM BACK TO SIDE WHILE IN FLAT BAD: A LITTLE
PERSONAL GROOMING: A LITTLE
MOBILITY SCORE: 17
DRESSING REGULAR LOWER BODY CLOTHING: A LITTLE
TOILETING: A LITTLE
WALKING IN HOSPITAL ROOM: TOTAL
DAILY ACTIVITIY SCORE: 18
PATIENT BASELINE BEDBOUND: NO
EATING MEALS: A LITTLE
HELP NEEDED FOR BATHING: A LITTLE
WALKING IN HOSPITAL ROOM: A LITTLE
HELP NEEDED FOR BATHING: A LOT
DRESSING REGULAR UPPER BODY CLOTHING: A LOT
STANDING UP FROM CHAIR USING ARMS: A LITTLE
MOVING TO AND FROM BED TO CHAIR: A LITTLE

## 2025-09-04 ASSESSMENT — ACTIVITIES OF DAILY LIVING (ADL)
ADEQUATE_TO_COMPLETE_ADL: YES
GROOMING: DEPENDENT
FEEDING YOURSELF: NEEDS ASSISTANCE
HEARING - RIGHT EAR: FUNCTIONAL
JUDGMENT_ADEQUATE_SAFELY_COMPLETE_DAILY_ACTIVITIES: NO
LACK_OF_TRANSPORTATION: PATIENT UNABLE TO ANSWER
DRESSING YOURSELF: DEPENDENT
ASSISTIVE_DEVICE: WALKER
BATHING: DEPENDENT
TOILETING: DEPENDENT
HEARING - LEFT EAR: FUNCTIONAL
TOILETING: NEEDS ASSISTANCE
WALKS IN HOME: NEEDS ASSISTANCE
WALKS IN HOME: DEPENDENT
FEEDING YOURSELF: NEEDS ASSISTANCE
PATIENT'S MEMORY ADEQUATE TO SAFELY COMPLETE DAILY ACTIVITIES?: NO

## 2025-09-04 ASSESSMENT — PAIN SCALES - GENERAL
PAINLEVEL_OUTOF10: 5 - MODERATE PAIN
PAINLEVEL_OUTOF10: 2
PAINLEVEL_OUTOF10: 0 - NO PAIN
PAINLEVEL_OUTOF10: 10 - WORST POSSIBLE PAIN

## 2025-09-04 ASSESSMENT — LIFESTYLE VARIABLES
AUDIT-C TOTAL SCORE: -1
AUDIT-C TOTAL SCORE: -1
HOW OFTEN DO YOU HAVE A DRINK CONTAINING ALCOHOL: PATIENT UNABLE TO ANSWER
HOW OFTEN DO YOU HAVE 6 OR MORE DRINKS ON ONE OCCASION: PATIENT UNABLE TO ANSWER
HOW MANY STANDARD DRINKS CONTAINING ALCOHOL DO YOU HAVE ON A TYPICAL DAY: PATIENT UNABLE TO ANSWER
SKIP TO QUESTIONS 9-10: 0

## 2025-09-04 ASSESSMENT — PAIN - FUNCTIONAL ASSESSMENT: PAIN_FUNCTIONAL_ASSESSMENT: 0-10

## 2025-09-05 PROBLEM — R62.7 ADULT FAILURE TO THRIVE SYNDROME: Status: ACTIVE | Noted: 2024-04-29

## 2025-09-05 PROBLEM — N40.1 BENIGN PROSTATIC HYPERPLASIA WITH URINARY RETENTION: Status: ACTIVE | Noted: 2025-09-05

## 2025-09-05 PROBLEM — F25.9 SCHIZOAFFECTIVE DISORDER (MULTI): Status: ACTIVE | Noted: 2017-04-17

## 2025-09-05 PROBLEM — F33.9 RECURRENT MAJOR DEPRESSION: Status: ACTIVE | Noted: 2017-04-17

## 2025-09-05 PROBLEM — R33.8 BENIGN PROSTATIC HYPERPLASIA WITH URINARY RETENTION: Status: ACTIVE | Noted: 2025-09-05

## 2025-09-05 LAB
ANION GAP SERPL CALC-SCNC: 10 MMOL/L (ref 10–20)
ATRIAL RATE: 76 BPM
BUN SERPL-MCNC: 13 MG/DL (ref 6–23)
CALCIUM SERPL-MCNC: 8.4 MG/DL (ref 8.6–10.3)
CHLORIDE SERPL-SCNC: 109 MMOL/L (ref 98–107)
CO2 SERPL-SCNC: 25 MMOL/L (ref 21–32)
CREAT SERPL-MCNC: 0.51 MG/DL (ref 0.5–1.3)
EGFRCR SERPLBLD CKD-EPI 2021: >90 ML/MIN/1.73M*2
ERYTHROCYTE [DISTWIDTH] IN BLOOD BY AUTOMATED COUNT: 13.2 % (ref 11.5–14.5)
GLUCOSE SERPL-MCNC: 82 MG/DL (ref 74–99)
HCT VFR BLD AUTO: 32.1 % (ref 41–52)
HGB BLD-MCNC: 10.2 G/DL (ref 13.5–17.5)
HOLD SPECIMEN: NORMAL
MCH RBC QN AUTO: 30.5 PG (ref 26–34)
MCHC RBC AUTO-ENTMCNC: 31.8 G/DL (ref 32–36)
MCV RBC AUTO: 96 FL (ref 80–100)
NRBC BLD-RTO: 0 /100 WBCS (ref 0–0)
P AXIS: 71 DEGREES
P OFFSET: 213 MS
P ONSET: 160 MS
PLATELET # BLD AUTO: 246 X10*3/UL (ref 150–450)
POTASSIUM SERPL-SCNC: 3.7 MMOL/L (ref 3.5–5.3)
PR INTERVAL: 136 MS
Q ONSET: 228 MS
QRS COUNT: 12 BEATS
QRS DURATION: 88 MS
QT INTERVAL: 390 MS
QTC CALCULATION(BAZETT): 438 MS
QTC FREDERICIA: 421 MS
R AXIS: -10 DEGREES
RBC # BLD AUTO: 3.34 X10*6/UL (ref 4.5–5.9)
SODIUM SERPL-SCNC: 140 MMOL/L (ref 136–145)
T AXIS: 66 DEGREES
T OFFSET: 423 MS
VENTRICULAR RATE: 76 BPM
WBC # BLD AUTO: 10.1 X10*3/UL (ref 4.4–11.3)

## 2025-09-05 PROCEDURE — 2500000004 HC RX 250 GENERAL PHARMACY W/ HCPCS (ALT 636 FOR OP/ED): Mod: JW,IPSPLIT | Performed by: STUDENT IN AN ORGANIZED HEALTH CARE EDUCATION/TRAINING PROGRAM

## 2025-09-05 PROCEDURE — 2500000004 HC RX 250 GENERAL PHARMACY W/ HCPCS (ALT 636 FOR OP/ED): Mod: IPSPLIT | Performed by: INTERNAL MEDICINE

## 2025-09-05 PROCEDURE — 97162 PT EVAL MOD COMPLEX 30 MIN: CPT | Mod: GP,IPSPLIT | Performed by: PHYSICAL THERAPIST

## 2025-09-05 PROCEDURE — 36415 COLL VENOUS BLD VENIPUNCTURE: CPT | Mod: IPSPLIT | Performed by: NURSE PRACTITIONER

## 2025-09-05 PROCEDURE — 2500000001 HC RX 250 WO HCPCS SELF ADMINISTERED DRUGS (ALT 637 FOR MEDICARE OP): Mod: IPSPLIT | Performed by: NURSE PRACTITIONER

## 2025-09-05 PROCEDURE — 85027 COMPLETE CBC AUTOMATED: CPT | Mod: IPSPLIT | Performed by: NURSE PRACTITIONER

## 2025-09-05 PROCEDURE — 2500000004 HC RX 250 GENERAL PHARMACY W/ HCPCS (ALT 636 FOR OP/ED): Mod: IPSPLIT | Performed by: NURSE PRACTITIONER

## 2025-09-05 PROCEDURE — 94640 AIRWAY INHALATION TREATMENT: CPT | Mod: IPSPLIT

## 2025-09-05 PROCEDURE — 1200000002 HC GENERAL ROOM WITH TELEMETRY DAILY: Mod: IPSPLIT

## 2025-09-05 PROCEDURE — 2500000002 HC RX 250 W HCPCS SELF ADMINISTERED DRUGS (ALT 637 FOR MEDICARE OP, ALT 636 FOR OP/ED): Mod: IPSPLIT | Performed by: INTERNAL MEDICINE

## 2025-09-05 PROCEDURE — 2500000004 HC RX 250 GENERAL PHARMACY W/ HCPCS (ALT 636 FOR OP/ED): Mod: JZ,IPSPLIT | Performed by: NURSE PRACTITIONER

## 2025-09-05 PROCEDURE — 2500000005 HC RX 250 GENERAL PHARMACY W/O HCPCS: Mod: IPSPLIT | Performed by: NURSE PRACTITIONER

## 2025-09-05 PROCEDURE — 80048 BASIC METABOLIC PNL TOTAL CA: CPT | Mod: IPSPLIT | Performed by: NURSE PRACTITIONER

## 2025-09-05 PROCEDURE — 99223 1ST HOSP IP/OBS HIGH 75: CPT | Performed by: NURSE PRACTITIONER

## 2025-09-05 PROCEDURE — 97165 OT EVAL LOW COMPLEX 30 MIN: CPT | Mod: GO,IPSPLIT

## 2025-09-05 PROCEDURE — 94760 N-INVAS EAR/PLS OXIMETRY 1: CPT | Mod: IPSPLIT

## 2025-09-05 PROCEDURE — 92610 EVALUATE SWALLOWING FUNCTION: CPT | Mod: GN,IPSPLIT | Performed by: SPEECH-LANGUAGE PATHOLOGIST

## 2025-09-05 RX ORDER — CEFTRIAXONE 2 G/50ML
2 INJECTION, SOLUTION INTRAVENOUS EVERY 24 HOURS
Status: DISPENSED | OUTPATIENT
Start: 2025-09-05

## 2025-09-05 RX ORDER — DIVALPROEX SODIUM 125 MG/1
250 CAPSULE, COATED PELLETS ORAL 3 TIMES DAILY
Status: DISPENSED | OUTPATIENT
Start: 2025-09-05

## 2025-09-05 RX ADMIN — KETOROLAC TROMETHAMINE 15 MG: 15 INJECTION, SOLUTION INTRAMUSCULAR; INTRAVENOUS at 16:22

## 2025-09-05 RX ADMIN — SODIUM CHLORIDE 10 ML/HR: 0.9 INJECTION, SOLUTION INTRAVENOUS at 20:35

## 2025-09-05 RX ADMIN — PIPERACILLIN SODIUM AND TAZOBACTAM SODIUM 4.5 G: 4; .5 INJECTION, SOLUTION INTRAVENOUS at 02:47

## 2025-09-05 RX ADMIN — HYDROMORPHONE HYDROCHLORIDE 0.2 MG: 1 INJECTION, SOLUTION INTRAMUSCULAR; INTRAVENOUS; SUBCUTANEOUS at 11:02

## 2025-09-05 RX ADMIN — GABAPENTIN 100 MG: 100 CAPSULE ORAL at 16:20

## 2025-09-05 RX ADMIN — IPRATROPIUM BROMIDE AND ALBUTEROL SULFATE 3 ML: .5; 3 SOLUTION RESPIRATORY (INHALATION) at 21:42

## 2025-09-05 RX ADMIN — DIVALPROEX SODIUM 250 MG: 125 CAPSULE, COATED PELLETS ORAL at 20:32

## 2025-09-05 RX ADMIN — DIVALPROEX SODIUM 250 MG: 125 CAPSULE, COATED PELLETS ORAL at 16:20

## 2025-09-05 RX ADMIN — SODIUM CHLORIDE 75 ML/HR: 0.9 INJECTION, SOLUTION INTRAVENOUS at 11:09

## 2025-09-05 RX ADMIN — ENOXAPARIN SODIUM 40 MG: 40 INJECTION SUBCUTANEOUS at 16:21

## 2025-09-05 RX ADMIN — DEXTROSE 190 MG: 50 INJECTION, SOLUTION INTRAVENOUS at 03:22

## 2025-09-05 RX ADMIN — IPRATROPIUM BROMIDE AND ALBUTEROL SULFATE 3 ML: .5; 3 SOLUTION RESPIRATORY (INHALATION) at 16:07

## 2025-09-05 RX ADMIN — PANTOPRAZOLE SODIUM 40 MG: 40 INJECTION, POWDER, LYOPHILIZED, FOR SOLUTION INTRAVENOUS at 06:13

## 2025-09-05 RX ADMIN — IPRATROPIUM BROMIDE AND ALBUTEROL SULFATE 3 ML: .5; 3 SOLUTION RESPIRATORY (INHALATION) at 08:56

## 2025-09-05 RX ADMIN — Medication 5 MG: at 20:32

## 2025-09-05 RX ADMIN — AZITHROMYCIN MONOHYDRATE 500 MG: 500 INJECTION, POWDER, LYOPHILIZED, FOR SOLUTION INTRAVENOUS at 11:24

## 2025-09-05 RX ADMIN — CEFTRIAXONE 2 G: 2 INJECTION, SOLUTION INTRAVENOUS at 14:28

## 2025-09-05 RX ADMIN — GABAPENTIN 100 MG: 100 CAPSULE ORAL at 20:32

## 2025-09-05 RX ADMIN — Medication: at 21:42

## 2025-09-05 RX ADMIN — DEXTROSE 190 MG: 50 INJECTION, SOLUTION INTRAVENOUS at 11:09

## 2025-09-05 RX ADMIN — Medication: at 08:56

## 2025-09-05 ASSESSMENT — PAIN - FUNCTIONAL ASSESSMENT
PAIN_FUNCTIONAL_ASSESSMENT: 0-10

## 2025-09-05 ASSESSMENT — COGNITIVE AND FUNCTIONAL STATUS - GENERAL
HELP NEEDED FOR BATHING: TOTAL
PERSONAL GROOMING: TOTAL
STANDING UP FROM CHAIR USING ARMS: TOTAL
TOILETING: TOTAL
MOVING TO AND FROM BED TO CHAIR: TOTAL
PERSONAL GROOMING: A LOT
TOILETING: TOTAL
DRESSING REGULAR LOWER BODY CLOTHING: TOTAL
CLIMB 3 TO 5 STEPS WITH RAILING: TOTAL
WALKING IN HOSPITAL ROOM: TOTAL
TURNING FROM BACK TO SIDE WHILE IN FLAT BAD: TOTAL
CLIMB 3 TO 5 STEPS WITH RAILING: TOTAL
EATING MEALS: A LITTLE
MOVING TO AND FROM BED TO CHAIR: TOTAL
MOBILITY SCORE: 6
STANDING UP FROM CHAIR USING ARMS: TOTAL
EATING MEALS: TOTAL
DRESSING REGULAR UPPER BODY CLOTHING: TOTAL
MOVING FROM LYING ON BACK TO SITTING ON SIDE OF FLAT BED WITH BEDRAILS: TOTAL
DRESSING REGULAR UPPER BODY CLOTHING: A LOT
MOVING FROM LYING ON BACK TO SITTING ON SIDE OF FLAT BED WITH BEDRAILS: TOTAL
DRESSING REGULAR LOWER BODY CLOTHING: TOTAL
TURNING FROM BACK TO SIDE WHILE IN FLAT BAD: TOTAL
HELP NEEDED FOR BATHING: A LOT
MOBILITY SCORE: 6
WALKING IN HOSPITAL ROOM: TOTAL
DAILY ACTIVITIY SCORE: 6
DAILY ACTIVITIY SCORE: 11

## 2025-09-05 ASSESSMENT — PAIN DESCRIPTION - LOCATION: LOCATION: GENERALIZED

## 2025-09-05 ASSESSMENT — ACTIVITIES OF DAILY LIVING (ADL)
LACK_OF_TRANSPORTATION: PATIENT UNABLE TO ANSWER
ADL_ASSISTANCE: NEEDS ASSISTANCE
BATHING_ASSISTANCE: MAXIMAL

## 2025-09-05 ASSESSMENT — PAIN SCALES - GENERAL
PAINLEVEL_OUTOF10: 10 - WORST POSSIBLE PAIN
PAINLEVEL_OUTOF10: 6

## 2025-09-06 LAB
ANION GAP SERPL CALC-SCNC: 10 MMOL/L (ref 10–20)
BUN SERPL-MCNC: 11 MG/DL (ref 6–23)
CALCIUM SERPL-MCNC: 8.8 MG/DL (ref 8.6–10.3)
CHLORIDE SERPL-SCNC: 108 MMOL/L (ref 98–107)
CO2 SERPL-SCNC: 26 MMOL/L (ref 21–32)
CREAT SERPL-MCNC: 0.51 MG/DL (ref 0.5–1.3)
EGFRCR SERPLBLD CKD-EPI 2021: >90 ML/MIN/1.73M*2
ERYTHROCYTE [DISTWIDTH] IN BLOOD BY AUTOMATED COUNT: 12.8 % (ref 11.5–14.5)
GLUCOSE SERPL-MCNC: 90 MG/DL (ref 74–99)
HCT VFR BLD AUTO: 34.5 % (ref 41–52)
HGB BLD-MCNC: 11 G/DL (ref 13.5–17.5)
MCH RBC QN AUTO: 30.2 PG (ref 26–34)
MCHC RBC AUTO-ENTMCNC: 31.9 G/DL (ref 32–36)
MCV RBC AUTO: 95 FL (ref 80–100)
NRBC BLD-RTO: 0 /100 WBCS (ref 0–0)
PLATELET # BLD AUTO: 287 X10*3/UL (ref 150–450)
POTASSIUM SERPL-SCNC: 4 MMOL/L (ref 3.5–5.3)
RBC # BLD AUTO: 3.64 X10*6/UL (ref 4.5–5.9)
SODIUM SERPL-SCNC: 140 MMOL/L (ref 136–145)
WBC # BLD AUTO: 7.8 X10*3/UL (ref 4.4–11.3)

## 2025-09-06 PROCEDURE — 99233 SBSQ HOSP IP/OBS HIGH 50: CPT | Performed by: NURSE PRACTITIONER

## 2025-09-06 PROCEDURE — 85027 COMPLETE CBC AUTOMATED: CPT | Mod: IPSPLIT | Performed by: NURSE PRACTITIONER

## 2025-09-06 PROCEDURE — 36415 COLL VENOUS BLD VENIPUNCTURE: CPT | Mod: IPSPLIT | Performed by: NURSE PRACTITIONER

## 2025-09-06 PROCEDURE — 2500000004 HC RX 250 GENERAL PHARMACY W/ HCPCS (ALT 636 FOR OP/ED): Mod: IPSPLIT | Performed by: NURSE PRACTITIONER

## 2025-09-06 PROCEDURE — 2500000002 HC RX 250 W HCPCS SELF ADMINISTERED DRUGS (ALT 637 FOR MEDICARE OP, ALT 636 FOR OP/ED): Mod: IPSPLIT | Performed by: INTERNAL MEDICINE

## 2025-09-06 PROCEDURE — 2500000005 HC RX 250 GENERAL PHARMACY W/O HCPCS: Mod: IPSPLIT | Performed by: NURSE PRACTITIONER

## 2025-09-06 PROCEDURE — 94760 N-INVAS EAR/PLS OXIMETRY 1: CPT | Mod: IPSPLIT

## 2025-09-06 PROCEDURE — 80048 BASIC METABOLIC PNL TOTAL CA: CPT | Mod: IPSPLIT | Performed by: NURSE PRACTITIONER

## 2025-09-06 PROCEDURE — 94640 AIRWAY INHALATION TREATMENT: CPT | Mod: IPSPLIT

## 2025-09-06 PROCEDURE — 31720 CLEARANCE OF AIRWAYS: CPT | Mod: IPSPLIT

## 2025-09-06 PROCEDURE — 1200000002 HC GENERAL ROOM WITH TELEMETRY DAILY: Mod: IPSPLIT

## 2025-09-06 PROCEDURE — 2500000002 HC RX 250 W HCPCS SELF ADMINISTERED DRUGS (ALT 637 FOR MEDICARE OP, ALT 636 FOR OP/ED): Mod: IPSPLIT | Performed by: NURSE PRACTITIONER

## 2025-09-06 PROCEDURE — 2500000004 HC RX 250 GENERAL PHARMACY W/ HCPCS (ALT 636 FOR OP/ED): Mod: JW,IPSPLIT | Performed by: STUDENT IN AN ORGANIZED HEALTH CARE EDUCATION/TRAINING PROGRAM

## 2025-09-06 PROCEDURE — 2500000001 HC RX 250 WO HCPCS SELF ADMINISTERED DRUGS (ALT 637 FOR MEDICARE OP): Mod: IPSPLIT | Performed by: NURSE PRACTITIONER

## 2025-09-06 RX ORDER — SODIUM CHLORIDE 9 MG/ML
50 INJECTION, SOLUTION INTRAVENOUS CONTINUOUS
Status: DISPENSED | OUTPATIENT
Start: 2025-09-06 | End: 2025-09-08

## 2025-09-06 RX ORDER — ACETAMINOPHEN 650 MG/1
650 SUPPOSITORY RECTAL EVERY 6 HOURS PRN
Status: ACTIVE | OUTPATIENT
Start: 2025-09-06 | End: 2025-09-09

## 2025-09-06 RX ADMIN — AZITHROMYCIN MONOHYDRATE 500 MG: 500 INJECTION, POWDER, LYOPHILIZED, FOR SOLUTION INTRAVENOUS at 11:00

## 2025-09-06 RX ADMIN — HYDROMORPHONE HYDROCHLORIDE 0.2 MG: 1 INJECTION, SOLUTION INTRAMUSCULAR; INTRAVENOUS; SUBCUTANEOUS at 21:03

## 2025-09-06 RX ADMIN — IPRATROPIUM BROMIDE AND ALBUTEROL SULFATE 3 ML: .5; 3 SOLUTION RESPIRATORY (INHALATION) at 08:58

## 2025-09-06 RX ADMIN — DIVALPROEX SODIUM 250 MG: 125 CAPSULE, COATED PELLETS ORAL at 09:35

## 2025-09-06 RX ADMIN — ESCITALOPRAM OXALATE 20 MG: 10 TABLET ORAL at 09:34

## 2025-09-06 RX ADMIN — CEFTRIAXONE 2 G: 2 INJECTION, SOLUTION INTRAVENOUS at 09:35

## 2025-09-06 RX ADMIN — Medication: at 09:01

## 2025-09-06 RX ADMIN — PANTOPRAZOLE SODIUM 40 MG: 40 TABLET, DELAYED RELEASE ORAL at 06:19

## 2025-09-06 RX ADMIN — SODIUM CHLORIDE 50 ML/HR: 0.9 INJECTION, SOLUTION INTRAVENOUS at 12:35

## 2025-09-06 RX ADMIN — IPRATROPIUM BROMIDE AND ALBUTEROL SULFATE 3 ML: .5; 3 SOLUTION RESPIRATORY (INHALATION) at 20:52

## 2025-09-06 RX ADMIN — Medication: at 20:52

## 2025-09-06 RX ADMIN — GABAPENTIN 100 MG: 100 CAPSULE ORAL at 09:35

## 2025-09-06 RX ADMIN — IPRATROPIUM BROMIDE AND ALBUTEROL SULFATE 3 ML: .5; 3 SOLUTION RESPIRATORY (INHALATION) at 14:23

## 2025-09-06 RX ADMIN — ENOXAPARIN SODIUM 40 MG: 40 INJECTION SUBCUTANEOUS at 15:24

## 2025-09-06 RX ADMIN — DEXTROSE 190 MG: 50 INJECTION, SOLUTION INTRAVENOUS at 21:05

## 2025-09-06 RX ADMIN — DEXTROSE 190 MG: 50 INJECTION, SOLUTION INTRAVENOUS at 15:25

## 2025-09-06 RX ADMIN — TAMSULOSIN HYDROCHLORIDE 0.4 MG: 0.4 CAPSULE ORAL at 09:34

## 2025-09-06 RX ADMIN — HYDROMORPHONE HYDROCHLORIDE 0.2 MG: 1 INJECTION, SOLUTION INTRAMUSCULAR; INTRAVENOUS; SUBCUTANEOUS at 17:10

## 2025-09-06 ASSESSMENT — PAIN - FUNCTIONAL ASSESSMENT
PAIN_FUNCTIONAL_ASSESSMENT: WONG-BAKER FACES
PAIN_FUNCTIONAL_ASSESSMENT: 0-10
PAIN_FUNCTIONAL_ASSESSMENT: 0-10
PAIN_FUNCTIONAL_ASSESSMENT: PAINAD (PAIN ASSESSMENT IN ADVANCED DEMENTIA SCALE)

## 2025-09-06 ASSESSMENT — COGNITIVE AND FUNCTIONAL STATUS - GENERAL
PERSONAL GROOMING: A LOT
DRESSING REGULAR UPPER BODY CLOTHING: TOTAL
EATING MEALS: TOTAL
STANDING UP FROM CHAIR USING ARMS: A LOT
TOILETING: A LOT
CLIMB 3 TO 5 STEPS WITH RAILING: TOTAL
HELP NEEDED FOR BATHING: TOTAL
MOVING TO AND FROM BED TO CHAIR: A LOT
WALKING IN HOSPITAL ROOM: TOTAL
DAILY ACTIVITIY SCORE: 8
TURNING FROM BACK TO SIDE WHILE IN FLAT BAD: TOTAL
MOVING FROM LYING ON BACK TO SITTING ON SIDE OF FLAT BED WITH BEDRAILS: TOTAL
DRESSING REGULAR LOWER BODY CLOTHING: TOTAL
MOBILITY SCORE: 8

## 2025-09-06 ASSESSMENT — PAIN SCALES - PAIN ASSESSMENT IN ADVANCED DEMENTIA (PAINAD)
FACIALEXPRESSION: SAD, FRIGHTENED, FROWN
FACIALEXPRESSION: SMILING OR INEXPRESSIVE
BREATHING: NORMAL
BREATHING: NORMAL
CONSOLABILITY: NO NEED TO CONSOLE
BODYLANGUAGE: RELAXED
TOTALSCORE: 1
BODYLANGUAGE: RELAXED
CONSOLABILITY: NO NEED TO CONSOLE
TOTALSCORE: 0

## 2025-09-06 ASSESSMENT — PAIN DESCRIPTION - LOCATION
LOCATION: GENERALIZED
LOCATION: GENERALIZED

## 2025-09-06 ASSESSMENT — PAIN SCALES - GENERAL
PAINLEVEL_OUTOF10: 0 - NO PAIN
PAINLEVEL_OUTOF10: 0 - NO PAIN
PAINLEVEL_OUTOF10: 8
PAINLEVEL_OUTOF10: 10 - WORST POSSIBLE PAIN

## 2025-09-06 ASSESSMENT — PAIN DESCRIPTION - DESCRIPTORS: DESCRIPTORS: ACHING;SORE

## 2025-09-06 ASSESSMENT — PAIN SCALES - WONG BAKER: WONGBAKER_NUMERICALRESPONSE: NO HURT

## 2025-09-07 VITALS
OXYGEN SATURATION: 90 % | WEIGHT: 152.78 LBS | HEART RATE: 59 BPM | TEMPERATURE: 97.5 F | HEIGHT: 72 IN | SYSTOLIC BLOOD PRESSURE: 129 MMHG | RESPIRATION RATE: 16 BRPM | DIASTOLIC BLOOD PRESSURE: 63 MMHG | BODY MASS INDEX: 20.69 KG/M2

## 2025-09-07 LAB
ANION GAP SERPL CALC-SCNC: 13 MMOL/L (ref 10–20)
BACTERIA BLD CULT: NORMAL
BACTERIA BLD CULT: NORMAL
BUN SERPL-MCNC: 12 MG/DL (ref 6–23)
CALCIUM SERPL-MCNC: 8.5 MG/DL (ref 8.6–10.3)
CHLORIDE SERPL-SCNC: 106 MMOL/L (ref 98–107)
CO2 SERPL-SCNC: 24 MMOL/L (ref 21–32)
CREAT SERPL-MCNC: 0.45 MG/DL (ref 0.5–1.3)
EGFRCR SERPLBLD CKD-EPI 2021: >90 ML/MIN/1.73M*2
ERYTHROCYTE [DISTWIDTH] IN BLOOD BY AUTOMATED COUNT: 12.6 % (ref 11.5–14.5)
GLUCOSE SERPL-MCNC: 77 MG/DL (ref 74–99)
HCT VFR BLD AUTO: 34.4 % (ref 41–52)
HGB BLD-MCNC: 11 G/DL (ref 13.5–17.5)
MCH RBC QN AUTO: 30.5 PG (ref 26–34)
MCHC RBC AUTO-ENTMCNC: 32 G/DL (ref 32–36)
MCV RBC AUTO: 95 FL (ref 80–100)
NRBC BLD-RTO: 0 /100 WBCS (ref 0–0)
PLATELET # BLD AUTO: 257 X10*3/UL (ref 150–450)
POTASSIUM SERPL-SCNC: 4 MMOL/L (ref 3.5–5.3)
RBC # BLD AUTO: 3.61 X10*6/UL (ref 4.5–5.9)
SODIUM SERPL-SCNC: 139 MMOL/L (ref 136–145)
WBC # BLD AUTO: 5.8 X10*3/UL (ref 4.4–11.3)

## 2025-09-07 PROCEDURE — 2500000002 HC RX 250 W HCPCS SELF ADMINISTERED DRUGS (ALT 637 FOR MEDICARE OP, ALT 636 FOR OP/ED): Mod: IPSPLIT | Performed by: INTERNAL MEDICINE

## 2025-09-07 PROCEDURE — 2500000004 HC RX 250 GENERAL PHARMACY W/ HCPCS (ALT 636 FOR OP/ED): Mod: IPSPLIT | Performed by: NURSE PRACTITIONER

## 2025-09-07 PROCEDURE — 80048 BASIC METABOLIC PNL TOTAL CA: CPT | Mod: IPSPLIT | Performed by: NURSE PRACTITIONER

## 2025-09-07 PROCEDURE — 2500000005 HC RX 250 GENERAL PHARMACY W/O HCPCS: Mod: IPSPLIT | Performed by: NURSE PRACTITIONER

## 2025-09-07 PROCEDURE — 94640 AIRWAY INHALATION TREATMENT: CPT | Mod: IPSPLIT

## 2025-09-07 PROCEDURE — 94760 N-INVAS EAR/PLS OXIMETRY 1: CPT | Mod: IPSPLIT

## 2025-09-07 PROCEDURE — 36415 COLL VENOUS BLD VENIPUNCTURE: CPT | Mod: IPSPLIT | Performed by: NURSE PRACTITIONER

## 2025-09-07 PROCEDURE — 85027 COMPLETE CBC AUTOMATED: CPT | Mod: IPSPLIT | Performed by: NURSE PRACTITIONER

## 2025-09-07 PROCEDURE — 1200000002 HC GENERAL ROOM WITH TELEMETRY DAILY: Mod: IPSPLIT

## 2025-09-07 PROCEDURE — 2500000004 HC RX 250 GENERAL PHARMACY W/ HCPCS (ALT 636 FOR OP/ED): Mod: IPSPLIT | Performed by: STUDENT IN AN ORGANIZED HEALTH CARE EDUCATION/TRAINING PROGRAM

## 2025-09-07 RX ADMIN — IPRATROPIUM BROMIDE AND ALBUTEROL SULFATE 3 ML: .5; 3 SOLUTION RESPIRATORY (INHALATION) at 09:11

## 2025-09-07 RX ADMIN — SODIUM CHLORIDE 50 ML/HR: 0.9 INJECTION, SOLUTION INTRAVENOUS at 11:33

## 2025-09-07 RX ADMIN — Medication: at 14:22

## 2025-09-07 RX ADMIN — IPRATROPIUM BROMIDE AND ALBUTEROL SULFATE 3 ML: .5; 3 SOLUTION RESPIRATORY (INHALATION) at 14:19

## 2025-09-07 RX ADMIN — ENOXAPARIN SODIUM 40 MG: 40 INJECTION SUBCUTANEOUS at 16:23

## 2025-09-07 RX ADMIN — PANTOPRAZOLE SODIUM 40 MG: 40 INJECTION, POWDER, LYOPHILIZED, FOR SOLUTION INTRAVENOUS at 06:24

## 2025-09-07 RX ADMIN — KETOROLAC TROMETHAMINE 15 MG: 15 INJECTION, SOLUTION INTRAMUSCULAR; INTRAVENOUS at 00:01

## 2025-09-07 RX ADMIN — Medication: at 20:45

## 2025-09-07 RX ADMIN — DEXTROSE 190 MG: 50 INJECTION, SOLUTION INTRAVENOUS at 21:12

## 2025-09-07 RX ADMIN — HYDROMORPHONE HYDROCHLORIDE 0.2 MG: 1 INJECTION, SOLUTION INTRAMUSCULAR; INTRAVENOUS; SUBCUTANEOUS at 16:26

## 2025-09-07 RX ADMIN — IPRATROPIUM BROMIDE AND ALBUTEROL SULFATE 3 ML: .5; 3 SOLUTION RESPIRATORY (INHALATION) at 20:45

## 2025-09-07 RX ADMIN — Medication: at 09:13

## 2025-09-07 RX ADMIN — DEXTROSE 190 MG: 50 INJECTION, SOLUTION INTRAVENOUS at 03:14

## 2025-09-07 RX ADMIN — HYDROMORPHONE HYDROCHLORIDE 0.2 MG: 1 INJECTION, SOLUTION INTRAMUSCULAR; INTRAVENOUS; SUBCUTANEOUS at 09:56

## 2025-09-07 RX ADMIN — KETOROLAC TROMETHAMINE 15 MG: 15 INJECTION, SOLUTION INTRAMUSCULAR; INTRAVENOUS at 13:50

## 2025-09-07 RX ADMIN — HYDROMORPHONE HYDROCHLORIDE 0.2 MG: 1 INJECTION, SOLUTION INTRAMUSCULAR; INTRAVENOUS; SUBCUTANEOUS at 21:12

## 2025-09-07 RX ADMIN — AZITHROMYCIN MONOHYDRATE 500 MG: 500 INJECTION, POWDER, LYOPHILIZED, FOR SOLUTION INTRAVENOUS at 11:33

## 2025-09-07 RX ADMIN — KETOROLAC TROMETHAMINE 15 MG: 15 INJECTION, SOLUTION INTRAMUSCULAR; INTRAVENOUS at 06:28

## 2025-09-07 RX ADMIN — CEFTRIAXONE 2 G: 2 INJECTION, SOLUTION INTRAVENOUS at 08:29

## 2025-09-07 RX ADMIN — DEXTROSE 190 MG: 50 INJECTION, SOLUTION INTRAVENOUS at 09:57

## 2025-09-07 RX ADMIN — DEXTROSE 190 MG: 50 INJECTION, SOLUTION INTRAVENOUS at 16:23

## 2025-09-07 ASSESSMENT — PAIN DESCRIPTION - LOCATION
LOCATION: GENERALIZED
LOCATION: GENERALIZED
LOCATION: HIP
LOCATION: HIP
LOCATION: GENERALIZED
LOCATION: GENERALIZED

## 2025-09-07 ASSESSMENT — PAIN - FUNCTIONAL ASSESSMENT
PAIN_FUNCTIONAL_ASSESSMENT: 0-10
PAIN_FUNCTIONAL_ASSESSMENT: WONG-BAKER FACES
PAIN_FUNCTIONAL_ASSESSMENT: 0-10

## 2025-09-07 ASSESSMENT — PAIN SCALES - PAIN ASSESSMENT IN ADVANCED DEMENTIA (PAINAD)
TOTALSCORE: 0
FACIALEXPRESSION: SMILING OR INEXPRESSIVE
TOTALSCORE: 0
BREATHING: NORMAL
BODYLANGUAGE: RELAXED
BREATHING: NORMAL
CONSOLABILITY: NO NEED TO CONSOLE
CONSOLABILITY: NO NEED TO CONSOLE
FACIALEXPRESSION: SMILING OR INEXPRESSIVE
BODYLANGUAGE: RELAXED

## 2025-09-07 ASSESSMENT — PAIN SCALES - GENERAL
PAINLEVEL_OUTOF10: 5 - MODERATE PAIN
PAINLEVEL_OUTOF10: 0 - NO PAIN
PAINLEVEL_OUTOF10: 0 - NO PAIN
PAINLEVEL_OUTOF10: 6
PAINLEVEL_OUTOF10: 10 - WORST POSSIBLE PAIN
PAINLEVEL_OUTOF10: 6
PAINLEVEL_OUTOF10: 0 - NO PAIN
PAINLEVEL_OUTOF10: 10 - WORST POSSIBLE PAIN
PAINLEVEL_OUTOF10: 6
PAINLEVEL_OUTOF10: 8

## 2025-09-07 ASSESSMENT — COGNITIVE AND FUNCTIONAL STATUS - GENERAL
WALKING IN HOSPITAL ROOM: TOTAL
DRESSING REGULAR LOWER BODY CLOTHING: TOTAL
HELP NEEDED FOR BATHING: TOTAL
CLIMB 3 TO 5 STEPS WITH RAILING: TOTAL
EATING MEALS: TOTAL
MOBILITY SCORE: 8
STANDING UP FROM CHAIR USING ARMS: A LOT
MOVING TO AND FROM BED TO CHAIR: A LOT
PERSONAL GROOMING: A LOT
DRESSING REGULAR UPPER BODY CLOTHING: TOTAL
DAILY ACTIVITIY SCORE: 8
TURNING FROM BACK TO SIDE WHILE IN FLAT BAD: TOTAL
TOILETING: A LOT
MOVING FROM LYING ON BACK TO SITTING ON SIDE OF FLAT BED WITH BEDRAILS: TOTAL

## 2025-09-07 ASSESSMENT — PAIN SCALES - WONG BAKER
WONGBAKER_NUMERICALRESPONSE: NO HURT

## 2025-09-07 ASSESSMENT — PAIN DESCRIPTION - DESCRIPTORS
DESCRIPTORS: ACHING
DESCRIPTORS: ACHING;DISCOMFORT;NAGGING
DESCRIPTORS: ACHING;SORE
DESCRIPTORS: ACHING;SORE
DESCRIPTORS: ACHING;DISCOMFORT;NAGGING

## 2025-09-07 ASSESSMENT — PAIN DESCRIPTION - ORIENTATION
ORIENTATION: RIGHT
ORIENTATION: RIGHT